# Patient Record
Sex: MALE | Race: WHITE | NOT HISPANIC OR LATINO | URBAN - METROPOLITAN AREA
[De-identification: names, ages, dates, MRNs, and addresses within clinical notes are randomized per-mention and may not be internally consistent; named-entity substitution may affect disease eponyms.]

---

## 2017-08-17 ENCOUNTER — EMERGENCY (EMERGENCY)
Facility: HOSPITAL | Age: 72
LOS: 1 days | Discharge: HOME | End: 2017-08-17
Payer: MEDICARE

## 2017-08-17 VITALS
TEMPERATURE: 98 F | HEART RATE: 77 BPM | DIASTOLIC BLOOD PRESSURE: 56 MMHG | SYSTOLIC BLOOD PRESSURE: 123 MMHG | HEIGHT: 66 IN | WEIGHT: 177.91 LBS | RESPIRATION RATE: 18 BRPM | OXYGEN SATURATION: 97 %

## 2017-08-17 PROCEDURE — 99284 EMERGENCY DEPT VISIT MOD MDM: CPT

## 2017-08-17 PROCEDURE — 99283 EMERGENCY DEPT VISIT LOW MDM: CPT | Mod: 25

## 2017-08-17 PROCEDURE — 96372 THER/PROPH/DIAG INJ SC/IM: CPT

## 2017-08-17 RX ORDER — DEXAMETHASONE 0.5 MG/5ML
10 ELIXIR ORAL ONCE
Qty: 0 | Refills: 0 | Status: DISCONTINUED | OUTPATIENT
Start: 2017-08-17 | End: 2017-08-17

## 2017-08-17 RX ORDER — DEXAMETHASONE 0.5 MG/5ML
2 ELIXIR ORAL ONCE
Qty: 0 | Refills: 0 | Status: COMPLETED | OUTPATIENT
Start: 2017-08-17 | End: 2017-08-17

## 2017-08-17 RX ADMIN — Medication 600 MILLIGRAM(S): at 06:54

## 2017-08-17 RX ADMIN — Medication 2 MILLIGRAM(S): at 06:54

## 2017-08-17 NOTE — ED PROVIDER NOTE - RIGHT EAR
benign ear exam, no external ear tenderness, no mastoid tenderness, TM clear, no erythema or bulging/TM clear

## 2017-08-17 NOTE — ED PROVIDER NOTE - ATTENDING CONTRIBUTION TO CARE
73 yo male with chronic ear pain currently being treated for an acute otitis by his PMD. I personally saw the patient with the PA, and completed the key components of the history and physical exam. I then discussed the management plan with the PA.

## 2017-08-17 NOTE — ED PROVIDER NOTE - PROGRESS NOTE DETAILS
Case d/w ED attending. Pt received Decadron IM to decrease mild throat inflammation and Mucinex po. Rx sent for Mucinex po. Pt advised to follow up with PMD within 2 days. Pt advised to return to ED w worsening symptoms, fevers/chills, dizziness, worsening ear pain or drainage. Pt advised to take prescribed meds to completion. NAD at discharge.

## 2017-08-17 NOTE — ED PROVIDER NOTE - OBJECTIVE STATEMENT
71 y/o male presents to ED w cc right ear pain. Pt reports that pain started ten days ago. Pt went to his PMD, was prescribed oral Cipro, Ofloxacin ophthalmic and Dexamethasone ophthalmic. Pt has been taking medications for 6 days. States pain has decreased from 10/10 to 4/10. Pt reports he feels nervous because he is leaving for [place] in one week and will be visiting there for three months. He came to ED due to he wants an injection that his PMD told him about. Pt admits associated symptom of mucous production and right sided throat pain. Pt denies fevers/chills, nausea/vomiting, hearing loss, ear drainage, headache, dizziness.

## 2017-08-17 NOTE — ED PROVIDER NOTE - THROAT FINDINGS
no exudate/no redness/uvula midline/no vesicles/TONSILLAR SWELLING/mild peritonsillar edema and erythema, no abscess,

## 2022-06-14 PROBLEM — E11.9 TYPE 2 DIABETES MELLITUS WITHOUT COMPLICATIONS: Chronic | Status: ACTIVE | Noted: 2017-08-17

## 2022-07-15 ENCOUNTER — APPOINTMENT (OUTPATIENT)
Dept: COLORECTAL SURGERY | Facility: CLINIC | Age: 77
End: 2022-07-15

## 2022-07-15 VITALS
HEART RATE: 81 BPM | WEIGHT: 126.25 LBS | DIASTOLIC BLOOD PRESSURE: 71 MMHG | SYSTOLIC BLOOD PRESSURE: 155 MMHG | OXYGEN SATURATION: 99 %

## 2022-07-15 DIAGNOSIS — F41.9 ANXIETY DISORDER, UNSPECIFIED: ICD-10-CM

## 2022-07-15 DIAGNOSIS — K64.5 PERIANAL VENOUS THROMBOSIS: ICD-10-CM

## 2022-07-15 DIAGNOSIS — Z78.9 OTHER SPECIFIED HEALTH STATUS: ICD-10-CM

## 2022-07-15 DIAGNOSIS — F32.A ANXIETY DISORDER, UNSPECIFIED: ICD-10-CM

## 2022-07-15 DIAGNOSIS — Z86.79 PERSONAL HISTORY OF OTHER DISEASES OF THE CIRCULATORY SYSTEM: ICD-10-CM

## 2022-07-15 DIAGNOSIS — Z86.39 PERSONAL HISTORY OF OTHER ENDOCRINE, NUTRITIONAL AND METABOLIC DISEASE: ICD-10-CM

## 2022-07-15 PROCEDURE — 99203 OFFICE O/P NEW LOW 30 MIN: CPT | Mod: 25

## 2022-07-15 PROCEDURE — 46600 DIAGNOSTIC ANOSCOPY SPX: CPT

## 2022-07-15 RX ORDER — BUSPIRONE HYDROCHLORIDE 10 MG/1
10 TABLET ORAL
Qty: 60 | Refills: 0 | Status: ACTIVE | COMMUNITY
Start: 2022-04-12

## 2022-07-15 RX ORDER — PANTOPRAZOLE 40 MG/1
40 TABLET, DELAYED RELEASE ORAL
Qty: 90 | Refills: 0 | Status: ACTIVE | COMMUNITY
Start: 2022-04-12

## 2022-07-15 RX ORDER — QUETIAPINE FUMARATE 100 MG/1
100 TABLET ORAL
Qty: 30 | Refills: 0 | Status: ACTIVE | COMMUNITY
Start: 2022-04-26

## 2022-07-15 RX ORDER — SERTRALINE HYDROCHLORIDE 100 MG/1
100 TABLET, FILM COATED ORAL
Qty: 30 | Refills: 0 | Status: ACTIVE | COMMUNITY
Start: 2022-04-12

## 2022-07-15 RX ORDER — GLIMEPIRIDE 4 MG/1
4 TABLET ORAL
Qty: 90 | Refills: 0 | Status: ACTIVE | COMMUNITY
Start: 2021-11-30

## 2022-07-15 RX ORDER — HYDROCORTISONE 10 MG/G
1 CREAM TOPICAL
Qty: 28 | Refills: 0 | Status: ACTIVE | COMMUNITY
Start: 2022-06-08

## 2022-07-15 RX ORDER — POLYETHYLENE GLYCOL-3350 AND ELECTROLYTES WITH FLAVOR PACK 240; 5.84; 2.98; 6.72; 22.72 G/278.26G; G/278.26G; G/278.26G; G/278.26G; G/278.26G
240 POWDER, FOR SOLUTION ORAL
Qty: 4000 | Refills: 0 | Status: COMPLETED | COMMUNITY
Start: 2022-04-08

## 2022-07-15 RX ORDER — AMOXICILLIN 500 MG/1
500 CAPSULE ORAL
Qty: 84 | Refills: 0 | Status: COMPLETED | COMMUNITY
Start: 2022-04-29

## 2022-07-15 RX ORDER — QUETIAPINE FUMARATE 50 MG/1
50 TABLET ORAL
Qty: 30 | Refills: 0 | Status: ACTIVE | COMMUNITY
Start: 2022-07-05

## 2022-07-15 RX ORDER — SIMVASTATIN 20 MG/1
20 TABLET, FILM COATED ORAL
Qty: 90 | Refills: 0 | Status: ACTIVE | COMMUNITY
Start: 2022-04-28

## 2022-07-15 RX ORDER — CLOBETASOL PROPIONATE 0.5 MG/G
0.05 OINTMENT TOPICAL
Qty: 120 | Refills: 0 | Status: ACTIVE | COMMUNITY
Start: 2022-03-25

## 2022-07-15 RX ORDER — FERROUS FUMARATE 324(106)MG
324 (106 FE) TABLET ORAL
Qty: 90 | Refills: 0 | Status: ACTIVE | COMMUNITY
Start: 2022-06-27

## 2022-07-15 RX ORDER — CLONAZEPAM 1 MG/1
1 TABLET ORAL
Qty: 38 | Refills: 0 | Status: ACTIVE | COMMUNITY
Start: 2022-03-29

## 2022-07-15 RX ORDER — SERTRALINE HYDROCHLORIDE 50 MG/1
50 TABLET, FILM COATED ORAL
Qty: 90 | Refills: 0 | Status: ACTIVE | COMMUNITY
Start: 2022-03-01

## 2022-07-15 RX ORDER — LISINOPRIL 40 MG/1
40 TABLET ORAL
Qty: 90 | Refills: 0 | Status: ACTIVE | COMMUNITY
Start: 2022-01-23

## 2022-07-15 RX ORDER — METFORMIN HYDROCHLORIDE 500 MG/1
500 TABLET, COATED ORAL
Qty: 180 | Refills: 0 | Status: ACTIVE | COMMUNITY
Start: 2022-01-23

## 2022-07-15 NOTE — REVIEW OF SYSTEMS
[As Noted in HPI] : as noted in HPI [Anxiety] : anxiety [Negative] : Heme/Lymph [FreeTextEntry2] : 40 pound weight loss over the past 4 years [FreeTextEntry7] : Dysphagia

## 2022-07-15 NOTE — CONSULT LETTER
[Dear  ___] : Dear  [unfilled], [Consult Letter:] : I had the pleasure of evaluating your patient, [unfilled]. [Please see my note below.] : Please see my note below. [Consult Closing:] : Thank you very much for allowing me to participate in the care of this patient.  If you have any questions, please do not hesitate to contact me. [Sincerely,] : Sincerely, [FreeTextEntry3] : Chava Chatman MD\par

## 2022-07-15 NOTE — HISTORY OF PRESENT ILLNESS
[FreeTextEntry1] : 76-year-old male who presents for consultation for perirectal lump.  He noticed it about 2 months ago and has gradually decreased in size.  He is quite nervous about the possibility of a malignancy.  He denies any pain or bleeding from the area.  He is having regular bowel movements without difficulty.  He has been using topical hydrocortisone to the area which helped.  He underwent a colonoscopy about 2 months ago which was reportedly normal.  Endoscopy at the time was also done and he was treated for his H. pylori which has since resolved.

## 2022-07-15 NOTE — PHYSICAL EXAM
[Excoriation] : no perianal excoriation [Fistula] : no fistulas [Normal] : was normal [None] : there was no rectal mass  [Gross Blood] : no gross blood [Respiratory Effort] : normal respiratory effort [Normal Rate and Rhythm] : normal rate and rhythm [Calm] : calm [de-identified] : Soft, nontender, nondistended.  No mass or hernias appreciated. [de-identified] : Enlarged prostate on NORMA [de-identified] : Grade 2 internal hemorrhoids [de-identified] : Very small resolving left external hemorrhoid [de-identified] :  in no distress [de-identified] : Normocephalic, atraumatic [de-identified] : Moves extremities without difficulty [de-identified] : Warm and dry [de-identified] : Alert and oriented x3

## 2022-09-12 ENCOUNTER — INPATIENT (INPATIENT)
Facility: HOSPITAL | Age: 77
LOS: 4 days | Discharge: HOME | DRG: 391 | End: 2022-09-17
Attending: INTERNAL MEDICINE | Admitting: STUDENT IN AN ORGANIZED HEALTH CARE EDUCATION/TRAINING PROGRAM
Payer: COMMERCIAL

## 2022-09-12 ENCOUNTER — OUTPATIENT (OUTPATIENT)
Dept: OUTPATIENT SERVICES | Facility: HOSPITAL | Age: 77
LOS: 1 days | End: 2022-09-12
Payer: COMMERCIAL

## 2022-09-12 VITALS
WEIGHT: 126.99 LBS | SYSTOLIC BLOOD PRESSURE: 119 MMHG | HEIGHT: 66 IN | RESPIRATION RATE: 18 BRPM | HEART RATE: 61 BPM | OXYGEN SATURATION: 100 % | DIASTOLIC BLOOD PRESSURE: 58 MMHG | TEMPERATURE: 98 F

## 2022-09-12 DIAGNOSIS — R13.10 DYSPHAGIA, UNSPECIFIED: ICD-10-CM

## 2022-09-12 DIAGNOSIS — R13.12 DYSPHAGIA, OROPHARYNGEAL PHASE: ICD-10-CM

## 2022-09-12 LAB
ALBUMIN SERPL ELPH-MCNC: 4.1 G/DL — SIGNIFICANT CHANGE UP (ref 3.3–5.2)
ALP SERPL-CCNC: 80 U/L — SIGNIFICANT CHANGE UP (ref 40–120)
ALT FLD-CCNC: 9 U/L — SIGNIFICANT CHANGE UP
ANION GAP SERPL CALC-SCNC: 13 MMOL/L — SIGNIFICANT CHANGE UP (ref 5–17)
AST SERPL-CCNC: 19 U/L — SIGNIFICANT CHANGE UP
BASOPHILS # BLD AUTO: 0.03 K/UL — SIGNIFICANT CHANGE UP (ref 0–0.2)
BASOPHILS NFR BLD AUTO: 0.6 % — SIGNIFICANT CHANGE UP (ref 0–2)
BILIRUB SERPL-MCNC: 0.3 MG/DL — LOW (ref 0.4–2)
BUN SERPL-MCNC: 42.4 MG/DL — HIGH (ref 8–20)
CALCIUM SERPL-MCNC: 9.4 MG/DL — SIGNIFICANT CHANGE UP (ref 8.4–10.5)
CHLORIDE SERPL-SCNC: 101 MMOL/L — SIGNIFICANT CHANGE UP (ref 98–107)
CO2 SERPL-SCNC: 20 MMOL/L — LOW (ref 22–29)
CREAT SERPL-MCNC: 1.45 MG/DL — HIGH (ref 0.5–1.3)
EGFR: 49 ML/MIN/1.73M2 — LOW
EOSINOPHIL # BLD AUTO: 0.07 K/UL — SIGNIFICANT CHANGE UP (ref 0–0.5)
EOSINOPHIL NFR BLD AUTO: 1.4 % — SIGNIFICANT CHANGE UP (ref 0–6)
GLUCOSE SERPL-MCNC: 177 MG/DL — HIGH (ref 70–99)
HCT VFR BLD CALC: 27.1 % — LOW (ref 39–50)
HGB BLD-MCNC: 8.5 G/DL — LOW (ref 13–17)
IMM GRANULOCYTES NFR BLD AUTO: 0.2 % — SIGNIFICANT CHANGE UP (ref 0–1.5)
LYMPHOCYTES # BLD AUTO: 1.47 K/UL — SIGNIFICANT CHANGE UP (ref 1–3.3)
LYMPHOCYTES # BLD AUTO: 29 % — SIGNIFICANT CHANGE UP (ref 13–44)
MAGNESIUM SERPL-MCNC: 1.7 MG/DL — SIGNIFICANT CHANGE UP (ref 1.6–2.6)
MCHC RBC-ENTMCNC: 26.2 PG — LOW (ref 27–34)
MCHC RBC-ENTMCNC: 31.4 GM/DL — LOW (ref 32–36)
MCV RBC AUTO: 83.4 FL — SIGNIFICANT CHANGE UP (ref 80–100)
MONOCYTES # BLD AUTO: 0.37 K/UL — SIGNIFICANT CHANGE UP (ref 0–0.9)
MONOCYTES NFR BLD AUTO: 7.3 % — SIGNIFICANT CHANGE UP (ref 2–14)
NEUTROPHILS # BLD AUTO: 3.12 K/UL — SIGNIFICANT CHANGE UP (ref 1.8–7.4)
NEUTROPHILS NFR BLD AUTO: 61.5 % — SIGNIFICANT CHANGE UP (ref 43–77)
PLATELET # BLD AUTO: 243 K/UL — SIGNIFICANT CHANGE UP (ref 150–400)
POTASSIUM SERPL-MCNC: 5.5 MMOL/L — HIGH (ref 3.5–5.3)
POTASSIUM SERPL-SCNC: 5.5 MMOL/L — HIGH (ref 3.5–5.3)
PROT SERPL-MCNC: 7 G/DL — SIGNIFICANT CHANGE UP (ref 6.6–8.7)
RBC # BLD: 3.25 M/UL — LOW (ref 4.2–5.8)
RBC # FLD: 15.4 % — HIGH (ref 10.3–14.5)
SODIUM SERPL-SCNC: 134 MMOL/L — LOW (ref 135–145)
WBC # BLD: 5.07 K/UL — SIGNIFICANT CHANGE UP (ref 3.8–10.5)
WBC # FLD AUTO: 5.07 K/UL — SIGNIFICANT CHANGE UP (ref 3.8–10.5)

## 2022-09-12 PROCEDURE — 70491 CT SOFT TISSUE NECK W/DYE: CPT | Mod: 26,MA

## 2022-09-12 PROCEDURE — 74230 X-RAY XM SWLNG FUNCJ C+: CPT

## 2022-09-12 PROCEDURE — 99223 1ST HOSP IP/OBS HIGH 75: CPT

## 2022-09-12 PROCEDURE — 99285 EMERGENCY DEPT VISIT HI MDM: CPT

## 2022-09-12 PROCEDURE — 74230 X-RAY XM SWLNG FUNCJ C+: CPT | Mod: 26

## 2022-09-12 RX ORDER — SODIUM BICARBONATE 1 MEQ/ML
50 SYRINGE (ML) INTRAVENOUS ONCE
Refills: 0 | Status: COMPLETED | OUTPATIENT
Start: 2022-09-12 | End: 2022-09-12

## 2022-09-12 RX ORDER — DEXTROSE 50 % IN WATER 50 %
50 SYRINGE (ML) INTRAVENOUS ONCE
Refills: 0 | Status: COMPLETED | OUTPATIENT
Start: 2022-09-12 | End: 2022-09-12

## 2022-09-12 RX ORDER — INSULIN HUMAN 100 [IU]/ML
10 INJECTION, SOLUTION SUBCUTANEOUS ONCE
Refills: 0 | Status: COMPLETED | OUTPATIENT
Start: 2022-09-12 | End: 2022-09-12

## 2022-09-12 NOTE — ED ADULT TRIAGE NOTE - CHIEF COMPLAINT QUOTE
pt c/o something stuck in his throat, unable to swallow anything, has a history of difficulty swallowing  A&Ox3, resp wnl

## 2022-09-12 NOTE — SWALLOW VFSS/MBS ASSESSMENT ADULT - SLP GENERAL OBSERVATIONS
Pt recd awake/upright in chair in radiology, A&A Ox4, reduced cognition, 0/10 pain pre/post, tolerating RA no overt distress, emotional support provided due to anxiety

## 2022-09-12 NOTE — SWALLOW VFSS/MBS ASSESSMENT ADULT - RECOMMENDED CONSISTENCY
NPO w/consideration for short-term non-oral alternative means of nutrition/hydration as per Pt/family wishes      ** Pt & wife ultimately deciding to refer to [facility] emergency room for further w/u. Pt/wife escorted to ED, report/information provided to triage

## 2022-09-12 NOTE — SWALLOW VFSS/MBS ASSESSMENT ADULT - DIAGNOSTIC IMPRESSIONS
Pt presents w/severe pharyngeal dysphagia for consistencies presented puree & thin liquids. Oral stage of swallow generally functional, intact PO acceptance w/functional bolus formation/cohesion/propulsion, no anterior loss or oral stasis appreciated.    Pharyngeal phase of swallow marked by incomplete/inconsistent epiglottic deflection, questionable reduced integrity of cricopharyngeus muscle- thus impeding UES opening, w/reduced upper/lower airway protection. Unable to ascertain potential structural defect (CP bar vs outpouching?), w/? abnormal movement/flow of PO through pharyngeal space (again difficult to ascertain structural issue vs ?visualization of air on fluoro. Almost incomplete progression of PO beyond UES, with substantial stasis remaining in pharynx despite multiple swallows, liquid wash or compensatory postures. Pt with clearance of stasis reducing to min-mod only following independent & cued expectoration/regurgitation of trials via oral cavity. Penetration contacting the vocal cords w/ultimate silent aspiration during & following the swallow on severe stasis, without clearance despite cued cough. Compensatory postures are ineffective at facilitating airway protection, continued airway entry of material visualized. Additional presence of degenerative changes ?osteophytes at level C2, C3, C4, however do not suspect to impact bolus progression as per radiologist.     Esophageal phase of swallow notable for mild retention/retrograde progression of all trials, below the UES, in upper cervical esophagus (observed w/small amount of PO that was able to pass beyond pharynx). Retrograde movement not re-occupying pharyngeal space.

## 2022-09-12 NOTE — ED PROVIDER NOTE - CONSTITUTIONAL, MLM
normal... Cachectic with facial muscle wasting, awake, alert, oriented to person, place, time/situation.

## 2022-09-12 NOTE — ED ADULT NURSE REASSESSMENT NOTE - NS ED NURSE REASSESS COMMENT FT1
Assumed care of patient at approx 19:15. Patient AxOx4. Patient pain/discomfort, denies CP/SOB. Patient on cardiac monitor, NSR noted. Patient has been updated on the plan of care. Patient offers no complaints at this time. No visible signs of acute distress noted, safety maintained.

## 2022-09-12 NOTE — ED PROVIDER NOTE - OBJECTIVE STATEMENT
78 y/o male with PMHx of DM presents with difficulty swallowing. Pt reports 1 year of progressive dysphagia. Initially with solids, progressed to liquids. Pt states he now chokes on swallowing just water. Pt had a 50lb weight loss over past year. Pt had barium swallow test by radiology and failed. No other complaints at this time.

## 2022-09-12 NOTE — SWALLOW VFSS/MBS ASSESSMENT ADULT - ADDITIONAL RECOMMENDATIONS
Rx full w/u to determine etiology of significant dysphagia   Rx ENT referral for direct visualization of upper airway   Rx GI due to retention/retrograde movement noted on fluoro  Rx neuro w/u   As per radiologist, consider CT soft tissue

## 2022-09-12 NOTE — SWALLOW VFSS/MBS ASSESSMENT ADULT - UNSUCCESSFUL STRATEGIES TRIALED DURING PROCEDURE
chin tuck/head turn to the right/head turn to the left/productive volitional cough following clinician cue chin tuck/head turn to the right/head turn to the left

## 2022-09-12 NOTE — SWALLOW VFSS/MBS ASSESSMENT ADULT - SLP PERTINENT HISTORY OF CURRENT PROBLEM
Pt is a 77 M w/reported hx of DM2 & anxiety (no further hx available in [facility] or [facility]). C/o dysphagia for ~1 year, indicating food "gets stuck" and does not pass, with ultimate regurgitation of PO throughout meals, admitting to significant weight loss in last year ~30 lbs. No neuro hx, stating has been evaluated by GI via endoscopy, indicating, "infection in stomach, received ABX". No prior dysphagia evaluation or treatment. No respiratory infection or PNA in recent.

## 2022-09-13 LAB
ANION GAP SERPL CALC-SCNC: 11 MMOL/L — SIGNIFICANT CHANGE UP (ref 5–17)
BUN SERPL-MCNC: 36.7 MG/DL — HIGH (ref 8–20)
CALCIUM SERPL-MCNC: 9.7 MG/DL — SIGNIFICANT CHANGE UP (ref 8.4–10.5)
CHLORIDE SERPL-SCNC: 104 MMOL/L — SIGNIFICANT CHANGE UP (ref 98–107)
CO2 SERPL-SCNC: 27 MMOL/L — SIGNIFICANT CHANGE UP (ref 22–29)
CREAT SERPL-MCNC: 1.32 MG/DL — HIGH (ref 0.5–1.3)
EGFR: 55 ML/MIN/1.73M2 — LOW
GLUCOSE BLDC GLUCOMTR-MCNC: 103 MG/DL — HIGH (ref 70–99)
GLUCOSE BLDC GLUCOMTR-MCNC: 113 MG/DL — HIGH (ref 70–99)
GLUCOSE BLDC GLUCOMTR-MCNC: 118 MG/DL — HIGH (ref 70–99)
GLUCOSE BLDC GLUCOMTR-MCNC: 121 MG/DL — HIGH (ref 70–99)
GLUCOSE BLDC GLUCOMTR-MCNC: 135 MG/DL — HIGH (ref 70–99)
GLUCOSE BLDC GLUCOMTR-MCNC: 136 MG/DL — HIGH (ref 70–99)
GLUCOSE BLDC GLUCOMTR-MCNC: 56 MG/DL — LOW (ref 70–99)
GLUCOSE BLDC GLUCOMTR-MCNC: 58 MG/DL — LOW (ref 70–99)
GLUCOSE BLDC GLUCOMTR-MCNC: 63 MG/DL — LOW (ref 70–99)
GLUCOSE BLDC GLUCOMTR-MCNC: 66 MG/DL — LOW (ref 70–99)
GLUCOSE BLDC GLUCOMTR-MCNC: 70 MG/DL — SIGNIFICANT CHANGE UP (ref 70–99)
GLUCOSE BLDC GLUCOMTR-MCNC: 80 MG/DL — SIGNIFICANT CHANGE UP (ref 70–99)
GLUCOSE SERPL-MCNC: 51 MG/DL — CRITICAL LOW (ref 70–99)
INR BLD: 1.12 RATIO — SIGNIFICANT CHANGE UP (ref 0.88–1.16)
MAGNESIUM SERPL-MCNC: 1.6 MG/DL — SIGNIFICANT CHANGE UP (ref 1.6–2.6)
POTASSIUM SERPL-MCNC: 4.4 MMOL/L — SIGNIFICANT CHANGE UP (ref 3.5–5.3)
POTASSIUM SERPL-SCNC: 4.4 MMOL/L — SIGNIFICANT CHANGE UP (ref 3.5–5.3)
PROTHROM AB SERPL-ACNC: 13 SEC — SIGNIFICANT CHANGE UP (ref 10.5–13.4)
SARS-COV-2 RNA SPEC QL NAA+PROBE: SIGNIFICANT CHANGE UP
SODIUM SERPL-SCNC: 141 MMOL/L — SIGNIFICANT CHANGE UP (ref 135–145)

## 2022-09-13 PROCEDURE — 99233 SBSQ HOSP IP/OBS HIGH 50: CPT

## 2022-09-13 PROCEDURE — 12345: CPT | Mod: NC

## 2022-09-13 PROCEDURE — 74176 CT ABD & PELVIS W/O CONTRAST: CPT | Mod: 26

## 2022-09-13 PROCEDURE — 71250 CT THORAX DX C-: CPT | Mod: 26

## 2022-09-13 PROCEDURE — 93010 ELECTROCARDIOGRAM REPORT: CPT

## 2022-09-13 RX ORDER — DEXTROSE 50 % IN WATER 50 %
12.5 SYRINGE (ML) INTRAVENOUS ONCE
Refills: 0 | Status: DISCONTINUED | OUTPATIENT
Start: 2022-09-13 | End: 2022-09-13

## 2022-09-13 RX ORDER — DEXTROSE 50 % IN WATER 50 %
12.5 SYRINGE (ML) INTRAVENOUS ONCE
Refills: 0 | Status: DISCONTINUED | OUTPATIENT
Start: 2022-09-13 | End: 2022-09-17

## 2022-09-13 RX ORDER — DEXTROSE 10 % IN WATER 10 %
250 INTRAVENOUS SOLUTION INTRAVENOUS
Refills: 0 | Status: COMPLETED | OUTPATIENT
Start: 2022-09-13 | End: 2022-09-13

## 2022-09-13 RX ORDER — DIAZEPAM 5 MG
2 TABLET ORAL
Refills: 0 | Status: DISCONTINUED | OUTPATIENT
Start: 2022-09-13 | End: 2022-09-16

## 2022-09-13 RX ORDER — GLUCAGON INJECTION, SOLUTION 0.5 MG/.1ML
1 INJECTION, SOLUTION SUBCUTANEOUS ONCE
Refills: 0 | Status: DISCONTINUED | OUTPATIENT
Start: 2022-09-13 | End: 2022-09-17

## 2022-09-13 RX ORDER — ONDANSETRON 8 MG/1
4 TABLET, FILM COATED ORAL EVERY 6 HOURS
Refills: 0 | Status: DISCONTINUED | OUTPATIENT
Start: 2022-09-13 | End: 2022-09-17

## 2022-09-13 RX ORDER — DEXTROSE 10 % IN WATER 10 %
125 INTRAVENOUS SOLUTION INTRAVENOUS
Refills: 0 | Status: COMPLETED | OUTPATIENT
Start: 2022-09-13 | End: 2022-09-13

## 2022-09-13 RX ORDER — SODIUM CHLORIDE 9 MG/ML
1000 INJECTION, SOLUTION INTRAVENOUS
Refills: 0 | Status: DISCONTINUED | OUTPATIENT
Start: 2022-09-13 | End: 2022-09-13

## 2022-09-13 RX ORDER — DEXTROSE 50 % IN WATER 50 %
25 SYRINGE (ML) INTRAVENOUS ONCE
Refills: 0 | Status: DISCONTINUED | OUTPATIENT
Start: 2022-09-13 | End: 2022-09-17

## 2022-09-13 RX ORDER — SODIUM CHLORIDE 9 MG/ML
1000 INJECTION INTRAMUSCULAR; INTRAVENOUS; SUBCUTANEOUS
Refills: 0 | Status: DISCONTINUED | OUTPATIENT
Start: 2022-09-13 | End: 2022-09-13

## 2022-09-13 RX ORDER — DEXTROSE 50 % IN WATER 50 %
15 SYRINGE (ML) INTRAVENOUS ONCE
Refills: 0 | Status: DISCONTINUED | OUTPATIENT
Start: 2022-09-13 | End: 2022-09-17

## 2022-09-13 RX ORDER — SODIUM CHLORIDE 9 MG/ML
1000 INJECTION, SOLUTION INTRAVENOUS
Refills: 0 | Status: DISCONTINUED | OUTPATIENT
Start: 2022-09-13 | End: 2022-09-16

## 2022-09-13 RX ADMIN — Medication 1000 MILLILITER(S): at 09:56

## 2022-09-13 RX ADMIN — SODIUM CHLORIDE 75 MILLILITER(S): 9 INJECTION INTRAMUSCULAR; INTRAVENOUS; SUBCUTANEOUS at 06:07

## 2022-09-13 RX ADMIN — SODIUM CHLORIDE 75 MILLILITER(S): 9 INJECTION, SOLUTION INTRAVENOUS at 11:07

## 2022-09-13 RX ADMIN — SODIUM CHLORIDE 75 MILLILITER(S): 9 INJECTION, SOLUTION INTRAVENOUS at 20:55

## 2022-09-13 RX ADMIN — Medication 50 MILLIEQUIVALENT(S): at 00:16

## 2022-09-13 RX ADMIN — Medication 50 MILLILITER(S): at 00:17

## 2022-09-13 RX ADMIN — Medication 2 MILLIGRAM(S): at 21:53

## 2022-09-13 RX ADMIN — INSULIN HUMAN 10 UNIT(S): 100 INJECTION, SOLUTION SUBCUTANEOUS at 00:17

## 2022-09-13 RX ADMIN — SODIUM CHLORIDE 24 MILLILITER(S): 9 INJECTION, SOLUTION INTRAVENOUS at 07:40

## 2022-09-13 RX ADMIN — Medication 8.33 MILLILITER(S): at 07:10

## 2022-09-13 NOTE — H&P ADULT - HISTORY OF PRESENT ILLNESS
77yoM hx DM, HLD, CKD presenting with several months hx of progressive dysphagia initially to solid foods, now to liquids who was advised to seek hospital admission after failed barium swallow test.  Pt reports outpatient evaluation with ?laryngoscopy and also endoscopy, with the former study reportedly being normal and the latter study showing a ?stomach infection that was treated with antibiotics, however per pt neither provided an explanation for dysphagia.  Pt attempts to use soft and liquid diet but persistently coughs afterwards.  He reports about 50lb weight loss over the past year.  Admission CT neck w/ IV contrast w/out acute findings.

## 2022-09-13 NOTE — H&P ADULT - NSHPPHYSICALEXAM_GEN_ALL_CORE
Vital Signs Last 24 Hrs  T(C): 36.7 (13 Sep 2022 00:37), Max: 36.7 (13 Sep 2022 00:37)  T(F): 98.1 (13 Sep 2022 00:37), Max: 98.1 (13 Sep 2022 00:37)  HR: 78 (13 Sep 2022 00:37) (61 - 78)  BP: 124/77 (13 Sep 2022 00:37) (119/58 - 124/77)  BP(mean): --  RR: 18 (13 Sep 2022 00:37) (18 - 18)  SpO2: 99% (13 Sep 2022 00:37) (99% - 100%)    Parameters below as of 13 Sep 2022 00:37  Patient On (Oxygen Delivery Method): room air    GENERAL:  Well-appearing elderly male, not in acute distress  EYES:  Clear conjunctiva, extraocular movement intact  ENT: Moist mucous membranes  RESP:  Non-labored breathing pattern, lungs clear to ausculation   CV: Regular rate and rhythm, no murmurs appreciated, no lower extremity edema  GI: Soft, non-tender, non-distended  NEURO: Awake, alert, conversant, upper and lower extremity strength 5/5, light touch sensation grossly intact  PSYCH: Sad affect, cooperative  SKIN: No rash or lesions, warm and dry

## 2022-09-13 NOTE — H&P ADULT - NSHPADDITIONALINFOADULT_GEN_ALL_CORE
Addendum- changed IVF from NS to D5 + NS for hypoglycemia with FS of 56. Changed pt from any bed to step down for q2hr FS until FS >100 x 2 at which point FS can be de-escalated to FS q4-6hr.  Pharmacy does not have dextrose pushes so will supplement with D10 125cc to be given over 15 minutes as per pharmacy.  Repeat FS after interventions started, most recent FS is 113.

## 2022-09-13 NOTE — H&P ADULT - VTE RISK ASSESSMENT
VTE Assessment already completed for this visit H Plasty Text: Given the location of the defect, shape of the defect and the proximity to free margins a H-plasty was deemed most appropriate for repair.  Using a sterile surgical marker, the appropriate advancement arms of the H-plasty were drawn incorporating the defect and placing the expected incisions within the relaxed skin tension lines where possible. The area thus outlined was incised deep to adipose tissue with a #15 scalpel blade. The skin margins were undermined to an appropriate distance in all directions utilizing iris scissors.  The opposing advancement arms were then advanced into place in opposite direction and anchored with interrupted buried subcutaneous sutures.

## 2022-09-13 NOTE — H&P ADULT - ASSESSMENT
77yoM hx DM, HLD, CKD presenting with several months hx of progressive dysphagia initially to solid foods, now to liquids who was advised to seek hospital admission after failed barium swallow test    Dysphagia  -CT neck w/out structural abnormality  -Pt reports previous laryngoscopy and endoscopy that did not reveal etiology  -?Due to esophageal motility disorder  -Strict NPO  -HOB elevation  -Aspiration precautions   -Maintenance NS at 75 cc/hr x 14hr  -Hypoglycemia protocol with FS q6hr  -GI consulted, suspect need for PEG given aspiration risk and to help support nutritional requirements    CKD complicated by hyperkalemia  -Cr around baseline which appears to be 1.4 to 1.7 based on outpatient records  -Received insulin/dextrose, NaHCO3 by ED  -Repeat BMP in AM  -No EKG performed in ED, ordered, please follow up  -Telemetry monitoring    Hx DM   -Holding metformin   -Hypoglycemia protocol with FS q6hr as above    Hx HLD  -Holding simvastatin 10mg daily as NPO    Anxiety disorder with insomnia  -Holding buspirone 10mg and quetiapine mg QHS given above  -On clonazepam, will supplement with IV valium 2mg QHS with holding parameters    Prophylactic measure  -No pharmacologic AC in event of GI procedure i.e. PEG Interpolation Flap Text: A decision was made to reconstruct the defect utilizing an interpolation axial flap and a staged reconstruction.  A telfa template was made of the defect.  This telfa template was then used to outline the interpolation flap.  The donor area for the pedicle flap was then injected with anesthesia.  The flap was excised through the skin and subcutaneous tissue down to the layer of the underlying musculature.  The interpolation flap was carefully excised within this deep plane to maintain its blood supply.  The edges of the donor site were undermined.   The donor site was closed in a primary fashion.  The pedicle was then rotated into position and sutured.  Once the tube was sutured into place, adequate blood supply was confirmed with blanching and refill.  The pedicle was then wrapped with xeroform gauze and dressed appropriately with a telfa and gauze bandage to ensure continued blood supply and protect the attached pedicle.

## 2022-09-13 NOTE — H&P ADULT - NSHPLABSRESULTS_GEN_ALL_CORE
09-13    141  |  104  |  36.7<H>  ----------------------------<  51<LL>  4.4   |  27.0  |  1.32<H>    Ca    9.7      13 Sep 2022 04:21  Mg     1.6     09-13    TPro  7.0  /  Alb  4.1  /  TBili  0.3<L>  /  DBili  x   /  AST  19  /  ALT  9   /  AlkPhos  80  09-12                            8.5    5.07  )-----------( 243      ( 12 Sep 2022 16:45 )             27.1

## 2022-09-13 NOTE — PROGRESS NOTE ADULT - ASSESSMENT
77yoM hx DM, HLD, CKD presenting with several months hx of progressive dysphagia initially to solid foods, now to liquids who was advised to seek hospital admission after failed barium swallow test.Pt states he is following with gi out pt,has egd/colonoscopy 3 months ago was stable.    Dysphagia  -CT neck w/out structural abnormality  -Pt reports previous laryngoscopy and endoscopy that did not reveal etiology  -?Due to esophageal motility disorder  -Strict NPO  -Aspiration precautions   -iv fluid   -Hypoglycemia protocol with FS q6hr  -GI consulted, suspect need for PEG given aspiration risk and to help support nutritional requirements    CKD complicated by hyperkalemia  -Cr around baseline which appears to be 1.4 to 1.7 based on outpatient records  -Received insulin/dextrose, NaHCO3 by ED  -k 4.4 am  -No EKG performed in ED, ordered, please follow up  -Telemetry monitoring    Hypoglycemia,hx DM   -npo   -s/p replacement per hypoglycemia protocol  -close monitor bg  -Hypoglycemia protocol     Hx HLD  -Holding simvastatin 10mg daily as NPO    Anxiety disorder with insomnia  -Holding buspirone 10mg and quetiapine mg QHS given above  -On clonazepam, will supplement with IV valium 2mg QHS with holding parameters    Prophylactic measure  -No pharmacologic AC in event of GI procedure i.e. PEG  -scd's    care of plan dw pt  paula rn

## 2022-09-14 DIAGNOSIS — R13.10 DYSPHAGIA, UNSPECIFIED: ICD-10-CM

## 2022-09-14 LAB
ALBUMIN SERPL ELPH-MCNC: 3.6 G/DL — SIGNIFICANT CHANGE UP (ref 3.3–5.2)
ALP SERPL-CCNC: 72 U/L — SIGNIFICANT CHANGE UP (ref 40–120)
ALT FLD-CCNC: 7 U/L — SIGNIFICANT CHANGE UP
ANION GAP SERPL CALC-SCNC: 11 MMOL/L — SIGNIFICANT CHANGE UP (ref 5–17)
AST SERPL-CCNC: 11 U/L — SIGNIFICANT CHANGE UP
BILIRUB SERPL-MCNC: 0.3 MG/DL — LOW (ref 0.4–2)
BUN SERPL-MCNC: 26.3 MG/DL — HIGH (ref 8–20)
CALCIUM SERPL-MCNC: 9 MG/DL — SIGNIFICANT CHANGE UP (ref 8.4–10.5)
CHLORIDE SERPL-SCNC: 107 MMOL/L — SIGNIFICANT CHANGE UP (ref 98–107)
CO2 SERPL-SCNC: 23 MMOL/L — SIGNIFICANT CHANGE UP (ref 22–29)
CREAT SERPL-MCNC: 1.17 MG/DL — SIGNIFICANT CHANGE UP (ref 0.5–1.3)
EGFR: 64 ML/MIN/1.73M2 — SIGNIFICANT CHANGE UP
GLUCOSE BLDC GLUCOMTR-MCNC: 143 MG/DL — HIGH (ref 70–99)
GLUCOSE BLDC GLUCOMTR-MCNC: 145 MG/DL — HIGH (ref 70–99)
GLUCOSE BLDC GLUCOMTR-MCNC: 146 MG/DL — HIGH (ref 70–99)
GLUCOSE BLDC GLUCOMTR-MCNC: 150 MG/DL — HIGH (ref 70–99)
GLUCOSE SERPL-MCNC: 138 MG/DL — HIGH (ref 70–99)
HCT VFR BLD CALC: 23.9 % — LOW (ref 39–50)
HCT VFR BLD CALC: 25.5 % — LOW (ref 39–50)
HGB BLD-MCNC: 7.8 G/DL — LOW (ref 13–17)
HGB BLD-MCNC: 8 G/DL — LOW (ref 13–17)
MAGNESIUM SERPL-MCNC: 1.6 MG/DL — SIGNIFICANT CHANGE UP (ref 1.6–2.6)
MCHC RBC-ENTMCNC: 26.1 PG — LOW (ref 27–34)
MCHC RBC-ENTMCNC: 26.7 PG — LOW (ref 27–34)
MCHC RBC-ENTMCNC: 31.4 GM/DL — LOW (ref 32–36)
MCHC RBC-ENTMCNC: 32.6 GM/DL — SIGNIFICANT CHANGE UP (ref 32–36)
MCV RBC AUTO: 81.8 FL — SIGNIFICANT CHANGE UP (ref 80–100)
MCV RBC AUTO: 83.3 FL — SIGNIFICANT CHANGE UP (ref 80–100)
PLATELET # BLD AUTO: 211 K/UL — SIGNIFICANT CHANGE UP (ref 150–400)
PLATELET # BLD AUTO: 216 K/UL — SIGNIFICANT CHANGE UP (ref 150–400)
POTASSIUM SERPL-MCNC: 4.4 MMOL/L — SIGNIFICANT CHANGE UP (ref 3.5–5.3)
POTASSIUM SERPL-SCNC: 4.4 MMOL/L — SIGNIFICANT CHANGE UP (ref 3.5–5.3)
PROT SERPL-MCNC: 6.2 G/DL — LOW (ref 6.6–8.7)
RBC # BLD: 2.92 M/UL — LOW (ref 4.2–5.8)
RBC # BLD: 3.06 M/UL — LOW (ref 4.2–5.8)
RBC # FLD: 15.5 % — HIGH (ref 10.3–14.5)
RBC # FLD: 15.6 % — HIGH (ref 10.3–14.5)
SODIUM SERPL-SCNC: 140 MMOL/L — SIGNIFICANT CHANGE UP (ref 135–145)
WBC # BLD: 5.25 K/UL — SIGNIFICANT CHANGE UP (ref 3.8–10.5)
WBC # BLD: 5.29 K/UL — SIGNIFICANT CHANGE UP (ref 3.8–10.5)
WBC # FLD AUTO: 5.25 K/UL — SIGNIFICANT CHANGE UP (ref 3.8–10.5)
WBC # FLD AUTO: 5.29 K/UL — SIGNIFICANT CHANGE UP (ref 3.8–10.5)

## 2022-09-14 PROCEDURE — 31505 DIAGNOSTIC LARYNGOSCOPY: CPT

## 2022-09-14 PROCEDURE — 70450 CT HEAD/BRAIN W/O DYE: CPT | Mod: 26

## 2022-09-14 PROCEDURE — 99024 POSTOP FOLLOW-UP VISIT: CPT

## 2022-09-14 PROCEDURE — 99233 SBSQ HOSP IP/OBS HIGH 50: CPT

## 2022-09-14 RX ORDER — INFLUENZA VIRUS VACCINE 15; 15; 15; 15 UG/.5ML; UG/.5ML; UG/.5ML; UG/.5ML
0.7 SUSPENSION INTRAMUSCULAR ONCE
Refills: 0 | Status: DISCONTINUED | OUTPATIENT
Start: 2022-09-14 | End: 2022-09-17

## 2022-09-14 RX ADMIN — Medication 2 MILLIGRAM(S): at 23:29

## 2022-09-14 NOTE — PATIENT PROFILE ADULT - TOBACCO USE

## 2022-09-14 NOTE — PROGRESS NOTE ADULT - ASSESSMENT
77yoM hx DM, HLD, CKD presenting with several months hx of progressive dysphagia initially to solid foods, now to liquids who was advised to seek hospital admission after failed Modified barium swallow test 09/12/22 as out pt , pt was seen by GI as out pt and had work up done was negative per pt..Pt states he is following with gi out pt,has egd /colonoscopy 3 months ago was stable.CT neck/chest/abd pelvis stable. Per GI no further work rec, rec ENT/neuro eval.    Dysphagia  -Failed out MBS,speech rec strict npo  -CT neck w/out structural abnormality  -Pt reports previous laryngoscopy and endoscopy that did not reveal etiology  -Pt states he is following with gi out pt,has egd /colonoscopy 3 months ago was stable.CT neck/chest/abd pelvis stable. Per GI no further work rec, rec ENT/neuro eval.  -?Due to esophageal motility disorder,will dw GI  -Strict NPO  -Aspiration precautions   -iv fluid   -Hypoglycemia protocol with FS q6hr  -spoke with GI  suspect need for PEG given aspiration risk and to help support nutritional requirements, as pt has progressive wt loss    CKD complicated by hyperkalemia  -Cr around baseline which appears to be 1.4 to 1.7 based on outpatient records  -Received insulin/dextrose, NaHCO3 by ED  -k 4.4 am  -No EKG performed in ED, ordered, please follow up  -Telemetry monitoring    Hypoglycemia,hx DM   -npo   -s/p replacement per hypoglycemia protocol  -close monitor bg  -Hypoglycemia protocol     Hx HLD  -Holding simvastatin 10mg daily as NPO    Anxiety disorder with insomnia  -Holding buspirone 10mg and quetiapine mg QHS given above  -On clonazepam, will supplement with IV valium 2mg QHS with holding parameters    Prophylactic measure  -No pharmacologic AC in event of GI procedure i.e. PEG  -scd's    care of plan dw pt  spoke with Speech/gi  dw rn 77yoM hx DM, HLD, CKD presenting with several months hx of progressive dysphagia initially to solid foods, now to liquids who was advised to seek hospital admission after failed Modified barium swallow test 09/12/22 as out pt , pt was seen by GI as out pt and had work up done was negative per pt..Pt states he is following with gi out pt,has egd /colonoscopy 3 months ago was stable.CT neck/chest/abd pelvis stable. Per GI no further work rec, rec ENT/neuro eval.    Dysphagia  -Failed out MBS,speech rec strict npo  -CT neck w/out structural abnormality  -Pt reports previous laryngoscopy and endoscopy that did not reveal etiology  -Pt states he is following with gi out pt,has egd /colonoscopy 3 months ago was stable.CT neck/chest/abd pelvis stable. Per GI no further work rec, rec ENT/neuro eval.  -?Due to esophageal motility disorder,will dw GI  -Strict NPO  -Aspiration precautions   -iv fluid   -Hypoglycemia protocol with FS q6hr  -spoke with GI  suspect need for PEG given aspiration risk and to help support nutritional requirements, as pt has progressive wt loss  -ct head     CKD complicated by hyperkalemia  -Cr around baseline which appears to be 1.4 to 1.7 based on outpatient records  -Received insulin/dextrose, NaHCO3 by ED  -k 4.4 am,cr 1.17  -No EKG performed in ED, ordered, please follow up  -Telemetry monitoring    Hypoglycemia,hx DM   -npo   -s/p replacement per hypoglycemia protocol  -close monitor bg  -Hypoglycemia protocol     Hx HLD  -Holding simvastatin 10mg daily as NPO    Anxiety disorder with insomnia  -Holding buspirone 10mg and quetiapine mg QHS given above  -On clonazepam, will supplement with IV valium 2mg QHS with holding parameters    Prophylactic measure  -No pharmacologic AC in event of GI procedure i.e. PEG  -scd's    care of plan dw pt  called wife --no voicemaill, unable to reach  spoke with Speech/gi  dw rn

## 2022-09-14 NOTE — CONSULT NOTE ADULT - SUBJECTIVE AND OBJECTIVE BOX
HISTORY OF PRESENT ILLNESS: 77 year old male with a history of DM (a1c 6.5), HLD and Anxiety who was admitted after a failed MBS performed for progressive dysphagia. He reports a 50lb weight loss in the last 1-2 years. He reports progressive dysphagia to solids. He states that he is able to tolerate liquids but reports coughing with a large quantity of fluids.   He denies reflux symptoms or history of food impactions. He denies reflux symptoms or abdominal pain.   According to his family at home he is able to tolerate regular food as long as he takes small bites and chews thoroughly.     His last EGD 4/2022 report was provided by the patient's family and reviewed. EGD performed by Dr. Cheikh John Paul Ba with a normal esophagus and stomach Gastric biopsies were obtained and per patient found to be positive for a bacteria (presumably h pylori) for which he was treated with antibiotics and stool testing upon completion confirmed eradications. Colonoscopy also performed at that time notable for small polyps.    ROS: A 14-point review of systems was completed and was otherwise negative save what was reported in the HPI.    PAST MEDICAL/SURGICAL HISTORY:  Diabetes  Hyperlipidemia  Anxiety with depression  Insomnia    No significant past surgical history      SOCIAL HISTORY:  - TOBACCO: Denies  - ALCOHOL: Denies  - ILLICIT DRUG USE: Denies    FAMILY HISTORY:  No known history of gastrointestinal or liver disease;  No pertinent family history in first degree relatives        HOME MEDICATIONS:  busPIRone 10 mg oral tablet: 1 tab(s) orally once a day (13 Sep 2022 07:36)  clonazePAM 0.5 mg oral tablet: 1 tab(s) orally once a day (13 Sep 2022 07:36)  metFORMIN 500 mg oral tablet: 1 tab(s) orally 2 times a day (13 Sep 2022 07:36)  QUEtiapine 50 mg oral tablet: 1 tab(s) orally once a day (at bedtime) (13 Sep 2022 07:36)  simvastatin 10 mg oral tablet: 1 tab(s) orally once a day (at bedtime) (13 Sep 2022 07:36)    INPATIENT MEDICATIONS:  MEDICATIONS  (STANDING):  dextrose 5% + sodium chloride 0.9%. 1000 milliLiter(s) (24 mL/Hr) IV Continuous <Continuous>  dextrose 50% Injectable 25 Gram(s) IV Push once  dextrose 50% Injectable 12.5 Gram(s) IV Push once  dextrose 50% Injectable 25 Gram(s) IV Push once  dextrose Oral Gel 15 Gram(s) Oral once  diazepam  Injectable 2 milliGRAM(s) IV Push <User Schedule>  glucagon  Injectable 1 milliGRAM(s) IntraMuscular once    MEDICATIONS  (PRN):  ondansetron Injectable 4 milliGRAM(s) IV Push every 6 hours PRN Nausea and/or Vomiting    ALLERGIES:  No Known Allergies    T(C): 36.6 (09-13-22 @ 07:58), Max: 36.7 (09-13-22 @ 00:37)  HR: 74 (09-13-22 @ 07:58) (61 - 78)  BP: 154/71 (09-13-22 @ 07:58) (119/58 - 154/71)  RR: 18 (09-13-22 @ 07:58) (18 - 18)  SpO2: 100% (09-13-22 @ 07:58) (99% - 100%)      PHYSICAL EXAM:  Constitutional: in no apparent distress  Eyes: Sclerae anicteric, conjunctivae normal  ENMT: Mucus membranes moist, no oropharyngeal thrush noted  Respiratory: Breathing nonlabored; clear to auscultation  Cardiovascular: Regular rate and rhythm  Gastrointestinal: Soft, nontender, nondistended, normoactive bowel sounds; no hepatosplenomegaly appreciated; no rebound tenderness or involuntary guarding  Extremities: No edema  Neurological: Alert and oriented to person, place and time;  Skin: No jaundice  Musculoskeletal: No significant peripheral atrophy  Psychiatric: Affect and mood appropriate      LABS:             8.5    5.07  )-----------( 243      ( 09-12 @ 16:45 )             27.1       PT/INR - ( 13 Sep 2022 04:21 )   PT: 13.0 sec;   INR: 1.12 ratio           09-13    141  |  104  |  36.7<H>  ----------------------------<  51<LL>  4.4   |  27.0  |  1.32<H>    Ca    9.7      13 Sep 2022 04:21  Mg     1.6     09-13    TPro  7.0  /  Alb  4.1  /  TBili  0.3<L>  /  DBili  x   /  AST  19  /  ALT  9   /  AlkPhos  80  09-12    LIVER FUNCTIONS - ( 12 Sep 2022 16:45 )  Alb: 4.1 g/dL / Pro: 7.0 g/dL / ALK PHOS: 80 U/L / ALT: 9 U/L / AST: 19 U/L /           IMAGING: I personally reviewed the XXXX, and I agree with the radiologist's interpretation as described below:   ACC: [id number] EXAM:  CT NECK SOFT TISSUE IC                          PROCEDURE DATE:  09/12/2022          INTERPRETATION:  VRAD Exam: CT Neck With Contrast  Exam date and time: 9/12/2022 7:16 PM  Age: 77 years old Clinical indication: Neck pain and other: Dysphagia    TECHNIQUE:  Imaging protocol: Computed tomography of the neck with contrast.    Contrast material: OMNI 350; Contrast volume: 84 ml; Contrast route:   INTRAVENOUS (IV);    COMPARISON: RF XR BARIUM SWALLOW WITH CINE 9/12/2022 2:11 PM    FINDINGS:  Pharynx: Unremarkable. No significant tonsillar enlargement.  Larynx: Unremarkable. Epiglottis is normal.  Prevertebral and retropharyngeal spaces: Unremarkable.  Salivary glands: Unremarkable. Glands are normal in size.  Thyroid: Unremarkable. No enlarged or calcified nodules.  Lymph nodes: Unremarkable. No lymphadenopathy.    Trachea: Visualized trachea is unremarkable.  Lungs: Unremarkable as visualized.  Bones/joints: Unremarkable. No acute fracture.  Soft tissues: Unremarkable. No significant soft tissue swelling.    IMPRESSION:  No acute findings. Preliminary report provided by [facility] PARRISH.    --- End of Report ---            ALONDRA TOURE MD; Attending Radiologist  This document has been electronically signed. Sep 13 2022  9:30AM        
CC: difficulty swallowing    HPI: 77yoM hx DM, HLD, CKD presenting with several months hx of progressive dysphagia initially to solid foods, now to liquids who was advised to seek hospital admission after failed barium swallow test.  Pt reports outpatient evaluation with ?laryngoscopy and also endoscopy, with the former study reportedly being normal and the latter study showing a ?stomach infection that was treated with antibiotics, however per pt neither provided an explanation for dysphagia. Pt attempts to use soft and liquid diet but persistently coughs afterwards.  He reports about 50lb weight loss over the past year.  Admission CT neck w/ IV contrast w/out acute findings.     Pt reports never seeing an ENT  Denies throat pain, hoarseness, smoking hx, throat tightness.   Pt reports the swallowing difficulty as "food goes down but then gets stuck"    PAST MEDICAL & SURGICAL HISTORY:  Diabetes      Hyperlipidemia      Anxiety with depression      Insomnia      No significant past surgical history        Allergies    No Known Allergies    Intolerances      MEDICATIONS  (STANDING):  dextrose 5% + sodium chloride 0.9%. 1000 milliLiter(s) (75 mL/Hr) IV Continuous <Continuous>  dextrose 50% Injectable 25 Gram(s) IV Push once  dextrose 50% Injectable 12.5 Gram(s) IV Push once  dextrose 50% Injectable 25 Gram(s) IV Push once  dextrose Oral Gel 15 Gram(s) Oral once  diazepam  Injectable 2 milliGRAM(s) IV Push <User Schedule>  glucagon  Injectable 1 milliGRAM(s) IntraMuscular once    MEDICATIONS  (PRN):  ondansetron Injectable 4 milliGRAM(s) IV Push every 6 hours PRN Nausea and/or Vomiting      Social History: Denies hx of smoking, heavy EtOH use or illicit drug use    Family history:   No pertinent family history in first degree relatives        ROS:   ENT: all negative except as noted in HPI   Skin: No itching, dryness, rash, changes to hair, or skin masses  CV: denies palpitations  Pulm: denies SOB, cough, hemoptysis  GI: denies change in appetite, indigestion, n/v  : denies pertinent urinary symptoms, urgency  Neuro: denies numbness/tingling, loss of sensation  Psych: denies anxiety  MS: denies muscle weakness, instability  Heme: denies easy bruising or bleeding  Endo: denies heat/cold intolerance, excessive sweating  Vascular: denies LE edema    Vital Signs Last 24 Hrs  T(C): 36.9 (14 Sep 2022 12:18), Max: 37.1 (14 Sep 2022 04:00)  T(F): 98.5 (14 Sep 2022 12:18), Max: 98.8 (14 Sep 2022 04:00)  HR: 65 (14 Sep 2022 08:00) (58 - 65)  BP: 111/49 (14 Sep 2022 08:00) (111/49 - 142/69)  BP(mean): 77 (14 Sep 2022 04:00) (77 - 91)  RR: 14 (14 Sep 2022 08:00) (13 - 18)  SpO2: 100% (14 Sep 2022 08:00) (97% - 100%)    Parameters below as of 14 Sep 2022 08:00  Patient On (Oxygen Delivery Method): room air                              8.0    5.25  )-----------( 211      ( 14 Sep 2022 10:46 )             25.5    09-14    140  |  107  |  26.3<H>  ----------------------------<  138<H>  4.4   |  23.0  |  1.17    Ca    9.0      14 Sep 2022 04:50  Mg     1.6     09-14    TPro  6.2<L>  /  Alb  3.6  /  TBili  0.3<L>  /  DBili  x   /  AST  11  /  ALT  7   /  AlkPhos  72  09-14   PT/INR - ( 13 Sep 2022 04:21 )   PT: 13.0 sec;   INR: 1.12 ratio             PHYSICAL EXAM:  Gen: NAD, strong voice  Skin: No rashes, bruises, or lesions  Head: Normocephalic, Atraumatic  Face: no edema, erythema, or fluctuance. Parotid glands soft without mass  Eyes: no scleral injection  Nose: Nares bilaterally patent, no discharge  Mouth: No Stridor / Drooling / Trismus.  Mucosa moist, tongue/uvula midline, poor dentition  Neck: Flat, supple, no lymphadenopathy, trachea midline, no masses  Lymphatic: No lymphadenopathy  Resp: breathing easily, no stridor, on room air  CV: no peripheral edema/cyanosis  GI: nondistended   Peripheral vascular: no JVD or edema  Neuro: facial nerve intact, no facial droop      Fiberoptic Indirect laryngoscopy:    Reason for Laryngoscopy: Dysphagia    Patient was unable to cooperate with mirror.  +Copious thick secretions. Nasopharynx, oropharynx, and hypopharynx clear, no bleeding. Tongue base, posterior pharyngeal wall, vallecula, epiglottis, and subglottis appear normal. No erythema, edema, masses or lesions. Airway patent, no foreign body visualized. No glottic/supraglottic edema. True vocal cords, arytenoids, vestibular folds, ventricles, pyriform sinuses, and aryepiglottic folds appear normal bilaterally. Vocal cords mobile with good contact b/l.    IMAGING/ADDITIONAL STUDIES:     ACC: [id number] EXAM:  CT NECK SOFT TISSUE IC                          PROCEDURE DATE:  09/12/2022          INTERPRETATION:  VRAD Exam: CT Neck With Contrast  Exam date and time: 9/12/2022 7:16 PM  Age: 77 years old Clinical indication: Neck pain andother: Dysphagia    TECHNIQUE:  Imaging protocol: Computed tomography of the neck with contrast.    Contrast material: OMNI 350; Contrast volume: 84 ml; Contrast route:   INTRAVENOUS (IV);    COMPARISON: RF XR BARIUM SWALLOW WITH CINE 9/12/2022 2:11 PM    FINDINGS:  Pharynx: Unremarkable. No significant tonsillar enlargement.  Larynx: Unremarkable. Epiglottis is normal.  Prevertebral and retropharyngeal spaces: Unremarkable.  Salivary glands: Unremarkable. Glands are normal in size.  Thyroid: Unremarkable. No enlarged or calcified nodules.  Lymph nodes: Unremarkable. No lymphadenopathy.    Trachea: Visualized trachea is unremarkable.  Lungs: Unremarkable as visualized.  Bones/joints: Unremarkable. No acute fracture.  Soft tissues: Unremarkable. No significant soft tissue swelling.    IMPRESSION:  No acute findings. Preliminary report provided by [facility] PARRISH.    --- End of Report ---      ALONDRA TOURE MD; Attending Radiologist  This document has been electronically signed. Sep 13 2022  9:30AM

## 2022-09-14 NOTE — PATIENT PROFILE ADULT - FALL HARM RISK - RISK INTERVENTIONS

## 2022-09-14 NOTE — CONSULT NOTE ADULT - ASSESSMENT
77 year old male with DM, HLD and Anxiety who presents with progressive dysphagia.    Dysphagia: given recent negative egd in April low suspicion for malignancy   Recommend continued work up for dysphagia and weight loss to rule out occult malignancy paraneoplastic syndrome or neurologic disorders  Consider barium esophagram if no other etiology determined. No plans for endoscopy at this time, please re-engage GI if clinical status changes  Maintain aspiration precautions and head of bed elevation    Recommendations discussed with wife and son at bedside.
78 yo male w progresive dysphagia now to both solids and liquids. No change in voice. CT neck neg. Laryngoscopy WNL, no obstructing anatomy, VCs mobile b/l

## 2022-09-15 LAB
ANION GAP SERPL CALC-SCNC: 10 MMOL/L — SIGNIFICANT CHANGE UP (ref 5–17)
BUN SERPL-MCNC: 20.6 MG/DL — HIGH (ref 8–20)
CALCIUM SERPL-MCNC: 9.2 MG/DL — SIGNIFICANT CHANGE UP (ref 8.4–10.5)
CHLORIDE SERPL-SCNC: 108 MMOL/L — HIGH (ref 98–107)
CO2 SERPL-SCNC: 25 MMOL/L — SIGNIFICANT CHANGE UP (ref 22–29)
CREAT SERPL-MCNC: 1.03 MG/DL — SIGNIFICANT CHANGE UP (ref 0.5–1.3)
EGFR: 74 ML/MIN/1.73M2 — SIGNIFICANT CHANGE UP
GLUCOSE BLDC GLUCOMTR-MCNC: 138 MG/DL — HIGH (ref 70–99)
GLUCOSE BLDC GLUCOMTR-MCNC: 141 MG/DL — HIGH (ref 70–99)
GLUCOSE BLDC GLUCOMTR-MCNC: 146 MG/DL — HIGH (ref 70–99)
GLUCOSE BLDC GLUCOMTR-MCNC: 160 MG/DL — HIGH (ref 70–99)
GLUCOSE SERPL-MCNC: 131 MG/DL — HIGH (ref 70–99)
HCT VFR BLD CALC: 26.4 % — LOW (ref 39–50)
HGB BLD-MCNC: 8.5 G/DL — LOW (ref 13–17)
MCHC RBC-ENTMCNC: 26.7 PG — LOW (ref 27–34)
MCHC RBC-ENTMCNC: 32.2 GM/DL — SIGNIFICANT CHANGE UP (ref 32–36)
MCV RBC AUTO: 83 FL — SIGNIFICANT CHANGE UP (ref 80–100)
PLATELET # BLD AUTO: 240 K/UL — SIGNIFICANT CHANGE UP (ref 150–400)
POTASSIUM SERPL-MCNC: 4.2 MMOL/L — SIGNIFICANT CHANGE UP (ref 3.5–5.3)
POTASSIUM SERPL-SCNC: 4.2 MMOL/L — SIGNIFICANT CHANGE UP (ref 3.5–5.3)
RBC # BLD: 3.18 M/UL — LOW (ref 4.2–5.8)
RBC # FLD: 15.4 % — HIGH (ref 10.3–14.5)
SODIUM SERPL-SCNC: 143 MMOL/L — SIGNIFICANT CHANGE UP (ref 135–145)
WBC # BLD: 5.76 K/UL — SIGNIFICANT CHANGE UP (ref 3.8–10.5)
WBC # FLD AUTO: 5.76 K/UL — SIGNIFICANT CHANGE UP (ref 3.8–10.5)

## 2022-09-15 PROCEDURE — 99233 SBSQ HOSP IP/OBS HIGH 50: CPT

## 2022-09-15 RX ORDER — DIAZEPAM 5 MG
2 TABLET ORAL ONCE
Refills: 0 | Status: DISCONTINUED | OUTPATIENT
Start: 2022-09-15 | End: 2022-09-15

## 2022-09-15 RX ADMIN — SODIUM CHLORIDE 75 MILLILITER(S): 9 INJECTION, SOLUTION INTRAVENOUS at 02:18

## 2022-09-15 RX ADMIN — SODIUM CHLORIDE 75 MILLILITER(S): 9 INJECTION, SOLUTION INTRAVENOUS at 12:32

## 2022-09-15 RX ADMIN — Medication 2 MILLIGRAM(S): at 21:35

## 2022-09-15 NOTE — PROGRESS NOTE ADULT - ASSESSMENT
77yoM hx DM, HLD, CKD presenting with several months hx of progressive dysphagia initially to solid foods, now to liquids who was advised to seek hospital admission after failed Modified barium swallow test 09/12/22 as out pt , pt was seen by GI as out pt and had work up done was negative per pt..Pt states he is following with gi out pt,has egd /colonoscopy 3 months ago was stable.CT neck/chest/abd pelvis stable. Per GI no further work rec, rec ENT/neuro eval.    Oropharyngeal Dysphagia unclear etiology at this time  -Failed out MBS on presentation  - extensive work up,  including CT neck, head, chest//abd/pel, GI and ENT eval, is unrevealing so far  - suppect psychological nature of dysphagia; " it always happens to me when I am udner stress"  - c/w NPO and IVF  - ordered repeat MBS and SLP eval again  - may involve psych team if failing again       CKD complicated by hyperkalemia  -Cr around baseline which appears to be 1.4 to 1.7 based on outpatient records  -stable  - bmp daily     Hypoglycemia,hx DM   - FS achs  - IVF as above    Anemia most likely CORNELIUS  - lamentary in nature  - will check iron panel, may start iv iron    Hx HLD  -Holding simvastatin 10mg daily as NPO    Anxiety disorder with insomnia  -Holding buspirone 10mg and quetiapine mg QHS given above  -On clonazepam, will supplement with IV valium 2mg QHS with holding parameters      DVT ppx - scd for now  code/social - full code, family updated at bedside  Dispo - pending above work up

## 2022-09-16 ENCOUNTER — TRANSCRIPTION ENCOUNTER (OUTPATIENT)
Age: 77
End: 2022-09-16

## 2022-09-16 LAB
ANION GAP SERPL CALC-SCNC: 10 MMOL/L — SIGNIFICANT CHANGE UP (ref 5–17)
APTT BLD: 28.6 SEC — SIGNIFICANT CHANGE UP (ref 27.5–35.5)
BUN SERPL-MCNC: 17.7 MG/DL — SIGNIFICANT CHANGE UP (ref 8–20)
CALCIUM SERPL-MCNC: 9 MG/DL — SIGNIFICANT CHANGE UP (ref 8.4–10.5)
CHLORIDE SERPL-SCNC: 113 MMOL/L — HIGH (ref 98–107)
CO2 SERPL-SCNC: 23 MMOL/L — SIGNIFICANT CHANGE UP (ref 22–29)
CREAT SERPL-MCNC: 1.03 MG/DL — SIGNIFICANT CHANGE UP (ref 0.5–1.3)
EGFR: 74 ML/MIN/1.73M2 — SIGNIFICANT CHANGE UP
FERRITIN SERPL-MCNC: 380 NG/ML — SIGNIFICANT CHANGE UP (ref 30–400)
FOLATE SERPL-MCNC: 8.3 NG/ML — SIGNIFICANT CHANGE UP
GLUCOSE BLDC GLUCOMTR-MCNC: 122 MG/DL — HIGH (ref 70–99)
GLUCOSE BLDC GLUCOMTR-MCNC: 126 MG/DL — HIGH (ref 70–99)
GLUCOSE BLDC GLUCOMTR-MCNC: 142 MG/DL — HIGH (ref 70–99)
GLUCOSE BLDC GLUCOMTR-MCNC: 194 MG/DL — HIGH (ref 70–99)
GLUCOSE SERPL-MCNC: 132 MG/DL — HIGH (ref 70–99)
HCT VFR BLD CALC: 25.5 % — LOW (ref 39–50)
HGB BLD-MCNC: 8.1 G/DL — LOW (ref 13–17)
INR BLD: 1.28 RATIO — HIGH (ref 0.88–1.16)
IRON SATN MFR SERPL: 34 % — SIGNIFICANT CHANGE UP (ref 16–55)
IRON SATN MFR SERPL: 70 UG/DL — SIGNIFICANT CHANGE UP (ref 59–158)
POTASSIUM SERPL-MCNC: 4 MMOL/L — SIGNIFICANT CHANGE UP (ref 3.5–5.3)
POTASSIUM SERPL-SCNC: 4 MMOL/L — SIGNIFICANT CHANGE UP (ref 3.5–5.3)
PROTHROM AB SERPL-ACNC: 14.9 SEC — HIGH (ref 10.5–13.4)
SODIUM SERPL-SCNC: 146 MMOL/L — HIGH (ref 135–145)
TIBC SERPL-MCNC: 209 UG/DL — LOW (ref 220–430)
TRANSFERRIN SERPL-MCNC: 146 MG/DL — LOW (ref 180–329)
VIT B12 SERPL-MCNC: 629 PG/ML — SIGNIFICANT CHANGE UP (ref 232–1245)

## 2022-09-16 PROCEDURE — 99232 SBSQ HOSP IP/OBS MODERATE 35: CPT

## 2022-09-16 PROCEDURE — 74230 X-RAY XM SWLNG FUNCJ C+: CPT | Mod: 26

## 2022-09-16 PROCEDURE — 99233 SBSQ HOSP IP/OBS HIGH 50: CPT

## 2022-09-16 RX ORDER — QUETIAPINE FUMARATE 200 MG/1
50 TABLET, FILM COATED ORAL AT BEDTIME
Refills: 0 | Status: DISCONTINUED | OUTPATIENT
Start: 2022-09-16 | End: 2022-09-17

## 2022-09-16 RX ORDER — SIMVASTATIN 20 MG/1
10 TABLET, FILM COATED ORAL AT BEDTIME
Refills: 0 | Status: DISCONTINUED | OUTPATIENT
Start: 2022-09-16 | End: 2022-09-17

## 2022-09-16 RX ORDER — CLONAZEPAM 1 MG
0.5 TABLET ORAL AT BEDTIME
Refills: 0 | Status: DISCONTINUED | OUTPATIENT
Start: 2022-09-16 | End: 2022-09-17

## 2022-09-16 RX ADMIN — SODIUM CHLORIDE 75 MILLILITER(S): 9 INJECTION, SOLUTION INTRAVENOUS at 06:17

## 2022-09-16 RX ADMIN — QUETIAPINE FUMARATE 50 MILLIGRAM(S): 200 TABLET, FILM COATED ORAL at 22:06

## 2022-09-16 RX ADMIN — Medication 0.5 MILLIGRAM(S): at 22:06

## 2022-09-16 RX ADMIN — SIMVASTATIN 10 MILLIGRAM(S): 20 TABLET, FILM COATED ORAL at 22:06

## 2022-09-16 NOTE — SWALLOW VFSS/MBS ASSESSMENT ADULT - ADDITIONAL RECOMMENDATIONS
Follow-up with team with regards to etiology of dysphagia (CT soft tissue completed & unremarkable)  Further neuro work-up (Head CT completed) Follow-up with team with regards to etiology of dysphagia (CT soft tissue completed & unremarkable)  Consider further neuro work-up (Head CT completed)

## 2022-09-16 NOTE — DIETITIAN INITIAL EVALUATION ADULT - ADD RECOMMEND
RECOMMENDATIONS:  1) Monitor tolerance to diet consistency; adjust as medically feasible  2) RX: MVI to maintain nutrition status  3) monitor nutrition related labs; monitor wts; monitor PO intake

## 2022-09-16 NOTE — DISCHARGE NOTE PROVIDER - DETAILS OF MALNUTRITION DIAGNOSIS/DIAGNOSES
This patient has been assessed with a concern for Malnutrition and was treated during this hospitalization for the following Nutrition diagnosis/diagnoses:     -  09/16/2022: Severe protein-calorie malnutrition   -  09/16/2022: Underweight (BMI < 19)

## 2022-09-16 NOTE — PROGRESS NOTE ADULT - ASSESSMENT
77yoM hx DM, HLD, CKD presenting with several months hx of progressive dysphagia initially to solid foods, now to liquids who was advised to seek hospital admission after failed Modified barium swallow test 09/12/22 as out pt. Pt underwent MBS on presentation noted to have poor progression of bolus feed beyong pharyng. Pt underwent extensive work up while in house including GI evealuation, ENT assessment, CT head neg for signs of prior stroke, no evidence of any masses on CT neck, chest, abd/pelvis sugestive paraneoplastic syndrom. Pt appears very depressed and anxious and given that pt reports  "it always happens to me when I am udner stress", suspect psychological component in his current condition. Notably despite pt with weightl loss, there is no signs of aspiration PNA or vitamin deficiencies or iron. On 09/16 pt underwent another MBS with again poor progression of contrast bolus below T1 level, therefore GOC with pt was held on 09/16 and we went over above finding and discussed indications for PEG tube in his case for nutritional support, pt declined PEG tube and express wish to be resumed on diet, pt and wife verbalized understanding of risk for aspiration, but would like to resume diet. Pt was resumed of dysphagia diet and restarted on his home medication.       Oropharyngeal Dysphagia unclear etiology at this time  -Failed out MBS today >>  therefore GOC with pt was held on 09/16 and we went over above finding and discussed indications for PEG tube in his case for nutritional support, pt declined PEG tube and express wish to be resumed on diet, pt and wife verbalized understanding of risk for aspiration, but would like to resume diet.  - extensive work up,  including CT neck, head, chest//abd/pel, GI and ENT eval, is unrevealing so far  - suspect psychological nature of dysphagia; " it always happens to me when I am udner stress"  - d/c IVF  - pt primary OP psychiatrist ( over phone) and wife were present during conversation for GOC      CKD complicated by hyperkalemia  -Cr around baseline which appears to be 1.4 to 1.7 based on outpatient records  -stable  - bmp in am     Hypoglycemia,hx DM   - FS achs  - resumed diet     Anemia most likely CORNELIUS  - lamentary in nature  -  iron panel, vit b12, folated - no role in supplementing above    Hx HLD  -resumed simvastatin 10mg daily as NPO    Anxiety disorder with insomnia  - resumed buspirone 10mg, quetiapine, ativan    DVT ppx - ambulation every shift   code/social - full code, family updated at bedside, GOC as above spent 30 at bedside  Dispo - if bmp is stable in am, may be d/c home

## 2022-09-16 NOTE — DIETITIAN INITIAL EVALUATION ADULT - PERTINENT MEDS FT
MEDICATIONS  (STANDING):  busPIRone 10 milliGRAM(s) Oral <User Schedule>  clonazePAM  Tablet 0.5 milliGRAM(s) Oral at bedtime  dextrose 50% Injectable 25 Gram(s) IV Push once  dextrose 50% Injectable 12.5 Gram(s) IV Push once  dextrose 50% Injectable 25 Gram(s) IV Push once  dextrose Oral Gel 15 Gram(s) Oral once  glucagon  Injectable 1 milliGRAM(s) IntraMuscular once  influenza  Vaccine (HIGH DOSE) 0.7 milliLiter(s) IntraMuscular once  QUEtiapine 50 milliGRAM(s) Oral at bedtime  simvastatin 10 milliGRAM(s) Oral at bedtime    MEDICATIONS  (PRN):  ondansetron Injectable 4 milliGRAM(s) IV Push every 6 hours PRN Nausea and/or Vomiting

## 2022-09-16 NOTE — SWALLOW VFSS/MBS ASSESSMENT ADULT - DIAGNOSTIC IMPRESSIONS
Oral stage of swallow grossly WFL for assessed PO trials of thin fluids & puree   Severe pharyngeal dysphagia for presented trials with absent epiglottic deflection, ? integrity of cricopharyngeus muscle, resulting in reduced UES opening. Pharyngeal stasis noted as outlined above, with resultant silent penetration to level of vocal folds during & after the swallow. Incorporation of trialed postures did not improve swallow profile, however stasis did reduce in valleculae with successive swallows. Minimal progression of material was observed beyond UES.   Esophageal phase noted for ongoing retention/retrograde progression of presented trials to level of pyriform sinus & below level of UES.   A ? cricopharyngeal bar/fingerlike projection was visualized below level of pyriform sinus. Oral stage of swallow grossly WFL for assessed PO trials of thin fluids & puree   Severe pharyngeal dysphagia for presented trials with absent epiglottic deflection, ? integrity of cricopharyngeus muscle, resulting in reduced UES opening. Pharyngeal stasis noted as outlined above, with resultant silent penetration to level of vocal folds during & after the swallow. Incorporation of trialed postures did not improve swallow profile, however stasis did reduce with successive swallows. Minimal progression of material was observed beyond UES.   Esophageal phase noted for ongoing retention/retrograde progression of presented trials to level of pyriform sinus & below level of UES.   A ? cricopharyngeal bar/fingerlike projection was visualized below level of pyriform sinus.

## 2022-09-16 NOTE — DISCHARGE NOTE PROVIDER - NSDCMRMEDTOKEN_GEN_ALL_CORE_FT
busPIRone 10 mg oral tablet: 1 tab(s) orally once a day  clonazePAM 0.5 mg oral tablet: 1 tab(s) orally once a day  metFORMIN 500 mg oral tablet: 1 tab(s) orally 2 times a day  QUEtiapine 50 mg oral tablet: 1 tab(s) orally once a day (at bedtime)  simvastatin 10 mg oral tablet: 1 tab(s) orally once a day (at bedtime)   busPIRone 10 mg oral tablet: 1 tab(s) orally once a day  clonazePAM 0.5 mg oral tablet: 0.5 tab(s) orally 3 times a day, As Needed MDD:1.5 tabs  metFORMIN 500 mg oral tablet: 1 tab(s) orally 2 times a day  QUEtiapine 50 mg oral tablet: 1 tab(s) orally once a day (at bedtime)  simvastatin 10 mg oral tablet: 1 tab(s) orally once a day (at bedtime)

## 2022-09-16 NOTE — DISCHARGE NOTE PROVIDER - PROVIDER TOKENS
FREE:[LAST:[Primary medical doctor],PHONE:[(   )    -],FAX:[(   )    -]],FREE:[LAST:[Primary psychiatrist],PHONE:[(   )    -],FAX:[(   )    -]]

## 2022-09-16 NOTE — DIETITIAN INITIAL EVALUATION ADULT - NS FNS CHANGE IN WEIGHT
Jhonny Ortiz is a 44 year old male who calls with diarrhea.    NURSING ASSESSMENT:  Description:  Ongoing diarrhea x 7 days  Onset/duration:  Last Friday  Precip. factors:  Started with diarrhea last Friday, patient reports greater than 3-4 diarrhea stools per day. No has watery stools. Drinking lots of water.   Patient was not around anyone else that had diarrhea or anyone that was ill, has not been out of the country.  Last night had some cramping.   Associated symptoms:  Has had abdominal cramping  Improves/worsens symptoms:  Soda crackers and soup helped.     Last exam/Treatment:  12/4/18  Allergies: No Known Allergies      RECOMMENDED DISPOSITION:  See in 24 hours - scheduled today at  2 pm with Dr. Prescott.   Will comply with recommendation: Yes  If further questions/concerns or if symptoms do not improve, worsen or new symptoms develop, call your PCP or [facility] as soon as possible.      Guideline used:  Telephone Triage Protocols for Nurses, Fifth Edition, Chandrika Christina RN      
Reason for Call:  Karan    Detailed comments: pt is call and has for about 1 week having GI issues, diarrhea mostly.  Wondering his next step as he states this is not usual.    Please advise    Phone Number Patient can be reached at: Cell number on file:    Telephone Information:   Mobile [phone]       Best Time: any    Can we leave a detailed message on this number? YES    Call taken on 4/12/2019 at 8:00 AM by Kavita Philip      
Loss

## 2022-09-16 NOTE — DISCHARGE NOTE PROVIDER - HOSPITAL COURSE
77yoM hx DM, HLD, CKD presenting with several months hx of progressive dysphagia initially to solid foods, now to liquids who was advised to seek hospital admission after failed Modified barium swallow test 09/12/22 as out pt. Pt underwent MBS on presentation noted to have poor progression of bolus feed beyong pharyng. Pt underwent extensive work up while in house including GI evealuation, ENT assessment, CT head neg for signs of prior stroke, no evidence of any masses on CT neck, chest, abd/pelvis sugestive paraneoplastic syndrom. Pt appears very depressed and anxious and given that pt reports  "it always happens to me when I am udner stress", suspect psychological component in his current condition. Notably despite pt with weightl loss, there is no signs of aspiration PNA or vitamin deficiencies or iron. On 09/16 pt underwent another MBS with again poor progression of contrast bolus below T1 level, therefore GOC with pt was held on 09/16 and we went over above finding and discussed indications for PEG tube in his case for nutritional support, pt declined PEG tube and express wish to be resumed on diet, pt and wife verbalized understanding of risk for aspiration, but would like to resume diet. Pt was resumed of dysphagia diet and restarted on his home medication.

## 2022-09-16 NOTE — DIETITIAN INITIAL EVALUATION ADULT - PERTINENT LABORATORY DATA
09-16    146<H>  |  113<H>  |  17.7  ----------------------------<  132<H>  4.0   |  23.0  |  1.03    Ca    9.0      16 Sep 2022 05:56    POCT Blood Glucose.: 122 mg/dL (09-16-22 @ 12:26)    09-16 Na146 mmol/L<H> Glu 132 mg/dL<H> K+ 4.0 mmol/L Cr  1.03 mg/dL BUN 17.7 mg/dL Phos n/a   Alb n/a   PAB n/a

## 2022-09-16 NOTE — DISCHARGE NOTE PROVIDER - NSDCCPCAREPLAN_GEN_ALL_CORE_FT
PRINCIPAL DISCHARGE DIAGNOSIS  Diagnosis: Dysphagia  Assessment and Plan of Treatment: - we ruled out any organic couses for dysphagia   - negative ENT adn GI evaluation  - ruled out prior CVA, no signs of malignancy on CT neck/chest/abd/pelvis  - it appears related to sever depression/anxiety  - pt was offered PEG tube which he declined at this time  - please follow up with your PMD and pscychiatrist      SECONDARY DISCHARGE DIAGNOSES  Diagnosis: Depression, major  Assessment and Plan of Treatment: - resume  your medication as directed    Diagnosis: HLD (hyperlipidemia)  Assessment and Plan of Treatment: - resume  your medication as directed    Diagnosis: DM (diabetes mellitus), type 2  Assessment and Plan of Treatment: - resume  your medication as directed

## 2022-09-16 NOTE — SWALLOW VFSS/MBS ASSESSMENT ADULT - ESOPHAGEAL STAGE
Retrograde movement of PO to pyriform sinus & below level of UES noted    Minimal progression of contrast beyond ~T-1 Retrograde movement of PO to pyriform sinus & below level of UES noted    Minimal progression of contrast beyond ~T-1, as per radiologist Fingerlike projection/? criscopharyngeal bar visualized below pyriform sinus  etrograde movement of PO to pyriform sinus & below level of UES noted    Minimal progression of contrast beyond ~T-1

## 2022-09-16 NOTE — DIETITIAN INITIAL EVALUATION ADULT - ORAL INTAKE PTA/DIET HISTORY
Nutrition assessment completed at bedside. Wife present for interview. Patient s/p MBS; failed 2/2 oropharyngeal dysphagia. Now tolerating pureed consistency with mildly thick liquids. High aspiration risk. Gurgling observed. Patient and wife confirmed UBW ~170 lbs x 2 years ago, and gradual weight loss noted. However, they feel like he has been gaining weight again recently. Patient appears underweight. BMI 17.5. IBW 136lbs. Bedscale weight 116lbs. Some gain noted since admission, weight maintenance desired. Provided nutrition recommendations for mildly thick liquids/pureed diet care at home. Emphasized safety precautions during meals. Patient and wife in good understanding. At this time, PEG / enteral nutrition not option to meet nutrition needs per family wishes. RD to remain available.

## 2022-09-16 NOTE — DIETITIAN NUTRITION RISK NOTIFICATION - TREATMENT: THE FOLLOWING DIET HAS BEEN RECOMMENDED
Diet, Pureed:   Mildly Thick Liquids (MILDTHICKLIQS)  Supplement Feeding Modality:  Oral  Ensure Clear Cans or Servings Per Day:  1       Frequency:  Three Times a day (09-16-22 @ 13:12) [Active]

## 2022-09-16 NOTE — PROGRESS NOTE ADULT - SUBJECTIVE AND OBJECTIVE BOX
[facility] Division of Hospital Medicine    Chief Complaint:  dysphagia    SUBJECTIVE: pt appears stressed and depressed; speaks and swollos fine when not paying attention to it, denied odynophagia, also is very reserved about NGT or PEG tube     OVERNIGHT EVENTS: none reported     Patient denies chest pain, SOB, abd pain, N/V, fever, chills, dysuria or any other complaints. All remainder ROS negative.     MEDICATIONS  (STANDING):  dextrose 5% + sodium chloride 0.9%. 1000 milliLiter(s) (75 mL/Hr) IV Continuous <Continuous>  dextrose 50% Injectable 25 Gram(s) IV Push once  dextrose 50% Injectable 12.5 Gram(s) IV Push once  dextrose 50% Injectable 25 Gram(s) IV Push once  dextrose Oral Gel 15 Gram(s) Oral once  diazepam  Injectable 2 milliGRAM(s) IV Push <User Schedule>  glucagon  Injectable 1 milliGRAM(s) IntraMuscular once  influenza  Vaccine (HIGH DOSE) 0.7 milliLiter(s) IntraMuscular once    MEDICATIONS  (PRN):  ondansetron Injectable 4 milliGRAM(s) IV Push every 6 hours PRN Nausea and/or Vomiting        I&O's Summary    14 Sep 2022 07:01  -  15 Sep 2022 07:00  --------------------------------------------------------  IN: 0 mL / OUT: 150 mL / NET: -150 mL        PHYSICAL EXAM:  Vital Signs Last 24 Hrs  T(C): 37.1 (15 Sep 2022 02:00), Max: 37.1 (15 Sep 2022 02:00)  T(F): 98.8 (15 Sep 2022 02:00), Max: 98.8 (15 Sep 2022 02:00)  HR: 61 (15 Sep 2022 05:36) (59 - 69)  BP: 161/56 (15 Sep 2022 05:36) (131/69 - 161/56)  BP(mean): 91 (14 Sep 2022 19:56) (91 - 91)  RR: 17 (15 Sep 2022 05:36) (16 - 20)  SpO2: 100% (15 Sep 2022 05:36) (100% - 100%)    Parameters below as of 15 Sep 2022 05:36  Patient On (Oxygen Delivery Method): room air            CONSTITUTIONAL: NAD. pail, malnurished  ENMT: Moist oral mucosa, no pharyngeal injection or exudates; normal dentition; No JVD  RESPIRATORY: Normal respiratory effort; lungs are clear to auscultation bilaterally  CARDIOVASCULAR: Regular rate and rhythm, normal S1 and S2, no murmur/rub/gallop; No lower extremity edema; Peripheral pulses are 2+ bilaterally  ABDOMEN: Nontender to palpation, normoactive bowel sounds, no rebound/guarding; No hepatosplenomegaly  MUSCLOSKELETAL:  no clubbing or cyanosis of digits; no joint swelling or tenderness to palpation  PSYCH: A+O to person, place, and time; appears stressed and anxious  NEUROLOGY: CN 2-12 are intact and symmetric; no gross sensory deficits; was observed moving all 4 ext against gravity cooperating with exam.   SKIN: No rashes; no palpable lesions      LABS:                        8.5    5.76  )-----------( 240      ( 15 Sep 2022 07:35 )             26.4     09-15    143  |  108<H>  |  20.6<H>  ----------------------------<  131<H>  4.2   |  25.0  |  1.03    Ca    9.2      15 Sep 2022 07:35  Mg     1.6     09-14    TPro  6.2<L>  /  Alb  3.6  /  TBili  0.3<L>  /  DBili  x   /  AST  11  /  ALT  7   /  AlkPhos  72  09-14              CAPILLARY BLOOD GLUCOSE      POCT Blood Glucose.: 138 mg/dL (15 Sep 2022 12:06)  POCT Blood Glucose.: 141 mg/dL (15 Sep 2022 05:25)  POCT Blood Glucose.: 146 mg/dL (14 Sep 2022 23:25)  POCT Blood Glucose.: 143 mg/dL (14 Sep 2022 17:53)        RADIOLOGY & ADDITIONAL TESTS:  Results Reviewed:   Imaging Personally Reviewed:  Electrocardiogram Personally Reviewed:
Chief Complaint:  Patient is a 77y old  Male who presents with a chief complaint of Dysphagia (15 Sep 2022 13:10)      Interval Events / Subjective: Patient seen and examined at bedside with wife. GI re-engaged after patient failed a repeat MBS due to oropharyngeal dysphagia. S&S recommended endoscopic evaluation. Malignancy work up is negative. Patient was also evaluated by ENT.     He reports feeling very stressed that he is still in the hospital. He is relieved that there was no cancer found. He would like to go home. He does not want a PEG tube or NTG. He feels that staying in the hospital is making his anxiety and swallowing worse.   He denies chest pain, cough, nausea, vomiting or abdominal pain.    EGD performed by Dr. Cheikh John Paul Ba April 2022 negative     REVIEW OF SYSTEMS:   General: anxious   HEENT: Negative  CV: Negative  Respiratory: Negative  GI: See HPI  : Negative  MSK: Negative  Hematologic: Negative  Skin: Negative    MEDICATIONS:   MEDICATIONS  (STANDING):  dextrose 5% + sodium chloride 0.9%. 1000 milliLiter(s) (75 mL/Hr) IV Continuous <Continuous>  dextrose 50% Injectable 25 Gram(s) IV Push once  dextrose 50% Injectable 12.5 Gram(s) IV Push once  dextrose 50% Injectable 25 Gram(s) IV Push once  dextrose Oral Gel 15 Gram(s) Oral once  diazepam  Injectable 2 milliGRAM(s) IV Push <User Schedule>  glucagon  Injectable 1 milliGRAM(s) IntraMuscular once  influenza  Vaccine (HIGH DOSE) 0.7 milliLiter(s) IntraMuscular once    MEDICATIONS  (PRN):  ondansetron Injectable 4 milliGRAM(s) IV Push every 6 hours PRN Nausea and/or Vomiting      ALLERGIES:   Allergies    No Known Allergies      VITAL SIGNS:   Vital Signs Last 24 Hrs  T(C): 36.9 (16 Sep 2022 12:05), Max: 37 (16 Sep 2022 04:05)  T(F): 98.4 (16 Sep 2022 12:05), Max: 98.6 (16 Sep 2022 04:05)  HR: 75 (16 Sep 2022 12:05) (60 - 78)  BP: 145/73 (16 Sep 2022 12:05) (145/73 - 165/62)  BP(mean): --  RR: 19 (16 Sep 2022 12:05) (18 - 19)  SpO2: 95% (16 Sep 2022 10:45) (95% - 99%)    Parameters below as of 16 Sep 2022 10:45  Patient On (Oxygen Delivery Method): room air      I&O's Summary    15 Sep 2022 07:01  -  16 Sep 2022 07:00  --------------------------------------------------------  IN: 525 mL / OUT: 0 mL / NET: 525 mL        PHYSICAL EXAM:   GENERAL:  No acute distress  HEENT:  NC/AT,  conjunctivae clear, sclera -anicteric  CHEST:  Full & symmetric excursion, no increased effort, breath sounds clear  HEART:  rate regular Regular rhythm,  ABDOMEN:  Soft, non-tender, non-distended, normoactive bowel sounds,  no rebound or guarding  EXTREMITIES: No cyanosis, clubbing or edema  SKIN: warm/dry  NEURO:  calm, cooperative    LABS:  CBC Full  -  ( 16 Sep 2022 05:56 )    Hemoglobin : 8.1 g/dL  Hematocrit : 25.5 %    09-16    146<H>  |  113<H>  |  17.7  ----------------------------<  132<H>  4.0   |  23.0  |  1.03    Ca    9.0      16 Sep 2022 05:56        PT/INR - ( 16 Sep 2022 05:56 )   PT: 14.9 sec;   INR: 1.28 ratio         PTT - ( 16 Sep 2022 05:56 )  PTT:28.6 sec      
[facility] Division of Hospital Medicine    Chief Complaint:  dysphagia    SUBJECTIVE: reports feeling ok    OVERNIGHT EVENTS: none reported     Patient denies chest pain, SOB, abd pain, N/V, fever, chills, dysuria or any other complaints. All remainder ROS negative.     MEDICATIONS  (STANDING):  busPIRone 10 milliGRAM(s) Oral <User Schedule>  clonazePAM  Tablet 0.5 milliGRAM(s) Oral at bedtime  dextrose 50% Injectable 25 Gram(s) IV Push once  dextrose 50% Injectable 12.5 Gram(s) IV Push once  dextrose 50% Injectable 25 Gram(s) IV Push once  dextrose Oral Gel 15 Gram(s) Oral once  glucagon  Injectable 1 milliGRAM(s) IntraMuscular once  influenza  Vaccine (HIGH DOSE) 0.7 milliLiter(s) IntraMuscular once  QUEtiapine 50 milliGRAM(s) Oral at bedtime  simvastatin 10 milliGRAM(s) Oral at bedtime    MEDICATIONS  (PRN):  ondansetron Injectable 4 milliGRAM(s) IV Push every 6 hours PRN Nausea and/or Vomiting        I&O's Summary    15 Sep 2022 07:01  -  16 Sep 2022 07:00  --------------------------------------------------------  IN: 525 mL / OUT: 0 mL / NET: 525 mL        PHYSICAL EXAM:  Vital Signs Last 24 Hrs  T(C): 36.9 (16 Sep 2022 12:05), Max: 37 (16 Sep 2022 04:05)  T(F): 98.4 (16 Sep 2022 12:05), Max: 98.6 (16 Sep 2022 04:05)  HR: 75 (16 Sep 2022 12:05) (60 - 78)  BP: 145/73 (16 Sep 2022 12:05) (145/73 - 165/62)  BP(mean): --  RR: 19 (16 Sep 2022 12:05) (18 - 19)  SpO2: 95% (16 Sep 2022 10:45) (95% - 99%)    Parameters below as of 16 Sep 2022 10:45  Patient On (Oxygen Delivery Method): room air      CONSTITUTIONAL: NAD. pail, malnurished  ENMT: Moist oral mucosa, no pharyngeal injection or exudates; normal dentition; No JVD  RESPIRATORY: Normal respiratory effort; lungs are clear to auscultation bilaterally  CARDIOVASCULAR: Regular rate and rhythm, normal S1 and S2, no murmur/rub/gallop; No lower extremity edema; Peripheral pulses are 2+ bilaterally  ABDOMEN: Nontender to palpation, normoactive bowel sounds, no rebound/guarding; No hepatosplenomegaly  MUSCLOSKELETAL:  no clubbing or cyanosis of digits; no joint swelling or tenderness to palpation  PSYCH: A+O to person, place, and time; appears stressed and anxious  NEUROLOGY: CN 2-12 are intact and symmetric; no gross sensory deficits; was observed moving all 4 ext against gravity cooperating with exam.   SKIN: No rashes; no palpable lesions      LABS:                        8.1    x     )-----------( x        ( 16 Sep 2022 05:56 )             25.5     09-16    146<H>  |  113<H>  |  17.7  ----------------------------<  132<H>  4.0   |  23.0  |  1.03    Ca    9.0      16 Sep 2022 05:56      PT/INR - ( 16 Sep 2022 05:56 )   PT: 14.9 sec;   INR: 1.28 ratio         PTT - ( 16 Sep 2022 05:56 )  PTT:28.6 sec          CAPILLARY BLOOD GLUCOSE      POCT Blood Glucose.: 122 mg/dL (16 Sep 2022 12:26)  POCT Blood Glucose.: 142 mg/dL (16 Sep 2022 06:06)  POCT Blood Glucose.: 160 mg/dL (15 Sep 2022 23:49)  POCT Blood Glucose.: 146 mg/dL (15 Sep 2022 17:16)        RADIOLOGY & ADDITIONAL TESTS:  Results Reviewed:   Imaging Personally Reviewed:  Electrocardiogram Personally Reviewed:
Patient is a 77y old  Male who presents with a chief complaint of Dysphagia (13 Sep 2022 01:20)    c/o dysphagia,food is not going down,+vomiting. nausea/vomiting not present now, denoes cp,sob,fever,chill,diarrhea,dizziness,headache  bg was low, now 70  REVIEW OF SYSTEMS: All systems are reviewed and found to be negative except above    MEDICATIONS  (STANDING):  dextrose 5% + sodium chloride 0.9%. 1000 milliLiter(s) (24 mL/Hr) IV Continuous <Continuous>  dextrose 50% Injectable 25 Gram(s) IV Push once  dextrose 50% Injectable 12.5 Gram(s) IV Push once  dextrose 50% Injectable 25 Gram(s) IV Push once  dextrose Oral Gel 15 Gram(s) Oral once  diazepam  Injectable 2 milliGRAM(s) IV Push <User Schedule>  glucagon  Injectable 1 milliGRAM(s) IntraMuscular once    MEDICATIONS  (PRN):  ondansetron Injectable 4 milliGRAM(s) IV Push every 6 hours PRN Nausea and/or Vomiting      CAPILLARY BLOOD GLUCOSE      POCT Blood Glucose.: 70 mg/dL (13 Sep 2022 10:22)  POCT Blood Glucose.: 66 mg/dL (13 Sep 2022 09:21)  POCT Blood Glucose.: 63 mg/dL (13 Sep 2022 09:19)  POCT Blood Glucose.: 113 mg/dL (13 Sep 2022 06:53)  POCT Blood Glucose.: 56 mg/dL (13 Sep 2022 06:14)  POCT Blood Glucose.: 58 mg/dL (13 Sep 2022 06:12)    I&O's Summary      PHYSICAL EXAM:  Vital Signs Last 24 Hrs  T(C): 36.6 (13 Sep 2022 07:58), Max: 36.7 (13 Sep 2022 00:37)  T(F): 97.9 (13 Sep 2022 07:58), Max: 98.1 (13 Sep 2022 00:37)  HR: 74 (13 Sep 2022 07:58) (61 - 78)  BP: 154/71 (13 Sep 2022 07:58) (119/58 - 154/71)  BP(mean): --  RR: 18 (13 Sep 2022 07:58) (18 - 18)  SpO2: 100% (13 Sep 2022 07:58) (99% - 100%)    Parameters below as of 13 Sep 2022 07:58  Patient On (Oxygen Delivery Method): room air        CONSTITUTIONAL: NAD,  EYES: PERRLA; conjunctiva and sclera clear  ENMT: Moist oral mucosa,   RESPIRATORY: Normal respiratory effort; lungs are clear to auscultation bilaterally  CARDIOVASCULAR: Regular rate and rhythm, normal S1 and S2, no murmur   EXTS: No lower extremity edema; Peripheral pulses are 2+ bilaterally  ABDOMEN: Nontender to palpation, normoactive bowel sounds, no rebound/guarding;   MUSCLOSKELETAL:   no cyanosis of digits; no joint swelling or tenderness to palpation  PSYCH: affect appropriate  NEUROLOGY: A+O to person, place, and time; CN 2-12 are intact and symmetric; no gross sensory/motor deficits;       LABS:                        8.5    5.07  )-----------( 243      ( 12 Sep 2022 16:45 )             27.1     09-13    141  |  104  |  36.7<H>  ----------------------------<  51<LL>  4.4   |  27.0  |  1.32<H>    Ca    9.7      13 Sep 2022 04:21  Mg     1.6     09-13    TPro  7.0  /  Alb  4.1  /  TBili  0.3<L>  /  DBili  x   /  AST  19  /  ALT  9   /  AlkPhos  80  09-12    PT/INR - ( 13 Sep 2022 04:21 )   PT: 13.0 sec;   INR: 1.12 ratio                     RADIOLOGY & ADDITIONAL TESTS:  Results Reviewed:     
Patient is a 77y old  Male who presents with a chief complaint of Dysphagia (14 Sep 2022 07:34)    pt denies any chest pain,sob,sore throat,heart burn,weakness,numbness,headache  REVIEW OF SYSTEMS: All systems are reviewed and found to be negative except above    MEDICATIONS  (STANDING):  dextrose 5% + sodium chloride 0.9%. 1000 milliLiter(s) (75 mL/Hr) IV Continuous <Continuous>  dextrose 50% Injectable 25 Gram(s) IV Push once  dextrose 50% Injectable 12.5 Gram(s) IV Push once  dextrose 50% Injectable 25 Gram(s) IV Push once  dextrose Oral Gel 15 Gram(s) Oral once  diazepam  Injectable 2 milliGRAM(s) IV Push <User Schedule>  glucagon  Injectable 1 milliGRAM(s) IntraMuscular once    MEDICATIONS  (PRN):  ondansetron Injectable 4 milliGRAM(s) IV Push every 6 hours PRN Nausea and/or Vomiting      CAPILLARY BLOOD GLUCOSE      POCT Blood Glucose.: 145 mg/dL (14 Sep 2022 06:05)  POCT Blood Glucose.: 150 mg/dL (14 Sep 2022 01:54)  POCT Blood Glucose.: 136 mg/dL (13 Sep 2022 23:27)  POCT Blood Glucose.: 135 mg/dL (13 Sep 2022 20:54)  POCT Blood Glucose.: 121 mg/dL (13 Sep 2022 18:28)  POCT Blood Glucose.: 118 mg/dL (13 Sep 2022 16:59)  POCT Blood Glucose.: 103 mg/dL (13 Sep 2022 14:41)  POCT Blood Glucose.: 80 mg/dL (13 Sep 2022 12:20)    I&O's Summary    13 Sep 2022 07:01  -  14 Sep 2022 07:00  --------------------------------------------------------  IN: 1500 mL / OUT: 850 mL / NET: 650 mL    14 Sep 2022 07:01  -  14 Sep 2022 11:28  --------------------------------------------------------  IN: 0 mL / OUT: 150 mL / NET: -150 mL        PHYSICAL EXAM:  Vital Signs Last 24 Hrs  T(C): 36.9 (14 Sep 2022 07:21), Max: 37.1 (13 Sep 2022 12:43)  T(F): 98.4 (14 Sep 2022 07:21), Max: 98.8 (14 Sep 2022 04:00)  HR: 65 (14 Sep 2022 08:00) (58 - 68)  BP: 111/49 (14 Sep 2022 08:00) (111/49 - 142/69)  BP(mean): 77 (14 Sep 2022 04:00) (76 - 91)  RR: 14 (14 Sep 2022 08:00) (13 - 18)  SpO2: 100% (14 Sep 2022 08:00) (96% - 100%)    Parameters below as of 14 Sep 2022 08:00  Patient On (Oxygen Delivery Method): room air        CONSTITUTIONAL: NAD,  EYES: PERRLA; conjunctiva and sclera clear  ENMT: Moist oral mucosa,   RESPIRATORY: Normal respiratory effort; lungs are clear to auscultation bilaterally  CARDIOVASCULAR: Regular rate and rhythm, normal S1 and S2, no murmur   EXTS: No lower extremity edema; Peripheral pulses are 2+ bilaterally  ABDOMEN: Nontender to palpation, normoactive bowel sounds, no rebound/guarding;   MUSCLOSKELETAL:   no cyanosis of digits; no joint swelling or tenderness to palpation  PSYCH: affect appropriate  NEUROLOGY: A+O to person, place, and time; CN 2-12 are intact and symmetric; no gross sensory/motor deficits;       LABS:                        8.0    5.25  )-----------( 211      ( 14 Sep 2022 10:46 )             25.5     09-14    140  |  107  |  26.3<H>  ----------------------------<  138<H>  4.4   |  23.0  |  1.17    Ca    9.0      14 Sep 2022 04:50  Mg     1.6     09-14    TPro  6.2<L>  /  Alb  3.6  /  TBili  0.3<L>  /  DBili  x   /  AST  11  /  ALT  7   /  AlkPhos  72  09-14    PT/INR - ( 13 Sep 2022 04:21 )   PT: 13.0 sec;   INR: 1.12 ratio                     RADIOLOGY & ADDITIONAL TESTS:  Results Reviewed:

## 2022-09-16 NOTE — DIETITIAN NUTRITION RISK NOTIFICATION - ADDITIONAL COMMENTS/DIETITIAN RECOMMENDATIONS
1) Monitor tolerance to diet consistency; adjust as medically feasible  2) RX: MVI to maintain nutrition status  3) monitor nutrition related labs; monitor wts; monitor PO intake

## 2022-09-16 NOTE — SWALLOW VFSS/MBS ASSESSMENT ADULT - COMMENTS
As per MD note: 77yoM hx DM, HLD, CKD presenting with several months hx of progressive dysphagia initially to solid foods, now to liquids who was advised to seek hospital admission after failed Modified barium swallow test 09/12/22 as out pt , pt was seen by GI as out pt and had work up done was negative per pt..Pt states he is following with gi out pt,has egd /colonoscopy 3 months ago was stable.CT neck/chest/abd pelvis stable. Per GI no further work rec, rec ENT eval, which was unremarkable" .

## 2022-09-16 NOTE — DIETITIAN INITIAL EVALUATION ADULT - ETIOLOGY
related to not meeting protein energy requirements in the setting of dx dysphagia, NPO x 3 days now pureed with mildly thick liquids

## 2022-09-16 NOTE — SWALLOW VFSS/MBS ASSESSMENT ADULT - RESIDUE IN VALLECULAE
Moderate/Severe reduced with attempted successive swallows/Moderate/Severe reduced with successive swallows/Moderate/Severe

## 2022-09-16 NOTE — SWALLOW VFSS/MBS ASSESSMENT ADULT - SLP GENERAL OBSERVATIONS
Pt received & seen seated upright via stretcher in radiology, awake/alert, anxious: support provided as appropriate, 0/10 pain

## 2022-09-16 NOTE — PROGRESS NOTE ADULT - ASSESSMENT
77 year old male with DM, HLD and Anxiety who presents with progressive dysphagia.    Oropharyngeal Dysphagia   Recent EGD without evidence of structural lesion, neuro evaluation unrevealing, malignancy work up negative  The patient and wife do not want PEG tube placement. They would like to go home today.   Patient wishes to maintain po intake   Management of anxiety per primary team   No further inpatient evaluation by GI needed

## 2022-09-16 NOTE — DIETITIAN INITIAL EVALUATION ADULT - NS FNS DIET ORDER
Diet, Pureed:   Mildly Thick Liquids (MILDTHICKLIQS)  Supplement Feeding Modality:  Oral  Ensure Clear Cans or Servings Per Day:  1       Frequency:  Three Times a day (09-16-22 @ 13:12)

## 2022-09-16 NOTE — SWALLOW VFSS/MBS ASSESSMENT ADULT - SLP PERTINENT HISTORY OF CURRENT PROBLEM
Pt s/p MBS 9/12 as an outpatient at this facilkity: see reportt for details. NPO status was RX: pt since admitted to this facility with work-up thus far for dysphagia etiology unremarkable. Repeat MBS warranted by team to re-assess PO candidacy Pt s/p MBS 9/12 as an outpatient at this facility: see report for details. NPO status was RX: pt since admitted to this facility with work-up thus far for dysphagia etiology unremarkable (seen by ENT/GI, CT soft tissue & head CT completed). Repeat MBS warranted by team to re-assess PO candidacy

## 2022-09-17 ENCOUNTER — TRANSCRIPTION ENCOUNTER (OUTPATIENT)
Age: 77
End: 2022-09-17

## 2022-09-17 VITALS
OXYGEN SATURATION: 99 % | DIASTOLIC BLOOD PRESSURE: 78 MMHG | TEMPERATURE: 98 F | RESPIRATION RATE: 18 BRPM | SYSTOLIC BLOOD PRESSURE: 165 MMHG | HEART RATE: 72 BPM

## 2022-09-17 LAB
ANION GAP SERPL CALC-SCNC: 10 MMOL/L — SIGNIFICANT CHANGE UP (ref 5–17)
BUN SERPL-MCNC: 16.5 MG/DL — SIGNIFICANT CHANGE UP (ref 8–20)
CALCIUM SERPL-MCNC: 8.7 MG/DL — SIGNIFICANT CHANGE UP (ref 8.4–10.5)
CHLORIDE SERPL-SCNC: 106 MMOL/L — SIGNIFICANT CHANGE UP (ref 98–107)
CO2 SERPL-SCNC: 23 MMOL/L — SIGNIFICANT CHANGE UP (ref 22–29)
CREAT SERPL-MCNC: 0.99 MG/DL — SIGNIFICANT CHANGE UP (ref 0.5–1.3)
EGFR: 78 ML/MIN/1.73M2 — SIGNIFICANT CHANGE UP
GLUCOSE BLDC GLUCOMTR-MCNC: 108 MG/DL — HIGH (ref 70–99)
GLUCOSE BLDC GLUCOMTR-MCNC: 82 MG/DL — SIGNIFICANT CHANGE UP (ref 70–99)
GLUCOSE SERPL-MCNC: 82 MG/DL — SIGNIFICANT CHANGE UP (ref 70–99)
POTASSIUM SERPL-MCNC: 3.6 MMOL/L — SIGNIFICANT CHANGE UP (ref 3.5–5.3)
POTASSIUM SERPL-SCNC: 3.6 MMOL/L — SIGNIFICANT CHANGE UP (ref 3.5–5.3)
SODIUM SERPL-SCNC: 139 MMOL/L — SIGNIFICANT CHANGE UP (ref 135–145)

## 2022-09-17 PROCEDURE — 74176 CT ABD & PELVIS W/O CONTRAST: CPT

## 2022-09-17 PROCEDURE — 74230 X-RAY XM SWLNG FUNCJ C+: CPT

## 2022-09-17 PROCEDURE — 99239 HOSP IP/OBS DSCHRG MGMT >30: CPT

## 2022-09-17 PROCEDURE — 84466 ASSAY OF TRANSFERRIN: CPT

## 2022-09-17 PROCEDURE — 83735 ASSAY OF MAGNESIUM: CPT

## 2022-09-17 PROCEDURE — 85025 COMPLETE CBC W/AUTO DIFF WBC: CPT

## 2022-09-17 PROCEDURE — 82746 ASSAY OF FOLIC ACID SERUM: CPT

## 2022-09-17 PROCEDURE — 99285 EMERGENCY DEPT VISIT HI MDM: CPT

## 2022-09-17 PROCEDURE — 82607 VITAMIN B-12: CPT

## 2022-09-17 PROCEDURE — 71250 CT THORAX DX C-: CPT

## 2022-09-17 PROCEDURE — 82962 GLUCOSE BLOOD TEST: CPT

## 2022-09-17 PROCEDURE — 83550 IRON BINDING TEST: CPT

## 2022-09-17 PROCEDURE — 80053 COMPREHEN METABOLIC PANEL: CPT

## 2022-09-17 PROCEDURE — U0003: CPT

## 2022-09-17 PROCEDURE — 80048 BASIC METABOLIC PNL TOTAL CA: CPT

## 2022-09-17 PROCEDURE — 36415 COLL VENOUS BLD VENIPUNCTURE: CPT

## 2022-09-17 PROCEDURE — 85018 HEMOGLOBIN: CPT

## 2022-09-17 PROCEDURE — 70491 CT SOFT TISSUE NECK W/DYE: CPT | Mod: MA

## 2022-09-17 PROCEDURE — 85027 COMPLETE CBC AUTOMATED: CPT

## 2022-09-17 PROCEDURE — 85730 THROMBOPLASTIN TIME PARTIAL: CPT

## 2022-09-17 PROCEDURE — 93005 ELECTROCARDIOGRAM TRACING: CPT

## 2022-09-17 PROCEDURE — 92611 MOTION FLUOROSCOPY/SWALLOW: CPT

## 2022-09-17 PROCEDURE — U0005: CPT

## 2022-09-17 PROCEDURE — 85610 PROTHROMBIN TIME: CPT

## 2022-09-17 PROCEDURE — 82728 ASSAY OF FERRITIN: CPT

## 2022-09-17 PROCEDURE — 85014 HEMATOCRIT: CPT

## 2022-09-17 PROCEDURE — 70450 CT HEAD/BRAIN W/O DYE: CPT

## 2022-09-17 PROCEDURE — 83540 ASSAY OF IRON: CPT

## 2022-09-17 RX ORDER — CLONAZEPAM 1 MG
0.5 TABLET ORAL
Qty: 11 | Refills: 0
Start: 2022-09-17 | End: 2022-09-23

## 2022-09-17 RX ORDER — CLONAZEPAM 1 MG
1 TABLET ORAL
Qty: 0 | Refills: 0 | DISCHARGE

## 2022-09-17 RX ADMIN — Medication 10 MILLIGRAM(S): at 08:03

## 2022-09-17 NOTE — DISCHARGE NOTE NURSING/CASE MANAGEMENT/SOCIAL WORK - NSDCPEFALRISK_GEN_ALL_CORE
For information on Fall & Injury Prevention, visit: [email or web address] OR  [email or web address] OR  https://www.cdc.gov/steadi/patient.html

## 2022-09-17 NOTE — DISCHARGE NOTE NURSING/CASE MANAGEMENT/SOCIAL WORK - PATIENT PORTAL LINK FT
You can access the FollowMyHealth Patient Portal offered by [facility] by registering at the following website: [email or web address]. By joining [employer]’s FollowMyHealth portal, you will also be able to view your health information using other applications (apps) compatible with our system.

## 2024-02-12 ENCOUNTER — INPATIENT (INPATIENT)
Facility: HOSPITAL | Age: 79
LOS: 22 days | Discharge: HOSPICE | DRG: 280 | End: 2024-03-06
Attending: STUDENT IN AN ORGANIZED HEALTH CARE EDUCATION/TRAINING PROGRAM | Admitting: HOSPITALIST
Payer: COMMERCIAL

## 2024-02-12 ENCOUNTER — RESULT REVIEW (OUTPATIENT)
Age: 79
End: 2024-02-12

## 2024-02-12 VITALS
HEIGHT: 65 IN | OXYGEN SATURATION: 96 % | SYSTOLIC BLOOD PRESSURE: 108 MMHG | TEMPERATURE: 98 F | DIASTOLIC BLOOD PRESSURE: 65 MMHG | WEIGHT: 134.92 LBS | RESPIRATION RATE: 20 BRPM | HEART RATE: 83 BPM

## 2024-02-12 DIAGNOSIS — N18.9 CHRONIC KIDNEY DISEASE, UNSPECIFIED: ICD-10-CM

## 2024-02-12 DIAGNOSIS — I50.9 HEART FAILURE, UNSPECIFIED: ICD-10-CM

## 2024-02-12 DIAGNOSIS — I21.4 NON-ST ELEVATION (NSTEMI) MYOCARDIAL INFARCTION: ICD-10-CM

## 2024-02-12 PROBLEM — E78.5 HYPERLIPIDEMIA, UNSPECIFIED: Chronic | Status: ACTIVE | Noted: 2022-09-13

## 2024-02-12 PROBLEM — F41.8 OTHER SPECIFIED ANXIETY DISORDERS: Chronic | Status: ACTIVE | Noted: 2022-09-13

## 2024-02-12 PROBLEM — G47.00 INSOMNIA, UNSPECIFIED: Chronic | Status: ACTIVE | Noted: 2022-09-13

## 2024-02-12 LAB
ALBUMIN SERPL ELPH-MCNC: 3.6 G/DL — SIGNIFICANT CHANGE UP (ref 3.3–5.2)
ALBUMIN SERPL ELPH-MCNC: 4 G/DL — SIGNIFICANT CHANGE UP (ref 3.3–5.2)
ALP SERPL-CCNC: 110 U/L — SIGNIFICANT CHANGE UP (ref 40–120)
ALP SERPL-CCNC: 119 U/L — SIGNIFICANT CHANGE UP (ref 40–120)
ALT FLD-CCNC: 12 U/L — SIGNIFICANT CHANGE UP
ALT FLD-CCNC: 33 U/L — SIGNIFICANT CHANGE UP
ANION GAP SERPL CALC-SCNC: 16 MMOL/L — SIGNIFICANT CHANGE UP (ref 5–17)
ANION GAP SERPL CALC-SCNC: 21 MMOL/L — HIGH (ref 5–17)
APPEARANCE UR: ABNORMAL
APTT BLD: 27.2 SEC — SIGNIFICANT CHANGE UP (ref 24.5–35.6)
AST SERPL-CCNC: 44 U/L — HIGH
AST SERPL-CCNC: 82 U/L — HIGH
BACTERIA # UR AUTO: ABNORMAL /HPF
BASOPHILS # BLD AUTO: 0.02 K/UL — SIGNIFICANT CHANGE UP (ref 0–0.2)
BASOPHILS NFR BLD AUTO: 0.1 % — SIGNIFICANT CHANGE UP (ref 0–2)
BILIRUB SERPL-MCNC: 0.4 MG/DL — SIGNIFICANT CHANGE UP (ref 0.4–2)
BILIRUB SERPL-MCNC: 0.5 MG/DL — SIGNIFICANT CHANGE UP (ref 0.4–2)
BILIRUB UR-MCNC: ABNORMAL
BUN SERPL-MCNC: 43.5 MG/DL — HIGH (ref 8–20)
BUN SERPL-MCNC: 47.7 MG/DL — HIGH (ref 8–20)
CALCIUM SERPL-MCNC: 8.6 MG/DL — SIGNIFICANT CHANGE UP (ref 8.4–10.5)
CALCIUM SERPL-MCNC: 9.2 MG/DL — SIGNIFICANT CHANGE UP (ref 8.4–10.5)
CAST: 2 /LPF — SIGNIFICANT CHANGE UP (ref 0–4)
CHLORIDE SERPL-SCNC: 96 MMOL/L — SIGNIFICANT CHANGE UP (ref 96–108)
CHLORIDE SERPL-SCNC: 99 MMOL/L — SIGNIFICANT CHANGE UP (ref 96–108)
CO2 SERPL-SCNC: 20 MMOL/L — LOW (ref 22–29)
CO2 SERPL-SCNC: 20 MMOL/L — LOW (ref 22–29)
COLOR SPEC: SIGNIFICANT CHANGE UP
CREAT SERPL-MCNC: 2.31 MG/DL — HIGH (ref 0.5–1.3)
CREAT SERPL-MCNC: 2.39 MG/DL — HIGH (ref 0.5–1.3)
DIFF PNL FLD: ABNORMAL
EGFR: 27 ML/MIN/1.73M2 — LOW
EGFR: 28 ML/MIN/1.73M2 — LOW
EOSINOPHIL # BLD AUTO: 0 K/UL — SIGNIFICANT CHANGE UP (ref 0–0.5)
EOSINOPHIL NFR BLD AUTO: 0 % — SIGNIFICANT CHANGE UP (ref 0–6)
GLUCOSE BLDC GLUCOMTR-MCNC: 261 MG/DL — HIGH (ref 70–99)
GLUCOSE SERPL-MCNC: 270 MG/DL — HIGH (ref 70–99)
GLUCOSE SERPL-MCNC: 274 MG/DL — HIGH (ref 70–99)
GLUCOSE UR QL: NEGATIVE MG/DL — SIGNIFICANT CHANGE UP
HCT VFR BLD CALC: 32.1 % — LOW (ref 39–50)
HGB BLD-MCNC: 10.3 G/DL — LOW (ref 13–17)
IMM GRANULOCYTES NFR BLD AUTO: 0.4 % — SIGNIFICANT CHANGE UP (ref 0–0.9)
INR BLD: 1.26 RATIO — HIGH (ref 0.85–1.18)
KETONES UR-MCNC: ABNORMAL MG/DL
LACTATE BLDV-MCNC: 3.5 MMOL/L — HIGH (ref 0.5–2)
LACTATE BLDV-MCNC: 6.2 MMOL/L — CRITICAL HIGH (ref 0.5–2)
LEUKOCYTE ESTERASE UR-ACNC: ABNORMAL
LIDOCAIN IGE QN: 8 U/L — LOW (ref 22–51)
LYMPHOCYTES # BLD AUTO: 0.61 K/UL — LOW (ref 1–3.3)
LYMPHOCYTES # BLD AUTO: 4.6 % — LOW (ref 13–44)
MAGNESIUM SERPL-MCNC: 1.7 MG/DL — LOW (ref 1.8–2.6)
MAGNESIUM SERPL-MCNC: 1.7 MG/DL — SIGNIFICANT CHANGE UP (ref 1.6–2.6)
MCHC RBC-ENTMCNC: 26.8 PG — LOW (ref 27–34)
MCHC RBC-ENTMCNC: 32.1 GM/DL — SIGNIFICANT CHANGE UP (ref 32–36)
MCV RBC AUTO: 83.4 FL — SIGNIFICANT CHANGE UP (ref 80–100)
MONOCYTES # BLD AUTO: 0.69 K/UL — SIGNIFICANT CHANGE UP (ref 0–0.9)
MONOCYTES NFR BLD AUTO: 5.1 % — SIGNIFICANT CHANGE UP (ref 2–14)
NEUTROPHILS # BLD AUTO: 12.02 K/UL — HIGH (ref 1.8–7.4)
NEUTROPHILS NFR BLD AUTO: 89.8 % — HIGH (ref 43–77)
NITRITE UR-MCNC: NEGATIVE — SIGNIFICANT CHANGE UP
NT-PROBNP SERPL-SCNC: HIGH PG/ML (ref 0–300)
PH UR: 5 — SIGNIFICANT CHANGE UP (ref 5–8)
PHOSPHATE SERPL-MCNC: 5.2 MG/DL — HIGH (ref 2.4–4.7)
PLATELET # BLD AUTO: 352 K/UL — SIGNIFICANT CHANGE UP (ref 150–400)
POTASSIUM SERPL-MCNC: 5.6 MMOL/L — HIGH (ref 3.5–5.3)
POTASSIUM SERPL-MCNC: 5.7 MMOL/L — HIGH (ref 3.5–5.3)
POTASSIUM SERPL-SCNC: 5.6 MMOL/L — HIGH (ref 3.5–5.3)
POTASSIUM SERPL-SCNC: 5.7 MMOL/L — HIGH (ref 3.5–5.3)
PROT SERPL-MCNC: 6.7 G/DL — SIGNIFICANT CHANGE UP (ref 6.6–8.7)
PROT SERPL-MCNC: 7.5 G/DL — SIGNIFICANT CHANGE UP (ref 6.6–8.7)
PROT UR-MCNC: 100 MG/DL
PROTHROM AB SERPL-ACNC: 13.9 SEC — HIGH (ref 9.5–13)
RAPID RVP RESULT: SIGNIFICANT CHANGE UP
RBC # BLD: 3.85 M/UL — LOW (ref 4.2–5.8)
RBC # FLD: 15 % — HIGH (ref 10.3–14.5)
RBC CASTS # UR COMP ASSIST: 60 /HPF — HIGH (ref 0–4)
SARS-COV-2 RNA SPEC QL NAA+PROBE: SIGNIFICANT CHANGE UP
SODIUM SERPL-SCNC: 135 MMOL/L — SIGNIFICANT CHANGE UP (ref 135–145)
SODIUM SERPL-SCNC: 137 MMOL/L — SIGNIFICANT CHANGE UP (ref 135–145)
SP GR SPEC: 1.02 — SIGNIFICANT CHANGE UP (ref 1–1.03)
TROPONIN T, HIGH SENSITIVITY RESULT: 2344 NG/L — HIGH (ref 0–51)
TROPONIN T, HIGH SENSITIVITY RESULT: 2477 NG/L — HIGH (ref 0–51)
TROPONIN T, HIGH SENSITIVITY RESULT: 3279 NG/L — HIGH (ref 0–51)
UROBILINOGEN FLD QL: 1 MG/DL — SIGNIFICANT CHANGE UP (ref 0.2–1)
WBC # BLD: 13.4 K/UL — HIGH (ref 3.8–10.5)
WBC # FLD AUTO: 13.4 K/UL — HIGH (ref 3.8–10.5)
WBC UR QL: 10 /HPF — HIGH (ref 0–5)

## 2024-02-12 PROCEDURE — 99285 EMERGENCY DEPT VISIT HI MDM: CPT

## 2024-02-12 PROCEDURE — 71045 X-RAY EXAM CHEST 1 VIEW: CPT | Mod: 26

## 2024-02-12 PROCEDURE — 93306 TTE W/DOPPLER COMPLETE: CPT | Mod: 26

## 2024-02-12 RX ORDER — DEXTROSE 50 % IN WATER 50 %
25 SYRINGE (ML) INTRAVENOUS ONCE
Refills: 0 | Status: DISCONTINUED | OUTPATIENT
Start: 2024-02-12 | End: 2024-03-06

## 2024-02-12 RX ORDER — HEPARIN SODIUM 5000 [USP'U]/ML
3800 INJECTION INTRAVENOUS; SUBCUTANEOUS ONCE
Refills: 0 | Status: COMPLETED | OUTPATIENT
Start: 2024-02-12 | End: 2024-02-12

## 2024-02-12 RX ORDER — PHYTONADIONE (VIT K1) 5 MG
5 TABLET ORAL ONCE
Refills: 0 | Status: DISCONTINUED | OUTPATIENT
Start: 2024-02-12 | End: 2024-02-12

## 2024-02-12 RX ORDER — CHLORHEXIDINE GLUCONATE 213 G/1000ML
1 SOLUTION TOPICAL
Refills: 0 | Status: DISCONTINUED | OUTPATIENT
Start: 2024-02-12 | End: 2024-03-06

## 2024-02-12 RX ORDER — SODIUM CHLORIDE 9 MG/ML
1000 INJECTION INTRAMUSCULAR; INTRAVENOUS; SUBCUTANEOUS ONCE
Refills: 0 | Status: COMPLETED | OUTPATIENT
Start: 2024-02-12 | End: 2024-02-12

## 2024-02-12 RX ORDER — NOREPINEPHRINE BITARTRATE/D5W 8 MG/250ML
0.05 PLASTIC BAG, INJECTION (ML) INTRAVENOUS
Qty: 8 | Refills: 0 | Status: DISCONTINUED | OUTPATIENT
Start: 2024-02-12 | End: 2024-02-15

## 2024-02-12 RX ORDER — MAGNESIUM SULFATE 500 MG/ML
2 VIAL (ML) INJECTION ONCE
Refills: 0 | Status: COMPLETED | OUTPATIENT
Start: 2024-02-12 | End: 2024-02-12

## 2024-02-12 RX ORDER — INSULIN HUMAN 100 [IU]/ML
10 INJECTION, SOLUTION SUBCUTANEOUS ONCE
Refills: 0 | Status: COMPLETED | OUTPATIENT
Start: 2024-02-12 | End: 2024-02-12

## 2024-02-12 RX ORDER — INFLUENZA VIRUS VACCINE 15; 15; 15; 15 UG/.5ML; UG/.5ML; UG/.5ML; UG/.5ML
0.7 SUSPENSION INTRAMUSCULAR ONCE
Refills: 0 | Status: DISCONTINUED | OUTPATIENT
Start: 2024-02-12 | End: 2024-03-06

## 2024-02-12 RX ORDER — CALCIUM GLUCONATE 100 MG/ML
2 VIAL (ML) INTRAVENOUS ONCE
Refills: 0 | Status: COMPLETED | OUTPATIENT
Start: 2024-02-12 | End: 2024-02-12

## 2024-02-12 RX ORDER — PIPERACILLIN AND TAZOBACTAM 4; .5 G/20ML; G/20ML
3.38 INJECTION, POWDER, LYOPHILIZED, FOR SOLUTION INTRAVENOUS ONCE
Refills: 0 | Status: COMPLETED | OUTPATIENT
Start: 2024-02-12 | End: 2024-02-12

## 2024-02-12 RX ORDER — HEPARIN SODIUM 5000 [USP'U]/ML
INJECTION INTRAVENOUS; SUBCUTANEOUS
Qty: 25000 | Refills: 0 | Status: DISCONTINUED | OUTPATIENT
Start: 2024-02-12 | End: 2024-02-15

## 2024-02-12 RX ORDER — HEPARIN SODIUM 5000 [USP'U]/ML
3800 INJECTION INTRAVENOUS; SUBCUTANEOUS EVERY 6 HOURS
Refills: 0 | Status: DISCONTINUED | OUTPATIENT
Start: 2024-02-12 | End: 2024-02-15

## 2024-02-12 RX ORDER — SIMVASTATIN 20 MG/1
1 TABLET, FILM COATED ORAL
Qty: 0 | Refills: 0 | DISCHARGE

## 2024-02-12 RX ORDER — INSULIN LISPRO 100/ML
VIAL (ML) SUBCUTANEOUS EVERY 6 HOURS
Refills: 0 | Status: DISCONTINUED | OUTPATIENT
Start: 2024-02-12 | End: 2024-02-14

## 2024-02-12 RX ORDER — DEXTROSE 50 % IN WATER 50 %
50 SYRINGE (ML) INTRAVENOUS ONCE
Refills: 0 | Status: COMPLETED | OUTPATIENT
Start: 2024-02-12 | End: 2024-02-12

## 2024-02-12 RX ORDER — ACETAMINOPHEN 500 MG
1000 TABLET ORAL ONCE
Refills: 0 | Status: COMPLETED | OUTPATIENT
Start: 2024-02-12 | End: 2024-02-12

## 2024-02-12 RX ORDER — FUROSEMIDE 40 MG
40 TABLET ORAL ONCE
Refills: 0 | Status: COMPLETED | OUTPATIENT
Start: 2024-02-12 | End: 2024-02-12

## 2024-02-12 RX ORDER — BUMETANIDE 0.25 MG/ML
2 INJECTION INTRAMUSCULAR; INTRAVENOUS ONCE
Refills: 0 | Status: COMPLETED | OUTPATIENT
Start: 2024-02-12 | End: 2024-02-12

## 2024-02-12 RX ORDER — DEXTROSE 50 % IN WATER 50 %
12.5 SYRINGE (ML) INTRAVENOUS ONCE
Refills: 0 | Status: DISCONTINUED | OUTPATIENT
Start: 2024-02-12 | End: 2024-03-06

## 2024-02-12 RX ORDER — ONDANSETRON 8 MG/1
4 TABLET, FILM COATED ORAL ONCE
Refills: 0 | Status: COMPLETED | OUTPATIENT
Start: 2024-02-12 | End: 2024-02-12

## 2024-02-12 RX ADMIN — Medication 40 MILLIGRAM(S): at 18:52

## 2024-02-12 RX ADMIN — Medication 400 MILLIGRAM(S): at 17:12

## 2024-02-12 RX ADMIN — Medication 5.74 MICROGRAM(S)/KG/MIN: at 18:36

## 2024-02-12 RX ADMIN — CHLORHEXIDINE GLUCONATE 1 APPLICATION(S): 213 SOLUTION TOPICAL at 23:30

## 2024-02-12 RX ADMIN — HEPARIN SODIUM 750 UNIT(S)/HR: 5000 INJECTION INTRAVENOUS; SUBCUTANEOUS at 21:42

## 2024-02-12 RX ADMIN — INSULIN HUMAN 10 UNIT(S): 100 INJECTION, SOLUTION SUBCUTANEOUS at 23:58

## 2024-02-12 RX ADMIN — Medication 50 MILLILITER(S): at 23:58

## 2024-02-12 RX ADMIN — Medication 3: at 23:34

## 2024-02-12 RX ADMIN — ONDANSETRON 4 MILLIGRAM(S): 8 TABLET, FILM COATED ORAL at 17:12

## 2024-02-12 RX ADMIN — Medication 200 GRAM(S): at 23:58

## 2024-02-12 RX ADMIN — BUMETANIDE 2 MILLIGRAM(S): 0.25 INJECTION INTRAMUSCULAR; INTRAVENOUS at 23:29

## 2024-02-12 RX ADMIN — PIPERACILLIN AND TAZOBACTAM 200 GRAM(S): 4; .5 INJECTION, POWDER, LYOPHILIZED, FOR SOLUTION INTRAVENOUS at 18:01

## 2024-02-12 RX ADMIN — SODIUM CHLORIDE 1000 MILLILITER(S): 9 INJECTION INTRAMUSCULAR; INTRAVENOUS; SUBCUTANEOUS at 17:12

## 2024-02-12 RX ADMIN — Medication 25 GRAM(S): at 23:58

## 2024-02-12 RX ADMIN — HEPARIN SODIUM 3800 UNIT(S): 5000 INJECTION INTRAVENOUS; SUBCUTANEOUS at 21:42

## 2024-02-12 NOTE — PHARMACOTHERAPY INTERVENTION NOTE - COMMENTS
Hypotension
Spoke to family to obtain medication list. Last filled clonazepam 1.5 mG once daily as needed #45 on 7/2023. As per daughter still on it for anxiety. Outpatient Medication Review updated.    HOME MEDICATIONS:  ARIPiprazole 5 mg oral tablet: 1 tab(s) orally once a day (12 Feb 2024 19:32)  busPIRone 10 mg oral tablet: 1 tab(s) orally once a day (12 Feb 2024 19:32)  KlonoPIN 1 mg oral tablet: 1 tab(s) orally once a day as needed for  anxiety (12 Feb 2024 19:32)  lisinopril 40 mg oral tablet: 1 tab(s) orally once a day (12 Feb 2024 19:32)  metFORMIN 500 mg oral tablet: 1 tab(s) orally 2 times a day (12 Feb 2024 19:32)  QUEtiapine 50 mg oral tablet: 1 tab(s) orally once a day (at bedtime) (12 Feb 2024 19:32)  sertraline 100 mg oral tablet: 1 tab(s) orally once a day (12 Feb 2024 19:32)  simvastatin 20 mg oral tablet: 1 tab(s) orally once a day (12 Feb 2024 19:32)  
Impaired gait/Weakness

## 2024-02-12 NOTE — ED PROVIDER NOTE - OBJECTIVE STATEMENT
78-year-old male past medical history of depression ex-smoker comes to the ED with chest chest pain x 1 day.  As per family patient has never seen a cardiologist before but has a primary care doctor.  Patient noted having diarrhea x 6 and vomiting x 1 today.  Patient in the in the ED noted to have a low O2 sat in the 90%

## 2024-02-12 NOTE — ED ADULT NURSE REASSESSMENT NOTE - NS ED NURSE REASSESS COMMENT FT1
PT with increasing hypoxia on 3L NC. Reports SOB. Spo2 @85%. Dr. Christianson called to bedside. NC increased to 6L.

## 2024-02-12 NOTE — PATIENT PROFILE ADULT - FALL HARM RISK - UNIVERSAL INTERVENTIONS
Bed in lowest position, wheels locked, appropriate side rails in place/Call bell, personal items and telephone in reach/Instruct patient to call for assistance before getting out of bed or chair/Non-slip footwear when patient is out of bed/[place] to call system/Physically safe environment - no spills, clutter or unnecessary equipment/Purposeful Proactive Rounding/Room/bathroom lighting operational, light cord in reach

## 2024-02-12 NOTE — CONSULT NOTE ADULT - ASSESSMENT
79 y/o M with PMHx anxiety, HTN, HLD, CKD, plaque psoriasis, DM2 who presents to [facility] ED with 1 weeks of chest pain at rest, that woke him out of sleep today. Patient states he had been having chest pain but was mild and tolerable. Today the pain became sharp and he felt it midsternal with radiation to left side and he also had 6 diarrhea bowel movements. Hypotensive on presentation, given IV fluid bolus but had respiratory distress, placed on bipap and levophed initiated to maintain BP. He endorses having chest pain and diarrhea today and denies back pain, headache, dizziness, diaphoresis, syncope.  Plan for ICU evaluation, STAT TTE, and start heparin GTT.    hsTnT 2344->2477, lactate 6.2, BNP 17912

## 2024-02-12 NOTE — ED ADULT NURSE NOTE - NSFALLUNIVINTERV_ED_ALL_ED
Bed/Stretcher in lowest position, wheels locked, appropriate side rails in place/Call bell, personal items and telephone in reach/Instruct patient to call for assistance before getting out of bed/chair/stretcher/Non-slip footwear applied when patient is off stretcher/[place] to call system/Physically safe environment - no spills, clutter or unnecessary equipment/Purposeful proactive rounding/Room/bathroom lighting operational, light cord in reach

## 2024-02-12 NOTE — ED ADULT NURSE NOTE - OBJECTIVE STATEMENT
Pt. states he was having crushing chest pain, beginning last night. Pt rates pain 6/10. Pt poor historian. CiF0puni equal respirations. Pt. on 3L nasal cannula with O2 saturation of 94%. Pt witnessed coughing but denies any contact with respiratory viruses. Pt states he vomited once this morning and had diarrhea x6 this morning.

## 2024-02-12 NOTE — CONSULT NOTE ADULT - SUBJECTIVE AND OBJECTIVE BOX
[facility]                                              CARDIOLOGY AT [address], [place] [address]                                             Telephone: [phone]. Fax:[phone]                                                       CARDIOLOGY CONSULTATION NOTE                                                                                             History obtained by: Patient and medical record  Community Cardiologist: none follows with Dr. Sissy Daniels in [place]   obtained: Yes [  ] No [ x ]  Reason for Consultation: Chest Pain   Available out pt records reviewed: Yes [  ] No [  ]    Chief complaint:    Patient is a 78y old  Male who presents with a chief complaint of     HPI:  77 y/o M with PMHx anxiety, HTN, HLD, CKD, plaque psoriasis, DM2 who presents to [facility] ED with 1 weeks of chest pain at rest, that woke him out of sleep today. Patient states he had been having chest pain but was mild and tolerable. Today the pain became sharp and he felt it midsternal with radiation to left side and he also had 6 diarrhea bowel movements. Per daughter (RN in ED), patient very anxious about health and doctors and most of his care is managed by his PCP. He has never seen a cardiologist, rheumatologist or nephrologist. In ED EKG with nonspecific ST changes. hsTnT 2344->2477. Hypotensive on presentation, given IV fluid bolus but had respiratory distress, placed on bipap and levophed initiated to maintain BP. He endorses having chest pain and diarrhea today and denies back pain, headache, dizziness, diaphoresis, syncope.    CARDIAC TESTING   ECHO: PENDING    STRESS:    CATH:     ELECTROPHYSIOLOGY:     PAST MEDICAL HISTORY  Diabetes  Hyperlipidemia  Anxiety with depression  Insomnia        PAST SURGICAL HISTORY  No significant past surgical history        SOCIAL HISTORY:  Denies smoking/alcohol/drugs  CIGARETTES:     ALCOHOL:  DRUGS:    FAMILY HISTORY:  No pertinent family history in first degree relatives      Family History of Cardiovascular Disease:  Yes [  ] No [  ]  Coronary Artery Disease in first degree relative: Yes [  ] No [  ]  Sudden Cardiac Death in First degree relative: Yes [  ] No [  ]    HOME MEDICATIONS:  ARIPiprazole 5 mg oral tablet: 1 tab(s) orally once a day (12 Feb 2024 19:32)  busPIRone 10 mg oral tablet: 1 tab(s) orally once a day (12 Feb 2024 19:32)  KlonoPIN 1 mg oral tablet: 1 tab(s) orally once a day as needed for  anxiety (12 Feb 2024 19:32)  lisinopril 40 mg oral tablet: 1 tab(s) orally once a day (12 Feb 2024 19:32)  metFORMIN 500 mg oral tablet: 1 tab(s) orally 2 times a day (12 Feb 2024 19:32)  QUEtiapine 50 mg oral tablet: 1 tab(s) orally once a day (at bedtime) (12 Feb 2024 19:32)  sertraline 100 mg oral tablet: 1 tab(s) orally once a day (12 Feb 2024 19:32)  simvastatin 20 mg oral tablet: 1 tab(s) orally once a day (12 Feb 2024 19:32)      CURRENT CARDIAC MEDICATIONS:  norepinephrine Infusion 0.05 MICROgram(s)/kG/Min IV Continuous <Continuous>      CURRENT OTHER MEDICATIONS:      ALLERGIES:   No Known Allergies      REVIEW OF SYMPTOMS:   CONSTITUTIONAL: No fever, no chills, no weight loss, no weight gain, no fatigue   ENMT:  No vertigo; No sinus or throat pain  NECK: No pain or stiffness  CARDIOVASCULAR: + chest pain,+dyspnea, no syncope/presyncope, no palpitations, no dizziness, no Orthopnea, no Paroxsymal nocturnal dyspnea  RESPIRATORY: + Shortness of breath, no cough, no wheezing  : No dysuria, no hematuria   GI: No dark color stool, no nausea, no diarrhea, no constipation, no abdominal pain   NEURO: No headache, no slurred speech   MUSCULOSKELETAL: No joint pain or swelling; No muscle, back, or extremity pain  PSYCH: No agitation, no anxiety.    ALL OTHER REVIEW OF SYSTEMS ARE NEGATIVE.    VITAL SIGNS:  T(C): 36.7 (02-12-24 @ 16:00), Max: 36.7 (02-12-24 @ 14:06)  T(F): 98 (02-12-24 @ 16:00), Max: 98 (02-12-24 @ 14:06)  HR: 97 (02-12-24 @ 20:42) (83 - 106)  BP: 106/72 (02-12-24 @ 20:40) (87/58 - 108/65)  RR: 18 (02-12-24 @ 20:40) (14 - 24)  SpO2: 99% (02-12-24 @ 20:42) (85% - 100%)    INTAKE AND OUTPUT:       PHYSICAL EXAM:  Constitutional: Comfortable . No acute distress.   HEENT: Atraumatic and normocephalic , neck is supple . no JVD. No carotid bruit.  CNS: A&Ox3. No focal deficits.   Respiratory: CTAB, unlabored currently on bipap  Cardiovascular: RRR normal s1 s2. No murmur. No rubs or gallop.  Gastrointestinal: Soft, non-tender. +Bowel sounds.   Extremities: 2+ Peripheral Pulses, No clubbing, cyanosis, or edema  Psychiatric: Calm . no agitation.   Skin: Warm and dry, plaques on LE consistent with psoriasis  LABS:                            10.3   13.40 )-----------( 352      ( 12 Feb 2024 16:50 )             32.1     02-12    137  |  96  |  43.5<H>  ----------------------------<  274<H>  5.6<H>   |  20.0<L>  |  2.31<H>    Ca    9.2      12 Feb 2024 16:50  Mg     1.7     02-12    TPro  7.5  /  Alb  4.0  /  TBili  0.4  /  DBili  x   /  AST  44<H>  /  ALT  12  /  AlkPhos  119  02-12    PT/INR - ( 12 Feb 2024 18:35 )   PT: 13.9 sec;   INR: 1.26 ratio         PTT - ( 12 Feb 2024 18:35 )  PTT:27.2 sec  Urinalysis Basic - ( 12 Feb 2024 16:50 )    Color: x / Appearance: x / SG: x / pH: x  Gluc: 274 mg/dL / Ketone: x  / Bili: x / Urobili: x   Blood: x / Protein: x / Nitrite: x   Leuk Esterase: x / RBC: x / WBC x   Sq Epi: x / Non Sq Epi: x / Bacteria: x              INTERPRETATION OF TELEMETRY:     ECG: ST nonspecific EKG changes  Prior ECG: Yes [  ] No [  ]    RADIOLOGY & ADDITIONAL STUDIES:    X-ray:    CT scan:   MRI:   US:

## 2024-02-13 ENCOUNTER — RESULT REVIEW (OUTPATIENT)
Age: 79
End: 2024-02-13

## 2024-02-13 DIAGNOSIS — I50.41 ACUTE COMBINED SYSTOLIC (CONGESTIVE) AND DIASTOLIC (CONGESTIVE) HEART FAILURE: ICD-10-CM

## 2024-02-13 LAB
A1C WITH ESTIMATED AVERAGE GLUCOSE RESULT: 7 % — HIGH (ref 4–5.6)
A1C WITH ESTIMATED AVERAGE GLUCOSE RESULT: 7 % — HIGH (ref 4–5.6)
ALBUMIN SERPL ELPH-MCNC: 3.7 G/DL — SIGNIFICANT CHANGE UP (ref 3.3–5.2)
ALBUMIN SERPL ELPH-MCNC: 3.7 G/DL — SIGNIFICANT CHANGE UP (ref 3.3–5.2)
ALP SERPL-CCNC: 101 U/L — SIGNIFICANT CHANGE UP (ref 40–120)
ALP SERPL-CCNC: 105 U/L — SIGNIFICANT CHANGE UP (ref 40–120)
ALT FLD-CCNC: 43 U/L — HIGH
ALT FLD-CCNC: 44 U/L — HIGH
ANION GAP SERPL CALC-SCNC: 19 MMOL/L — HIGH (ref 5–17)
ANION GAP SERPL CALC-SCNC: 21 MMOL/L — HIGH (ref 5–17)
APTT BLD: 33.2 SEC — SIGNIFICANT CHANGE UP (ref 24.5–35.6)
APTT BLD: 33.8 SEC — SIGNIFICANT CHANGE UP (ref 24.5–35.6)
APTT BLD: 34.4 SEC — SIGNIFICANT CHANGE UP (ref 24.5–35.6)
AST SERPL-CCNC: 113 U/L — HIGH
AST SERPL-CCNC: 83 U/L — HIGH
BASOPHILS # BLD AUTO: 0.02 K/UL — SIGNIFICANT CHANGE UP (ref 0–0.2)
BASOPHILS NFR BLD AUTO: 0.2 % — SIGNIFICANT CHANGE UP (ref 0–2)
BILIRUB SERPL-MCNC: 0.3 MG/DL — LOW (ref 0.4–2)
BILIRUB SERPL-MCNC: 0.5 MG/DL — SIGNIFICANT CHANGE UP (ref 0.4–2)
BUN SERPL-MCNC: 48.6 MG/DL — HIGH (ref 8–20)
BUN SERPL-MCNC: 60.7 MG/DL — HIGH (ref 8–20)
CALCIUM SERPL-MCNC: 9.1 MG/DL — SIGNIFICANT CHANGE UP (ref 8.4–10.5)
CALCIUM SERPL-MCNC: 9.1 MG/DL — SIGNIFICANT CHANGE UP (ref 8.4–10.5)
CHLORIDE SERPL-SCNC: 101 MMOL/L — SIGNIFICANT CHANGE UP (ref 96–108)
CHLORIDE SERPL-SCNC: 98 MMOL/L — SIGNIFICANT CHANGE UP (ref 96–108)
CHOLEST SERPL-MCNC: 179 MG/DL — SIGNIFICANT CHANGE UP
CHOLEST SERPL-MCNC: 183 MG/DL — SIGNIFICANT CHANGE UP
CO2 SERPL-SCNC: 18 MMOL/L — LOW (ref 22–29)
CO2 SERPL-SCNC: 21 MMOL/L — LOW (ref 22–29)
CREAT SERPL-MCNC: 2.51 MG/DL — HIGH (ref 0.5–1.3)
CREAT SERPL-MCNC: 2.57 MG/DL — HIGH (ref 0.5–1.3)
CULTURE RESULTS: NO GROWTH — SIGNIFICANT CHANGE UP
EGFR: 25 ML/MIN/1.73M2 — LOW
EGFR: 25 ML/MIN/1.73M2 — LOW
EOSINOPHIL # BLD AUTO: 0 K/UL — SIGNIFICANT CHANGE UP (ref 0–0.5)
EOSINOPHIL NFR BLD AUTO: 0 % — SIGNIFICANT CHANGE UP (ref 0–6)
ESTIMATED AVERAGE GLUCOSE: 154 MG/DL — HIGH (ref 68–114)
ESTIMATED AVERAGE GLUCOSE: 154 MG/DL — HIGH (ref 68–114)
GAS PNL BLDA: SIGNIFICANT CHANGE UP
GAS PNL BLDV: SIGNIFICANT CHANGE UP
GLUCOSE BLDC GLUCOMTR-MCNC: 155 MG/DL — HIGH (ref 70–99)
GLUCOSE BLDC GLUCOMTR-MCNC: 197 MG/DL — HIGH (ref 70–99)
GLUCOSE SERPL-MCNC: 163 MG/DL — HIGH (ref 70–99)
GLUCOSE SERPL-MCNC: 166 MG/DL — HIGH (ref 70–99)
HCT VFR BLD CALC: 28.3 % — LOW (ref 39–50)
HCT VFR BLD CALC: 31.3 % — LOW (ref 39–50)
HDLC SERPL-MCNC: 48 MG/DL — SIGNIFICANT CHANGE UP
HDLC SERPL-MCNC: 49 MG/DL — SIGNIFICANT CHANGE UP
HGB BLD-MCNC: 9.2 G/DL — LOW (ref 13–17)
HGB BLD-MCNC: 9.7 G/DL — LOW (ref 13–17)
IMM GRANULOCYTES NFR BLD AUTO: 0.4 % — SIGNIFICANT CHANGE UP (ref 0–0.9)
INR BLD: 1.23 RATIO — HIGH (ref 0.85–1.18)
INR BLD: 1.26 RATIO — HIGH (ref 0.85–1.18)
LACTATE SERPL-SCNC: 2.5 MMOL/L — HIGH (ref 0.5–2)
LACTATE SERPL-SCNC: 3.8 MMOL/L — HIGH (ref 0.5–2)
LIPID PNL WITH DIRECT LDL SERPL: 111 MG/DL — HIGH
LIPID PNL WITH DIRECT LDL SERPL: 120 MG/DL — HIGH
LYMPHOCYTES # BLD AUTO: 1.88 K/UL — SIGNIFICANT CHANGE UP (ref 1–3.3)
LYMPHOCYTES # BLD AUTO: 14.2 % — SIGNIFICANT CHANGE UP (ref 13–44)
MAGNESIUM SERPL-MCNC: 2.2 MG/DL — SIGNIFICANT CHANGE UP (ref 1.6–2.6)
MAGNESIUM SERPL-MCNC: 2.2 MG/DL — SIGNIFICANT CHANGE UP (ref 1.8–2.6)
MCHC RBC-ENTMCNC: 26.3 PG — LOW (ref 27–34)
MCHC RBC-ENTMCNC: 27.3 PG — SIGNIFICANT CHANGE UP (ref 27–34)
MCHC RBC-ENTMCNC: 31 GM/DL — LOW (ref 32–36)
MCHC RBC-ENTMCNC: 32.5 GM/DL — SIGNIFICANT CHANGE UP (ref 32–36)
MCV RBC AUTO: 84 FL — SIGNIFICANT CHANGE UP (ref 80–100)
MCV RBC AUTO: 84.8 FL — SIGNIFICANT CHANGE UP (ref 80–100)
MONOCYTES # BLD AUTO: 1.25 K/UL — HIGH (ref 0–0.9)
MONOCYTES NFR BLD AUTO: 9.4 % — SIGNIFICANT CHANGE UP (ref 2–14)
MRSA PCR RESULT.: SIGNIFICANT CHANGE UP
NEUTROPHILS # BLD AUTO: 10.03 K/UL — HIGH (ref 1.8–7.4)
NEUTROPHILS NFR BLD AUTO: 75.8 % — SIGNIFICANT CHANGE UP (ref 43–77)
NON HDL CHOLESTEROL: 130 MG/DL — HIGH
NON HDL CHOLESTEROL: 135 MG/DL — HIGH
NT-PROBNP SERPL-SCNC: HIGH PG/ML (ref 0–300)
PHOSPHATE SERPL-MCNC: 4.8 MG/DL — HIGH (ref 2.4–4.7)
PHOSPHATE SERPL-MCNC: 5.1 MG/DL — HIGH (ref 2.4–4.7)
PLATELET # BLD AUTO: 242 K/UL — SIGNIFICANT CHANGE UP (ref 150–400)
PLATELET # BLD AUTO: 306 K/UL — SIGNIFICANT CHANGE UP (ref 150–400)
POTASSIUM SERPL-MCNC: 5 MMOL/L — SIGNIFICANT CHANGE UP (ref 3.5–5.3)
POTASSIUM SERPL-MCNC: 5.1 MMOL/L — SIGNIFICANT CHANGE UP (ref 3.5–5.3)
POTASSIUM SERPL-SCNC: 5 MMOL/L — SIGNIFICANT CHANGE UP (ref 3.5–5.3)
POTASSIUM SERPL-SCNC: 5.1 MMOL/L — SIGNIFICANT CHANGE UP (ref 3.5–5.3)
PROCALCITONIN SERPL-MCNC: 1.43 NG/ML — HIGH (ref 0.02–0.1)
PROT SERPL-MCNC: 7.2 G/DL — SIGNIFICANT CHANGE UP (ref 6.6–8.7)
PROT SERPL-MCNC: 7.2 G/DL — SIGNIFICANT CHANGE UP (ref 6.6–8.7)
PROTHROM AB SERPL-ACNC: 13.6 SEC — HIGH (ref 9.5–13)
PROTHROM AB SERPL-ACNC: 13.9 SEC — HIGH (ref 9.5–13)
RBC # BLD: 3.37 M/UL — LOW (ref 4.2–5.8)
RBC # BLD: 3.69 M/UL — LOW (ref 4.2–5.8)
RBC # FLD: 14.9 % — HIGH (ref 10.3–14.5)
RBC # FLD: 15 % — HIGH (ref 10.3–14.5)
S AUREUS DNA NOSE QL NAA+PROBE: DETECTED
SODIUM SERPL-SCNC: 138 MMOL/L — SIGNIFICANT CHANGE UP (ref 135–145)
SODIUM SERPL-SCNC: 139 MMOL/L — SIGNIFICANT CHANGE UP (ref 135–145)
SPECIMEN SOURCE: SIGNIFICANT CHANGE UP
TRIGL SERPL-MCNC: 76 MG/DL — SIGNIFICANT CHANGE UP
TRIGL SERPL-MCNC: 94 MG/DL — SIGNIFICANT CHANGE UP
TROPONIN T, HIGH SENSITIVITY RESULT: 4448 NG/L — HIGH (ref 0–51)
TROPONIN T, HIGH SENSITIVITY RESULT: 5209 NG/L — HIGH (ref 0–51)
TSH SERPL-MCNC: 2.49 UIU/ML — SIGNIFICANT CHANGE UP (ref 0.27–4.2)
WBC # BLD: 13.23 K/UL — HIGH (ref 3.8–10.5)
WBC # BLD: 15.96 K/UL — HIGH (ref 3.8–10.5)
WBC # FLD AUTO: 13.23 K/UL — HIGH (ref 3.8–10.5)
WBC # FLD AUTO: 15.96 K/UL — HIGH (ref 3.8–10.5)

## 2024-02-13 PROCEDURE — 99233 SBSQ HOSP IP/OBS HIGH 50: CPT

## 2024-02-13 PROCEDURE — 93010 ELECTROCARDIOGRAM REPORT: CPT | Mod: 76

## 2024-02-13 PROCEDURE — 93308 TTE F-UP OR LMTD: CPT | Mod: 26

## 2024-02-13 PROCEDURE — 71045 X-RAY EXAM CHEST 1 VIEW: CPT | Mod: 26

## 2024-02-13 PROCEDURE — 99291 CRITICAL CARE FIRST HOUR: CPT

## 2024-02-13 PROCEDURE — 93321 DOPPLER ECHO F-UP/LMTD STD: CPT | Mod: 26

## 2024-02-13 RX ORDER — BUMETANIDE 0.25 MG/ML
2 INJECTION INTRAMUSCULAR; INTRAVENOUS ONCE
Refills: 0 | Status: COMPLETED | OUTPATIENT
Start: 2024-02-13 | End: 2024-02-13

## 2024-02-13 RX ORDER — ARIPIPRAZOLE 15 MG/1
5 TABLET ORAL DAILY
Refills: 0 | Status: DISCONTINUED | OUTPATIENT
Start: 2024-02-13 | End: 2024-02-13

## 2024-02-13 RX ORDER — CEFTRIAXONE 500 MG/1
1000 INJECTION, POWDER, FOR SOLUTION INTRAMUSCULAR; INTRAVENOUS ONCE
Refills: 0 | Status: COMPLETED | OUTPATIENT
Start: 2024-02-13 | End: 2024-02-13

## 2024-02-13 RX ORDER — ATORVASTATIN CALCIUM 80 MG/1
40 TABLET, FILM COATED ORAL AT BEDTIME
Refills: 0 | Status: DISCONTINUED | OUTPATIENT
Start: 2024-02-13 | End: 2024-02-13

## 2024-02-13 RX ORDER — DEXMEDETOMIDINE HYDROCHLORIDE IN 0.9% SODIUM CHLORIDE 4 UG/ML
0.3 INJECTION INTRAVENOUS
Qty: 400 | Refills: 0 | Status: DISCONTINUED | OUTPATIENT
Start: 2024-02-13 | End: 2024-02-13

## 2024-02-13 RX ORDER — DOBUTAMINE HCL 250MG/20ML
2.5 VIAL (ML) INTRAVENOUS
Qty: 500 | Refills: 0 | Status: DISCONTINUED | OUTPATIENT
Start: 2024-02-13 | End: 2024-02-15

## 2024-02-13 RX ORDER — MUPIROCIN 20 MG/G
1 OINTMENT TOPICAL ONCE
Refills: 0 | Status: DISCONTINUED | OUTPATIENT
Start: 2024-02-13 | End: 2024-02-13

## 2024-02-13 RX ORDER — CLONAZEPAM 1 MG
1 TABLET ORAL DAILY
Refills: 0 | Status: DISCONTINUED | OUTPATIENT
Start: 2024-02-13 | End: 2024-02-13

## 2024-02-13 RX ORDER — CEFTRIAXONE 500 MG/1
INJECTION, POWDER, FOR SOLUTION INTRAMUSCULAR; INTRAVENOUS
Refills: 0 | Status: COMPLETED | OUTPATIENT
Start: 2024-02-13 | End: 2024-02-21

## 2024-02-13 RX ORDER — ASPIRIN/CALCIUM CARB/MAGNESIUM 324 MG
81 TABLET ORAL DAILY
Refills: 0 | Status: DISCONTINUED | OUTPATIENT
Start: 2024-02-13 | End: 2024-02-13

## 2024-02-13 RX ORDER — FUROSEMIDE 40 MG
15 TABLET ORAL
Qty: 500 | Refills: 0 | Status: DISCONTINUED | OUTPATIENT
Start: 2024-02-13 | End: 2024-02-13

## 2024-02-13 RX ORDER — FUROSEMIDE 40 MG
10 TABLET ORAL
Qty: 500 | Refills: 0 | Status: DISCONTINUED | OUTPATIENT
Start: 2024-02-13 | End: 2024-02-13

## 2024-02-13 RX ORDER — BUMETANIDE 0.25 MG/ML
2 INJECTION INTRAMUSCULAR; INTRAVENOUS
Qty: 20 | Refills: 0 | Status: DISCONTINUED | OUTPATIENT
Start: 2024-02-13 | End: 2024-02-14

## 2024-02-13 RX ORDER — OLANZAPINE 15 MG/1
10 TABLET, FILM COATED ORAL DAILY
Refills: 0 | Status: DISCONTINUED | OUTPATIENT
Start: 2024-02-13 | End: 2024-02-13

## 2024-02-13 RX ORDER — ACETAMINOPHEN 500 MG
1000 TABLET ORAL ONCE
Refills: 0 | Status: COMPLETED | OUTPATIENT
Start: 2024-02-13 | End: 2024-02-13

## 2024-02-13 RX ORDER — DEXMEDETOMIDINE HYDROCHLORIDE IN 0.9% SODIUM CHLORIDE 4 UG/ML
0.3 INJECTION INTRAVENOUS
Qty: 200 | Refills: 0 | Status: DISCONTINUED | OUTPATIENT
Start: 2024-02-13 | End: 2024-02-17

## 2024-02-13 RX ORDER — SERTRALINE 25 MG/1
100 TABLET, FILM COATED ORAL DAILY
Refills: 0 | Status: DISCONTINUED | OUTPATIENT
Start: 2024-02-13 | End: 2024-02-13

## 2024-02-13 RX ORDER — QUETIAPINE FUMARATE 200 MG/1
50 TABLET, FILM COATED ORAL AT BEDTIME
Refills: 0 | Status: DISCONTINUED | OUTPATIENT
Start: 2024-02-13 | End: 2024-02-13

## 2024-02-13 RX ORDER — CHLOROTHIAZIDE 500 MG
250 TABLET ORAL ONCE
Refills: 0 | Status: COMPLETED | OUTPATIENT
Start: 2024-02-13 | End: 2024-02-13

## 2024-02-13 RX ORDER — CEFTRIAXONE 500 MG/1
1000 INJECTION, POWDER, FOR SOLUTION INTRAMUSCULAR; INTRAVENOUS EVERY 24 HOURS
Refills: 0 | Status: COMPLETED | OUTPATIENT
Start: 2024-02-14 | End: 2024-02-20

## 2024-02-13 RX ORDER — ASPIRIN/CALCIUM CARB/MAGNESIUM 324 MG
300 TABLET ORAL DAILY
Refills: 0 | Status: DISCONTINUED | OUTPATIENT
Start: 2024-02-14 | End: 2024-02-14

## 2024-02-13 RX ADMIN — HEPARIN SODIUM 3800 UNIT(S): 5000 INJECTION INTRAVENOUS; SUBCUTANEOUS at 03:35

## 2024-02-13 RX ADMIN — CEFTRIAXONE 1000 MILLIGRAM(S): 500 INJECTION, POWDER, FOR SOLUTION INTRAMUSCULAR; INTRAVENOUS at 05:02

## 2024-02-13 RX ADMIN — CHLORHEXIDINE GLUCONATE 1 APPLICATION(S): 213 SOLUTION TOPICAL at 05:06

## 2024-02-13 RX ADMIN — DEXMEDETOMIDINE HYDROCHLORIDE IN 0.9% SODIUM CHLORIDE 4.59 MICROGRAM(S)/KG/HR: 4 INJECTION INTRAVENOUS at 21:21

## 2024-02-13 RX ADMIN — Medication 400 MILLIGRAM(S): at 22:17

## 2024-02-13 RX ADMIN — Medication 1: at 05:05

## 2024-02-13 RX ADMIN — HEPARIN SODIUM 3800 UNIT(S): 5000 INJECTION INTRAVENOUS; SUBCUTANEOUS at 20:22

## 2024-02-13 RX ADMIN — Medication 0.5 MILLIGRAM(S): at 03:22

## 2024-02-13 RX ADMIN — Medication 1 MILLIGRAM(S): at 10:51

## 2024-02-13 RX ADMIN — BUMETANIDE 10 MG/HR: 0.25 INJECTION INTRAMUSCULAR; INTRAVENOUS at 21:50

## 2024-02-13 RX ADMIN — HEPARIN SODIUM 1150 UNIT(S)/HR: 5000 INJECTION INTRAVENOUS; SUBCUTANEOUS at 20:20

## 2024-02-13 RX ADMIN — Medication 200 MILLIGRAM(S): at 23:09

## 2024-02-13 RX ADMIN — Medication 0.5 MILLIGRAM(S): at 14:38

## 2024-02-13 RX ADMIN — HEPARIN SODIUM 950 UNIT(S)/HR: 5000 INJECTION INTRAVENOUS; SUBCUTANEOUS at 03:33

## 2024-02-13 RX ADMIN — BUMETANIDE 2 MILLIGRAM(S): 0.25 INJECTION INTRAMUSCULAR; INTRAVENOUS at 21:21

## 2024-02-13 RX ADMIN — Medication 100 MILLIGRAM(S): at 19:07

## 2024-02-13 RX ADMIN — Medication 100 MILLIGRAM(S): at 05:02

## 2024-02-13 RX ADMIN — Medication 1: at 23:08

## 2024-02-13 RX ADMIN — Medication 4.59 MICROGRAM(S)/KG/MIN: at 21:24

## 2024-02-13 RX ADMIN — Medication 5 MG/HR: at 01:32

## 2024-02-13 RX ADMIN — Medication 0.5 MILLIGRAM(S): at 10:53

## 2024-02-13 RX ADMIN — Medication 7.5 MG/HR: at 13:16

## 2024-02-13 RX ADMIN — Medication 1000 MILLIGRAM(S): at 23:16

## 2024-02-13 NOTE — PROGRESS NOTE ADULT - PROBLEM SELECTOR PLAN 1
.  - EKG ST nonspecific ST changes  - hsTnT 2344->2477->3279->4448  - started on levophed for BP support, maintain MAP >65, now weaned off   - continue heparin Gtt ACS nomogram  - currently on NIPPV weaning as tolerated  - TTE with EF <20%, moderate pericardial effusion adjacent to anterior RV, multiple segmental abnormalities  - check daily EKG   - eventual ischemic evaluation when more optimized. Case discussed with Dr. Edwards, plan for LHC tomorrow for further evaluation   - if patient develops worsening chest pain, will consider expediting LHC

## 2024-02-13 NOTE — CHART NOTE - NSCHARTNOTEFT_GEN_A_CORE
Pt failed trial without BiPAP. Pt desaturating with increased WOB. Will continue with BiPAP. Pt unable to tolerate PO meds, will hold home medications. Plan to start IV ativan, IM zyprexa and IV thiazide

## 2024-02-13 NOTE — H&P ADULT - NSHPPHYSICALEXAM_GEN_ALL_CORE
PHYSICAL EXAM:    GENERAL: NAD, elderly man  NERVOUS SYSTEM:  A&O X3, interactive, non-focal, Motor Strength 5/5 B/L upper and lower extremities;   HEAD:  Atraumatic, Normocephalic  EYES: EOMI, PERRLA, conjunctiva and sclera clear  NECK: Supple, No JVD  CHEST/LUNG: Bilateral rales throughout. No rhonchi, wheezing, or rubs  HEART: RRR, normal S1/S2; No murmurs, rubs, or gallops  ABDOMEN: Soft, +BS, Nontender, Nondistended  EXTREMITIES:  2+ Peripheral Pulses, No clubbing, cyanosis, or edema  SKIN: Warm. Scattered psoriasis plaques over entirety of body

## 2024-02-13 NOTE — PROGRESS NOTE ADULT - SUBJECTIVE AND OBJECTIVE BOX
[facility]                                                         CARDIOLOGY AT [place] [address]                                                         Telephone: [phone]. Fax:[phone]                                                                             PROGRESS NOTE    Reason for follow up: Chest Pain   Update: currently in ICU remains on BIPAP, planning to wean as tolerated. vasopressor support weaned around 4am. New HFrEF      Review of symptoms:   Cardiac:  No chest pain. No dyspnea. No palpitations.  Respiratory: no cough. No dyspnea  Gastrointestinal: No diarrhea. No abdominal pain. No bleeding.   Neuro: No focal neuro complaints.    Vitals:  T(C): 36.5 (02-13-24 @ 05:00), Max: 36.8 (02-13-24 @ 04:29)  HR: 79 (02-13-24 @ 11:15) (76 - 106)  BP: 101/61 (02-13-24 @ 11:10) (87/58 - 120/71)  RR: 20 (02-13-24 @ 11:15) (14 - 35)  SpO2: 99% (02-13-24 @ 11:15) (80% - 100%)  Wt(kg): --  I&O's Summary    12 Feb 2024 07:01  -  13 Feb 2024 07:00  --------------------------------------------------------  IN: 369.6 mL / OUT: 560 mL / NET: -190.4 mL    13 Feb 2024 07:01  -  13 Feb 2024 11:40  --------------------------------------------------------  IN: 0 mL / OUT: 170 mL / NET: -170 mL      Weight (kg): 61.2 (02-12 @ 14:06)    PHYSICAL EXAM:  Appearance: Comfortable. No acute distress  HEENT:  Atraumatic. Normocephalic.  Normal oral mucosa  Neurologic: A & O x 3, no gross focal deficits.  Cardiovascular: RRR S1 S2, No murmur, no rubs/gallops. No JVD  Respiratory: Lungs clear to auscultation, unlabored   Gastrointestinal:  Soft, Non-tender, + BS  Lower Extremities: 2+ Peripheral Pulses, No clubbing, cyanosis, or edema  Psychiatry: Patient is calm. No agitation.   Skin: warm and dry.    CURRENT CARDIAC MEDICATIONS:  furosemide Infusion 10 mG/Hr IV Continuous <Continuous>  norepinephrine Infusion 0.05 MICROgram(s)/kG/Min IV Continuous <Continuous>      CURRENT OTHER MEDICATIONS:  cefTRIAXone Injectable.      doxycycline IVPB      doxycycline IVPB 100 milliGRAM(s) IV Intermittent every 12 hours  ARIPiprazole 5 milliGRAM(s) Oral daily  busPIRone 10 milliGRAM(s) Oral <User Schedule>  clonazePAM  Tablet 1 milliGRAM(s) Oral daily PRN for anxiety  QUEtiapine 50 milliGRAM(s) Oral at bedtime  sertraline 100 milliGRAM(s) Oral daily  atorvastatin 40 milliGRAM(s) Oral at bedtime  dextrose 50% Injectable 12.5 Gram(s) IV Push once, Stop order after: 1 Doses  dextrose 50% Injectable 25 Gram(s) IV Push once, Stop order after: 1 Doses  dextrose 50% Injectable 25 Gram(s) IV Push once, Stop order after: 1 Doses  insulin lispro (ADMELOG) corrective regimen sliding scale   SubCutaneous every 6 hours  aspirin  chewable 81 milliGRAM(s) Oral daily  chlorhexidine 2% Cloths 1 Application(s) Topical <User Schedule>  heparin   Injectable 3800 Unit(s) IV Push every 6 hours PRN For aPTT less than 40  heparin  Infusion.  Unit(s)/Hr (7.5 mL/Hr) IV Continuous <Continuous>  influenza  Vaccine (HIGH DOSE) 0.7 milliLiter(s) IntraMuscular once      LABS:	 	                            9.7    13.23 )-----------( 306      ( 13 Feb 2024 03:08 )             31.3     02-13    139  |  101  |  48.6<H>  ----------------------------<  163<H>  5.1   |  18.0<L>  |  2.51<H>    Ca    9.1      13 Feb 2024 03:08  Phos  5.1     02-13  Mg     2.2     02-13    TPro  7.2  /  Alb  3.7  /  TBili  0.5  /  DBili  x   /  AST  113<H>  /  ALT  44<H>  /  AlkPhos  105  02-13    PT/INR/PTT ( 13 Feb 2024 03:08 )                       :                       :      13.6         :       34.4                  .        .                   .              .           .       1.23        .                                       Lipid Profile: Date: 02-13 @ 03:08  Total cholesterol 183; Direct LDL: --; HDL: 48; Triglycerides:76    HgA1c:   TSH: Thyroid Stimulating Hormone, Serum: 2.49 uIU/mL      TELEMETRY: NSR  ECG:    DIAGNOSTIC TESTING:  [ ] Echocardiogram: < from: TTE W or WO Ultrasound Enhancing Agent (02.12.24 @ 20:54) >  TRANSTHORACIC ECHOCARDIOGRAM REPORT  ________________________________________________________________________________                                      _______       Pt. Name:       CABRERA DENSON Study Date:    2/12/2024  MRN:            [id number]         YOB: 1945  Accession #:    [id number]        Age:           78 years  Account#:       [id number]       Gender:        M  Heart Rate:     98 bpm           Height:        65.00 in (165.10 cm)  Rhythm:         sinus rhythm     Weight:        134.64 lb (61.07 kg)  Blood Pressure: 105/70 mmHg      BSA/BMI:       1.67 m² / 22.41 kg/m²  ________________________________________________________________________________________  Referring Physician:    Mai Holley  Interpreting Physician: Aman Monte  Primary Sonographer:    Randy Loving    CPT:                ECHO TTE WITH CON COMP W DOPP - .m;DEFINITY ECHO                      CONTRAST PER ML - .m  Indication(s):      Dyspnea, unspecified - R06.00  Procedure:        Transthoracic echocardiogram with 2-D, M-mode and complete                      spectral and color flow Doppler.  Ordering Location:  [facility]  Admission Status:   Inpatient  Contrast Injection: Verbal consent was obtained for injection of Ultrasonic                      Enhancing Agent following a discussion of risks and                      benefits.                      Endocardial visualization enhanced with 2 ml of Definity                      Ultrasound enhancing agent (Lot#:[id number] Exp.Date:01/2025).  UEA Reaction:       Patient had no adverse reaction after injection of                      Ultrasound Enhancing Agent.  Study Information:  Image quality for this study is technically difficult.                      Technically difficult study due to poor echo windows.    _______________________________________________________________________________________     CONCLUSIONS:      1. Technically difficult image quality. Patient on BIPAP for the study.   2. Left ventricular cavity is normal. Left ventricular systolic function is severely decreased with an ejection fraction of 20 % by Magana's method of disks with an ejection fraction visually estimated at <20 %.   3. There is no evidence of a left ventricular thrombus.   4. Multiple segmental abnormalities exist. See findings.   5. Normal right ventricular cavity size and mildly reduced systolic function.   6. Normal left and right atrial size.   7. Moderate mitral regurgitation.   8. Moderate tricuspid regurgitation.   9. Mild pulmonic regurgitation.  10. Estimated pulmonary artery systolic pressure is 43 mmHg, consistent with mild pulmonary hypertension.  11. Moderate pericardial effusion noted adjacent to the anterior right ventricle with no evidence of hemodynamic compromise (or echocardiographic evidence of cardiac tamponade).  12. Findings were discussed with patient, daughter, ER physician on 2/12/2024.    < end of copied text >    [ ]  Catheterization:   [ ] Stress Test:    OTHER:

## 2024-02-13 NOTE — PROGRESS NOTE ADULT - PROBLEM SELECTOR PLAN 2
.  - per daughter has chronic kidney disease, unclear baseline   - has never seen nephrologist  - will need optimization of renal function  - would obtain nephrology consult

## 2024-02-13 NOTE — PROGRESS NOTE ADULT - CRITICAL CARE ATTENDING COMMENT
Critical care time spent titrating IV diuretic infusion, vasoactive medications, adjusting noninvasive ventilator settings, interpreting blood gases and chest imaging.

## 2024-02-13 NOTE — PROGRESS NOTE ADULT - NS ATTEND AMEND GEN_ALL_CORE FT
79 y/o M with PMHx anxiety, HTN, HLD, CKD, plaque psoriasis, DM2 who presents to [facility] ED with 1 weeks of chest pain at rest, that woke him out of sleep today. Patient states he had been having chest pain but was mild and tolerable. Today the pain became sharp and he felt it midsternal with radiation to left side and he also had 6 diarrhea bowel movements. Hypotensive on presentation, given IV fluid bolus but had respiratory distress, placed on bipap and levophed initiated to maintain BP. He endorses having chest pain and diarrhea today and denies back pain, headache, dizziness, diaphoresis, syncope.  Plan for ICU evaluation, STAT TTE, and start heparin GTT.    hsTnT 2344->2477, lactate 6.2, BNP 86454      NSTEMI    reviewed echo with interventional cardiology along with cardiac plan currently with Dr. Edwards. reviewed case.    Patient not candidate for interventional cath today, unless clinical status worsens. He is improving with lasix infusion but still remains on bipap. the patient is almost able to lay flat.    He is without cardiac chest pain. He does not complain of anything and actually wants to leave despite his clinical status.    He will need an ischemic evaluation, we will plan for tomorrow.    A repeat limited 2D TTE is recommended today for further evlauation of MR  no vasopressors at present  heparin infusion is on board.    hold GDMT at present given low normal BP's and recent acute successful wean of vasopressors.    We will follow.

## 2024-02-13 NOTE — PROGRESS NOTE ADULT - SUBJECTIVE AND OBJECTIVE BOX
Interval HPI:  wants to come off bipap  saturating well on IPAP 10, EPAP 5, fio2 40%    Exam:  awake and alert, slightly restless, nad  bilat crackles  reg rhythm  abd soft  bilat edema    Radiology: cxr - pulmonary vascular congestion, perihilar opacities, pulmonary edema    On Admission  02-12-24 (1d)  HPI:  78M with anxiety, HTN, HLD, CKD, plaque psoriasis, DM2 who presents with burning substernal chest pain at rest that started 1wk ago but acutely progressed this morning to sharp pain with radiation to L arm with associated multiple bouts of diarrhea. On ED arrival, afebrile, hypotensive to SBP 80-90s despite 1L IVF requiring levophed. EKG with nonspecific ST changes, troponin 2344->2477. Labs otherwise notable for K 5.6, BUN/Cr 43.5/2.31 (baseline Cr ~1 in 2022), BNP 56996, lactate 6.2. CXR with pulmonary edema. Placed on BiPAP for SOB and hypoxia. STAT TTE with EF<20%, multiple segmental abnormalities, mod MR, mod TR, mod pericardial effusion without tamponade. Received total lasix 40mg IV in ED. ICU consulted for further management of cardiogenic shock.      (13 Feb 2024 01:56)    PAST MEDICAL & SURGICAL HISTORY:  Diabetes      Hyperlipidemia      Anxiety with depression      Insomnia      No significant past surgical history          Antimicrobial:  cefTRIAXone Injectable.      doxycycline IVPB      doxycycline IVPB 100 milliGRAM(s) IV Intermittent every 12 hours    Cardiovascular:  furosemide Infusion 10 mG/Hr IV Continuous <Continuous>  metolazone 5 milliGRAM(s) Oral daily  norepinephrine Infusion 0.05 MICROgram(s)/kG/Min IV Continuous <Continuous>    Pulmonary:    Hematalogic:  aspirin  chewable 81 milliGRAM(s) Oral daily  heparin   Injectable 3800 Unit(s) IV Push every 6 hours PRN  heparin  Infusion.  Unit(s)/Hr IV Continuous <Continuous>    Other:  ARIPiprazole 5 milliGRAM(s) Oral daily  atorvastatin 40 milliGRAM(s) Oral at bedtime  busPIRone 10 milliGRAM(s) Oral <User Schedule>  chlorhexidine 2% Cloths 1 Application(s) Topical <User Schedule>  clonazePAM  Tablet 1 milliGRAM(s) Oral daily PRN  dextrose 50% Injectable 25 Gram(s) IV Push once  dextrose 50% Injectable 25 Gram(s) IV Push once  dextrose 50% Injectable 12.5 Gram(s) IV Push once  influenza  Vaccine (HIGH DOSE) 0.7 milliLiter(s) IntraMuscular once  insulin lispro (ADMELOG) corrective regimen sliding scale   SubCutaneous every 6 hours  QUEtiapine 50 milliGRAM(s) Oral at bedtime  sertraline 100 milliGRAM(s) Oral daily      Drug Dosing Weight  Height (cm): 167.6 (13 Feb 2024 00:17)  Weight (kg): 61.2 (12 Feb 2024 14:06)  BMI (kg/m2): 21.8 (13 Feb 2024 00:17)  BSA (m2): 1.69 (13 Feb 2024 00:17)    T(C): 36.5 (02-13-24 @ 05:00), Max: 36.8 (02-13-24 @ 04:29)  HR: 78 (02-13-24 @ 12:04)  BP: 101/61 (02-13-24 @ 11:10)  BP(mean): 74 (02-13-24 @ 11:10)  ABP: --  ABP(mean): --  RR: 20 (02-13-24 @ 11:15)  SpO2: 99% (02-13-24 @ 12:04)          02-12 @ 07:01  -  02-13 @ 07:00  --------------------------------------------------------  IN: 369.6 mL / OUT: 560 mL / NET: -190.4 mL              LABS:  CBC Full  -  ( 13 Feb 2024 03:08 )  WBC Count : 13.23 K/uL  RBC Count : 3.69 M/uL  Hemoglobin : 9.7 g/dL  Hematocrit : 31.3 %  Platelet Count - Automated : 306 K/uL  Mean Cell Volume : 84.8 fl  Mean Cell Hemoglobin : 26.3 pg  Mean Cell Hemoglobin Concentration : 31.0 gm/dL  Auto Neutrophil # : 10.03 K/uL  Auto Lymphocyte # : 1.88 K/uL  Auto Monocyte # : 1.25 K/uL  Auto Eosinophil # : 0.00 K/uL  Auto Basophil # : 0.02 K/uL  Auto Neutrophil % : 75.8 %  Auto Lymphocyte % : 14.2 %  Auto Monocyte % : 9.4 %  Auto Eosinophil % : 0.0 %  Auto Basophil % : 0.2 %    02-13    139  |  101  |  48.6<H>  ----------------------------<  163<H>  5.1   |  18.0<L>  |  2.51<H>    Ca    9.1      13 Feb 2024 03:08  Phos  5.1     02-13  Mg     2.2     02-13    TPro  7.2  /  Alb  3.7  /  TBili  0.5  /  DBili  x   /  AST  113<H>  /  ALT  44<H>  /  AlkPhos  105  02-13    PT/INR - ( 13 Feb 2024 03:08 )   PT: 13.6 sec;   INR: 1.23 ratio         PTT - ( 13 Feb 2024 11:29 )  PTT:33.2 sec  Urinalysis Basic - ( 13 Feb 2024 03:08 )    Color: x / Appearance: x / SG: x / pH: x  Gluc: 163 mg/dL / Ketone: x  / Bili: x / Urobili: x   Blood: x / Protein: x / Nitrite: x   Leuk Esterase: x / RBC: x / WBC x   Sq Epi: x / Non Sq Epi: x / Bacteria: x        ____________________________________________________________________________________________________

## 2024-02-13 NOTE — H&P ADULT - HISTORY OF PRESENT ILLNESS
78M with anxiety, HTN, HLD, CKD, plaque psoriasis, DM2 who presents with burning substernal chest pain at rest that started 1wk ago but acutely progressed this morning to sharp pain with radiation to L arm with associated multiple bouts of diarrhea. On ED arrival, afebrile, hypotensive to SBP 80-90s despite 1L IVF requiring levophed. EKG with nonspecific ST changes, troponin 2344->2477. Labs otherwise notable for K 5.6, BUN/Cr 43.5/2.31, BNP 57014, lactate 6.2. CXR with pulmonary edema. Placed on BiPAP for SOB and hypoxia. STAT TTE with EF<20%, multiple segmental abnormalities, mod MR, mod TR, mod pericardial effusion without tamponade. Received total lasix 40mg IV in ED. ICU consulted for further management of cardiogenic shock.      78M with anxiety, HTN, HLD, CKD, plaque psoriasis, DM2 who presents with burning substernal chest pain at rest that started 1wk ago but acutely progressed this morning to sharp pain with radiation to L arm with associated multiple bouts of diarrhea. On ED arrival, afebrile, hypotensive to SBP 80-90s despite 1L IVF requiring levophed. EKG with nonspecific ST changes, troponin 2344->2477. Labs otherwise notable for K 5.6, BUN/Cr 43.5/2.31 (baseline Cr ~1 in 2022), BNP 65018, lactate 6.2. CXR with pulmonary edema. Placed on BiPAP for SOB and hypoxia. STAT TTE with EF<20%, multiple segmental abnormalities, mod MR, mod TR, mod pericardial effusion without tamponade. Received total lasix 40mg IV in ED. ICU consulted for further management of cardiogenic shock.

## 2024-02-13 NOTE — PROGRESS NOTE ADULT - ASSESSMENT
79 yo male with CKD, plaque psoriasis, DM, HTN, HLD, presented with acute chest pain, elevated troponin/NSTEMI, acute systolic CHF (EF 20%), acute respiratory failure requiring NIV/ pulmonary edema, WES on CKD, hyperkalemia.     Plan  Acute respiratory failure  - will try to wean off bipap after diuresis  - on lasix drip, adequate urine output but not robust, added metolazone   - on empiric CTX/doxy for pneumonia coverage, can likely d/c if all cultures negative     Acute systolic CHF/ NSTEMI  - ASA, heparin drip, statin  - BB when off pressor  - GDMT when able to tolerate  - ideally he should undergo LHC, however his WES and current respiratory status make that difficult    Diarrhea   - GI PCR panel  - on empiric abx    GI/DVT proph  Start diet when off bipap  Prognosis guarded

## 2024-02-13 NOTE — PROGRESS NOTE ADULT - ASSESSMENT
77 y/o M with PMHx anxiety, HTN, HLD, CKD, plaque psoriasis, DM2 who presents to [facility] ED with 1 weeks of chest pain at rest, that woke him out of sleep today. Patient states he had been having chest pain but was mild and tolerable. Today the pain became sharp and he felt it midsternal with radiation to left side and he also had 6 diarrhea bowel movements. Hypotensive on presentation, given IV fluid bolus but had respiratory distress, placed on bipap and levophed initiated to maintain BP. He endorses having chest pain and diarrhea today and denies back pain, headache, dizziness, diaphoresis, syncope.  Plan for ICU evaluation, STAT TTE, and start heparin GTT.    hsTnT 2344->2477, lactate 6.2, BNP 60605

## 2024-02-13 NOTE — H&P ADULT - ASSESSMENT
78M with anxiety, HTN, HLD, CKD, plaque psoriasis, DM2 who presents with burning substernal chest pain at rest that started 1wk ago but acutely progressed this morning to sharp pain with radiation to L arm with associated multiple bouts of diarrhea. On ED arrival, afebrile, hypotensive to SBP 80-90s despite 1L IVF requiring levophed. EKG with nonspecific ST changes, troponin 2344->2477. Labs otherwise notable for K 5.6, BUN/Cr 43.5/2.31, BNP 01371, lactate 6.2. CXR with pulmonary edema. Placed on BiPAP for SOB and hypoxia. STAT TTE with EF<20%, multiple segmental abnormalities, mod MR, mod TR, mod pericardial effusion without tamponade. Received total lasix 40mg IV in ED. ICU consulted for further management of cardiogenic shock.     Cardiogenic Shock  Acute Systolic Heart Failure  NSTEMI  WES on CKD  Hyperkalemia 78M with anxiety, HTN, HLD, CKD, plaque psoriasis, DM2 who presents with burning substernal chest pain at rest that started 1wk ago but acutely progressed this morning to sharp pain with radiation to L arm with associated multiple bouts of diarrhea. On ED arrival, afebrile, hypotensive to SBP 80-90s despite 1L IVF requiring levophed. EKG with nonspecific ST changes, troponin 2344->2477. Labs otherwise notable for K 5.6, BUN/Cr 43.5/2.31 (baseline Cr ~1 in 2022), BNP 30520, lactate 6.2. CXR with pulmonary edema. Placed on BiPAP for SOB and hypoxia. STAT TTE with EF<20%, multiple segmental abnormalities, mod MR, mod TR, mod pericardial effusion without tamponade. Received total lasix 40mg IV in ED. ICU consulted for further management of cardiogenic shock.     Cardiogenic Shock, may be component of distributive iso weak UTI  Acute Systolic Heart Failure  NSTEMI  Acute Hypoxic Respiratory Failure  Acute Pulmonary Edema  Type 1 Lactic Acidosis  WES on CKD likely s/t ATN  Hyperkalemia    Plan:  NEURO: Mentation intact. Resume home anti-depressants.  CARDIAC: Actively titrating levophed to maintain goal MAP>65, closely monitoring end points of perfusion. STAT TTE with EF<20%, multiple segmental abnormalities, mod MR, mod TR, mod pericardial effusion without tamponade. S/p Lasix 40mg IV in ED, dose bumex 2mg IV now. Start lasix gtt @10mg/hr. Trend troponin to peak, will need LHC when medically optimized. Cardiology following.  RESPIRATORY: Continue BiPAP overnight. Goal SpO2>92% and or pH>7.25. CXR with pulmonary edema, diuresis as above.   GI: NPO on BiPAP.  : q12h BMPs while on lasix infusion. Temporized K with Ca, Insulin, D50, Bumex/Lasix. Goal K~4, Mg~2, P~3 for optimal arrhythmia suppresion. Strict I&Os via hawkins.   ENDO: q6h ISS. Goal -180. F/U AM TSH and A1c.   ID: Afebrile, WBC 13k. Start empiric CTX for weak UTI. F/U UCx, BCx, procalcitonin, and MRSA PCR.  HEME: Heparin gtt for ACS protocol. SCDs.    Dispo: Admit to ICU for further management of cardiogenic shock.    Case discussed with ICU Attending Dr Darnell.      Critical Care time: 90 mins assessing presenting problems of acute illness that poses high probability of life threatening deterioration or end organ damage/dysfunction.  Medical decision making including Initiating plan of care, reviewing data, reviewing radiology, direct patient bedside evaluation and interpretation of vital signs, any necessary ventilator management , discussion with multidisciplinary team, discussing goals of care with patient/family, all non inclusive of procedures. Date of entry of this note is equal to the date of services rendered.

## 2024-02-13 NOTE — H&P ADULT - NS PANP COMMENT GEN_ALL_CORE FT
78-year-old male with history of  anxiety hypertension dyslipidemia CKD plaque psoriasis type 2 diabetes mellitus severe depression presented with substernal chest pain that started last night progressively worsened earlier today with couple of episodes of diarrhea as well as 1 episode of nausea vomiting without fever chills dysuria frequency flank pain abdominal pain recent URI loss of consciousness dizziness palpitation  without ST elevation on EKG with elevated troponin, ER course complicated by hypotension for which she received 1 L IV fluid bolus subsequently significant hypoxia and respiratory distress requiring bilevel placement and placement on norepinephrine infusion following which medical ICU consulted being admitted to medical ICU for    Cardiogenic shock  Non-ST elevation MI  Acute/acute on chronic biventricular systolic heart failure  Pericardial effusion  Acute pulmonary edema  Acute hypoxic respiratory failure with respiratory distress  Hyperkalemia/hypomagnesemia/hypophosphatemia/metabolic acidosis–lactic acidosis  CKD   hyperglycemia with underlying diabetes mellitus       patient seen and examined  CODE STATUS:Full code  Plan of care discussed with: wife, daughter at bedside and son on the phone and all questions answered    Neurology: every 4 neurochecks, resume home medications of Abilify BuSpar Klonopin Seroquel sertraline, fall aspiration precaution    Cardiovascular: trend troponin, trend EKG, TTE performed with biventricular failure with LVEF less than 20% with moderate pericardial effusion, cardiology following along, started on heparin infusion for ACS protocol, holding rest of the GDMT, left heart cath timing  per cardiology discretion, as 40 mg of IV Lasix given in ER and will give 2 mg Bumex push in ICU followed by Bumex infusion, trend lactic acid and LFTs and urine output when mental status, norepinephrine infusion for MAP goal more than 65    Respiratory: Improved respiratory distress on bilevel as well as diuresis, will continue with BiPAP 10/5 at 60% through the night to keep saturation goal more than 92% and then transition to nasal cannula as tolerated, if persistent respiratory distress and metabolic acidosis in the morning persisted, couple of amps of sodium bicarb    Gastrointestinal: n.p.o. while on bilevel    Renal: avoid Nephrotoxic agents, Adjust medications for renal  function, Close urinary output monitoring, UA, Renal imaging if worsening renal function, Close Monitoring of electrolytes as well as acid-base status    Endocrinology: insulin sliding scale with every 6 fingerstick    Hematology: heparin infusion as above    Infectious disease:2 sets of blood culture and urine culture sent in the ER, MRSA screening; Will place the patient on empiric Rocephin and doxycycline with low threshold to discontinue antibiotics if initial cultures are negative in next 24 to 48 hours

## 2024-02-14 DIAGNOSIS — I50.21 ACUTE SYSTOLIC (CONGESTIVE) HEART FAILURE: ICD-10-CM

## 2024-02-14 LAB
ALBUMIN SERPL ELPH-MCNC: 3.4 G/DL — SIGNIFICANT CHANGE UP (ref 3.3–5.2)
ALBUMIN SERPL ELPH-MCNC: 3.4 G/DL — SIGNIFICANT CHANGE UP (ref 3.3–5.2)
ALBUMIN SERPL ELPH-MCNC: 3.5 G/DL — SIGNIFICANT CHANGE UP (ref 3.3–5.2)
ALP SERPL-CCNC: 92 U/L — SIGNIFICANT CHANGE UP (ref 40–120)
ALP SERPL-CCNC: 93 U/L — SIGNIFICANT CHANGE UP (ref 40–120)
ALP SERPL-CCNC: 94 U/L — SIGNIFICANT CHANGE UP (ref 40–120)
ALT FLD-CCNC: 35 U/L — SIGNIFICANT CHANGE UP
ALT FLD-CCNC: 38 U/L — SIGNIFICANT CHANGE UP
ALT FLD-CCNC: 39 U/L — SIGNIFICANT CHANGE UP
ANION GAP SERPL CALC-SCNC: 16 MMOL/L — SIGNIFICANT CHANGE UP (ref 5–17)
ANION GAP SERPL CALC-SCNC: 18 MMOL/L — HIGH (ref 5–17)
ANION GAP SERPL CALC-SCNC: 18 MMOL/L — HIGH (ref 5–17)
ANION GAP SERPL CALC-SCNC: 19 MMOL/L — HIGH (ref 5–17)
APTT BLD: 32.8 SEC — SIGNIFICANT CHANGE UP (ref 24.5–35.6)
APTT BLD: 40.2 SEC — HIGH (ref 24.5–35.6)
APTT BLD: 45.7 SEC — HIGH (ref 24.5–35.6)
APTT BLD: 63.8 SEC — HIGH (ref 24.5–35.6)
AST SERPL-CCNC: 38 U/L — SIGNIFICANT CHANGE UP
AST SERPL-CCNC: 55 U/L — HIGH
AST SERPL-CCNC: 59 U/L — HIGH
BASOPHILS # BLD AUTO: 0.01 K/UL — SIGNIFICANT CHANGE UP (ref 0–0.2)
BASOPHILS # BLD AUTO: 0.02 K/UL — SIGNIFICANT CHANGE UP (ref 0–0.2)
BASOPHILS NFR BLD AUTO: 0.1 % — SIGNIFICANT CHANGE UP (ref 0–2)
BASOPHILS NFR BLD AUTO: 0.1 % — SIGNIFICANT CHANGE UP (ref 0–2)
BILIRUB SERPL-MCNC: 0.3 MG/DL — LOW (ref 0.4–2)
BILIRUB SERPL-MCNC: 0.3 MG/DL — LOW (ref 0.4–2)
BILIRUB SERPL-MCNC: 0.4 MG/DL — SIGNIFICANT CHANGE UP (ref 0.4–2)
BUN SERPL-MCNC: 64.3 MG/DL — HIGH (ref 8–20)
BUN SERPL-MCNC: 67.3 MG/DL — HIGH (ref 8–20)
BUN SERPL-MCNC: 69.5 MG/DL — HIGH (ref 8–20)
BUN SERPL-MCNC: 70.9 MG/DL — HIGH (ref 8–20)
CALCIUM SERPL-MCNC: 8.4 MG/DL — SIGNIFICANT CHANGE UP (ref 8.4–10.5)
CALCIUM SERPL-MCNC: 8.5 MG/DL — SIGNIFICANT CHANGE UP (ref 8.4–10.5)
CALCIUM SERPL-MCNC: 8.7 MG/DL — SIGNIFICANT CHANGE UP (ref 8.4–10.5)
CALCIUM SERPL-MCNC: 8.7 MG/DL — SIGNIFICANT CHANGE UP (ref 8.4–10.5)
CHLORIDE SERPL-SCNC: 100 MMOL/L — SIGNIFICANT CHANGE UP (ref 96–108)
CHLORIDE SERPL-SCNC: 95 MMOL/L — LOW (ref 96–108)
CHLORIDE SERPL-SCNC: 96 MMOL/L — SIGNIFICANT CHANGE UP (ref 96–108)
CHLORIDE SERPL-SCNC: 96 MMOL/L — SIGNIFICANT CHANGE UP (ref 96–108)
CO2 SERPL-SCNC: 21 MMOL/L — LOW (ref 22–29)
CO2 SERPL-SCNC: 22 MMOL/L — SIGNIFICANT CHANGE UP (ref 22–29)
CO2 SERPL-SCNC: 24 MMOL/L — SIGNIFICANT CHANGE UP (ref 22–29)
CO2 SERPL-SCNC: 25 MMOL/L — SIGNIFICANT CHANGE UP (ref 22–29)
CREAT SERPL-MCNC: 2.58 MG/DL — HIGH (ref 0.5–1.3)
CREAT SERPL-MCNC: 2.64 MG/DL — HIGH (ref 0.5–1.3)
CREAT SERPL-MCNC: 2.71 MG/DL — HIGH (ref 0.5–1.3)
CREAT SERPL-MCNC: 2.76 MG/DL — HIGH (ref 0.5–1.3)
EGFR: 23 ML/MIN/1.73M2 — LOW
EGFR: 23 ML/MIN/1.73M2 — LOW
EGFR: 24 ML/MIN/1.73M2 — LOW
EGFR: 25 ML/MIN/1.73M2 — LOW
EOSINOPHIL # BLD AUTO: 0 K/UL — SIGNIFICANT CHANGE UP (ref 0–0.5)
EOSINOPHIL # BLD AUTO: 0.02 K/UL — SIGNIFICANT CHANGE UP (ref 0–0.5)
EOSINOPHIL NFR BLD AUTO: 0 % — SIGNIFICANT CHANGE UP (ref 0–6)
EOSINOPHIL NFR BLD AUTO: 0.2 % — SIGNIFICANT CHANGE UP (ref 0–6)
GAS PNL BLDA: SIGNIFICANT CHANGE UP
GAS PNL BLDV: SIGNIFICANT CHANGE UP
GLUCOSE BLDC GLUCOMTR-MCNC: 166 MG/DL — HIGH (ref 70–99)
GLUCOSE BLDC GLUCOMTR-MCNC: 241 MG/DL — HIGH (ref 70–99)
GLUCOSE BLDC GLUCOMTR-MCNC: 251 MG/DL — HIGH (ref 70–99)
GLUCOSE SERPL-MCNC: 151 MG/DL — HIGH (ref 70–99)
GLUCOSE SERPL-MCNC: 187 MG/DL — HIGH (ref 70–99)
GLUCOSE SERPL-MCNC: 201 MG/DL — HIGH (ref 70–99)
GLUCOSE SERPL-MCNC: 224 MG/DL — HIGH (ref 70–99)
GRAM STN FLD: SIGNIFICANT CHANGE UP
HCT VFR BLD CALC: 26.5 % — LOW (ref 39–50)
HCT VFR BLD CALC: 27 % — LOW (ref 39–50)
HCT VFR BLD CALC: 27.1 % — LOW (ref 39–50)
HGB BLD-MCNC: 8.8 G/DL — LOW (ref 13–17)
HGB BLD-MCNC: 8.9 G/DL — LOW (ref 13–17)
HGB BLD-MCNC: 9 G/DL — LOW (ref 13–17)
IMM GRANULOCYTES NFR BLD AUTO: 0.4 % — SIGNIFICANT CHANGE UP (ref 0–0.9)
IMM GRANULOCYTES NFR BLD AUTO: 0.5 % — SIGNIFICANT CHANGE UP (ref 0–0.9)
LACTATE BLDV-MCNC: 1.4 MMOL/L — SIGNIFICANT CHANGE UP (ref 0.5–2)
LACTATE BLDV-MCNC: 1.5 MMOL/L — SIGNIFICANT CHANGE UP (ref 0.5–2)
LEGIONELLA AG UR QL: NEGATIVE — SIGNIFICANT CHANGE UP
LYMPHOCYTES # BLD AUTO: 1.05 K/UL — SIGNIFICANT CHANGE UP (ref 1–3.3)
LYMPHOCYTES # BLD AUTO: 1.42 K/UL — SIGNIFICANT CHANGE UP (ref 1–3.3)
LYMPHOCYTES # BLD AUTO: 12.5 % — LOW (ref 13–44)
LYMPHOCYTES # BLD AUTO: 5.9 % — LOW (ref 13–44)
MAGNESIUM SERPL-MCNC: 2 MG/DL — SIGNIFICANT CHANGE UP (ref 1.6–2.6)
MAGNESIUM SERPL-MCNC: 2 MG/DL — SIGNIFICANT CHANGE UP (ref 1.6–2.6)
MAGNESIUM SERPL-MCNC: 2.1 MG/DL — SIGNIFICANT CHANGE UP (ref 1.6–2.6)
MAGNESIUM SERPL-MCNC: 2.1 MG/DL — SIGNIFICANT CHANGE UP (ref 1.6–2.6)
MCHC RBC-ENTMCNC: 27.1 PG — SIGNIFICANT CHANGE UP (ref 27–34)
MCHC RBC-ENTMCNC: 27.2 PG — SIGNIFICANT CHANGE UP (ref 27–34)
MCHC RBC-ENTMCNC: 27.8 PG — SIGNIFICANT CHANGE UP (ref 27–34)
MCHC RBC-ENTMCNC: 32.5 GM/DL — SIGNIFICANT CHANGE UP (ref 32–36)
MCHC RBC-ENTMCNC: 33.3 GM/DL — SIGNIFICANT CHANGE UP (ref 32–36)
MCHC RBC-ENTMCNC: 33.6 GM/DL — SIGNIFICANT CHANGE UP (ref 32–36)
MCV RBC AUTO: 80.8 FL — SIGNIFICANT CHANGE UP (ref 80–100)
MCV RBC AUTO: 83.3 FL — SIGNIFICANT CHANGE UP (ref 80–100)
MCV RBC AUTO: 83.6 FL — SIGNIFICANT CHANGE UP (ref 80–100)
MONOCYTES # BLD AUTO: 0.61 K/UL — SIGNIFICANT CHANGE UP (ref 0–0.9)
MONOCYTES # BLD AUTO: 1.04 K/UL — HIGH (ref 0–0.9)
MONOCYTES NFR BLD AUTO: 5.4 % — SIGNIFICANT CHANGE UP (ref 2–14)
MONOCYTES NFR BLD AUTO: 5.9 % — SIGNIFICANT CHANGE UP (ref 2–14)
NEUTROPHILS # BLD AUTO: 15.48 K/UL — HIGH (ref 1.8–7.4)
NEUTROPHILS # BLD AUTO: 9.26 K/UL — HIGH (ref 1.8–7.4)
NEUTROPHILS NFR BLD AUTO: 81.4 % — HIGH (ref 43–77)
NEUTROPHILS NFR BLD AUTO: 87.6 % — HIGH (ref 43–77)
NT-PROBNP SERPL-SCNC: HIGH PG/ML (ref 0–300)
PHOSPHATE SERPL-MCNC: 4 MG/DL — SIGNIFICANT CHANGE UP (ref 2.4–4.7)
PHOSPHATE SERPL-MCNC: 4.1 MG/DL — SIGNIFICANT CHANGE UP (ref 2.4–4.7)
PHOSPHATE SERPL-MCNC: 5.1 MG/DL — HIGH (ref 2.4–4.7)
PHOSPHATE SERPL-MCNC: 5.3 MG/DL — HIGH (ref 2.4–4.7)
PLATELET # BLD AUTO: 260 K/UL — SIGNIFICANT CHANGE UP (ref 150–400)
PLATELET # BLD AUTO: 264 K/UL — SIGNIFICANT CHANGE UP (ref 150–400)
PLATELET # BLD AUTO: 267 K/UL — SIGNIFICANT CHANGE UP (ref 150–400)
POTASSIUM SERPL-MCNC: 3.9 MMOL/L — SIGNIFICANT CHANGE UP (ref 3.5–5.3)
POTASSIUM SERPL-MCNC: 4 MMOL/L — SIGNIFICANT CHANGE UP (ref 3.5–5.3)
POTASSIUM SERPL-MCNC: 4.4 MMOL/L — SIGNIFICANT CHANGE UP (ref 3.5–5.3)
POTASSIUM SERPL-MCNC: 4.5 MMOL/L — SIGNIFICANT CHANGE UP (ref 3.5–5.3)
POTASSIUM SERPL-SCNC: 3.9 MMOL/L — SIGNIFICANT CHANGE UP (ref 3.5–5.3)
POTASSIUM SERPL-SCNC: 4 MMOL/L — SIGNIFICANT CHANGE UP (ref 3.5–5.3)
POTASSIUM SERPL-SCNC: 4.4 MMOL/L — SIGNIFICANT CHANGE UP (ref 3.5–5.3)
POTASSIUM SERPL-SCNC: 4.5 MMOL/L — SIGNIFICANT CHANGE UP (ref 3.5–5.3)
PROT SERPL-MCNC: 6.5 G/DL — LOW (ref 6.6–8.7)
PROT SERPL-MCNC: 6.8 G/DL — SIGNIFICANT CHANGE UP (ref 6.6–8.7)
PROT SERPL-MCNC: 6.8 G/DL — SIGNIFICANT CHANGE UP (ref 6.6–8.7)
RAPID RVP RESULT: SIGNIFICANT CHANGE UP
RBC # BLD: 3.24 M/UL — LOW (ref 4.2–5.8)
RBC # BLD: 3.24 M/UL — LOW (ref 4.2–5.8)
RBC # BLD: 3.28 M/UL — LOW (ref 4.2–5.8)
RBC # FLD: 14.8 % — HIGH (ref 10.3–14.5)
RBC # FLD: 14.8 % — HIGH (ref 10.3–14.5)
RBC # FLD: 15 % — HIGH (ref 10.3–14.5)
SARS-COV-2 RNA SPEC QL NAA+PROBE: SIGNIFICANT CHANGE UP
SODIUM SERPL-SCNC: 135 MMOL/L — SIGNIFICANT CHANGE UP (ref 135–145)
SODIUM SERPL-SCNC: 137 MMOL/L — SIGNIFICANT CHANGE UP (ref 135–145)
SODIUM SERPL-SCNC: 137 MMOL/L — SIGNIFICANT CHANGE UP (ref 135–145)
SODIUM SERPL-SCNC: 138 MMOL/L — SIGNIFICANT CHANGE UP (ref 135–145)
SPECIMEN SOURCE: SIGNIFICANT CHANGE UP
TROPONIN T, HIGH SENSITIVITY RESULT: 4832 NG/L — HIGH (ref 0–51)
TROPONIN T, HIGH SENSITIVITY RESULT: 4934 NG/L — HIGH (ref 0–51)
WBC # BLD: 11.37 K/UL — HIGH (ref 3.8–10.5)
WBC # BLD: 17 K/UL — HIGH (ref 3.8–10.5)
WBC # BLD: 17.68 K/UL — HIGH (ref 3.8–10.5)
WBC # FLD AUTO: 11.37 K/UL — HIGH (ref 3.8–10.5)
WBC # FLD AUTO: 17 K/UL — HIGH (ref 3.8–10.5)
WBC # FLD AUTO: 17.68 K/UL — HIGH (ref 3.8–10.5)

## 2024-02-14 PROCEDURE — 99223 1ST HOSP IP/OBS HIGH 75: CPT | Mod: 25

## 2024-02-14 PROCEDURE — 71045 X-RAY EXAM CHEST 1 VIEW: CPT | Mod: 26

## 2024-02-14 PROCEDURE — 99291 CRITICAL CARE FIRST HOUR: CPT

## 2024-02-14 PROCEDURE — 93456 R HRT CORONARY ARTERY ANGIO: CPT | Mod: 26

## 2024-02-14 PROCEDURE — 99222 1ST HOSP IP/OBS MODERATE 55: CPT

## 2024-02-14 PROCEDURE — 99223 1ST HOSP IP/OBS HIGH 75: CPT

## 2024-02-14 PROCEDURE — 93880 EXTRACRANIAL BILAT STUDY: CPT | Mod: 26

## 2024-02-14 RX ORDER — QUETIAPINE FUMARATE 200 MG/1
50 TABLET, FILM COATED ORAL AT BEDTIME
Refills: 0 | Status: DISCONTINUED | OUTPATIENT
Start: 2024-02-14 | End: 2024-03-06

## 2024-02-14 RX ORDER — PROPOFOL 10 MG/ML
20 INJECTION, EMULSION INTRAVENOUS
Qty: 1000 | Refills: 0 | Status: DISCONTINUED | OUTPATIENT
Start: 2024-02-14 | End: 2024-02-14

## 2024-02-14 RX ORDER — PROPOFOL 10 MG/ML
20 INJECTION, EMULSION INTRAVENOUS
Qty: 1000 | Refills: 0 | Status: DISCONTINUED | OUTPATIENT
Start: 2024-02-14 | End: 2024-02-15

## 2024-02-14 RX ORDER — ATORVASTATIN CALCIUM 80 MG/1
80 TABLET, FILM COATED ORAL AT BEDTIME
Refills: 0 | Status: DISCONTINUED | OUTPATIENT
Start: 2024-02-14 | End: 2024-03-06

## 2024-02-14 RX ORDER — FENTANYL CITRATE 50 UG/ML
50 INJECTION INTRAVENOUS ONCE
Refills: 0 | Status: DISCONTINUED | OUTPATIENT
Start: 2024-02-14 | End: 2024-02-14

## 2024-02-14 RX ORDER — SODIUM CHLORIDE 9 MG/ML
10 INJECTION INTRAMUSCULAR; INTRAVENOUS; SUBCUTANEOUS
Refills: 0 | Status: DISCONTINUED | OUTPATIENT
Start: 2024-02-14 | End: 2024-03-06

## 2024-02-14 RX ORDER — ACETAMINOPHEN 500 MG
650 TABLET ORAL EVERY 6 HOURS
Refills: 0 | Status: DISCONTINUED | OUTPATIENT
Start: 2024-02-14 | End: 2024-02-26

## 2024-02-14 RX ORDER — ASPIRIN/CALCIUM CARB/MAGNESIUM 324 MG
81 TABLET ORAL DAILY
Refills: 0 | Status: DISCONTINUED | OUTPATIENT
Start: 2024-02-15 | End: 2024-03-06

## 2024-02-14 RX ORDER — CHLORHEXIDINE GLUCONATE 213 G/1000ML
1 SOLUTION TOPICAL
Refills: 0 | Status: DISCONTINUED | OUTPATIENT
Start: 2024-02-14 | End: 2024-02-18

## 2024-02-14 RX ORDER — FENTANYL CITRATE 50 UG/ML
50 INJECTION INTRAVENOUS
Refills: 0 | Status: DISCONTINUED | OUTPATIENT
Start: 2024-02-14 | End: 2024-02-18

## 2024-02-14 RX ORDER — INSULIN LISPRO 100/ML
VIAL (ML) SUBCUTANEOUS EVERY 6 HOURS
Refills: 0 | Status: DISCONTINUED | OUTPATIENT
Start: 2024-02-14 | End: 2024-03-06

## 2024-02-14 RX ORDER — ARIPIPRAZOLE 15 MG/1
5 TABLET ORAL DAILY
Refills: 0 | Status: DISCONTINUED | OUTPATIENT
Start: 2024-02-14 | End: 2024-03-06

## 2024-02-14 RX ORDER — SERTRALINE 25 MG/1
100 TABLET, FILM COATED ORAL DAILY
Refills: 0 | Status: DISCONTINUED | OUTPATIENT
Start: 2024-02-14 | End: 2024-03-06

## 2024-02-14 RX ORDER — CHLORHEXIDINE GLUCONATE 213 G/1000ML
15 SOLUTION TOPICAL EVERY 12 HOURS
Refills: 0 | Status: DISCONTINUED | OUTPATIENT
Start: 2024-02-14 | End: 2024-02-18

## 2024-02-14 RX ORDER — CLONAZEPAM 1 MG
1 TABLET ORAL DAILY
Refills: 0 | Status: DISCONTINUED | OUTPATIENT
Start: 2024-02-14 | End: 2024-02-19

## 2024-02-14 RX ORDER — MIDAZOLAM HYDROCHLORIDE 1 MG/ML
2 INJECTION, SOLUTION INTRAMUSCULAR; INTRAVENOUS ONCE
Refills: 0 | Status: DISCONTINUED | OUTPATIENT
Start: 2024-02-14 | End: 2024-02-14

## 2024-02-14 RX ORDER — PANTOPRAZOLE SODIUM 20 MG/1
40 TABLET, DELAYED RELEASE ORAL DAILY
Refills: 0 | Status: DISCONTINUED | OUTPATIENT
Start: 2024-02-14 | End: 2024-03-06

## 2024-02-14 RX ORDER — MIDAZOLAM HYDROCHLORIDE 1 MG/ML
2 INJECTION, SOLUTION INTRAMUSCULAR; INTRAVENOUS
Refills: 0 | Status: DISCONTINUED | OUTPATIENT
Start: 2024-02-14 | End: 2024-02-18

## 2024-02-14 RX ADMIN — Medication 6: at 11:12

## 2024-02-14 RX ADMIN — MIDAZOLAM HYDROCHLORIDE 2 MILLIGRAM(S): 1 INJECTION, SOLUTION INTRAMUSCULAR; INTRAVENOUS at 01:24

## 2024-02-14 RX ADMIN — DEXMEDETOMIDINE HYDROCHLORIDE IN 0.9% SODIUM CHLORIDE 4.59 MICROGRAM(S)/KG/HR: 4 INJECTION INTRAVENOUS at 04:12

## 2024-02-14 RX ADMIN — CHLORHEXIDINE GLUCONATE 1 APPLICATION(S): 213 SOLUTION TOPICAL at 05:10

## 2024-02-14 RX ADMIN — Medication 2: at 05:11

## 2024-02-14 RX ADMIN — FENTANYL CITRATE 50 MICROGRAM(S): 50 INJECTION INTRAVENOUS at 02:51

## 2024-02-14 RX ADMIN — DEXMEDETOMIDINE HYDROCHLORIDE IN 0.9% SODIUM CHLORIDE 4.59 MICROGRAM(S)/KG/HR: 4 INJECTION INTRAVENOUS at 22:46

## 2024-02-14 RX ADMIN — CHLORHEXIDINE GLUCONATE 15 MILLILITER(S): 213 SOLUTION TOPICAL at 05:10

## 2024-02-14 RX ADMIN — ATORVASTATIN CALCIUM 80 MILLIGRAM(S): 80 TABLET, FILM COATED ORAL at 22:46

## 2024-02-14 RX ADMIN — PANTOPRAZOLE SODIUM 40 MILLIGRAM(S): 20 TABLET, DELAYED RELEASE ORAL at 11:12

## 2024-02-14 RX ADMIN — CEFTRIAXONE 1000 MILLIGRAM(S): 500 INJECTION, POWDER, FOR SOLUTION INTRAMUSCULAR; INTRAVENOUS at 04:37

## 2024-02-14 RX ADMIN — FENTANYL CITRATE 50 MICROGRAM(S): 50 INJECTION INTRAVENOUS at 01:54

## 2024-02-14 RX ADMIN — MIDAZOLAM HYDROCHLORIDE 2 MILLIGRAM(S): 1 INJECTION, SOLUTION INTRAMUSCULAR; INTRAVENOUS at 21:32

## 2024-02-14 RX ADMIN — MIDAZOLAM HYDROCHLORIDE 2 MILLIGRAM(S): 1 INJECTION, SOLUTION INTRAMUSCULAR; INTRAVENOUS at 02:32

## 2024-02-14 RX ADMIN — Medication 4.59 MICROGRAM(S)/KG/MIN: at 04:11

## 2024-02-14 RX ADMIN — HEPARIN SODIUM 1550 UNIT(S)/HR: 5000 INJECTION INTRAVENOUS; SUBCUTANEOUS at 23:02

## 2024-02-14 RX ADMIN — HEPARIN SODIUM 1550 UNIT(S)/HR: 5000 INJECTION INTRAVENOUS; SUBCUTANEOUS at 16:47

## 2024-02-14 RX ADMIN — HEPARIN SODIUM 1350 UNIT(S)/HR: 5000 INJECTION INTRAVENOUS; SUBCUTANEOUS at 04:11

## 2024-02-14 RX ADMIN — Medication 5.74 MICROGRAM(S)/KG/MIN: at 04:11

## 2024-02-14 RX ADMIN — CHLORHEXIDINE GLUCONATE 1 APPLICATION(S): 213 SOLUTION TOPICAL at 05:16

## 2024-02-14 RX ADMIN — HEPARIN SODIUM 1450 UNIT(S)/HR: 5000 INJECTION INTRAVENOUS; SUBCUTANEOUS at 09:16

## 2024-02-14 RX ADMIN — Medication 100 MILLIGRAM(S): at 05:11

## 2024-02-14 RX ADMIN — Medication 9.18 MICROGRAM(S)/KG/MIN: at 05:10

## 2024-02-14 RX ADMIN — FENTANYL CITRATE 50 MICROGRAM(S): 50 INJECTION INTRAVENOUS at 01:24

## 2024-02-14 RX ADMIN — DEXMEDETOMIDINE HYDROCHLORIDE IN 0.9% SODIUM CHLORIDE 4.59 MICROGRAM(S)/KG/HR: 4 INJECTION INTRAVENOUS at 05:09

## 2024-02-14 RX ADMIN — CHLORHEXIDINE GLUCONATE 15 MILLILITER(S): 213 SOLUTION TOPICAL at 17:13

## 2024-02-14 RX ADMIN — MIDAZOLAM HYDROCHLORIDE 2 MILLIGRAM(S): 1 INJECTION, SOLUTION INTRAMUSCULAR; INTRAVENOUS at 19:00

## 2024-02-14 RX ADMIN — Medication 300 MILLIGRAM(S): at 11:12

## 2024-02-14 RX ADMIN — HEPARIN SODIUM 1350 UNIT(S)/HR: 5000 INJECTION INTRAVENOUS; SUBCUTANEOUS at 02:49

## 2024-02-14 RX ADMIN — PROPOFOL 7.34 MICROGRAM(S)/KG/MIN: 10 INJECTION, EMULSION INTRAVENOUS at 05:10

## 2024-02-14 RX ADMIN — FENTANYL CITRATE 50 MICROGRAM(S): 50 INJECTION INTRAVENOUS at 02:21

## 2024-02-14 RX ADMIN — Medication 2: at 17:13

## 2024-02-14 RX ADMIN — MIDAZOLAM HYDROCHLORIDE 2 MILLIGRAM(S): 1 INJECTION, SOLUTION INTRAMUSCULAR; INTRAVENOUS at 07:10

## 2024-02-14 RX ADMIN — DEXMEDETOMIDINE HYDROCHLORIDE IN 0.9% SODIUM CHLORIDE 4.59 MICROGRAM(S)/KG/HR: 4 INJECTION INTRAVENOUS at 18:07

## 2024-02-14 NOTE — CONSULT NOTE ADULT - ASSESSMENT
79 y/o M with PMHx anxiety, HTN, HLD, CKD, plaque psoriasis, DM2 who presents to [facility] ED with 1 weeks of chest pain at rest, that woke him out of sleep today. Patient states he had been having chest pain but was mild and tolerable. Today the pain became sharp and he felt it midsternal with radiation to left side and he also had 6 diarrhea bowel movements. Hypotensive on presentation, given IV fluid bolus but had respiratory distress, placed on bipap and levophed initiated to maintain BP. He endorses having chest pain and diarrhea today and denies back pain, headache, dizziness, diaphoresis, syncope.  on 2/14 patient was complaining of chest pain again with uptrending troponins, unchanged EKG. Patient was still in respiratory distress despite BiPAP and CPAP with fever of 101.1 and cold extremities. Patient was started on levophed and dobutamine and intubated. Patient was weaned off levophed overnight, still on dobutamine at 5mcg/kg/min.         LHC which showed prox left main 60% stenosis, pros LAD with 70% stenosis, mid LAD with 80% stenosis, first diagonal 100% stenosis, prox circ 100% stenosis, distal RCA with 90% stenosis, ramus with 90% stenosis  RHC: RA 6, PA 22/10/15, PCWP 8, CO/CI 5.5/3.1

## 2024-02-14 NOTE — DIETITIAN INITIAL EVALUATION ADULT - ORAL INTAKE PTA/DIET HISTORY
MST consult flagged for unsure weight loss. Pt intubated, sedated and on pressors in ICU, NPO at this time, per notes plan for cath lab today, palliative care following pt to remain full code at this time. Propofol ordered for 7.34 ml/hr @ 24 hours providing ~194 kcals. Pt seen by [facility] RD 9/12/22, 109 lbs at that time, UBW 2 years prior 170 lbs. This admission weight 135 lbs. Pt has h/o dysphagia, declined PEG in the past, suspected aspiration pna this admission. At risk for malnutrition, RD to follow up as feasible, remains available.

## 2024-02-14 NOTE — CONSULT NOTE ADULT - ASSESSMENT
78M former smoker with anxiety, HTN, HLD, CKD, plaque psoriasis, DM2 who presents with burning substernal chest pain at rest that started 1wk ago but acutely progressed this morning to sharp pain with radiation to L arm with associated multiple bouts of diarrhea. On ED arrival, afebrile, hypotensive to SBP 80-90s despite 1L IVF requiring levophed. EKG with nonspecific ST changes, troponin 2344->2477. Labs otherwise notable for K 5.6, BUN/Cr 43.5/2.31 (baseline Cr ~1 in 2022), BNP 79610, lactate 6.2. CXR with pulmonary edema. Placed on BiPAP for SOB and hypoxia. STAT TTE with EF<20%, multiple segmental abnormalities, mod MR, mod TR, mod pericardial effusion without tamponade. Received total lasix 40mg IV in ED. The patient continued to have progressively worsening SOB, and was intubated overnight.

## 2024-02-14 NOTE — PROGRESS NOTE ADULT - ASSESSMENT
77 yo male, PMHx HTN, HLD, T2DM, CKD, plaque psoriasis, anxiety, depression, history of dysphagia who declined PEG placement, who initially presented with progressively worsening substernal chest pain, admitted to MICU:    # Cardiogenic shock  # NSTEMI  # Acute biventricular systolic heart failure  # Pericardial effusion  # Acute pulmonary edema  # Acute hypoxic respiratory failure  # Hyperkalemia/hypomagnesemia/hypophosphatemia/metabolic acidosis–lactic acidosis  # WES on CKD  # Hyperglycemia with underlying type II diabetes mellitus     77 yo male, PMHx HTN, HLD, T2DM, CKD, plaque psoriasis, anxiety, depression, history of dysphagia who declined PEG placement, who initially presented with progressively worsening substernal chest pain, admitted to MICU:    # Cardiogenic shock  # NSTEMI  # Acute biventricular systolic heart failure  # Pericardial effusion  # Acute hypoxic respiratory failure  # Acute pulmonary edema  # Aspiration pneumonitis/pneumonia  # Hyperkalemia/hypomagnesemia/hypophosphatemia/metabolic acidosis–lactic acidosis  # WES on CKD  # Hyperglycemia with underlying type II diabetes mellitus    Given history of dysphagia, now febrile most likely actively aspirating in conjunction with worsening encephalopathy and shock, decision made to proceed with intubation to protect airway from worsening aspiration and offload myocardial oxygen demands.  In light of his overall poor prognosis and known wishes to defer PEG placement as recommended for dysphagia diagnosed 8 years prior, goals of care were discussed extensively with family prior to proceeding with intubation, at which time they agreed upon a trial of aggressive critical care management.  He was in clinical shock with persistently elevated lactate, cool distal extremities, poor urine output despite Lasix infusion, worsening blood pressure and mentation. Dobutamine was added, with significant improvement in vasopressor requirement from Norepinephrine 0.1 --> 0.05 mcg/kg/min.  Lasix infusion was changed to Bumex infusion and in conjunction with inotropic support demonstrated improvement in urine output, though remains significantly volume overloaded with persistent pulmonary edema on CXR and POCUS.   If he does not demonstrate significant improvement   He is now febrile, source suspected to be aspiration pneumonia. Cultures sent and empiric antibiotics with Zosyn were initiated.       79 yo male, PMHx HTN, HLD, T2DM, CKD, plaque psoriasis, anxiety, depression, history of dysphagia who declined PEG placement, who initially presented with progressively worsening substernal chest pain, admitted to MICU:    # Cardiogenic shock  # NSTEMI  # Acute biventricular systolic heart failure  # Pericardial effusion  # Acute hypoxic respiratory failure  # Acute pulmonary edema  # Aspiration pneumonitis/pneumonia  # Hyperkalemia/hypomagnesemia/hypophosphatemia/metabolic acidosis–lactic acidosis  # WES on CKD  # Hyperglycemia with underlying type II diabetes mellitus    Given history of dysphagia, now febrile most likely actively aspirating in conjunction with worsening encephalopathy and shock, decision made to proceed with intubation to protect airway from worsening aspiration and offload myocardial oxygen demands.  In light of his overall poor prognosis and known wishes to defer PEG placement as recommended for dysphagia diagnosed 8 years prior, goals of care were discussed extensively with family prior to proceeding with intubation, at which time they agreed upon a trial of aggressive critical care management.  Sedated on Propofol and Dexmedetomidine with Fentanyl and Versed PRNs  Full vent support, weaning down FiO2 as able  He was in clinical shock with persistently elevated lactate, cool distal extremities, poor urine output despite Lasix infusion, worsening blood pressure and mentation. Dobutamine was added, with significant improvement in vasopressor requirement from Norepinephrine 0.1 --> 0.05 mcg/kg/min. He tolerated dobutamine without significant side effects, will increase to 5. Continuing to titrate norepinephrine as needed to keep targeted MAP goal >65. Monitoring dynamic end-points of perfusion.  Lasix infusion was changed to Bumex infusion and in conjunction with inotropic support demonstrated improvement in urine output, though remains significantly volume overloaded with persistent pulmonary edema on CXR and POCUS.   If he does not demonstrate significant improvement with inotropic-assisted diuresis, will require CRRT for UF or aquaphoresis.  Troponin continues to rise. Repeat EKG without acute ischemic changes. Plan for cardiac catheterization once stabilized.  He is now febrile, source suspected to be aspiration pneumonia. Cultures sent and he is on empiric antibiotics with Ceftriaxone and Doxycycline.

## 2024-02-14 NOTE — CONSULT NOTE ADULT - SUBJECTIVE AND OBJECTIVE BOX
HPI:  HPI from chart -  Unable   to obtain secondary to poor mentation   78M with anxiety, HTN, HLD, CKD, plaque psoriasis, DM2 who presents with burning substernal chest pain at rest that started 1wk ago but acutely progressed this morning to sharp pain with radiation to L arm with associated multiple bouts of diarrhea. On ED arrival, afebrile, hypotensive to SBP 80-90s despite 1L IVF requiring levophed. EKG with nonspecific ST changes, troponin 2344->2477. Labs otherwise notable for K 5.6, BUN/Cr 43.5/2.31 (baseline Cr ~1 in 2022), BNP 79929, lactate 6.2. CXR with pulmonary edema. Placed on BiPAP for SOB and hypoxia. STAT TTE with EF<20%, multiple segmental abnormalities, mod MR, mod TR, mod pericardial effusion without tamponade. Received total lasix 40mg IV in ED. ICU consulted for further management of cardiogenic shock.      (13 Feb 2024 01:56)      PERTINENT PMH REVIEWED: Yes    PAST MEDICAL & SURGICAL HISTORY:  Diabetes      Hyperlipidemia      Anxiety with depression      Insomnia      No significant past surgical history      SOCIAL HISTORY:                      Substance history:                    Admitted from:  home                      Church/spirituality: Orthodox                    Cultural concerns:    Baseline ADLs (prior to admission):  Independent/ Dependent                        Surrogate/HCP/Guardian:  Rosa Elena Elizabeth    FAMILY HISTORY:   Unable   to obtain secondary to poor mentation       Allergies    No Known Allergies    Intolerances        http://npcrc.org/files/news/palliative_performance_scale_[email or web address].pdf    Present Symptoms:   Dyspnea:  No Mild Moderate Severe  Nausea/Vomiting:  No  Yes  Anxiety:   No  Yes  Depression: No Yes Unable  Fatigue: Yes No Unable  Loss of appetite: Yes No  N/A  NPO  Constipation:  Not reported   Yes    Pain:  No  No signs            Location            Character            Duration            Factors            Severity            Effect    Pain AD Score:  [email or web address]/[email or web address].pdf (press ctrl + left click to view)    Review of Systems: Reviewed    All other ROS negative  Unable  Limited  to obtain due to poor mentation historian      MEDICATIONS  (STANDING):  aspirin Suppository 300 milliGRAM(s) Rectal daily  buMETAnide Infusion 2 mG/Hr (10 mL/Hr) IV Continuous <Continuous>  cefTRIAXone Injectable. 1000 milliGRAM(s) IV Push every 24 hours  cefTRIAXone Injectable.      chlorhexidine 0.12% Liquid 15 milliLiter(s) Oral Mucosa every 12 hours  chlorhexidine 2% Cloths 1 Application(s) Topical <User Schedule>  chlorhexidine 4% Liquid 1 Application(s) Topical <User Schedule>  dexMEDEtomidine Infusion 0.3 MICROgram(s)/kG/Hr (4.59 mL/Hr) IV Continuous <Continuous>  dextrose 50% Injectable 25 Gram(s) IV Push once  dextrose 50% Injectable 12.5 Gram(s) IV Push once  dextrose 50% Injectable 25 Gram(s) IV Push once  DOBUTamine Infusion 5 MICROgram(s)/kG/Min (9.18 mL/Hr) IV Continuous <Continuous>  doxycycline IVPB      doxycycline IVPB 100 milliGRAM(s) IV Intermittent every 12 hours  heparin  Infusion.  Unit(s)/Hr (7.5 mL/Hr) IV Continuous <Continuous>  influenza  Vaccine (HIGH DOSE) 0.7 milliLiter(s) IntraMuscular once  insulin lispro (ADMELOG) corrective regimen sliding scale   SubCutaneous every 6 hours  norepinephrine Infusion 0.05 MICROgram(s)/kG/Min (5.74 mL/Hr) IV Continuous <Continuous>  pantoprazole  Injectable 40 milliGRAM(s) IV Push daily  propofol Infusion 20 MICROgram(s)/kG/Min (7.34 mL/Hr) IV Continuous <Continuous>    MEDICATIONS  (PRN):  fentaNYL    Injectable 50 MICROGram(s) IV Push every 2 hours PRN vent synchrony or pain  heparin   Injectable 3800 Unit(s) IV Push every 6 hours PRN For aPTT less than 40  midazolam Injectable 2 milliGRAM(s) IV Push every 2 hours PRN Vent synchrony or anxiety  sodium chloride 0.9% lock flush 10 milliLiter(s) IV Push every 1 hour PRN Pre/post blood products, medications, blood draw, and to maintain line patency      PHYSICAL EXAM:    Vital Signs Last 24 Hrs  T(C): 36.7 (14 Feb 2024 04:00), Max: 38.4 (13 Feb 2024 22:00)  T(F): 98.1 (14 Feb 2024 04:00), Max: 101.1 (13 Feb 2024 22:00)  HR: 89 (14 Feb 2024 09:00) (75 - 123)  BP: 114/73 (14 Feb 2024 03:30) (78/53 - 136/74)  BP(mean): 84 (14 Feb 2024 03:30) (62 - 92)  RR: 20 (14 Feb 2024 09:00) (16 - 35)  SpO2: 100% (14 Feb 2024 09:00) (92% - 100%)    Parameters below as of 14 Feb 2024 08:00  Patient On (Oxygen Delivery Method): ventilator        Karnofsky     %  General:    HEENT: NCAT      (  )  ET tube   (    ) NGT  Lungs: comfortable  CV:   GI:           (  )  PEG  MSK: normal   weak  chair/bedbound  Neuro:   Skin: warm dry  Psych:    LABS:                        9.0    17.00 )-----------( 264      ( 14 Feb 2024 05:12 )             27.0     02-14    135  |  96  |  67.3<H>  ----------------------------<  224<H>  4.4   |  22.0  |  2.71<H>    Ca    8.5      14 Feb 2024 05:12  Phos  5.3     02-14  Mg     2.1     02-14    TPro  6.8  /  Alb  3.4  /  TBili  0.3<L>  /  DBili  x   /  AST  55<H>  /  ALT  39  /  AlkPhos  94  02-14    PT/INR - ( 13 Feb 2024 19:42 )   PT: 13.9 sec;   INR: 1.26 ratio         PTT - ( 14 Feb 2024 08:30 )  PTT:45.7 sec  Urinalysis Basic - ( 14 Feb 2024 05:12 )    Color: x / Appearance: x / SG: x / pH: x  Gluc: 224 mg/dL / Ketone: x  / Bili: x / Urobili: x   Blood: x / Protein: x / Nitrite: x   Leuk Esterase: x / RBC: x / WBC x   Sq Epi: x / Non Sq Epi: x / Bacteria: x      I&O's Summary    13 Feb 2024 07:01  -  14 Feb 2024 07:00  --------------------------------------------------------  IN: 1905.2 mL / OUT: 2465 mL / NET: -559.8 mL    14 Feb 2024 07:01  -  14 Feb 2024 09:22  --------------------------------------------------------  IN: 97 mL / OUT: 500 mL / NET: -403 mL        RADIOLOGY & ADDITIONAL STUDIES:  Imaging reviewed   < from: Xray Chest 1 View- PORTABLE-Urgent (02.12.24 @ 17:40) >    ACC: [id number] EXAM:  XR CHEST PORTABLE URGENT 1V   ORDERED BY: EMMA TANG     PROCEDURE DATE:  02/12/2024          INTERPRETATION:  Portable AP chest radiographs    COMPARISON: Chest CT 9/13/2022.    CLINICAL INFORMATION: Chest Pain.    FINDINGS:  CATHETERS AND TUBES: None    PULMONARY: Bilateral perihilar diffuse airspace disease concerning for   pulmonary edema of cardiac or noncardiac origin versus infectious process.  No pleural effusion or pneumothorax.    HEART/VASCULAR: The heartsize and mediastinum configuration are within   the limits of normal.    BONES: The visualized osseous thorax is intact.    IMPRESSION:    Bilateral perihilar diffuse airspace disease concerning for pulmonary   edema of cardiac or noncardiac origin versus infectious process.    --- End of Report ---    < end of copied text >      < from: TTE Limited W or WO Ultrasound Enhancing Agent (02.13.24 @ 21:29) >    TRANSTHORACIC ECHOCARDIOGRAM REPORT  ________________________________________________________________________________                                      _______       Pt. Name:       CABRERA DENSON Study Date:    2/13/2024  MRN:            [id number]         YOB: 1945  Accession #:    [id number]        Age:           78 years  Account#:       [id number]       Gender:        M  Heart Rate:                      Height:        65.00 in (165.10 cm)  Rhythm:                          Weight:        134.64 lb (61.07 kg)  Blood Pressure: 78/53 mmHg       BSA/BMI:       1.67 m² / 22.41 kg/m²  ________________________________________________________________________________________  Referring Physician:    [id number] Kin Darnell  Interpreting Physician: Melissa Pablo MD  Primary Sonographer:    Prudence Hamilton    CPT:               ECHO TTE W/O CON F/U LTD - 98618.m;LIMITED SPECTRAL - 69881.m  Indication(s):     Cardiogenic shock - R57.0  Procedure:         Limited transthoracic echocardiogram.  Ordering Location: [facility]  Admission Status:  Inpatient    _______________________________________________________________________________________     CONCLUSIONS:      1. Left ventricular systolic function is severely decreased with an ejection fraction visually estimated at 25 to 30 %.   2. Normal right ventricular cavity size and normal systolic function.   3. Estimated pulmonary artery systolic pressure is 44 mmHg.   4. Trace pericardial effusion noted adjacent to the right ventricle.    ________________________________________________________________________________________    < end of copied text >        ADVANCE DIRECTIVES/TREATMENT PREFERENCES:  Currently Full code, all aggressive measures desired          HPI:  HPI from chart -  Unable   to obtain secondary to poor mentation   78M with anxiety, HTN, HLD, CKD, plaque psoriasis, DM2 who presents with burning substernal chest pain at rest that started 1wk ago but acutely progressed this morning to sharp pain with radiation to L arm with associated multiple bouts of diarrhea. On ED arrival, afebrile, hypotensive to SBP 80-90s despite 1L IVF requiring levophed. EKG with nonspecific ST changes, troponin 2344->2477. Labs otherwise notable for K 5.6, BUN/Cr 43.5/2.31 (baseline Cr ~1 in 2022), BNP 87219, lactate 6.2. CXR with pulmonary edema. Placed on BiPAP for SOB and hypoxia. STAT TTE with EF<20%, multiple segmental abnormalities, mod MR, mod TR, mod pericardial effusion without tamponade. Received total lasix 40mg IV in ED. ICU consulted for further management of cardiogenic shock.      (13 Feb 2024 01:56)      PERTINENT PMH REVIEWED: Yes    PAST MEDICAL & SURGICAL HISTORY:  Diabetes      Hyperlipidemia      Anxiety with depression      Insomnia      No significant past surgical history      SOCIAL HISTORY:   Unable  Limited  to obtain secondary to poor mentation                     Substance history:                    Admitted from:  home                      Church/spirituality: Protestant                    Cultural concerns:    Baseline ADLs (prior to admission):  Unable  Limited  to obtain secondary to poor mentation    Independent/ Dependent                        Surrogate/HCP/Guardian:  Rosa Elena Cankellen    FAMILY HISTORY:   Unable   to obtain secondary to poor mentation       Allergies    No Known Allergies    Intolerances        http://npcrc.org/files/news/palliative_performance_scale_[email or web address].pdf    Present Symptoms:   Dyspnea:  No on vent  Nausea/Vomiting:  No    Anxiety:   No    Depression Unable  Fatigue:Unable  Loss of appetite:   N/A  NPO  Constipation:  Not reported      Pain:  No signs            Location            Character            Duration            Factors            Severity            Effect    Pain AD Score:  [email or web address]/[email or web address] (press ctrl + left click to view)    Review of Systems: Reviewed    Unable  Limited  to obtain due to poor mentation       MEDICATIONS  (STANDING):  aspirin Suppository 300 milliGRAM(s) Rectal daily  buMETAnide Infusion 2 mG/Hr (10 mL/Hr) IV Continuous <Continuous>  cefTRIAXone Injectable. 1000 milliGRAM(s) IV Push every 24 hours  cefTRIAXone Injectable.      chlorhexidine 0.12% Liquid 15 milliLiter(s) Oral Mucosa every 12 hours  chlorhexidine 2% Cloths 1 Application(s) Topical <User Schedule>  chlorhexidine 4% Liquid 1 Application(s) Topical <User Schedule>  dexMEDEtomidine Infusion 0.3 MICROgram(s)/kG/Hr (4.59 mL/Hr) IV Continuous <Continuous>  dextrose 50% Injectable 25 Gram(s) IV Push once  dextrose 50% Injectable 12.5 Gram(s) IV Push once  dextrose 50% Injectable 25 Gram(s) IV Push once  DOBUTamine Infusion 5 MICROgram(s)/kG/Min (9.18 mL/Hr) IV Continuous <Continuous>  doxycycline IVPB      doxycycline IVPB 100 milliGRAM(s) IV Intermittent every 12 hours  heparin  Infusion.  Unit(s)/Hr (7.5 mL/Hr) IV Continuous <Continuous>  influenza  Vaccine (HIGH DOSE) 0.7 milliLiter(s) IntraMuscular once  insulin lispro (ADMELOG) corrective regimen sliding scale   SubCutaneous every 6 hours  norepinephrine Infusion 0.05 MICROgram(s)/kG/Min (5.74 mL/Hr) IV Continuous <Continuous>  pantoprazole  Injectable 40 milliGRAM(s) IV Push daily  propofol Infusion 20 MICROgram(s)/kG/Min (7.34 mL/Hr) IV Continuous <Continuous>    MEDICATIONS  (PRN):  fentaNYL    Injectable 50 MICROGram(s) IV Push every 2 hours PRN vent synchrony or pain  heparin   Injectable 3800 Unit(s) IV Push every 6 hours PRN For aPTT less than 40  midazolam Injectable 2 milliGRAM(s) IV Push every 2 hours PRN Vent synchrony or anxiety  sodium chloride 0.9% lock flush 10 milliLiter(s) IV Push every 1 hour PRN Pre/post blood products, medications, blood draw, and to maintain line patency      PHYSICAL EXAM:    Vital Signs Last 24 Hrs  T(C): 36.7 (14 Feb 2024 04:00), Max: 38.4 (13 Feb 2024 22:00)  T(F): 98.1 (14 Feb 2024 04:00), Max: 101.1 (13 Feb 2024 22:00)  HR: 89 (14 Feb 2024 09:00) (75 - 123)  BP: 114/73 (14 Feb 2024 03:30) (78/53 - 136/74)  BP(mean): 84 (14 Feb 2024 03:30) (62 - 92)  RR: 20 (14 Feb 2024 09:00) (16 - 35)  SpO2: 100% (14 Feb 2024 09:00) (92% - 100%)    Parameters below as of 14 Feb 2024 08:00  Patient On (Oxygen Delivery Method): ventilator    Karnofsky  10 %  General:  Elder male NAD intubated  HEENT: NCAT    ( x )  ET tube     Lungs: comfortable  CV: RR  GI:   soft NTND  MSK:   weak  chair/bedbound  Neuro: sedated  Skin: warm dry    LABS:                        9.0    17.00 )-----------( 264      ( 14 Feb 2024 05:12 )             27.0     02-14    135  |  96  |  67.3<H>  ----------------------------<  224<H>  4.4   |  22.0  |  2.71<H>    Ca    8.5      14 Feb 2024 05:12  Phos  5.3     02-14  Mg     2.1     02-14    TPro  6.8  /  Alb  3.4  /  TBili  0.3<L>  /  DBili  x   /  AST  55<H>  /  ALT  39  /  AlkPhos  94  02-14    PT/INR - ( 13 Feb 2024 19:42 )   PT: 13.9 sec;   INR: 1.26 ratio         PTT - ( 14 Feb 2024 08:30 )  PTT:45.7 sec  Urinalysis Basic - ( 14 Feb 2024 05:12 )    Color: x / Appearance: x / SG: x / pH: x  Gluc: 224 mg/dL / Ketone: x  / Bili: x / Urobili: x   Blood: x / Protein: x / Nitrite: x   Leuk Esterase: x / RBC: x / WBC x   Sq Epi: x / Non Sq Epi: x / Bacteria: x      I&O's Summary    13 Feb 2024 07:01  -  14 Feb 2024 07:00  --------------------------------------------------------  IN: 1905.2 mL / OUT: 2465 mL / NET: -559.8 mL    14 Feb 2024 07:01  -  14 Feb 2024 09:22  --------------------------------------------------------  IN: 97 mL / OUT: 500 mL / NET: -403 mL        RADIOLOGY & ADDITIONAL STUDIES:  Imaging reviewed   < from: Xray Chest 1 View- PORTABLE-Urgent (02.12.24 @ 17:40) >    ACC: [id number] EXAM:  XR CHEST PORTABLE URGENT 1V   ORDERED BY: EMMA TANG     PROCEDURE DATE:  02/12/2024          INTERPRETATION:  Portable AP chest radiographs    COMPARISON: Chest CT 9/13/2022.    CLINICAL INFORMATION: Chest Pain.    FINDINGS:  CATHETERS AND TUBES: None    PULMONARY: Bilateral perihilar diffuse airspace disease concerning for   pulmonary edema of cardiac or noncardiac origin versus infectious process.  No pleural effusion or pneumothorax.    HEART/VASCULAR: The heartsize and mediastinum configuration are within   the limits of normal.    BONES: The visualized osseous thorax is intact.    IMPRESSION:    Bilateral perihilar diffuse airspace disease concerning for pulmonary   edema of cardiac or noncardiac origin versus infectious process.    --- End of Report ---    < end of copied text >      < from: TTE Limited W or WO Ultrasound Enhancing Agent (02.13.24 @ 21:29) >    TRANSTHORACIC ECHOCARDIOGRAM REPORT  ________________________________________________________________________________                                      _______       Pt. Name:       CABRERA DENSON Study Date:    2/13/2024  MRN:            [id number]         YOB: 1945  Accession #:    [id number]        Age:           78 years  Account#:       [id number]       Gender:        M  Heart Rate:                      Height:        65.00 in (165.10 cm)  Rhythm:                          Weight:        134.64 lb (61.07 kg)  Blood Pressure: 78/53 mmHg       BSA/BMI:       1.67 m² / 22.41 kg/m²  ________________________________________________________________________________________  Referring Physician:    [id number] Kin Darnell  Interpreting Physician: Melissa Pablo MD  Primary Sonographer:    Prudence Hamilton    CPT:               ECHO TTE W/O CON F/U LTD - 79427.m;LIMITED SPECTRAL - 30225.m  Indication(s):     Cardiogenic shock - R57.0  Procedure:         Limited transthoracic echocardiogram.  Ordering Location: [facility]  Admission Status:  Inpatient    _______________________________________________________________________________________     CONCLUSIONS:      1. Left ventricular systolic function is severely decreased with an ejection fraction visually estimated at 25 to 30 %.   2. Normal right ventricular cavity size and normal systolic function.   3. Estimated pulmonary artery systolic pressure is 44 mmHg.   4. Trace pericardial effusion noted adjacent to the right ventricle.    ________________________________________________________________________________________    < end of copied text >        ADVANCE DIRECTIVES/TREATMENT PREFERENCES:  Currently Full code, all aggressive measures desired

## 2024-02-14 NOTE — CONSULT NOTE ADULT - SUBJECTIVE AND OBJECTIVE BOX
Patient is a 78y old  Male who presents with a chief complaint of Cardiogenic Shock (14 Feb 2024 16:48)       HPI:  78M with anxiety, HTN, HLD, CKD, plaque psoriasis, DM2 who presents with burning substernal chest pain at rest that started 1wk ago but acutely progressed this morning to sharp pain with radiation to L arm with associated multiple bouts of diarrhea. On ED arrival, afebrile, hypotensive to SBP 80-90s despite 1L IVF requiring levophed. EKG with nonspecific ST changes, troponin 2344->2477. Labs otherwise notable for K 5.6, BUN/Cr 43.5/2.31 (baseline Cr ~1 in 2022), BNP 33305, lactate 6.2. CXR with pulmonary edema. Placed on BiPAP for SOB and hypoxia. STAT TTE with EF<20%, multiple segmental abnormalities, mod MR, mod TR, mod pericardial effusion without tamponade. Received total lasix 40mg IV in ED. ICU consulted for further management of cardiogenic shock.   Intubated. History obtained from daughter and wife. Has not seen nephrologist in the past.        PAST MEDICAL & SURGICAL HISTORY:  Diabetes      Hyperlipidemia      Anxiety with depression      Insomnia      No significant past surgical history           FAMILY HISTORY:  No pertinent family history in first degree relatives    [place]    Social History:Non smoker    MEDICATIONS  (STANDING):  atorvastatin 80 milliGRAM(s) Oral at bedtime  cefTRIAXone Injectable. 1000 milliGRAM(s) IV Push every 24 hours  cefTRIAXone Injectable.      chlorhexidine 0.12% Liquid 15 milliLiter(s) Oral Mucosa every 12 hours  chlorhexidine 2% Cloths 1 Application(s) Topical <User Schedule>  chlorhexidine 4% Liquid 1 Application(s) Topical <User Schedule>  dexMEDEtomidine Infusion 0.3 MICROgram(s)/kG/Hr (4.59 mL/Hr) IV Continuous <Continuous>  dextrose 50% Injectable 25 Gram(s) IV Push once  dextrose 50% Injectable 12.5 Gram(s) IV Push once  dextrose 50% Injectable 25 Gram(s) IV Push once  DOBUTamine Infusion 5 MICROgram(s)/kG/Min (9.18 mL/Hr) IV Continuous <Continuous>  heparin  Infusion.  Unit(s)/Hr (7.5 mL/Hr) IV Continuous <Continuous>  influenza  Vaccine (HIGH DOSE) 0.7 milliLiter(s) IntraMuscular once  insulin lispro (ADMELOG) corrective regimen sliding scale   SubCutaneous every 6 hours  norepinephrine Infusion 0.05 MICROgram(s)/kG/Min (5.74 mL/Hr) IV Continuous <Continuous>  pantoprazole  Injectable 40 milliGRAM(s) IV Push daily  propofol Infusion 20 MICROgram(s)/kG/Min (7.34 mL/Hr) IV Continuous <Continuous>    MEDICATIONS  (PRN):  acetaminophen     Tablet .. 650 milliGRAM(s) Oral every 6 hours PRN Temp greater or equal to 38C (100.4F)  fentaNYL    Injectable 50 MICROGram(s) IV Push every 2 hours PRN vent synchrony or pain  heparin   Injectable 3800 Unit(s) IV Push every 6 hours PRN For aPTT less than 40  midazolam Injectable 2 milliGRAM(s) IV Push every 2 hours PRN Vent synchrony or anxiety  sodium chloride 0.9% lock flush 10 milliLiter(s) IV Push every 1 hour PRN Pre/post blood products, medications, blood draw, and to maintain line patency   Meds reviewed    Allergies    No Known Allergies    Intolerances         REVIEW OF SYSTEMS:    Review of Systems:   UTO 2/2 intubation      Vital Signs Last 24 Hrs  T(C): 35.9 (14 Feb 2024 16:00), Max: 38.4 (13 Feb 2024 22:00)  T(F): 96.6 (14 Feb 2024 16:00), Max: 101.1 (13 Feb 2024 22:00)  HR: 92 (14 Feb 2024 20:26) (78 - 123)  BP: 114/73 (14 Feb 2024 03:30) (78/53 - 136/74)  BP(mean): 84 (14 Feb 2024 03:30) (62 - 92)  RR: 20 (14 Feb 2024 20:15) (15 - 30)  SpO2: 100% (14 Feb 2024 20:26) (93% - 100%)    Parameters below as of 14 Feb 2024 20:00  Patient On (Oxygen Delivery Method): ventilator      Daily     Daily     PHYSICAL EXAM:    GENERAL: ill appearing  HEAD:  Atraumatic, Normocephalic  EYES: EOMI, conjunctiva and sclera clear  ENMT: No Drainage from nares, No drainage from ears  NERVOUS SYSTEM:  intubated  CHEST/LUNG: decrased  EXTREMITIES:  No Edema  SKIN: No rashes No obvious ecchymosis      LABS:                        8.9    11.37 )-----------( 260      ( 14 Feb 2024 16:32 )             26.5     02-14    137  |  96  |  69.5<H>  ----------------------------<  151<H>  3.9   |  25.0  |  2.58<H>    Ca    8.7      14 Feb 2024 16:32  Phos  4.1     02-14  Mg     2.1     02-14    TPro  6.8  /  Alb  3.5  /  TBili  0.4  /  DBili  x   /  AST  38  /  ALT  35  /  AlkPhos  93  02-14    PT/INR - ( 13 Feb 2024 19:42 )   PT: 13.9 sec;   INR: 1.26 ratio         PTT - ( 14 Feb 2024 15:48 )  PTT:40.2 sec  Urinalysis Basic - ( 14 Feb 2024 16:32 )    Color: x / Appearance: x / SG: x / pH: x  Gluc: 151 mg/dL / Ketone: x  / Bili: x / Urobili: x   Blood: x / Protein: x / Nitrite: x   Leuk Esterase: x / RBC: x / WBC x   Sq Epi: x / Non Sq Epi: x / Bacteria: x      Magnesium: 2.1 mg/dL (02-14 @ 16:32)  Phosphorus: 4.1 mg/dL (02-14 @ 16:32)  Magnesium: 2.1 mg/dL (02-14 @ 05:12)  Phosphorus: 5.3 mg/dL (02-14 @ 05:12)  Magnesium: 2.0 mg/dL (02-14 @ 01:50)  Phosphorus: 5.1 mg/dL (02-14 @ 01:50)    ABG - ( 14 Feb 2024 03:52 )  pH, Arterial: 7.380 pH, Blood: x     /  pCO2: 39    /  pO2: 136   / HCO3: 23    / Base Excess: -2.0  /  SaO2: 100.0                 RADIOLOGY & ADDITIONAL TESTS:

## 2024-02-14 NOTE — PROGRESS NOTE ADULT - ASSESSMENT
A/P: 79 y/o M with PMHx anxiety, HTN, HLD, CKD, plaque psoriasis, DM2 who presents to [facility] ED with 1 weeks of chest pain at rest, that woke him out of sleep today. Patient states he had been having chest pain but was mild and tolerable. Today the pain became sharp and he felt it midsternal with radiation to left side and he also had 6 diarrhea bowel movements. Hypotensive on presentation, given IV fluid bolus but had respiratory distress, placed on bipap and levophed initiated to maintain BP. He endorses having chest pain and diarrhea today and denies back pain, headache, dizziness, diaphoresis, syncope.  Plan for ICU evaluation, STAT TTE, and start heparin GTT.    hsTnT 2344->2477, lactate 6.2, BNP 15062    02/14: Patient overnight was complaining of chest pain again with uptrending troponins, unchanged EKG. Patient was still in respiratory distress despite BiPAP and CPAP with fever of 101.1 and cold extremities. Patient was started on levophed and dobutamine and intubated. Patient was weaned off levophed overnight, still on dobutamine at 5mcg/kg/min. Advanced Heart Failure consulted. Plan for diagnostic LHC today with RHC and swan-juvenal placement.

## 2024-02-14 NOTE — PROGRESS NOTE ADULT - ASSESSMENT
Procedure: Left heart catheterization    1. S/P LHC: Severe multi-vessel CAD including LM  Bedrest  CT Surgery evaluation for CABG.  If refused for CABG, will need high risk PCI

## 2024-02-14 NOTE — DIETITIAN INITIAL EVALUATION ADULT - OTHER INFO
79 yo male, PMHx HTN, HLD, T2DM, CKD, plaque psoriasis, anxiety, depression, history of dysphagia who declined PEG placement, initially presented with progressively worsening substernal chest pain that onset the night prior with multiple episodes of diarrhea as well as 1 episode of nausea and vomiting. Initial EKG on arrival without ST elevation though inferolateral Q waves. Labs remarkable for elevated troponin, WES, anion gap metabolic lactic acidosis. CXR with pulmonary edema and possible underlying pneumonia. ER course complicated by hypotension for which he received 1L IV fluid bolus subsequently developed significant hypoxia and respiratory distress requiring bilevel placement and placement on norepinephrine infusion following which medical ICU consulted. Pt was placed on bipap, followed by intubation. Cardiogenic shock noted, on pressors. Lasix gtt switched to bumex.

## 2024-02-14 NOTE — PROGRESS NOTE ADULT - SUBJECTIVE AND OBJECTIVE BOX
[facility]                                                         CARDIOLOGY AT [place] [address]                                                         Telephone: [phone]. Fax:[phone]                                                                             PROGRESS NOTE    Reason for follow up: HFrEF, NSTEMI  Update: Patient overnight was complaining of chest pain again with uptrending troponins, unchanged EKG. Patient was still in respiratory distress despite BiPAP and CPAP with fever of 101.1 and cold extremities. Patient was started on levophed and dobutamine and intubated. Patient was weaned off levophed overnight, still on dobutamine at 5mcg/kg/min. Advanced Heart Failure consulted. Plan for diagnostic LHC today with RHC and swan-juvenal placement.      Review of symptoms: Unable to obtain      Vitals:  T(C): 36.7 (02-14-24 @ 04:00), Max: 38.4 (02-13-24 @ 22:00)  HR: 89 (02-14-24 @ 09:45) (75 - 123)  BP: 114/73 (02-14-24 @ 03:30) (78/53 - 136/74)  RR: 20 (02-14-24 @ 09:45) (16 - 35)  SpO2: 100% (02-14-24 @ 09:45) (92% - 100%)  Wt(kg): --  I&O's Summary    13 Feb 2024 07:01  -  14 Feb 2024 07:00  --------------------------------------------------------  IN: 1905.2 mL / OUT: 2465 mL / NET: -559.8 mL    14 Feb 2024 07:01  -  14 Feb 2024 09:48  --------------------------------------------------------  IN: 97 mL / OUT: 500 mL / NET: -403 mL      Weight (kg): 61.2 (02-12 @ 14:06)    PHYSICAL EXAM:  Appearance: Sedated  HEENT:  Atraumatic. Normocephalic.  Normal oral mucosa  Neurologic: Intubated, sedated  Cardiovascular: RRR S1 S2,  no rubs/gallops. No JVD, II/VI systolic murmur lsb  Respiratory: Coarse breath sounds bilaterally, vented  Gastrointestinal:  Soft, Non-tender, + BS  Lower Extremities: 2+ Peripheral Pulses, No clubbing, cyanosis, or edema, warm  Psychiatry: Patient is calm. No agitation.   Skin: warm and dry.    CURRENT CARDIAC MEDICATIONS:  buMETAnide Infusion 2 mG/Hr IV Continuous <Continuous>  DOBUTamine Infusion 5 MICROgram(s)/kG/Min IV Continuous <Continuous>  norepinephrine Infusion 0.05 MICROgram(s)/kG/Min IV Continuous <Continuous>      CURRENT OTHER MEDICATIONS:  cefTRIAXone Injectable. 1000 milliGRAM(s) IV Push every 24 hours  cefTRIAXone Injectable.      doxycycline IVPB      doxycycline IVPB 100 milliGRAM(s) IV Intermittent every 12 hours  aspirin Suppository 300 milliGRAM(s) Rectal daily  dexMEDEtomidine Infusion 0.3 MICROgram(s)/kG/Hr (4.59 mL/Hr) IV Continuous <Continuous>  fentaNYL    Injectable 50 MICROGram(s) IV Push every 2 hours PRN vent synchrony or pain  midazolam Injectable 2 milliGRAM(s) IV Push every 2 hours PRN Vent synchrony or anxiety  propofol Infusion 20 MICROgram(s)/kG/Min (7.34 mL/Hr) IV Continuous <Continuous>  pantoprazole  Injectable 40 milliGRAM(s) IV Push daily  dextrose 50% Injectable 25 Gram(s) IV Push once, Stop order after: 1 Doses  dextrose 50% Injectable 12.5 Gram(s) IV Push once, Stop order after: 1 Doses  dextrose 50% Injectable 25 Gram(s) IV Push once, Stop order after: 1 Doses  insulin lispro (ADMELOG) corrective regimen sliding scale   SubCutaneous every 6 hours  chlorhexidine 0.12% Liquid 15 milliLiter(s) Oral Mucosa every 12 hours  chlorhexidine 2% Cloths 1 Application(s) Topical <User Schedule>  chlorhexidine 4% Liquid 1 Application(s) Topical <User Schedule>  heparin   Injectable 3800 Unit(s) IV Push every 6 hours PRN For aPTT less than 40  heparin  Infusion.  Unit(s)/Hr (7.5 mL/Hr) IV Continuous <Continuous>  influenza  Vaccine (HIGH DOSE) 0.7 milliLiter(s) IntraMuscular once  sodium chloride 0.9% lock flush 10 milliLiter(s) IV Push every 1 hour PRN Pre/post blood products, medications, blood draw, and to maintain line patency      LABS:	 	                            9.0    17.00 )-----------( 264      ( 14 Feb 2024 05:12 )             27.0     02-14    135  |  96  |  67.3<H>  ----------------------------<  224<H>  4.4   |  22.0  |  2.71<H>    Ca    8.5      14 Feb 2024 05:12  Phos  5.3     02-14  Mg     2.1     02-14    TPro  6.8  /  Alb  3.4  /  TBili  0.3<L>  /  DBili  x   /  AST  55<H>  /  ALT  39  /  AlkPhos  94  02-14    PT/INR/PTT ( 14 Feb 2024 08:30 )                       :                       :      X            :       45.7                  .        .                   .              .           .       X           .                                       Lipid Profile: Date: 02-13 @ 11:15  Total cholesterol 179; Direct LDL: --; HDL: 49; Triglycerides:94  Date: 02-13 @ 03:08  Total cholesterol 183; Direct LDL: --; HDL: 48; Triglycerides:76    HgA1c:   TSH: Thyroid Stimulating Hormone, Serum: 2.49 uIU/mL      TELEMETRY: SR, Afib, NSVT  ECG:    DIAGNOSTIC TESTING:  [ ] Echocardiogram:   < from: TTE Limited W or WO Ultrasound Enhancing Agent (02.13.24 @ 21:29) >  CONCLUSIONS:      1. Left ventricular systolic function is severely decreased with an ejection fraction visually estimated at 25 to 30 %.   2. Normal right ventricular cavity size and normal systolic function.   3. Estimated pulmonary artery systolic pressure is 44 mmHg.   4. Trace pericardial effusion noted adjacent to the right ventricle.      < end of copied text >    [ ]  Catheterization:    [ ] Stress Test:    OTHER:

## 2024-02-14 NOTE — PROGRESS NOTE ADULT - SUBJECTIVE AND OBJECTIVE BOX
Department of Cardiology                                                                  [facility]/[place] [address]                                                            Telephone: [phone]. Fax:[phone]                                                     INTERVENTIONAL CARDIOLOGY CATHETERIZATION NOTE       Subjective:  78y  Male who had a left heart catheterization which showed:  Coronary Angiography   ·	The coronary circulation is right dominant. Cardiac catheterization was performed electively.  LM   ·	Left main artery: The segment is normal sized and severely calcified.  ·	Proximal left main: There is a 60 % stenosis.  LAD   ·	Left anterior descending artery: The segment is normal sized and moderately calcified.   ·	Proximal left anterior descending: There is a 70 % stenosis.   ·	Mid left anterior descending: There is an 80 % stenosis.   ·	First diagonal: There is a 100 % stenosis.  CX   ·	Circumflex: The segment is normal sized.   ·	Proximal circumflex: There is a 100 % stenosis.  RCA   ·	Right coronary artery: The segment is large and severely calcified.  ·	Distal right coronary artery: There is a 90 % stenosis.   ·	Right Posterolateral Segment: There is a 90 % stenosis.   ·	Right posterior descending artery: There is a 90 % stenosis.  Ramus   ·	Ramus intermedius: both upper and lower poles. There is a 90 % stenosis.  Right Heart Pressures:       RA: 6       RV: 28/6       PA: 22/10/15       PCWP: 8       CO: 5.59 LPM       CI: 3.31 LPM/m2       SVR: 11.82 Wood Uniits       PVR: 1.25 Wood Units         Access/Hemostasis: Right femoral artery (Angio-Seal), Right femoral vein (pulled in CCL)       Total Contrast: 45 mL Omnipaque       Total Heparin: N/A       Antiplatelet Given: N/A    PAST MEDICAL & SURGICAL HISTORY:  Diabetes  Hyperlipidemia  Anxiety with depression  Insomnia  No significant past surgical history    FAMILY HISTORY:  No pertinent family history in first degree relatives    Home Medications:  ARIPiprazole 5 mg oral tablet: 1 tab(s) orally once a day (12 Feb 2024 21:31)  busPIRone 10 mg oral tablet: 1 tab(s) orally once a day (12 Feb 2024 19:32)  KlonoPIN 1 mg oral tablet: 1 tab(s) orally once a day as needed for  anxiety (12 Feb 2024 21:31)  lisinopril 40 mg oral tablet: 1 tab(s) orally once a day (12 Feb 2024 21:31)  metFORMIN 500 mg oral tablet: 1 tab(s) orally 2 times a day (12 Feb 2024 19:32)  QUEtiapine 50 mg oral tablet: 1 tab(s) orally once a day (at bedtime) (12 Feb 2024 19:32)  sertraline 100 mg oral tablet: 1 tab(s) orally once a day (12 Feb 2024 21:31)  simvastatin 20 mg oral tablet: 1 tab(s) orally once a day (12 Feb 2024 21:31)    Patient is a 78y old  Male who presents with a chief complaint of Cardiogenic Shock (14 Feb 2024 11:06)    HEALTH ISSUES - PROBLEM Dx:  NSTEMI (non-ST elevation myocardial infarction)  Chronic kidney disease (CKD)  Acute combined systolic and diastolic congestive heart failure  Acute HFrEF (heart failure with reduced ejection fraction)    HPI: 78M former smoker with anxiety, HTN, HLD, CKD, plaque psoriasis, DM2 who presents with burning substernal chest pain at rest that started 1wk ago but acutely progressed this morning to sharp pain with radiation to L arm with associated multiple bouts of diarrhea. On ED arrival, afebrile, hypotensive to SBP 80-90s despite 1L IVF requiring levophed. EKG with nonspecific ST changes, troponin 2344->2477. Labs otherwise notable for K 5.6, BUN/Cr 43.5/2.31 (baseline Cr ~1 in 2022), BNP 63324, lactate 6.2. CXR with pulmonary edema. Placed on BiPAP for SOB and hypoxia. STAT TTE with EF<20%, multiple segmental abnormalities, mod MR, mod TR, mod pericardial effusion without tamponade. Received total lasix 40mg IV in ED. The patient continued to have progressively worsening SOB, and was intubated overnight.    REVIEW OF SYMPTOMS: Patient intubated    Objective:  Vital Signs Last 24 Hrs  T(C): 36.4 (14 Feb 2024 11:08), Max: 38.4 (13 Feb 2024 22:00)  T(F): 97.5 (14 Feb 2024 11:08), Max: 101.1 (13 Feb 2024 22:00)  HR: 97 (14 Feb 2024 12:45) (75 - 123)  BP: 114/73 (14 Feb 2024 03:30) (78/53 - 136/74)  BP(mean): 84 (14 Feb 2024 03:30) (62 - 92)  RR: 20 (14 Feb 2024 12:45) (18 - 32)  SpO2: 100% (14 Feb 2024 12:45) (93% - 100%)    Parameters below as of 14 Feb 2024 08:00  Patient On (Oxygen Delivery Method): ventilator    Constitutional: Intubated   HEENT: Atraumatic and normocephalic , neck is supple . no JVD. No carotid bruit.  Neck: Left IJ TLC, Right IJ HD catheter, sites soft, no bleeding, no hematoma  CNS: A&Ox3. No focal deficits.   Respiratory: CTAB, unlabored   Cardiovascular: RRR normal s1 s2. + grade 2/6 systolic murmur. No rubs or gallop.  Gastrointestinal: Soft, non-tender. +Bowel sounds.   Extremities: Left brachial arterial line  Psychiatric: Calm   Skin: Warm and dry, no ulcers on extremities                             9.0    17.00 )-----------( 264      ( 14 Feb 2024 05:12 )             27.0     02-14    135  |  96  |  67.3<H>  ----------------------------<  224<H>  4.4   |  22.0  |  2.71<H>    Ca    8.5      14 Feb 2024 05:12  Phos  5.3     02-14  Mg     2.1     02-14    TPro  6.8  /  Alb  3.4  /  TBili  0.3<L>  /  DBili  x   /  AST  55<H>  /  ALT  39  /  AlkPhos  94  02-14    PT/INR - ( 13 Feb 2024 19:42 )   PT: 13.9 sec;   INR: 1.26 ratio         PTT - ( 14 Feb 2024 08:30 )  PTT:45.7 sec

## 2024-02-14 NOTE — PROCEDURE NOTE - ADDITIONAL PROCEDURE DETAILS
indications: acute hypoxic respiratory failure, aspiration pneumonitis, cardiogenic shock, NSTEMI, acute systolic heart failure, acute kidney injury

## 2024-02-14 NOTE — CONSULT NOTE ADULT - PROBLEM SELECTOR PROBLEM 2
Chronic kidney disease (CKD)
Chronic kidney disease (CKD)
NSTEMI (non-ST elevation myocardial infarction)
Acute HFrEF (heart failure with reduced ejection fraction)

## 2024-02-14 NOTE — PROCEDURE NOTE - NSPERFORMEDBY_GEN_A_CORE
Wade Macedo, PA trainee/Other
Wade Macedo, PA trainee/Myself/Other
Wade Macedo, PA trainee/Other
Wade Macedo, PA trainee/Other

## 2024-02-14 NOTE — DIETITIAN INITIAL EVALUATION ADULT - PERTINENT MEDS FT
MEDICATIONS  (STANDING):  buMETAnide Infusion 2 mG/Hr (10 mL/Hr) IV Continuous <Continuous>  cefTRIAXone Injectable. 1000 milliGRAM(s) IV Push every 24 hours  dexMEDEtomidine Infusion 0.3 MICROgram(s)/kG/Hr (4.59 mL/Hr) IV Continuous <Continuous>  dextrose 50% Injectable 25 Gram(s) IV Push once  DOBUTamine Infusion 5 MICROgram(s)/kG/Min (9.18 mL/Hr) IV Continuous <Continuous>  doxycycline IVPB 100 milliGRAM(s) IV Intermittent every 12 hours  insulin lispro (ADMELOG) corrective regimen sliding scale   SubCutaneous every 6 hours  norepinephrine Infusion 0.05 MICROgram(s)/kG/Min (5.74 mL/Hr) IV Continuous <Continuous>  pantoprazole  Injectable 40 milliGRAM(s) IV Push daily  propofol Infusion 20 MICROgram(s)/kG/Min (7.34 mL/Hr) IV Continuous <Continuous>

## 2024-02-14 NOTE — CONSULT NOTE ADULT - ASSESSMENT
WES on CKD  2  Anion Gap  Lactic Acidosis  Metabolic Acidosis  Cardiogenic Shock    -Baseline Creatinine 1 WES on CKD  2  Anion Gap  Lactic Acidosis  Metabolic Acidosis  Cardiogenic Shock    -Baseline Creatinine 1  -WES likely 2/2 hypoperfusion from cardiogenic shock  -Check Urine lytes  -Check UA  -Was on bumex gtt and had adequate urine output  -Inotrope per heart failure team  -May need bicarbonate but not urgently and will avoid sodium load at this time  -Trend Lactic acid, improving    D/w ICU  Critical Care time 50minutes   2/14/24-d/w daughter and wife

## 2024-02-14 NOTE — CONSULT NOTE ADULT - SUBJECTIVE AND OBJECTIVE BOX
Reason for consult/History of Present Illness: Multi vessel CAD    REVIEW OF SYSTEMS: Unable to obtain as patient is sedated and vented        HPI:  78M with anxiety, HTN, HLD, CKD, plaque psoriasis, dysphagia, should be on thick liquid but does not comply as per daughter,DM2 who presents with burning substernal chest pain at rest that started 1wk ago but acutely progressed this morning to sharp pain with radiation to Left arm with associated multiple bouts of diarrhea. On ED arrival, afebrile, hypotensive to SBP 80-90s despite 1L IVF requiring levophed. EKG with nonspecific ST changes, troponin 2344->2477. Labs otherwise notable for K 5.6, BUN/Cr 43.5/2.31 (baseline Cr ~1 in 2022), BNP 25426, lactate 6.2. CXR with pulmonary edema. Placed on BiPAP for SOB and hypoxia. STAT TTE with EF<20%, multiple segmental abnormalities, mod MR, mod TR, mod pericardial effusion without tamponade. Received total lasix 40mg IV in ED. ICU consulted for further management of cardiogenic shock. Underwent cardiac cath 2/14 and found to have multivessel coronary disease.      (13 Feb 2024 01:56)      Past Medical History  Diabetes    Hyperlipidemia    Anxiety with depression    Insomnia    Dysphagia    Chronic kidney disease      Past Surgical History  No significant past surgical history        SOCIAL HISTORY: as per daughter   Smoker: limited 40 years ago never consistent   ETOH use: denies  Ilicit Drug use:  denies  Occupation: retired  Lives with: wife and daughter   Assist device use: none, has steps into the house and inside.     Relevant Family History  FAMILY HISTORY:  No pertinent family history in first degree relatives        LABS:                        9.0    17.00 )-----------( 264      ( 14 Feb 2024 05:12 )             27.0     02-14    135  |  96  |  67.3<H>  ----------------------------<  224<H>  4.4   |  22.0  |  2.71<H>    Ca    8.5      14 Feb 2024 05:12  Phos  5.3     02-14  Mg     2.1     02-14    TPro  6.8  /  Alb  3.4  /  TBili  0.3<L>  /  DBili  x   /  AST  55<H>  /  ALT  39  /  AlkPhos  94  02-14    PT/INR - ( 13 Feb 2024 19:42 )   PT: 13.9 sec;   INR: 1.26 ratio         PTT - ( 14 Feb 2024 15:48 )  PTT:40.2 sec  Urinalysis Basic - ( 14 Feb 2024 05:12 )    Color: x / Appearance: x / SG: x / pH: x  Gluc: 224 mg/dL / Ketone: x  / Bili: x / Urobili: x   Blood: x / Protein: x / Nitrite: x   Leuk Esterase: x / RBC: x / WBC x   Sq Epi: x / Non Sq Epi: x / Bacteria: x              Cardiac Cath:  < from: Cardiac Catheterization (02.14.24 @ 13:28) >  Diagnostic Findings:     Coronary Angiography   The coronary circulation is right dominant. Cardiac catheterization  was performed electively.    LM   Left main artery: The segment is normal sized and severely calcified.  Proximal left main: There is a 60 % stenosis.    LAD   Left anterior descending artery: The segment is normal sized and  moderately calcified. Proximal left anterior descending: There  is a 70 % stenosis. Mid left anterior descending: There is an 80 %  stenosis. First diagonal: There is a 100 % stenosis.    CX   Circumflex: The segment is normal sized. Proximal circumflex: There is  a 100 % stenosis.    RCA   Right coronary artery: The segment is large and severely calcified.  Distal right coronary artery: There is a 90 % stenosis. Right  Posterolateral Segment: There is a 90 % stenosis. Right posterior  descending artery: There is a 90 % stenosis.    Ramus   Ramus intermedius: both upper and lower poles. There is a 90 %  stenosis.    < end of copied text >      TTE / VINOD:  < from: TTE Limited W or WO Ultrasound Enhancing Agent (02.13.24 @ 21:29) >  CONCLUSIONS:      1. Left ventricular systolic function is severely decreased with an ejection fraction visually estimated at 25 to 30 %.   2. Normal right ventricular cavity size and normal systolic function.   3. Estimated pulmonary artery systolic pressure is 44 mmHg.   4. Trace pericardial effusion noted adjacent to the right ventricle.    < end of copied text >        MEDICATIONS  (STANDING):  atorvastatin 80 milliGRAM(s) Oral at bedtime  cefTRIAXone Injectable. 1000 milliGRAM(s) IV Push every 24 hours  cefTRIAXone Injectable.      chlorhexidine 0.12% Liquid 15 milliLiter(s) Oral Mucosa every 12 hours  chlorhexidine 2% Cloths 1 Application(s) Topical <User Schedule>  chlorhexidine 4% Liquid 1 Application(s) Topical <User Schedule>  dexMEDEtomidine Infusion 0.3 MICROgram(s)/kG/Hr (4.59 mL/Hr) IV Continuous <Continuous>  dextrose 50% Injectable 25 Gram(s) IV Push once  dextrose 50% Injectable 12.5 Gram(s) IV Push once  dextrose 50% Injectable 25 Gram(s) IV Push once  DOBUTamine Infusion 5 MICROgram(s)/kG/Min (9.18 mL/Hr) IV Continuous <Continuous>  heparin  Infusion.  Unit(s)/Hr (7.5 mL/Hr) IV Continuous <Continuous>  influenza  Vaccine (HIGH DOSE) 0.7 milliLiter(s) IntraMuscular once  insulin lispro (ADMELOG) corrective regimen sliding scale   SubCutaneous every 6 hours  norepinephrine Infusion 0.05 MICROgram(s)/kG/Min (5.74 mL/Hr) IV Continuous <Continuous>  pantoprazole  Injectable 40 milliGRAM(s) IV Push daily  propofol Infusion 20 MICROgram(s)/kG/Min (7.34 mL/Hr) IV Continuous <Continuous>    MEDICATIONS  (PRN):  acetaminophen     Tablet .. 650 milliGRAM(s) Oral every 6 hours PRN Temp greater or equal to 38C (100.4F)  fentaNYL    Injectable 50 MICROGram(s) IV Push every 2 hours PRN vent synchrony or pain  heparin   Injectable 3800 Unit(s) IV Push every 6 hours PRN For aPTT less than 40  midazolam Injectable 2 milliGRAM(s) IV Push every 2 hours PRN Vent synchrony or anxiety  sodium chloride 0.9% lock flush 10 milliLiter(s) IV Push every 1 hour PRN Pre/post blood products, medications, blood draw, and to maintain line patency                  Allergies: No Known Allergies        Vital Signs Last 24 Hrs  T(C): 35.9 (14 Feb 2024 16:00), Max: 38.4 (13 Feb 2024 22:00)  T(F): 96.6 (14 Feb 2024 16:00), Max: 101.1 (13 Feb 2024 22:00)  HR: 91 (14 Feb 2024 16:15) (75 - 123)  BP: 114/73 (14 Feb 2024 03:30) (78/53 - 136/74)  BP(mean): 84 (14 Feb 2024 03:30) (62 - 92)  RR: 20 (14 Feb 2024 16:15) (18 - 32)  SpO2: 100% (14 Feb 2024 16:15) (93% - 100%)    Parameters below as of 14 Feb 2024 16:15  Patient On (Oxygen Delivery Method): ventilator        Physical Exam:   Constitutional: NAD,Intubated sedated, opens eyes follows commands weakly   HEENT: PERRLA, EOMI, no drainage or redness  Neck: No JVD  Respiratory:ourse breath sounds B/L   Cardiovascular: Regular rate, regular rhythm,  Gastrointestinal: Soft, non-tender, non distended, no hepatosplenomegaly, normal bowel sounds  Extremities: LITTLE x 4, no peripheral edema, no cyanosis, no clubbing   Vascular: Equal and normal pulses: 2+ peripheral pulses throughout  Neurological: Intubated sedated, opens eyes follows commands weakly moves all 4 limbs  Musculoskeletal: No joint swelling or deformity;   Skin: warm, dry, well perfused

## 2024-02-14 NOTE — PROCEDURE NOTE - NSINDICATIONS_GEN_A_CORE
airway protection/mental status change/respiratory failure
dialysis/CRRT
arterial puncture to obtain ABG's/critical patient/monitoring purposes
critical illness/emergency venous access/hypertonic/irritant infusion

## 2024-02-14 NOTE — PROGRESS NOTE ADULT - PROBLEM SELECTOR PLAN 1
- Patient with crushing chest pain overnight with troponins peaked at 5209.   - Patient with worsening WES as well (baseline Cr 1 in 2022).   - Patient intubated overnight and started on levophed and dobutamine.   - Levophed subsequently weaned off.   - Continue dobutamine at 5 mcg/kg/min.   - Plan for diagnostic LHC today.   - Continue heparin gtt and rectal aspirin.   - Can transition to aspirin 81mg via NGT.

## 2024-02-14 NOTE — AIRWAY PLACEMENT NOTE ADULT - POST AIRWAY PLACEMENT ASSESSMENT:
Please see wound care instructions which have been provided separately.   
breath sounds bilateral/breath sounds equal/positive end tidal CO2 noted/CXR pending

## 2024-02-14 NOTE — PROGRESS NOTE ADULT - NS ATTEND AMEND GEN_ALL_CORE FT
79 y/o M admitted with NSTEMI, s/p LHC with multivessel disease (LM 60%, pLAD 70%, mLAD 80%, D1 100%, pLCx 100%, dRCA 90%, RI 90%). TTE with LVEF <20%, moderate MR/TR, moderate pericardial effusion. In shock state; s/p RHC with RA 6, PA 22/10/15, PCWP 8, CO/CI 5.5/3.1. On dobutamine and bumex gtt. Would d/c bumex as PCWP is 8 and patient is not significantly fluid overloaded on exam. Continue dobutamine, wean as tolerated. Will add GDMT as pressors are weaned. CTS following for CABG eval; if patient is not a candidate for surgery, will plan for high risk PCI with interventional cardiology. Advanced HF following.

## 2024-02-14 NOTE — CONSULT NOTE ADULT - SUBJECTIVE AND OBJECTIVE BOX
[facility]                                              INTERVENTIONAL CARDIOLOGY AT [address], [place] [address]                                             Telephone: [phone]. Fax:[phone]                                                       INTERVENTIONAL CARDIOLOGY CONSULTATION NOTE                                                                                           ******INCOMPLETE NOTE*******  History obtained by: Patient and medical record  Community Cardiologist: None  Reason for Consultation: Evaluation for cardiac catheterization  Available pt records reviewed: Yes [ x ] No [  ]    Chief complaint:    Patient is a 78y old  Male who presents with a chief complaint of Cardiogenic Shock (14 Feb 2024 09:47)    HPI: 78M never smoker with anxiety, HTN, HLD, CKD, plaque psoriasis, DM2 who presents with burning substernal chest pain at rest that started 1wk ago but acutely progressed this morning to sharp pain with radiation to L arm with associated multiple bouts of diarrhea. On ED arrival, afebrile, hypotensive to SBP 80-90s despite 1L IVF requiring levophed. EKG with nonspecific ST changes, troponin 2344->2477. Labs otherwise notable for K 5.6, BUN/Cr 43.5/2.31 (baseline Cr ~1 in 2022), BNP 21490, lactate 6.2. CXR with pulmonary edema. Placed on BiPAP for SOB and hypoxia. STAT TTE with EF<20%, multiple segmental abnormalities, mod MR, mod TR, mod pericardial effusion without tamponade. Received total lasix 40mg IV in ED. The patient continued to have progressively worsening SOB, and was intubated overnight.    Anginal Class:        Angina (Class): 4       Ischemic Symptoms: SOB (CCS class 4 anginal equivalent)    Heart Failure: Acute ACC/AHA stage D, NYHA functional class 4, HFrEF    PAST MEDICAL & SURGICAL HISTORY:  Diabetes  Hyperlipidemia  Anxiety with depression  Insomnia  No significant past surgical history    FAMILY HISTORY:  No pertinent family history in first degree relatives    Family History of Premature Cardiovascular Disease:  Yes [  ] No [X]    Associated Risk Factors:        Frailty Assessment: (none/mild/mod/severe):       Cerebrovascular Disease: N/A       Chronic Lung Disease: N/A       Peripheral Arterial Disease: N/A       Chronic Kidney Disease (if yes, what is GFR): N/A       Uncontrolled Diabetes (if yes, what is HgbA1C or FBS): N/A       Poorly Controlled Hypertension (if yes, what is SBP): N/A       Morbid Obesity (if yes, what is BMI): N/A       History of Recent Ventricular Arrhythmia: N/A       Inability to Ambulate Safely: N/A       Need for Therapeutic Anticoagulation: N/A       Antiplatelet or Contrast Allergy: N/A    SOCIAL HISTORY:         Marital:        Smoking: Never smoker       ETOH: Unknown       Drugs: Unknown       Caffeine: Unknown    Home Medications:  ARIPiprazole 5 mg oral tablet: 1 tab(s) orally once a day (12 Feb 2024 21:31)  busPIRone 10 mg oral tablet: 1 tab(s) orally once a day (12 Feb 2024 19:32)  KlonoPIN 1 mg oral tablet: 1 tab(s) orally once a day as needed for  anxiety (12 Feb 2024 21:31)  lisinopril 40 mg oral tablet: 1 tab(s) orally once a day (12 Feb 2024 21:31)  metFORMIN 500 mg oral tablet: 1 tab(s) orally 2 times a day (12 Feb 2024 19:32)  QUEtiapine 50 mg oral tablet: 1 tab(s) orally once a day (at bedtime) (12 Feb 2024 19:32)  sertraline 100 mg oral tablet: 1 tab(s) orally once a day (12 Feb 2024 21:31)  simvastatin 20 mg oral tablet: 1 tab(s) orally once a day (12 Feb 2024 21:31)    MEDICATIONS  (STANDING):  aspirin Suppository 300 milliGRAM(s) Rectal daily  buMETAnide Infusion 2 mG/Hr (10 mL/Hr) IV Continuous <Continuous>  cefTRIAXone Injectable. 1000 milliGRAM(s) IV Push every 24 hours  cefTRIAXone Injectable.      chlorhexidine 0.12% Liquid 15 milliLiter(s) Oral Mucosa every 12 hours  chlorhexidine 2% Cloths 1 Application(s) Topical <User Schedule>  chlorhexidine 4% Liquid 1 Application(s) Topical <User Schedule>  dexMEDEtomidine Infusion 0.3 MICROgram(s)/kG/Hr (4.59 mL/Hr) IV Continuous <Continuous>  dextrose 50% Injectable 25 Gram(s) IV Push once  dextrose 50% Injectable 25 Gram(s) IV Push once  dextrose 50% Injectable 12.5 Gram(s) IV Push once  DOBUTamine Infusion 5 MICROgram(s)/kG/Min (9.18 mL/Hr) IV Continuous <Continuous>  doxycycline IVPB      doxycycline IVPB 100 milliGRAM(s) IV Intermittent every 12 hours  heparin  Infusion.  Unit(s)/Hr (7.5 mL/Hr) IV Continuous <Continuous>  influenza  Vaccine (HIGH DOSE) 0.7 milliLiter(s) IntraMuscular once  insulin lispro (ADMELOG) corrective regimen sliding scale   SubCutaneous every 6 hours  norepinephrine Infusion 0.05 MICROgram(s)/kG/Min (5.74 mL/Hr) IV Continuous <Continuous>  pantoprazole  Injectable 40 milliGRAM(s) IV Push daily  propofol Infusion 20 MICROgram(s)/kG/Min (7.34 mL/Hr) IV Continuous <Continuous>    MEDICATIONS  (PRN):  acetaminophen     Tablet .. 650 milliGRAM(s) Oral every 6 hours PRN Temp greater or equal to 38C (100.4F)  fentaNYL    Injectable 50 MICROGram(s) IV Push every 2 hours PRN vent synchrony or pain  heparin   Injectable 3800 Unit(s) IV Push every 6 hours PRN For aPTT less than 40  midazolam Injectable 2 milliGRAM(s) IV Push every 2 hours PRN Vent synchrony or anxiety  sodium chloride 0.9% lock flush 10 milliLiter(s) IV Push every 1 hour PRN Pre/post blood products, medications, blood draw, and to maintain line patency    Antianginal Therapies:        Beta Blockers: N/A       Calcium Channel Blockers: N/A       Long Acting Nitrates: N/A       Ranexa: N/A    ALLERGIES: No Known Allergies    REVIEW OF SYMPTOMS: Patient intubated    VITAL SIGNS:  T(C): 36.7 (02-14-24 @ 04:00), Max: 38.4 (02-13-24 @ 22:00)  T(F): 98.1 (02-14-24 @ 04:00), Max: 101.1 (02-13-24 @ 22:00)  HR: 91 (02-14-24 @ 11:00) (75 - 123)  BP: 114/73 (02-14-24 @ 03:30) (78/53 - 136/74)  RR: 20 (02-14-24 @ 11:00) (18 - 32)  SpO2: 100% (02-14-24 @ 11:00) (92% - 100%)    INTAKE AND OUTPUT:   02-13 @ 07:01  -  02-14 @ 07:00  --------------------------------------------------------  IN: 1905.2 mL / OUT: 2465 mL / NET: -559.8 mL    02-14 @ 07:01  -  02-14 @ 11:07  --------------------------------------------------------  IN: 197 mL / OUT: 800 mL / NET: -603 mL    PHYSICAL EXAM:  Constitutional: Intubated   HEENT: Atraumatic and normocephalic , neck is supple . no JVD. No carotid bruit.  Neck: Left IJ TLC, Right IJ HD catheter, sites soft, no bleeding, no hematoma  CNS: A&Ox3. No focal deficits.   Respiratory: CTAB, unlabored   Cardiovascular: RRR normal s1 s2. No murmur. No rubs or gallop.  Gastrointestinal: Soft, non-tender. +Bowel sounds.   Extremities: Left brachial arterial line  Psychiatric: Calm . no agitation.   Skin: Warm and dry, no ulcers on extremities     LABS:                            9.0    17.00 )-----------( 264      ( 14 Feb 2024 05:12 )             27.0     02-14    135  |  96    |  67.3  ----------------------------<  224  4.4   |  22.0  |  2.71    Ca    8.5      14 Feb 2024 05:12  Phos  5.3     02-14  Mg     2.1     02-14    hsTrop: 2344 ---> 2477 ---> 3279 ---> 4448 ---> 5209 ---> 4934 ---> 4832    TPro  6.8  /  Alb  3.4  /  TBili  0.3  /  DBili  x   /  AST  55<H>  /  ALT  39  /  AlkPhos  94  02-14    PT/INR - ( 13 Feb 2024 19:42 )   PT: 13.9 sec;   INR: 1.26 ratio    PTT - ( 14 Feb 2024 08:30 )  PTT:45.7 sec    Lipids       Total Cholesterol: 179       Triglycerides: 94       HDL: 49       Non-HDL: 130       LDL: 111    HgbA1C: 7.0%    ECG:   Prior ECG: Yes [  ] No [  ]    CARDIAC TESTING   ECHO: 2/13/2024  Left Ventricle: Left ventricular systolic function is severely decreased with an ejection fraction visually estimated at 25 to 30%.  Right Ventricle: The right ventricular cavity is normal in size and normal systolic function.  Mitral Valve: There is mitral valve thickening of the anterior and posterior leaflets. There is mild mitral regurgitation.  Tricuspid Valve: Structurally normal tricuspid valve with normal leaflet excursion. Estimated pulmonary artery systolic pressure is 44 mmHg.  Pericardium: There is a trace pericardial effusion noted adjacent to the right ventricle.  Systemic Veins: The inferior vena cava is dilated (dilated >2.1cm) with abnormal inspiratory collapse (abnormal <50%) consistent with elevated right atrial pressure (~15, range 10-20mmHg).    STRESS: N/A    Cardiac Interventions: N/A    CATH: N/A    ELECTROPHYSIOLOGY: N/A    Cardiac Cath Risk Assessments:  ASA: 4  Mallampati: Intubated  Bleeding Risk:   Creatinine: 2.71  GFR: 23                                                [facility]                                              INTERVENTIONAL CARDIOLOGY AT [place] [address]                                             Telephone: [phone]. Fax:[phone]                                                       INTERVENTIONAL CARDIOLOGY CONSULTATION NOTE                                                                                           ******INCOMPLETE NOTE*******  History obtained by: Patient and medical record  Community Cardiologist: None  Reason for Consultation: Evaluation for cardiac catheterization  Available pt records reviewed: Yes [ x ] No [  ]    Chief complaint:    Patient is a 78y old  Male who presents with a chief complaint of Cardiogenic Shock (14 Feb 2024 09:47)    HPI: 78M former smoker with anxiety, HTN, HLD, CKD, plaque psoriasis, DM2 who presents with burning substernal chest pain at rest that started 1wk ago but acutely progressed this morning to sharp pain with radiation to L arm with associated multiple bouts of diarrhea. On ED arrival, afebrile, hypotensive to SBP 80-90s despite 1L IVF requiring levophed. EKG with nonspecific ST changes, troponin 2344->2477. Labs otherwise notable for K 5.6, BUN/Cr 43.5/2.31 (baseline Cr ~1 in 2022), BNP 73208, lactate 6.2. CXR with pulmonary edema. Placed on BiPAP for SOB and hypoxia. STAT TTE with EF<20%, multiple segmental abnormalities, mod MR, mod TR, mod pericardial effusion without tamponade. Received total lasix 40mg IV in ED. The patient continued to have progressively worsening SOB, and was intubated overnight.    Anginal Class:        Angina (Class): 4       Ischemic Symptoms: SOB (CCS class 4 anginal equivalent)    Heart Failure: Acute ACC/AHA stage D, NYHA functional class 4, HFrEF    PAST MEDICAL & SURGICAL HISTORY:  Diabetes  Hyperlipidemia  Anxiety with depression  Insomnia  No significant past surgical history    FAMILY HISTORY:  No pertinent family history in first degree relatives    Family History of Premature Cardiovascular Disease:  Yes [  ] No [X]    Associated Risk Factors:        Frailty Assessment: (none/mild/mod/severe):       Cerebrovascular Disease: N/A       Chronic Lung Disease: N/A       Peripheral Arterial Disease: N/A       Chronic Kidney Disease (if yes, what is GFR): N/A       Uncontrolled Diabetes (if yes, what is HgbA1C or FBS): N/A       Poorly Controlled Hypertension (if yes, what is SBP): N/A       Morbid Obesity (if yes, what is BMI): N/A       History of Recent Ventricular Arrhythmia: N/A       Inability to Ambulate Safely: N/A       Need for Therapeutic Anticoagulation: N/A       Antiplatelet or Contrast Allergy: N/A    SOCIAL HISTORY:         Marital:        Smoking: Never smoker       ETOH: Unknown       Drugs: Unknown       Caffeine: Unknown    Home Medications:  ARIPiprazole 5 mg oral tablet: 1 tab(s) orally once a day (12 Feb 2024 21:31)  busPIRone 10 mg oral tablet: 1 tab(s) orally once a day (12 Feb 2024 19:32)  KlonoPIN 1 mg oral tablet: 1 tab(s) orally once a day as needed for  anxiety (12 Feb 2024 21:31)  lisinopril 40 mg oral tablet: 1 tab(s) orally once a day (12 Feb 2024 21:31)  metFORMIN 500 mg oral tablet: 1 tab(s) orally 2 times a day (12 Feb 2024 19:32)  QUEtiapine 50 mg oral tablet: 1 tab(s) orally once a day (at bedtime) (12 Feb 2024 19:32)  sertraline 100 mg oral tablet: 1 tab(s) orally once a day (12 Feb 2024 21:31)  simvastatin 20 mg oral tablet: 1 tab(s) orally once a day (12 Feb 2024 21:31)    MEDICATIONS  (STANDING):  aspirin Suppository 300 milliGRAM(s) Rectal daily  buMETAnide Infusion 2 mG/Hr (10 mL/Hr) IV Continuous <Continuous>  cefTRIAXone Injectable. 1000 milliGRAM(s) IV Push every 24 hours  cefTRIAXone Injectable.      chlorhexidine 0.12% Liquid 15 milliLiter(s) Oral Mucosa every 12 hours  chlorhexidine 2% Cloths 1 Application(s) Topical <User Schedule>  chlorhexidine 4% Liquid 1 Application(s) Topical <User Schedule>  dexMEDEtomidine Infusion 0.3 MICROgram(s)/kG/Hr (4.59 mL/Hr) IV Continuous <Continuous>  dextrose 50% Injectable 25 Gram(s) IV Push once  dextrose 50% Injectable 25 Gram(s) IV Push once  dextrose 50% Injectable 12.5 Gram(s) IV Push once  DOBUTamine Infusion 5 MICROgram(s)/kG/Min (9.18 mL/Hr) IV Continuous <Continuous>  doxycycline IVPB      doxycycline IVPB 100 milliGRAM(s) IV Intermittent every 12 hours  heparin  Infusion.  Unit(s)/Hr (7.5 mL/Hr) IV Continuous <Continuous>  influenza  Vaccine (HIGH DOSE) 0.7 milliLiter(s) IntraMuscular once  insulin lispro (ADMELOG) corrective regimen sliding scale   SubCutaneous every 6 hours  norepinephrine Infusion 0.05 MICROgram(s)/kG/Min (5.74 mL/Hr) IV Continuous <Continuous>  pantoprazole  Injectable 40 milliGRAM(s) IV Push daily  propofol Infusion 20 MICROgram(s)/kG/Min (7.34 mL/Hr) IV Continuous <Continuous>    MEDICATIONS  (PRN):  acetaminophen     Tablet .. 650 milliGRAM(s) Oral every 6 hours PRN Temp greater or equal to 38C (100.4F)  fentaNYL    Injectable 50 MICROGram(s) IV Push every 2 hours PRN vent synchrony or pain  heparin   Injectable 3800 Unit(s) IV Push every 6 hours PRN For aPTT less than 40  midazolam Injectable 2 milliGRAM(s) IV Push every 2 hours PRN Vent synchrony or anxiety  sodium chloride 0.9% lock flush 10 milliLiter(s) IV Push every 1 hour PRN Pre/post blood products, medications, blood draw, and to maintain line patency    IV Drips:   ·	Heparin at 1450 units/hr  ·	Dobutamine at 5 mcg/kg/min  ·	Bumex at 2 mg/hr  ·	Precedex at 1.1 mcg/kg/hr    Mode: AC/ CMV (Assist Control/ Continuous Mandatory Ventilation)  RR (machine): 20  TV (machine): 450  FiO2: 40  PEEP: 6  ITime: 1  MAP: 10  PIP: 21    Antianginal Therapies:        Beta Blockers: N/A       Calcium Channel Blockers: N/A       Long Acting Nitrates: N/A       Ranexa: N/A    ALLERGIES: No Known Allergies    REVIEW OF SYMPTOMS: Patient intubated    VITAL SIGNS:  T(C): 36.7 (02-14-24 @ 04:00), Max: 38.4 (02-13-24 @ 22:00)  T(F): 98.1 (02-14-24 @ 04:00), Max: 101.1 (02-13-24 @ 22:00)  HR: 91 (02-14-24 @ 11:00) (75 - 123)  BP: 114/73 (02-14-24 @ 03:30) (78/53 - 136/74)  RR: 20 (02-14-24 @ 11:00) (18 - 32)  SpO2: 100% (02-14-24 @ 11:00) (92% - 100%)    INTAKE AND OUTPUT:   02-13 @ 07:01  -  02-14 @ 07:00  --------------------------------------------------------  IN: 1905.2 mL / OUT: 2465 mL / NET: -559.8 mL    02-14 @ 07:01  -  02-14 @ 11:07  --------------------------------------------------------  IN: 197 mL / OUT: 800 mL / NET: -603 mL    PHYSICAL EXAM:  Constitutional: Intubated   HEENT: Atraumatic and normocephalic , neck is supple . no JVD. No carotid bruit.  Neck: Left IJ TLC, Right IJ HD catheter, sites soft, no bleeding, no hematoma  CNS: A&Ox3. No focal deficits.   Respiratory: CTAB, unlabored   Cardiovascular: RRR normal s1 s2. No murmur. No rubs or gallop.  Gastrointestinal: Soft, non-tender. +Bowel sounds.   Extremities: Left brachial arterial line  Psychiatric: Calm   Skin: Warm and dry, no ulcers on extremities     LABS:                            9.0    17.00 )-----------( 264      ( 14 Feb 2024 05:12 )             27.0     02-14    135  |  96    |  67.3  ----------------------------<  224  4.4   |  22.0  |  2.71    Ca    8.5      14 Feb 2024 05:12  Phos  5.3     02-14  Mg     2.1     02-14    hsTrop: 2344 ---> 7357 ---> 4829 ---> 4448 ---> 5209 ---> 4934 ---> 4832    TPro  6.8  /  Alb  3.4  /  TBili  0.3  /  DBili  x   /  AST  55<H>  /  ALT  39  /  AlkPhos  94  02-14    PT/INR - ( 13 Feb 2024 19:42 )   PT: 13.9 sec;   INR: 1.26 ratio    PTT - ( 14 Feb 2024 08:30 )  PTT:45.7 sec    Lipids       Total Cholesterol: 179       Triglycerides: 94       HDL: 49       Non-HDL: 130       LDL: 111    HgbA1C: 7.0%    ECG:   Prior ECG: Yes [  ] No [  ]    CARDIAC TESTING   ECHO: 2/13/2024  Left Ventricle: Left ventricular systolic function is severely decreased with an ejection fraction visually estimated at 25 to 30%.  Right Ventricle: The right ventricular cavity is normal in size and normal systolic function.  Mitral Valve: There is mitral valve thickening of the anterior and posterior leaflets. There is mild mitral regurgitation.  Tricuspid Valve: Structurally normal tricuspid valve with normal leaflet excursion. Estimated pulmonary artery systolic pressure is 44 mmHg.  Pericardium: There is a trace pericardial effusion noted adjacent to the right ventricle.  Systemic Veins: The inferior vena cava is dilated (dilated >2.1cm) with abnormal inspiratory collapse (abnormal <50%) consistent with elevated right atrial pressure (~15, range 10-20mmHg).    STRESS: N/A    Cardiac Interventions: N/A    CATH: N/A    ELECTROPHYSIOLOGY: N/A    Cardiac Cath Risk Assessments:  ASA: 4  Mallampati: Intubated  Bleeding Risk: 43.6%  Creatinine: 2.71  GFR: 23                                                [facility]                                              INTERVENTIONAL CARDIOLOGY AT [place] [address]                                             Telephone: [phone]. Fax:[phone]                                                       INTERVENTIONAL CARDIOLOGY CONSULTATION NOTE                                                                                          History obtained by: Patient and medical record  Community Cardiologist: None  Reason for Consultation: Evaluation for cardiac catheterization  Available pt records reviewed: Yes [ x ] No [  ]    Chief complaint:    Patient is a 78y old  Male who presents with a chief complaint of Cardiogenic Shock (14 Feb 2024 09:47)    HPI: 78M former smoker with anxiety, HTN, HLD, CKD, plaque psoriasis, DM2 who presents with burning substernal chest pain at rest that started 1wk ago but acutely progressed this morning to sharp pain with radiation to L arm with associated multiple bouts of diarrhea. On ED arrival, afebrile, hypotensive to SBP 80-90s despite 1L IVF requiring levophed. EKG with nonspecific ST changes, troponin 2344->2477. Labs otherwise notable for K 5.6, BUN/Cr 43.5/2.31 (baseline Cr ~1 in 2022), BNP 47636, lactate 6.2. CXR with pulmonary edema. Placed on BiPAP for SOB and hypoxia. STAT TTE with EF<20%, multiple segmental abnormalities, mod MR, mod TR, mod pericardial effusion without tamponade. Received total lasix 40mg IV in ED. The patient continued to have progressively worsening SOB, and was intubated overnight.    Anginal Class:        Angina (Class): 4       Ischemic Symptoms: SOB (CCS class 4 anginal equivalent)    Heart Failure: Acute ACC/AHA stage D, NYHA functional class 4, HFrEF    PAST MEDICAL & SURGICAL HISTORY:  Diabetes  Hyperlipidemia  Anxiety with depression  Insomnia  No significant past surgical history    FAMILY HISTORY:  No pertinent family history in first degree relatives    Family History of Premature Cardiovascular Disease:  Yes [  ] No [X]    Associated Risk Factors:        Frailty Assessment: (none/mild/mod/severe):       Cerebrovascular Disease: N/A       Chronic Lung Disease: N/A       Peripheral Arterial Disease: N/A       Chronic Kidney Disease (if yes, what is GFR): N/A       Uncontrolled Diabetes (if yes, what is HgbA1C or FBS): N/A       Poorly Controlled Hypertension (if yes, what is SBP): N/A       Morbid Obesity (if yes, what is BMI): N/A       History of Recent Ventricular Arrhythmia: N/A       Inability to Ambulate Safely: N/A       Need for Therapeutic Anticoagulation: N/A       Antiplatelet or Contrast Allergy: N/A    SOCIAL HISTORY:         Marital:        Smoking: Never smoker       ETOH: Unknown       Drugs: Unknown       Caffeine: Unknown    Home Medications:  ARIPiprazole 5 mg oral tablet: 1 tab(s) orally once a day (12 Feb 2024 21:31)  busPIRone 10 mg oral tablet: 1 tab(s) orally once a day (12 Feb 2024 19:32)  KlonoPIN 1 mg oral tablet: 1 tab(s) orally once a day as needed for  anxiety (12 Feb 2024 21:31)  lisinopril 40 mg oral tablet: 1 tab(s) orally once a day (12 Feb 2024 21:31)  metFORMIN 500 mg oral tablet: 1 tab(s) orally 2 times a day (12 Feb 2024 19:32)  QUEtiapine 50 mg oral tablet: 1 tab(s) orally once a day (at bedtime) (12 Feb 2024 19:32)  sertraline 100 mg oral tablet: 1 tab(s) orally once a day (12 Feb 2024 21:31)  simvastatin 20 mg oral tablet: 1 tab(s) orally once a day (12 Feb 2024 21:31)    MEDICATIONS  (STANDING):  aspirin Suppository 300 milliGRAM(s) Rectal daily  buMETAnide Infusion 2 mG/Hr (10 mL/Hr) IV Continuous <Continuous>  cefTRIAXone Injectable. 1000 milliGRAM(s) IV Push every 24 hours  cefTRIAXone Injectable.      chlorhexidine 0.12% Liquid 15 milliLiter(s) Oral Mucosa every 12 hours  chlorhexidine 2% Cloths 1 Application(s) Topical <User Schedule>  chlorhexidine 4% Liquid 1 Application(s) Topical <User Schedule>  dexMEDEtomidine Infusion 0.3 MICROgram(s)/kG/Hr (4.59 mL/Hr) IV Continuous <Continuous>  dextrose 50% Injectable 25 Gram(s) IV Push once  dextrose 50% Injectable 25 Gram(s) IV Push once  dextrose 50% Injectable 12.5 Gram(s) IV Push once  DOBUTamine Infusion 5 MICROgram(s)/kG/Min (9.18 mL/Hr) IV Continuous <Continuous>  doxycycline IVPB      doxycycline IVPB 100 milliGRAM(s) IV Intermittent every 12 hours  heparin  Infusion.  Unit(s)/Hr (7.5 mL/Hr) IV Continuous <Continuous>  influenza  Vaccine (HIGH DOSE) 0.7 milliLiter(s) IntraMuscular once  insulin lispro (ADMELOG) corrective regimen sliding scale   SubCutaneous every 6 hours  norepinephrine Infusion 0.05 MICROgram(s)/kG/Min (5.74 mL/Hr) IV Continuous <Continuous>  pantoprazole  Injectable 40 milliGRAM(s) IV Push daily  propofol Infusion 20 MICROgram(s)/kG/Min (7.34 mL/Hr) IV Continuous <Continuous>    MEDICATIONS  (PRN):  acetaminophen     Tablet .. 650 milliGRAM(s) Oral every 6 hours PRN Temp greater or equal to 38C (100.4F)  fentaNYL    Injectable 50 MICROGram(s) IV Push every 2 hours PRN vent synchrony or pain  heparin   Injectable 3800 Unit(s) IV Push every 6 hours PRN For aPTT less than 40  midazolam Injectable 2 milliGRAM(s) IV Push every 2 hours PRN Vent synchrony or anxiety  sodium chloride 0.9% lock flush 10 milliLiter(s) IV Push every 1 hour PRN Pre/post blood products, medications, blood draw, and to maintain line patency    IV Drips:   ·	Heparin at 1450 units/hr  ·	Dobutamine at 5 mcg/kg/min  ·	Bumex at 2 mg/hr  ·	Precedex at 1.1 mcg/kg/hr    Mode: AC/ CMV (Assist Control/ Continuous Mandatory Ventilation)  RR (machine): 20  TV (machine): 450  FiO2: 40  PEEP: 6  ITime: 1  MAP: 10  PIP: 21    Antianginal Therapies:        Beta Blockers: N/A       Calcium Channel Blockers: N/A       Long Acting Nitrates: N/A       Ranexa: N/A    ALLERGIES: No Known Allergies    REVIEW OF SYMPTOMS: Patient intubated    VITAL SIGNS:  T(C): 36.7 (02-14-24 @ 04:00), Max: 38.4 (02-13-24 @ 22:00)  T(F): 98.1 (02-14-24 @ 04:00), Max: 101.1 (02-13-24 @ 22:00)  HR: 91 (02-14-24 @ 11:00) (75 - 123)  BP: 114/73 (02-14-24 @ 03:30) (78/53 - 136/74)  RR: 20 (02-14-24 @ 11:00) (18 - 32)  SpO2: 100% (02-14-24 @ 11:00) (92% - 100%)    INTAKE AND OUTPUT:   02-13 @ 07:01  -  02-14 @ 07:00  --------------------------------------------------------  IN: 1905.2 mL / OUT: 2465 mL / NET: -559.8 mL    02-14 @ 07:01  -  02-14 @ 11:07  --------------------------------------------------------  IN: 197 mL / OUT: 800 mL / NET: -603 mL    PHYSICAL EXAM:  Constitutional: Intubated   HEENT: Atraumatic and normocephalic , neck is supple . no JVD. No carotid bruit.  Neck: Left IJ TLC, Right IJ HD catheter, sites soft, no bleeding, no hematoma  CNS: A&Ox3. No focal deficits.   Respiratory: CTAB, unlabored   Cardiovascular: RRR normal s1 s2. + grade 2/6 systolic murmur. No rubs or gallop.  Gastrointestinal: Soft, non-tender. +Bowel sounds.   Extremities: Left brachial arterial line  Psychiatric: Calm   Skin: Warm and dry, no ulcers on extremities     LABS:                            9.0    17.00 )-----------( 264      ( 14 Feb 2024 05:12 )             27.0     02-14    135  |  96    |  67.3  ----------------------------<  224  4.4   |  22.0  |  2.71    Ca    8.5      14 Feb 2024 05:12  Phos  5.3     02-14  Mg     2.1     02-14    hsTrop: 2344 ---> 2477 ---> 3279 ---> 4448 ---> 5209 ---> 4934 ---> 4832    TPro  6.8  /  Alb  3.4  /  TBili  0.3  /  DBili  x   /  AST  55<H>  /  ALT  39  /  AlkPhos  94  02-14    PT/INR - ( 13 Feb 2024 19:42 )   PT: 13.9 sec;   INR: 1.26 ratio    PTT - ( 14 Feb 2024 08:30 )  PTT:45.7 sec    Lipids       Total Cholesterol: 179       Triglycerides: 94       HDL: 49       Non-HDL: 130       LDL: 111    HgbA1C: 7.0%    ECG:   Prior ECG: Yes [  ] No [  ]    CARDIAC TESTING   ECHO: 2/13/2024  Left Ventricle: Left ventricular systolic function is severely decreased with an ejection fraction visually estimated at 25 to 30%.  Right Ventricle: The right ventricular cavity is normal in size and normal systolic function.  Mitral Valve: There is mitral valve thickening of the anterior and posterior leaflets. There is mild mitral regurgitation.  Tricuspid Valve: Structurally normal tricuspid valve with normal leaflet excursion. Estimated pulmonary artery systolic pressure is 44 mmHg.  Pericardium: There is a trace pericardial effusion noted adjacent to the right ventricle.  Systemic Veins: The inferior vena cava is dilated (dilated >2.1cm) with abnormal inspiratory collapse (abnormal <50%) consistent with elevated right atrial pressure (~15, range 10-20mmHg).    STRESS: N/A    Cardiac Interventions: N/A    CATH: N/A    ELECTROPHYSIOLOGY: N/A    Cardiac Cath Risk Assessments:  ASA: 4  Mallampati: Intubated  Bleeding Risk: 43.6%  Creatinine: 2.71  GFR: 23

## 2024-02-14 NOTE — CONSULT NOTE ADULT - PROBLEM SELECTOR RECOMMENDATION 9
.  - EKG ST nonspecific ST changes  - hsTnT 2344->2477  - started on levophed for BP support, maintain MAP >65  - start heparin Gtt ACS nomogram  - resuscitated with 1 liter fluid, developed pulmonary edema  - BNP 08070, currently on NIPPV due to pulmonary edema, lasix as needed for diuresis  - prelim bedside POCUS with reduced EF  - pending official TTE for evaluation of cardiac function and any valvular abnormalities  - ICU consult pending   - Admit and monitor on Tele for acute arrhythmia monitoring  - Check labs including CBC, BMP, trend CE x3, ECG and CXR,  - Check TSH, FLP and HgA1C in AM  - trend markers of perfusion q12  - eventual ischemic evaluation when more optimized
Patient presented with chest pain and was found to have an NSTEMI and low EF. Cath revealed multivessel disease. He was empirically started on dobutamine yesterday for hypotension and respiratory distress  RHC this morning shows low filling pressures with normal cardiac output on  5  Would stop bumex drip  Will wean the dobutamine drip over the next couple of days  A swan was not left in so will monitor other signs of end organ perfusion such as lactate, UOP, LFTs and creatinine
C and possible intervention  Procedure explained and questions answered. Informed consent obtained.  GDMT:        APT: Asprin 300 mg PA daily, will start DAPT if PCI performed.       Statin: Will start statin       Beta Blocker: N/A       Other Antianginals: N/A       Aggressive cardiovascular risk reduction and lifestyle modification.  Cardiac rehab info provided/referral and communication to cardiac rehab completed
Cardiac cath results found above  WIll need work up for possible CABG  However patient seems to be a poor candidate for open heart surgery at this time  Will need medical optimization  Trial of extubation when deemed appropriate by primary team  Will order carotid duplex, Labs will need PFT once extubated  WIll also need dyspaghia work up once extubated    Plan to be discussed with DR Camacho

## 2024-02-14 NOTE — CONSULT NOTE ADULT - PROBLEM SELECTOR RECOMMENDATION 2
Nephrology on board
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
.  - per daughter has chronic kidney disease  - has never seen nephrologist  - will need optimization of renal function  - would obtain nephrology consult        plan discussed with Dr. Monte
RHC and [place] Sonya insertion  Continue Bumex drip at 2 mg/hr  Continue dobutamine at 5 mcg/kg/min

## 2024-02-14 NOTE — CONSULT NOTE ADULT - ASSESSMENT
78yr man  PMHx HTN, HLD, T2DM, CKD, plaque psoriasis, anxiety, depression, history of dysphagia admitted with cardiogenic shock 2/2 NSTEMI with Respiratory failure.    IN PROGRESS 78yr man  PMHx HTN, HLD, T2DM, CKD, plaque psoriasis, anxiety, depression, history of dysphagia admitted with cardiogenic shock 2/2 NSTEMI with Respiratory failure.    Cardiogenic Shock/NSTEMI  plan for cath lab today    Respiratory Failure  on vent - wean per MICU    WES/CKD  avoid nephrotoxic agents    Functional Quadriplegia  Assist with care  Turn reposition    Encounter for Palliative Care  Palliative Care consulted to assist with goals of care  Informed by RN per MICU discussions with the son who is in agreement with full  interventions.   Plan for cath today  For now, will follow hospital course and support family.

## 2024-02-14 NOTE — DIETITIAN INITIAL EVALUATION ADULT - PERTINENT LABORATORY DATA
02-14    135  |  96  |  67.3<H>  ----------------------------<  224<H>  4.4   |  22.0  |  2.71<H>    Ca    8.5      14 Feb 2024 05:12  Phos  5.3     02-14  Mg     2.1     02-14    TPro  6.8  /  Alb  3.4  /  TBili  0.3<L>  /  DBili  x   /  AST  55<H>  /  ALT  39  /  AlkPhos  94  02-14  POCT Blood Glucose.: 251 mg/dL (02-14-24 @ 11:10)  A1C with Estimated Average Glucose Result: 7.0 % (02-13-24 @ 11:15)

## 2024-02-14 NOTE — PROGRESS NOTE ADULT - SUBJECTIVE AND OBJECTIVE BOX
Patient is a 78y old  Male who presents with a chief complaint of Cardiogenic Shock (13 Feb 2024 12:14)      BRIEF HOSPITAL COURSE: 77 yo male, PMHx HTN, HLD, T2DM, CKD, plaque psoriasis, anxiety, depression, who initially presented with progressively worsening substernal chest pain that started last night progressively worsened earlier today with couple of episodes of diarrhea as well as 1 episode of nausea vomiting without fever chills dysuria frequency flank pain abdominal pain recent URI loss of consciousness dizziness palpitation  without ST elevation on EKG with elevated troponin, ER course complicated by hypotension for which she received 1 L IV fluid bolus subsequently significant hypoxia and respiratory distress requiring bilevel placement and placement on norepinephrine infusion following which medical ICU consulted being admitted to medical ICU for    Cardiogenic shock  Non-ST elevation MI  Acute/acute on chronic biventricular systolic heart failure  Pericardial effusion  Acute pulmonary edema  Acute hypoxic respiratory failure with respiratory distress  Hyperkalemia/hypomagnesemia/hypophosphatemia/metabolic acidosis–lactic acidosis  CKD   hyperglycemia with underlying diabetes mellitus      Events last 24 hours: ***      PAST MEDICAL & SURGICAL HISTORY:  Diabetes  Hyperlipidemia  Anxiety with depression  Insomnia  No significant past surgical history          Medications:  cefTRIAXone Injectable. 1000 milliGRAM(s) IV Push every 24 hours  cefTRIAXone Injectable.      doxycycline IVPB      doxycycline IVPB 100 milliGRAM(s) IV Intermittent every 12 hours    buMETAnide Infusion 2 mG/Hr IV Continuous <Continuous>  DOBUTamine Infusion 2.5 MICROgram(s)/kG/Min IV Continuous <Continuous>  norepinephrine Infusion 0.05 MICROgram(s)/kG/Min IV Continuous <Continuous>      aspirin Suppository 300 milliGRAM(s) Rectal daily  dexMEDEtomidine Infusion 0.3 MICROgram(s)/kG/Hr IV Continuous <Continuous>  fentaNYL    Injectable 50 MICROGram(s) IV Push every 2 hours PRN  midazolam Injectable 2 milliGRAM(s) IV Push every 2 hours PRN      heparin   Injectable 3800 Unit(s) IV Push every 6 hours PRN  heparin  Infusion.  Unit(s)/Hr IV Continuous <Continuous>    pantoprazole  Injectable 40 milliGRAM(s) IV Push daily      dextrose 50% Injectable 25 Gram(s) IV Push once  dextrose 50% Injectable 25 Gram(s) IV Push once  dextrose 50% Injectable 12.5 Gram(s) IV Push once  insulin lispro (ADMELOG) corrective regimen sliding scale   SubCutaneous every 6 hours      influenza  Vaccine (HIGH DOSE) 0.7 milliLiter(s) IntraMuscular once    chlorhexidine 0.12% Liquid 15 milliLiter(s) Oral Mucosa every 12 hours  chlorhexidine 2% Cloths 1 Application(s) Topical <User Schedule>        Mode: AC/ CMV (Assist Control/ Continuous Mandatory Ventilation)  RR (machine): 20  TV (machine): 450  FiO2: 60  PEEP: 6  ITime: 1  MAP: 11  PIP: 25      ICU Vital Signs Last 24 Hrs  T(C): 37.5 (14 Feb 2024 00:00), Max: 38.4 (13 Feb 2024 22:00)  T(F): 99.5 (14 Feb 2024 00:00), Max: 101.1 (13 Feb 2024 22:00)  HR: 95 (14 Feb 2024 03:00) (75 - 123)  BP: 132/71 (14 Feb 2024 03:00) (78/53 - 136/74)  BP(mean): 88 (14 Feb 2024 03:00) (62 - 92)  ABP: --  ABP(mean): --  RR: 20 (14 Feb 2024 03:00) (16 - 35)  SpO2: 100% (14 Feb 2024 03:00) (80% - 100%)    O2 Parameters below as of 14 Feb 2024 00:00  Patient On (Oxygen Delivery Method): BiPAP/CPAP    O2 Concentration (%): 30        ABG - ( 13 Feb 2024 21:18 )  pH, Arterial: 7.400 pH, Blood: x     /  pCO2: 36    /  pO2: 81    / HCO3: 22    / Base Excess: -2.5  /  SaO2: 98.8                I&O's Detail    12 Feb 2024 07:01  -  13 Feb 2024 07:00  --------------------------------------------------------  IN:    Furosemide: 30 mL    Heparin Infusion: 68 mL    IV PiggyBack: 50 mL    IV PiggyBack: 100 mL    IV PiggyBack: 100 mL    Norepinephrine: 21.6 mL  Total IN: 369.6 mL    OUT:    Indwelling Catheter - Urethral (mL): 500 mL    Voided (mL): 60 mL  Total OUT: 560 mL    Total NET: -190.4 mL      13 Feb 2024 07:01  -  14 Feb 2024 03:15  --------------------------------------------------------  IN:    Bumetanide: 50 mL    Dexmedetomidine: 7.7 mL    DOBUTamine: 30 mL    Furosemide: 67.5 mL    Heparin Infusion: 1076.5 mL    IV PiggyBack: 100 mL    IV PiggyBack: 100 mL    IV PiggyBack: 100 mL    Norepinephrine: 34 mL  Total IN: 1565.7 mL    OUT:    Indwelling Catheter - Urethral (mL): 1060 mL    Propofol: 0 mL    Voided (mL): 405 mL  Total OUT: 1465 mL    Total NET: 100.7 mL            LABS:                        8.8    17.68 )-----------( 267      ( 14 Feb 2024 01:50 )             27.1     02-14    138  |  100  |  64.3<H>  ----------------------------<  201<H>  4.5   |  21.0<L>  |  2.76<H>    Ca    8.4      14 Feb 2024 01:50  Phos  5.1     02-14  Mg     2.0     02-14    TPro  6.5<L>  /  Alb  3.4  /  TBili  0.3<L>  /  DBili  x   /  AST  59<H>  /  ALT  38  /  AlkPhos  92  02-14          CAPILLARY BLOOD GLUCOSE      POCT Blood Glucose.: 197 mg/dL (13 Feb 2024 23:04)    PT/INR - ( 13 Feb 2024 19:42 )   PT: 13.9 sec;   INR: 1.26 ratio         PTT - ( 14 Feb 2024 01:50 )  PTT:32.8 sec  Urinalysis Basic - ( 14 Feb 2024 01:50 )    Color: x / Appearance: x / SG: x / pH: x  Gluc: 201 mg/dL / Ketone: x  / Bili: x / Urobili: x   Blood: x / Protein: x / Nitrite: x   Leuk Esterase: x / RBC: x / WBC x   Sq Epi: x / Non Sq Epi: x / Bacteria: x      CULTURES:  Rapid RVP Result: NotDetec (02-13 @ 22:06)  Culture Results:   No growth (02-12 @ 22:40)  Rapid RVP Result: NotDetec (02-12 @ 17:58)  Culture Results:   No growth at 24 hours (02-12 @ 17:56)  Culture Results:   No growth at 24 hours (02-12 @ 17:46)            Physical Examination:    General: Frail appearing, in respiratory distress    PULM: Bilateral rales    CVS: +JVD. Regular rate and rhythm, no murmurs, rubs, or gallops    ABD: Soft, nondistended, nontender, normoactive bowel sounds    EXT: Trace pedal edema    SKIN: Distal extremities cool to touch. Plaque psoriasis    NEURO: RASS -2 on Dexmedetomidine        RADIOLOGY: < from: Xray Chest 1 View- PORTABLE-Urgent (02.12.24 @ 17:40) >  IMPRESSION:    Bilateral perihilar diffuse airspace disease concerning for pulmonary   edema of cardiac or noncardiac origin versus infectious process.    --- End of Report ---        MARTHA FELICIANO MD; Attending Radiologist  This document has been electronically signed. Feb 12 2024  7:36PM          INVASIVE LINES: R IJ HD cath, L IJ CVC, L Ax A line (placed 2/14)  INDWELLING HOOD: Y   VTE PROPHYLAXIS: Heparin gtt  CAM ICU: -  CODE STATUS: FULL        CRITICAL CARE TIME SPENT: 148 minutes spent performing frequent bedside reassessments and augmenting plan of care to address problems of acute critical illness that pose high probability of life threatening deterioration and/or end organ damage/dysfunction and discussing goals of care, non-inclusive of time spent on procedures performed. Patient is a 78y old  Male who presents with a chief complaint of Cardiogenic Shock (13 Feb 2024 12:14)      BRIEF HOSPITAL COURSE: 77 yo male, PMHx HTN, HLD, T2DM, CKD, plaque psoriasis, anxiety, depression, history of dysphagia who declined PEG placement, who initially presented with progressively worsening substernal chest pain that onset the night prior with multiple episodes of diarrhea as well as 1 episode of nausea and vomiting. No fever, chills, dysuria, frequency, flank pain, abdominal pain, recent URI, loss of consciousness, dizziness, palpitations. Initial EKG on arrival without ST elevation though inferolateral Q waves. Labs remarkable for elevated troponin, WES, anion gap metabolic lactic acidosis. CXR with pulmonary edema and possible underlying pneumonia. ER course complicated by hypotension for which he received 1L IV fluid bolus subsequently developed significant hypoxia and respiratory distress requiring bilevel placement and placement on norepinephrine infusion following which medical ICU consulted.      Events last 24 hours: At shift change patient complained to RN of crushing chest pain, EKG obtained which was unchanged and troponin continues to uptrend. Chest pain subsequently resolved. On evaluation, patient was on BiPAP 10/5/.30 in respiratory distress, tachypneic with accessory muscle use. Changed to CPAP 10 with significant improvement in work of breathing and RR to ~25. He was deeply sedated and minimally responsive on Dexmedetomidine infusion, which was titrated off. He also developed fever 101.1'F. He had cold distal extremities, elevated lactate, and minimal urine output. He developed recurrent shock requiring vasopressors. Dobutamine was added with improvement in significant Norepinephrine requirements. POCUS with diffuse B lines and stable pericardial effusion size without cardiac tamponade. Goals of care were discussed extensively with patient's family including his wife and daughter at bedside and son via telephone and then at bedside as well. Ultimately decision made for full code with trial of aggressive critical care.         PAST MEDICAL & SURGICAL HISTORY:  Diabetes  Hyperlipidemia  Anxiety with depression  Insomnia  No significant past surgical history        Medications:  cefTRIAXone Injectable. 1000 milliGRAM(s) IV Push every 24 hours  cefTRIAXone Injectable.      doxycycline IVPB      doxycycline IVPB 100 milliGRAM(s) IV Intermittent every 12 hours  buMETAnide Infusion 2 mG/Hr IV Continuous <Continuous>  DOBUTamine Infusion 2.5 MICROgram(s)/kG/Min IV Continuous <Continuous>  norepinephrine Infusion 0.05 MICROgram(s)/kG/Min IV Continuous <Continuous>  aspirin Suppository 300 milliGRAM(s) Rectal daily  dexMEDEtomidine Infusion 0.3 MICROgram(s)/kG/Hr IV Continuous <Continuous>  fentaNYL    Injectable 50 MICROGram(s) IV Push every 2 hours PRN  midazolam Injectable 2 milliGRAM(s) IV Push every 2 hours PRN      heparin   Injectable 3800 Unit(s) IV Push every 6 hours PRN  heparin  Infusion.  Unit(s)/Hr IV Continuous <Continuous>    pantoprazole  Injectable 40 milliGRAM(s) IV Push daily      dextrose 50% Injectable 25 Gram(s) IV Push once  dextrose 50% Injectable 25 Gram(s) IV Push once  dextrose 50% Injectable 12.5 Gram(s) IV Push once  insulin lispro (ADMELOG) corrective regimen sliding scale   SubCutaneous every 6 hours      influenza  Vaccine (HIGH DOSE) 0.7 milliLiter(s) IntraMuscular once    chlorhexidine 0.12% Liquid 15 milliLiter(s) Oral Mucosa every 12 hours  chlorhexidine 2% Cloths 1 Application(s) Topical <User Schedule>        Mode: AC/ CMV (Assist Control/ Continuous Mandatory Ventilation)  RR (machine): 20  TV (machine): 450  FiO2: 60  PEEP: 6  ITime: 1  MAP: 11  PIP: 25      ICU Vital Signs Last 24 Hrs  T(C): 37.5 (14 Feb 2024 00:00), Max: 38.4 (13 Feb 2024 22:00)  T(F): 99.5 (14 Feb 2024 00:00), Max: 101.1 (13 Feb 2024 22:00)  HR: 95 (14 Feb 2024 03:00) (75 - 123)  BP: 132/71 (14 Feb 2024 03:00) (78/53 - 136/74)  BP(mean): 88 (14 Feb 2024 03:00) (62 - 92)  ABP: --  ABP(mean): --  RR: 20 (14 Feb 2024 03:00) (16 - 35)  SpO2: 100% (14 Feb 2024 03:00) (80% - 100%)    O2 Parameters below as of 14 Feb 2024 00:00  Patient On (Oxygen Delivery Method): BiPAP/CPAP    O2 Concentration (%): 30        ABG - ( 13 Feb 2024 21:18 )  pH, Arterial: 7.400 pH, Blood: x     /  pCO2: 36    /  pO2: 81    / HCO3: 22    / Base Excess: -2.5  /  SaO2: 98.8                I&O's Detail    12 Feb 2024 07:01  -  13 Feb 2024 07:00  --------------------------------------------------------  IN:    Furosemide: 30 mL    Heparin Infusion: 68 mL    IV PiggyBack: 50 mL    IV PiggyBack: 100 mL    IV PiggyBack: 100 mL    Norepinephrine: 21.6 mL  Total IN: 369.6 mL    OUT:    Indwelling Catheter - Urethral (mL): 500 mL    Voided (mL): 60 mL  Total OUT: 560 mL    Total NET: -190.4 mL      13 Feb 2024 07:01  -  14 Feb 2024 03:15  --------------------------------------------------------  IN:    Bumetanide: 50 mL    Dexmedetomidine: 7.7 mL    DOBUTamine: 30 mL    Furosemide: 67.5 mL    Heparin Infusion: 1076.5 mL    IV PiggyBack: 100 mL    IV PiggyBack: 100 mL    IV PiggyBack: 100 mL    Norepinephrine: 34 mL  Total IN: 1565.7 mL    OUT:    Indwelling Catheter - Urethral (mL): 1060 mL    Propofol: 0 mL    Voided (mL): 405 mL  Total OUT: 1465 mL    Total NET: 100.7 mL            LABS:                        8.8    17.68 )-----------( 267      ( 14 Feb 2024 01:50 )             27.1     02-14    138  |  100  |  64.3<H>  ----------------------------<  201<H>  4.5   |  21.0<L>  |  2.76<H>    Ca    8.4      14 Feb 2024 01:50  Phos  5.1     02-14  Mg     2.0     02-14    TPro  6.5<L>  /  Alb  3.4  /  TBili  0.3<L>  /  DBili  x   /  AST  59<H>  /  ALT  38  /  AlkPhos  92  02-14          CAPILLARY BLOOD GLUCOSE      POCT Blood Glucose.: 197 mg/dL (13 Feb 2024 23:04)    PT/INR - ( 13 Feb 2024 19:42 )   PT: 13.9 sec;   INR: 1.26 ratio         PTT - ( 14 Feb 2024 01:50 )  PTT:32.8 sec  Urinalysis Basic - ( 14 Feb 2024 01:50 )    Color: x / Appearance: x / SG: x / pH: x  Gluc: 201 mg/dL / Ketone: x  / Bili: x / Urobili: x   Blood: x / Protein: x / Nitrite: x   Leuk Esterase: x / RBC: x / WBC x   Sq Epi: x / Non Sq Epi: x / Bacteria: x      CULTURES:  Rapid RVP Result: NotDetec (02-13 @ 22:06)  Culture Results:   No growth (02-12 @ 22:40)  Rapid RVP Result: NotDetec (02-12 @ 17:58)  Culture Results:   No growth at 24 hours (02-12 @ 17:56)  Culture Results:   No growth at 24 hours (02-12 @ 17:46)            Physical Examination:    General: Frail appearing, in respiratory distress    PULM: Bilateral rales    CVS: +JVD. Regular rate and rhythm, no murmurs, rubs, or gallops    ABD: Soft, nondistended, nontender, normoactive bowel sounds    EXT: Trace pedal edema    SKIN: Distal extremities cool to touch. Plaque psoriasis    NEURO: RASS -2 on Dexmedetomidine        RADIOLOGY: < from: Xray Chest 1 View- PORTABLE-Urgent (02.12.24 @ 17:40) >  IMPRESSION:    Bilateral perihilar diffuse airspace disease concerning for pulmonary   edema of cardiac or noncardiac origin versus infectious process.    --- End of Report ---        MARTHA FELICIANO MD; Attending Radiologist  This document has been electronically signed. Feb 12 2024  7:36PM          INVASIVE LINES: R IJ HD cath, L IJ CVC, L Ax A line (placed 2/14)  INDWELLING HOOD: Y   VTE PROPHYLAXIS: Heparin gtt  CAM ICU: -  CODE STATUS: FULL        CRITICAL CARE TIME SPENT: 148 minutes spent performing frequent bedside reassessments and augmenting plan of care to address problems of acute critical illness that pose high probability of life threatening deterioration and/or end organ damage/dysfunction and discussing goals of care, non-inclusive of time spent on procedures performed. Patient is a 78y old  Male who presents with a chief complaint of Cardiogenic Shock (13 Feb 2024 12:14)      BRIEF HOSPITAL COURSE: 77 yo male, PMHx HTN, HLD, T2DM, CKD, plaque psoriasis, anxiety, depression, history of dysphagia who declined PEG placement, who initially presented with progressively worsening substernal chest pain that onset the night prior with multiple episodes of diarrhea as well as 1 episode of nausea and vomiting. No fever, chills, dysuria, frequency, flank pain, abdominal pain, recent URI, loss of consciousness, dizziness, palpitations. Initial EKG on arrival without ST elevation though inferolateral Q waves. Labs remarkable for elevated troponin, WES, anion gap metabolic lactic acidosis. CXR with pulmonary edema and possible underlying pneumonia. ER course complicated by hypotension for which he received 1L IV fluid bolus subsequently developed significant hypoxia and respiratory distress requiring bilevel placement and placement on norepinephrine infusion following which medical ICU consulted.      Events last 24 hours: At shift change patient complained to RN of crushing chest pain, EKG obtained which was unchanged and troponin continues to uptrend. Chest pain subsequently resolved. On evaluation, patient was on BiPAP 10/5/.30 in respiratory distress, tachypneic with accessory muscle use. Changed to CPAP 10 with significant improvement in work of breathing and RR to ~25. He was deeply sedated and minimally responsive on Dexmedetomidine infusion, which was titrated off. He also developed fever 101.1'F. He had cold distal extremities, elevated lactate, and minimal urine output. He developed recurrent shock requiring vasopressors. Dobutamine was added with improvement in significant Norepinephrine requirements. POCUS with diffuse B lines, IVC 2.5 cm, and stable pericardial effusion size without cardiac tamponade. Goals of care were discussed extensively with patient's family including his wife and daughter at bedside and son via telephone and then at bedside as well. Ultimately decision made for full code with trial of aggressive critical care. Patient developed worsening encephalopathy, obtunded off sedation, and was intubated.        PAST MEDICAL & SURGICAL HISTORY:  Diabetes  Hyperlipidemia  Anxiety with depression  Insomnia  No significant past surgical history        Medications:  cefTRIAXone Injectable. 1000 milliGRAM(s) IV Push every 24 hours  cefTRIAXone Injectable.      doxycycline IVPB      doxycycline IVPB 100 milliGRAM(s) IV Intermittent every 12 hours  buMETAnide Infusion 2 mG/Hr IV Continuous <Continuous>  DOBUTamine Infusion 2.5 MICROgram(s)/kG/Min IV Continuous <Continuous>  norepinephrine Infusion 0.05 MICROgram(s)/kG/Min IV Continuous <Continuous>  aspirin Suppository 300 milliGRAM(s) Rectal daily  dexMEDEtomidine Infusion 0.3 MICROgram(s)/kG/Hr IV Continuous <Continuous>  fentaNYL    Injectable 50 MICROGram(s) IV Push every 2 hours PRN  midazolam Injectable 2 milliGRAM(s) IV Push every 2 hours PRN      heparin   Injectable 3800 Unit(s) IV Push every 6 hours PRN  heparin  Infusion.  Unit(s)/Hr IV Continuous <Continuous>    pantoprazole  Injectable 40 milliGRAM(s) IV Push daily      dextrose 50% Injectable 25 Gram(s) IV Push once  dextrose 50% Injectable 25 Gram(s) IV Push once  dextrose 50% Injectable 12.5 Gram(s) IV Push once  insulin lispro (ADMELOG) corrective regimen sliding scale   SubCutaneous every 6 hours      influenza  Vaccine (HIGH DOSE) 0.7 milliLiter(s) IntraMuscular once    chlorhexidine 0.12% Liquid 15 milliLiter(s) Oral Mucosa every 12 hours  chlorhexidine 2% Cloths 1 Application(s) Topical <User Schedule>        Mode: AC/ CMV (Assist Control/ Continuous Mandatory Ventilation)  RR (machine): 20  TV (machine): 450  FiO2: 60  PEEP: 6  ITime: 1  MAP: 11  PIP: 25      ICU Vital Signs Last 24 Hrs  T(C): 37.5 (14 Feb 2024 00:00), Max: 38.4 (13 Feb 2024 22:00)  T(F): 99.5 (14 Feb 2024 00:00), Max: 101.1 (13 Feb 2024 22:00)  HR: 95 (14 Feb 2024 03:00) (75 - 123)  BP: 132/71 (14 Feb 2024 03:00) (78/53 - 136/74)  BP(mean): 88 (14 Feb 2024 03:00) (62 - 92)  ABP: --  ABP(mean): --  RR: 20 (14 Feb 2024 03:00) (16 - 35)  SpO2: 100% (14 Feb 2024 03:00) (80% - 100%)    O2 Parameters below as of 14 Feb 2024 00:00  Patient On (Oxygen Delivery Method): BiPAP/CPAP    O2 Concentration (%): 30        ABG - ( 13 Feb 2024 21:18 )  pH, Arterial: 7.400 pH, Blood: x     /  pCO2: 36    /  pO2: 81    / HCO3: 22    / Base Excess: -2.5  /  SaO2: 98.8                I&O's Detail    12 Feb 2024 07:01  -  13 Feb 2024 07:00  --------------------------------------------------------  IN:    Furosemide: 30 mL    Heparin Infusion: 68 mL    IV PiggyBack: 50 mL    IV PiggyBack: 100 mL    IV PiggyBack: 100 mL    Norepinephrine: 21.6 mL  Total IN: 369.6 mL    OUT:    Indwelling Catheter - Urethral (mL): 500 mL    Voided (mL): 60 mL  Total OUT: 560 mL    Total NET: -190.4 mL      13 Feb 2024 07:01  -  14 Feb 2024 03:15  --------------------------------------------------------  IN:    Bumetanide: 50 mL    Dexmedetomidine: 7.7 mL    DOBUTamine: 30 mL    Furosemide: 67.5 mL    Heparin Infusion: 1076.5 mL    IV PiggyBack: 100 mL    IV PiggyBack: 100 mL    IV PiggyBack: 100 mL    Norepinephrine: 34 mL  Total IN: 1565.7 mL    OUT:    Indwelling Catheter - Urethral (mL): 1060 mL    Propofol: 0 mL    Voided (mL): 405 mL  Total OUT: 1465 mL    Total NET: 100.7 mL            LABS:                        8.8    17.68 )-----------( 267      ( 14 Feb 2024 01:50 )             27.1     02-14    138  |  100  |  64.3<H>  ----------------------------<  201<H>  4.5   |  21.0<L>  |  2.76<H>    Ca    8.4      14 Feb 2024 01:50  Phos  5.1     02-14  Mg     2.0     02-14    TPro  6.5<L>  /  Alb  3.4  /  TBili  0.3<L>  /  DBili  x   /  AST  59<H>  /  ALT  38  /  AlkPhos  92  02-14          CAPILLARY BLOOD GLUCOSE      POCT Blood Glucose.: 197 mg/dL (13 Feb 2024 23:04)    PT/INR - ( 13 Feb 2024 19:42 )   PT: 13.9 sec;   INR: 1.26 ratio         PTT - ( 14 Feb 2024 01:50 )  PTT:32.8 sec  Urinalysis Basic - ( 14 Feb 2024 01:50 )    Color: x / Appearance: x / SG: x / pH: x  Gluc: 201 mg/dL / Ketone: x  / Bili: x / Urobili: x   Blood: x / Protein: x / Nitrite: x   Leuk Esterase: x / RBC: x / WBC x   Sq Epi: x / Non Sq Epi: x / Bacteria: x      CULTURES:  Rapid RVP Result: NotDetec (02-13 @ 22:06)  Culture Results:   No growth (02-12 @ 22:40)  Rapid RVP Result: NotDetec (02-12 @ 17:58)  Culture Results:   No growth at 24 hours (02-12 @ 17:56)  Culture Results:   No growth at 24 hours (02-12 @ 17:46)            Physical Examination:    General: Frail appearing, in respiratory distress    PULM: Bilateral rales    CVS: +JVD. Regular rate and rhythm, no murmurs, rubs, or gallops    ABD: Soft, nondistended, nontender, normoactive bowel sounds    EXT: Trace pedal edema    SKIN: Distal extremities cool to touch. Plaque psoriasis    NEURO: RASS -2 on Dexmedetomidine        RADIOLOGY: < from: Xray Chest 1 View- PORTABLE-Urgent (02.12.24 @ 17:40) >  IMPRESSION:    Bilateral perihilar diffuse airspace disease concerning for pulmonary   edema of cardiac or noncardiac origin versus infectious process.    --- End of Report ---        MARTHA FELICIANO MD; Attending Radiologist  This document has been electronically signed. Feb 12 2024  7:36PM          INVASIVE LINES: R IJ HD cath, L IJ CVC, L Ax A line (placed 2/14)  INDWELLING HOOD: Y   VTE PROPHYLAXIS: Heparin gtt  CAM ICU: -  CODE STATUS: FULL        CRITICAL CARE TIME SPENT: 148 minutes spent performing frequent bedside reassessments and augmenting plan of care to address problems of acute critical illness that pose high probability of life threatening deterioration and/or end organ damage/dysfunction and discussing goals of care, non-inclusive of time spent on procedures performed.

## 2024-02-14 NOTE — CONSULT NOTE ADULT - TIME BILLING
evaluation for CABG
- Generation of cardiovascular treatment plan.  - Coordination of care with primary team.

## 2024-02-14 NOTE — CONSULT NOTE ADULT - PROBLEM SELECTOR PROBLEM 1
NSTEMI (non-ST elevation myocardial infarction)
NSTEMI (non-ST elevation myocardial infarction)
Acute combined systolic and diastolic congestive heart failure
NSTEMI (non-ST elevation myocardial infarction)

## 2024-02-14 NOTE — CONSULT NOTE ADULT - SUBJECTIVE AND OBJECTIVE BOX
Patient is a 78y old  Male who presents with a chief complaint of Cardiogenic Shock (14 Feb 2024 16:06)      HPI: History obtained from chart as he is intubated and sedated  79 y/o M with PMHx anxiety, HTN, HLD, CKD, plaque psoriasis, DM2 who presents to [facility] ED with 1 weeks of chest pain at rest, that woke him out of sleep today. Patient states he had been having chest pain but was mild and tolerable. Today the pain became sharp and he felt it midsternal with radiation to left side and he also had 6 diarrhea bowel movements. Hypotensive on presentation, given IV fluid bolus but had respiratory distress, placed on bipap and levophed initiated to maintain BP. He endorses having chest pain and diarrhea today and denies back pain, headache, dizziness, diaphoresis, syncope.  on 2/14 patient was complaining of chest pain again with uptrending troponins, nchanged EKG. Patient was still in respiratory distress despite BiPAP and CPAP with fever of 101.1 and cold extremities. Patient was started on levophed and dobutamine and intubated. Patient was weaned off levophed overnight, still on dobutamine at 5mcg/kg/min.     He underwent a LHC which showed prox left main 60% stenosis, pros LAD with 70% stenosis, mid LAD with 80% stenosis, first diagonal 100% stenosis, prox circ 100% stenosis, distal RCA with 90% stenosis, ramus with 90% stenosis  RHC: RA 6, PA 22/10/15, PCWP 8, CO/CI 5.5/3.1    PAST MEDICAL & SURGICAL HISTORY:  Diabetes      Hyperlipidemia      Anxiety with depression      Insomnia      No significant past surgical history          FAMILY HISTORY:  No pertinent family history in first degree relatives        Home Medications:  ARIPiprazole 5 mg oral tablet: 1 tab(s) orally once a day (12 Feb 2024 21:31)  busPIRone 10 mg oral tablet: 1 tab(s) orally once a day (12 Feb 2024 19:32)  KlonoPIN 1 mg oral tablet: 1 tab(s) orally once a day as needed for  anxiety (12 Feb 2024 21:31)  lisinopril 40 mg oral tablet: 1 tab(s) orally once a day (12 Feb 2024 21:31)  metFORMIN 500 mg oral tablet: 1 tab(s) orally 2 times a day (12 Feb 2024 19:32)  QUEtiapine 50 mg oral tablet: 1 tab(s) orally once a day (at bedtime) (12 Feb 2024 19:32)  sertraline 100 mg oral tablet: 1 tab(s) orally once a day (12 Feb 2024 21:31)  simvastatin 20 mg oral tablet: 1 tab(s) orally once a day (12 Feb 2024 21:31)      Medications:  acetaminophen     Tablet .. 650 milliGRAM(s) Oral every 6 hours PRN  atorvastatin 80 milliGRAM(s) Oral at bedtime  cefTRIAXone Injectable. 1000 milliGRAM(s) IV Push every 24 hours  cefTRIAXone Injectable.      chlorhexidine 0.12% Liquid 15 milliLiter(s) Oral Mucosa every 12 hours  chlorhexidine 2% Cloths 1 Application(s) Topical <User Schedule>  chlorhexidine 4% Liquid 1 Application(s) Topical <User Schedule>  dexMEDEtomidine Infusion 0.3 MICROgram(s)/kG/Hr IV Continuous <Continuous>  dextrose 50% Injectable 25 Gram(s) IV Push once  dextrose 50% Injectable 25 Gram(s) IV Push once  dextrose 50% Injectable 12.5 Gram(s) IV Push once  DOBUTamine Infusion 5 MICROgram(s)/kG/Min IV Continuous <Continuous>  fentaNYL    Injectable 50 MICROGram(s) IV Push every 2 hours PRN  heparin   Injectable 3800 Unit(s) IV Push every 6 hours PRN  heparin  Infusion.  Unit(s)/Hr IV Continuous <Continuous>  influenza  Vaccine (HIGH DOSE) 0.7 milliLiter(s) IntraMuscular once  insulin lispro (ADMELOG) corrective regimen sliding scale   SubCutaneous every 6 hours  midazolam Injectable 2 milliGRAM(s) IV Push every 2 hours PRN  norepinephrine Infusion 0.05 MICROgram(s)/kG/Min IV Continuous <Continuous>  pantoprazole  Injectable 40 milliGRAM(s) IV Push daily  propofol Infusion 20 MICROgram(s)/kG/Min IV Continuous <Continuous>  sodium chloride 0.9% lock flush 10 milliLiter(s) IV Push every 1 hour PRN      Review of systems:  10 point review of systems completed and are negative unless noted in HPI    Vitals:  T(C): 35.9 (02-14-24 @ 16:00), Max: 38.4 (02-13-24 @ 22:00)  HR: 91 (02-14-24 @ 16:15) (75 - 123)  BP: 114/73 (02-14-24 @ 03:30) (78/53 - 136/74)  BP(mean): 84 (02-14-24 @ 03:30) (62 - 92)  RR: 20 (02-14-24 @ 16:15) (18 - 32)  SpO2: 100% (02-14-24 @ 16:15) (93% - 100%)    Daily     Daily         I&O's Summary    13 Feb 2024 07:01  -  14 Feb 2024 07:00  --------------------------------------------------------  IN: 1905.2 mL / OUT: 2465 mL / NET: -559.8 mL    14 Feb 2024 07:01  -  14 Feb 2024 16:48  --------------------------------------------------------  IN: 444.8 mL / OUT: 1615 mL / NET: -1170.2 mL        Physical Exam:  Appearance: Intubated and sedated  HEENT: PERRL  Neck:   Cardiovascular: Normal S1 S2, No murmurs/rubs/gallops  Respiratory: Clear to auscultation bilaterally  Gastrointestinal: Soft, Non-tender	  Skin: No cyanosis	  Neurologic: Non-focal  Extremities: No LE edema      Labs:                        9.0    17.00 )-----------( 264      ( 14 Feb 2024 05:12 )             27.0     02-14    135  |  96  |  67.3<H>  ----------------------------<  224<H>  4.4   |  22.0  |  2.71<H>    Ca    8.5      14 Feb 2024 05:12  Phos  5.3     02-14  Mg     2.1     02-14    TPro  6.8  /  Alb  3.4  /  TBili  0.3<L>  /  DBili  x   /  AST  55<H>  /  ALT  39  /  AlkPhos  94  02-14    PT/INR - ( 13 Feb 2024 19:42 )   PT: 13.9 sec;   INR: 1.26 ratio         PTT - ( 14 Feb 2024 15:48 )  PTT:40.2 sec          TELEMETRY:    Echocardiogram:    EKG:

## 2024-02-14 NOTE — CONSULT NOTE ADULT - ASSESSMENT
78M with anxiety, HTN, HLD, CKD, plaque psoriasis, dysphagia, should be on thick liquid but does not comply as per daughter,DM2 who presents with burning substernal chest pain at rest that started 1wk ago but acutely progressed this morning to sharp pain with radiation to Left arm with associated multiple bouts of diarrhea. On ED arrival, afebrile, hypotensive to SBP 80-90s despite 1L IVF requiring levophed. EKG with nonspecific ST changes, troponin 2344->2477. Labs otherwise notable for K 5.6, BUN/Cr 43.5/2.31 (baseline Cr ~1 in 2022), BNP 28589, lactate 6.2. CXR with pulmonary edema. Placed on BiPAP for SOB and hypoxia. STAT TTE with EF<20%, multiple segmental abnormalities, mod MR, mod TR, mod pericardial effusion without tamponade. Received total lasix 40mg IV in ED. ICU consulted for further management of cardiogenic shock. Underwent cardiac cath 2/14 and found to have multivessel coronary disease. CT surgery called to evaluate.

## 2024-02-14 NOTE — PROGRESS NOTE ADULT - PROBLEM SELECTOR PLAN 2
- Patient initially with echocardiogram showing EF <20%, multiple RWMA, mod MR/TR, mild pulmonary HTN, and moderate pericardial effusion.   - Repeat echocardiogram last night with EF 25-30% on dobutamine with trace pericardial effusion.   - Advanced heart failure consult placed.   - Continue dobutamine at 5mcg/kg/min at this time.   - RHC with swan placement today.   - Continue bumex gtt.   - Hold ARNi/ACEi/ARB, SGLT2i, and MRA at this time.   - Will evaluate for increased LV support with RHC.

## 2024-02-14 NOTE — PROCEDURE NOTE - NSPROCDETAILS_GEN_ALL_CORE
guidewire recovered/lumen(s) aspirated and flushed/sterile dressing applied/sterile technique, catheter placed/ultrasound guidance with use of sterile gel and probe cove
guidewire recovered/lumen(s) aspirated and flushed/sterile dressing applied/sterile technique, catheter placed/ultrasound guidance with use of sterile gel and probe cove
patient pre-oxygenated, tube inserted, placement confirmed
location identified, draped/prepped, sterile technique used, needle inserted/introduced/positive blood return obtained via catheter/connected to a pressurized flush line/sutured in place/hemostasis with direct pressure, dressing applied/Seldinger technique/all materials/supplies accounted for at end of procedure

## 2024-02-15 LAB
ALBUMIN SERPL ELPH-MCNC: 3.2 G/DL — LOW (ref 3.3–5.2)
ALBUMIN SERPL ELPH-MCNC: 3.3 G/DL — SIGNIFICANT CHANGE UP (ref 3.3–5.2)
ALP SERPL-CCNC: 91 U/L — SIGNIFICANT CHANGE UP (ref 40–120)
ALP SERPL-CCNC: 94 U/L — SIGNIFICANT CHANGE UP (ref 40–120)
ALT FLD-CCNC: 35 U/L — SIGNIFICANT CHANGE UP
ALT FLD-CCNC: 36 U/L — SIGNIFICANT CHANGE UP
ANION GAP SERPL CALC-SCNC: 18 MMOL/L — HIGH (ref 5–17)
ANION GAP SERPL CALC-SCNC: 19 MMOL/L — HIGH (ref 5–17)
APTT BLD: 72.1 SEC — HIGH (ref 24.5–35.6)
APTT BLD: 81.4 SEC — HIGH (ref 24.5–35.6)
AST SERPL-CCNC: 29 U/L — SIGNIFICANT CHANGE UP
AST SERPL-CCNC: 29 U/L — SIGNIFICANT CHANGE UP
BASE EXCESS BLDV CALC-SCNC: 5.7 MMOL/L — HIGH (ref -2–3)
BASOPHILS # BLD AUTO: 0.01 K/UL — SIGNIFICANT CHANGE UP (ref 0–0.2)
BASOPHILS # BLD AUTO: 0.03 K/UL — SIGNIFICANT CHANGE UP (ref 0–0.2)
BASOPHILS NFR BLD AUTO: 0.1 % — SIGNIFICANT CHANGE UP (ref 0–2)
BASOPHILS NFR BLD AUTO: 0.3 % — SIGNIFICANT CHANGE UP (ref 0–2)
BILIRUB SERPL-MCNC: 0.3 MG/DL — LOW (ref 0.4–2)
BILIRUB SERPL-MCNC: 0.3 MG/DL — LOW (ref 0.4–2)
BUN SERPL-MCNC: 75.5 MG/DL — HIGH (ref 8–20)
BUN SERPL-MCNC: 77.1 MG/DL — HIGH (ref 8–20)
BUN SERPL-MCNC: 77.6 MG/DL — HIGH (ref 8–20)
BUN SERPL-MCNC: 78.5 MG/DL — HIGH (ref 8–20)
CA-I SERPL-SCNC: 1.17 MMOL/L — SIGNIFICANT CHANGE UP (ref 1.15–1.33)
CALCIUM SERPL-MCNC: 8.8 MG/DL — SIGNIFICANT CHANGE UP (ref 8.4–10.5)
CALCIUM SERPL-MCNC: 8.8 MG/DL — SIGNIFICANT CHANGE UP (ref 8.4–10.5)
CALCIUM SERPL-MCNC: 9 MG/DL — SIGNIFICANT CHANGE UP (ref 8.4–10.5)
CALCIUM SERPL-MCNC: 9.1 MG/DL — SIGNIFICANT CHANGE UP (ref 8.4–10.5)
CHLORIDE BLDV-SCNC: 101 MMOL/L — SIGNIFICANT CHANGE UP (ref 96–108)
CHLORIDE SERPL-SCNC: 94 MMOL/L — LOW (ref 96–108)
CHLORIDE SERPL-SCNC: 95 MMOL/L — LOW (ref 96–108)
CHLORIDE SERPL-SCNC: 95 MMOL/L — LOW (ref 96–108)
CHLORIDE SERPL-SCNC: 96 MMOL/L — SIGNIFICANT CHANGE UP (ref 96–108)
CO2 SERPL-SCNC: 23 MMOL/L — SIGNIFICANT CHANGE UP (ref 22–29)
CO2 SERPL-SCNC: 23 MMOL/L — SIGNIFICANT CHANGE UP (ref 22–29)
CO2 SERPL-SCNC: 24 MMOL/L — SIGNIFICANT CHANGE UP (ref 22–29)
CO2 SERPL-SCNC: 24 MMOL/L — SIGNIFICANT CHANGE UP (ref 22–29)
CREAT SERPL-MCNC: 2.56 MG/DL — HIGH (ref 0.5–1.3)
CREAT SERPL-MCNC: 2.59 MG/DL — HIGH (ref 0.5–1.3)
CREAT SERPL-MCNC: 2.74 MG/DL — HIGH (ref 0.5–1.3)
CREAT SERPL-MCNC: 2.74 MG/DL — HIGH (ref 0.5–1.3)
CULTURE RESULTS: SIGNIFICANT CHANGE UP
EGFR: 23 ML/MIN/1.73M2 — LOW
EGFR: 23 ML/MIN/1.73M2 — LOW
EGFR: 24 ML/MIN/1.73M2 — LOW
EGFR: 25 ML/MIN/1.73M2 — LOW
EOSINOPHIL # BLD AUTO: 0.02 K/UL — SIGNIFICANT CHANGE UP (ref 0–0.5)
EOSINOPHIL # BLD AUTO: 0.07 K/UL — SIGNIFICANT CHANGE UP (ref 0–0.5)
EOSINOPHIL NFR BLD AUTO: 0.2 % — SIGNIFICANT CHANGE UP (ref 0–6)
EOSINOPHIL NFR BLD AUTO: 0.7 % — SIGNIFICANT CHANGE UP (ref 0–6)
GAS PNL BLDA: SIGNIFICANT CHANGE UP
GAS PNL BLDA: SIGNIFICANT CHANGE UP
GAS PNL BLDV: 135 MMOL/L — LOW (ref 136–145)
GAS PNL BLDV: SIGNIFICANT CHANGE UP
GLUCOSE BLDC GLUCOMTR-MCNC: 141 MG/DL — HIGH (ref 70–99)
GLUCOSE BLDC GLUCOMTR-MCNC: 154 MG/DL — HIGH (ref 70–99)
GLUCOSE BLDC GLUCOMTR-MCNC: 158 MG/DL — HIGH (ref 70–99)
GLUCOSE BLDC GLUCOMTR-MCNC: 188 MG/DL — HIGH (ref 70–99)
GLUCOSE BLDC GLUCOMTR-MCNC: 216 MG/DL — HIGH (ref 70–99)
GLUCOSE BLDV-MCNC: 141 MG/DL — HIGH (ref 70–99)
GLUCOSE SERPL-MCNC: 136 MG/DL — HIGH (ref 70–99)
GLUCOSE SERPL-MCNC: 147 MG/DL — HIGH (ref 70–99)
GLUCOSE SERPL-MCNC: 151 MG/DL — HIGH (ref 70–99)
GLUCOSE SERPL-MCNC: 175 MG/DL — HIGH (ref 70–99)
HCO3 BLDV-SCNC: 30 MMOL/L — HIGH (ref 22–29)
HCT VFR BLD CALC: 26.7 % — LOW (ref 39–50)
HCT VFR BLD CALC: 27.5 % — LOW (ref 39–50)
HCT VFR BLDA CALC: 27 % — SIGNIFICANT CHANGE UP
HGB BLD CALC-MCNC: 8.9 G/DL — LOW (ref 12.6–17.4)
HGB BLD-MCNC: 8.8 G/DL — LOW (ref 13–17)
HGB BLD-MCNC: 8.8 G/DL — LOW (ref 13–17)
IMM GRANULOCYTES NFR BLD AUTO: 0.3 % — SIGNIFICANT CHANGE UP (ref 0–0.9)
IMM GRANULOCYTES NFR BLD AUTO: 0.3 % — SIGNIFICANT CHANGE UP (ref 0–0.9)
INR BLD: 1.14 RATIO — SIGNIFICANT CHANGE UP (ref 0.85–1.18)
LACTATE BLDV-MCNC: 1.2 MMOL/L — SIGNIFICANT CHANGE UP (ref 0.5–2)
LYMPHOCYTES # BLD AUTO: 1.43 K/UL — SIGNIFICANT CHANGE UP (ref 1–3.3)
LYMPHOCYTES # BLD AUTO: 14.7 % — SIGNIFICANT CHANGE UP (ref 13–44)
LYMPHOCYTES # BLD AUTO: 2.16 K/UL — SIGNIFICANT CHANGE UP (ref 1–3.3)
LYMPHOCYTES # BLD AUTO: 23.1 % — SIGNIFICANT CHANGE UP (ref 13–44)
MAGNESIUM SERPL-MCNC: 1.9 MG/DL — SIGNIFICANT CHANGE UP (ref 1.8–2.6)
MAGNESIUM SERPL-MCNC: 2 MG/DL — SIGNIFICANT CHANGE UP (ref 1.6–2.6)
MCHC RBC-ENTMCNC: 26.1 PG — LOW (ref 27–34)
MCHC RBC-ENTMCNC: 26.5 PG — LOW (ref 27–34)
MCHC RBC-ENTMCNC: 32 GM/DL — SIGNIFICANT CHANGE UP (ref 32–36)
MCHC RBC-ENTMCNC: 33 GM/DL — SIGNIFICANT CHANGE UP (ref 32–36)
MCV RBC AUTO: 80.4 FL — SIGNIFICANT CHANGE UP (ref 80–100)
MCV RBC AUTO: 81.6 FL — SIGNIFICANT CHANGE UP (ref 80–100)
MONOCYTES # BLD AUTO: 0.55 K/UL — SIGNIFICANT CHANGE UP (ref 0–0.9)
MONOCYTES # BLD AUTO: 0.68 K/UL — SIGNIFICANT CHANGE UP (ref 0–0.9)
MONOCYTES NFR BLD AUTO: 5.7 % — SIGNIFICANT CHANGE UP (ref 2–14)
MONOCYTES NFR BLD AUTO: 7.3 % — SIGNIFICANT CHANGE UP (ref 2–14)
NEUTROPHILS # BLD AUTO: 6.39 K/UL — SIGNIFICANT CHANGE UP (ref 1.8–7.4)
NEUTROPHILS # BLD AUTO: 7.68 K/UL — HIGH (ref 1.8–7.4)
NEUTROPHILS NFR BLD AUTO: 68.3 % — SIGNIFICANT CHANGE UP (ref 43–77)
NEUTROPHILS NFR BLD AUTO: 79 % — HIGH (ref 43–77)
PCO2 BLDV: 44 MMHG — SIGNIFICANT CHANGE UP (ref 42–55)
PH BLDV: 7.44 — HIGH (ref 7.32–7.43)
PHOSPHATE SERPL-MCNC: 3.7 MG/DL — SIGNIFICANT CHANGE UP (ref 2.4–4.7)
PHOSPHATE SERPL-MCNC: 3.8 MG/DL — SIGNIFICANT CHANGE UP (ref 2.4–4.7)
PHOSPHATE SERPL-MCNC: 4 MG/DL — SIGNIFICANT CHANGE UP (ref 2.4–4.7)
PHOSPHATE SERPL-MCNC: 4.1 MG/DL — SIGNIFICANT CHANGE UP (ref 2.4–4.7)
PLATELET # BLD AUTO: 309 K/UL — SIGNIFICANT CHANGE UP (ref 150–400)
PLATELET # BLD AUTO: 345 K/UL — SIGNIFICANT CHANGE UP (ref 150–400)
PO2 BLDV: <42 MMHG — SIGNIFICANT CHANGE UP (ref 25–45)
POTASSIUM BLDV-SCNC: 3.5 MMOL/L — SIGNIFICANT CHANGE UP (ref 3.5–5.1)
POTASSIUM SERPL-MCNC: 3.5 MMOL/L — SIGNIFICANT CHANGE UP (ref 3.5–5.3)
POTASSIUM SERPL-MCNC: 3.6 MMOL/L — SIGNIFICANT CHANGE UP (ref 3.5–5.3)
POTASSIUM SERPL-MCNC: 3.6 MMOL/L — SIGNIFICANT CHANGE UP (ref 3.5–5.3)
POTASSIUM SERPL-MCNC: 3.7 MMOL/L — SIGNIFICANT CHANGE UP (ref 3.5–5.3)
POTASSIUM SERPL-SCNC: 3.5 MMOL/L — SIGNIFICANT CHANGE UP (ref 3.5–5.3)
POTASSIUM SERPL-SCNC: 3.6 MMOL/L — SIGNIFICANT CHANGE UP (ref 3.5–5.3)
POTASSIUM SERPL-SCNC: 3.6 MMOL/L — SIGNIFICANT CHANGE UP (ref 3.5–5.3)
POTASSIUM SERPL-SCNC: 3.7 MMOL/L — SIGNIFICANT CHANGE UP (ref 3.5–5.3)
PROT SERPL-MCNC: 6.5 G/DL — LOW (ref 6.6–8.7)
PROT SERPL-MCNC: 6.7 G/DL — SIGNIFICANT CHANGE UP (ref 6.6–8.7)
PROTHROM AB SERPL-ACNC: 12.6 SEC — SIGNIFICANT CHANGE UP (ref 9.5–13)
RBC # BLD: 3.32 M/UL — LOW (ref 4.2–5.8)
RBC # BLD: 3.37 M/UL — LOW (ref 4.2–5.8)
RBC # FLD: 14.6 % — HIGH (ref 10.3–14.5)
RBC # FLD: 14.7 % — HIGH (ref 10.3–14.5)
SAO2 % BLDV: 65.5 % — SIGNIFICANT CHANGE UP
SODIUM SERPL-SCNC: 136 MMOL/L — SIGNIFICANT CHANGE UP (ref 135–145)
SODIUM SERPL-SCNC: 137 MMOL/L — SIGNIFICANT CHANGE UP (ref 135–145)
SPECIMEN SOURCE: SIGNIFICANT CHANGE UP
TROPONIN T, HIGH SENSITIVITY RESULT: HIGH NG/L (ref 0–51)
TROPONIN T, HIGH SENSITIVITY RESULT: HIGH NG/L (ref 0–51)
WBC # BLD: 9.36 K/UL — SIGNIFICANT CHANGE UP (ref 3.8–10.5)
WBC # BLD: 9.72 K/UL — SIGNIFICANT CHANGE UP (ref 3.8–10.5)
WBC # FLD AUTO: 9.36 K/UL — SIGNIFICANT CHANGE UP (ref 3.8–10.5)
WBC # FLD AUTO: 9.72 K/UL — SIGNIFICANT CHANGE UP (ref 3.8–10.5)

## 2024-02-15 PROCEDURE — 99233 SBSQ HOSP IP/OBS HIGH 50: CPT

## 2024-02-15 PROCEDURE — 71045 X-RAY EXAM CHEST 1 VIEW: CPT | Mod: 26

## 2024-02-15 PROCEDURE — 93010 ELECTROCARDIOGRAM REPORT: CPT

## 2024-02-15 PROCEDURE — 99291 CRITICAL CARE FIRST HOUR: CPT

## 2024-02-15 PROCEDURE — 99232 SBSQ HOSP IP/OBS MODERATE 35: CPT

## 2024-02-15 RX ORDER — MAGNESIUM SULFATE 500 MG/ML
2 VIAL (ML) INJECTION ONCE
Refills: 0 | Status: COMPLETED | OUTPATIENT
Start: 2024-02-15 | End: 2024-02-15

## 2024-02-15 RX ORDER — INSULIN GLARGINE 100 [IU]/ML
10 INJECTION, SOLUTION SUBCUTANEOUS EVERY MORNING
Refills: 0 | Status: DISCONTINUED | OUTPATIENT
Start: 2024-02-15 | End: 2024-02-18

## 2024-02-15 RX ORDER — NOREPINEPHRINE BITARTRATE/D5W 8 MG/250ML
0.05 PLASTIC BAG, INJECTION (ML) INTRAVENOUS
Qty: 16 | Refills: 0 | Status: DISCONTINUED | OUTPATIENT
Start: 2024-02-15 | End: 2024-02-15

## 2024-02-15 RX ORDER — POTASSIUM CHLORIDE 20 MEQ
40 PACKET (EA) ORAL ONCE
Refills: 0 | Status: DISCONTINUED | OUTPATIENT
Start: 2024-02-15 | End: 2024-02-15

## 2024-02-15 RX ORDER — HYDROMORPHONE HYDROCHLORIDE 2 MG/ML
0.5 INJECTION INTRAMUSCULAR; INTRAVENOUS; SUBCUTANEOUS ONCE
Refills: 0 | Status: DISCONTINUED | OUTPATIENT
Start: 2024-02-15 | End: 2024-02-15

## 2024-02-15 RX ORDER — ISOSORBIDE MONONITRATE 60 MG/1
30 TABLET, EXTENDED RELEASE ORAL DAILY
Refills: 0 | Status: DISCONTINUED | OUTPATIENT
Start: 2024-02-15 | End: 2024-02-16

## 2024-02-15 RX ORDER — POTASSIUM CHLORIDE 20 MEQ
20 PACKET (EA) ORAL ONCE
Refills: 0 | Status: COMPLETED | OUTPATIENT
Start: 2024-02-15 | End: 2024-02-15

## 2024-02-15 RX ORDER — POTASSIUM CHLORIDE 20 MEQ
40 PACKET (EA) ORAL ONCE
Refills: 0 | Status: COMPLETED | OUTPATIENT
Start: 2024-02-15 | End: 2024-02-15

## 2024-02-15 RX ORDER — SODIUM CHLORIDE 9 MG/ML
500 INJECTION, SOLUTION INTRAVENOUS ONCE
Refills: 0 | Status: COMPLETED | OUTPATIENT
Start: 2024-02-15 | End: 2024-02-15

## 2024-02-15 RX ORDER — NOREPINEPHRINE BITARTRATE/D5W 8 MG/250ML
0.05 PLASTIC BAG, INJECTION (ML) INTRAVENOUS
Qty: 8 | Refills: 0 | Status: DISCONTINUED | OUTPATIENT
Start: 2024-02-15 | End: 2024-02-18

## 2024-02-15 RX ADMIN — SODIUM CHLORIDE 500 MILLILITER(S): 9 INJECTION, SOLUTION INTRAVENOUS at 06:30

## 2024-02-15 RX ADMIN — CHLORHEXIDINE GLUCONATE 15 MILLILITER(S): 213 SOLUTION TOPICAL at 05:12

## 2024-02-15 RX ADMIN — Medication 9.18 MICROGRAM(S)/KG/MIN: at 00:18

## 2024-02-15 RX ADMIN — Medication 40 MILLIEQUIVALENT(S): at 22:59

## 2024-02-15 RX ADMIN — PROPOFOL 7.34 MICROGRAM(S)/KG/MIN: 10 INJECTION, EMULSION INTRAVENOUS at 00:18

## 2024-02-15 RX ADMIN — MIDAZOLAM HYDROCHLORIDE 2 MILLIGRAM(S): 1 INJECTION, SOLUTION INTRAMUSCULAR; INTRAVENOUS at 19:28

## 2024-02-15 RX ADMIN — Medication 81 MILLIGRAM(S): at 12:47

## 2024-02-15 RX ADMIN — PANTOPRAZOLE SODIUM 40 MILLIGRAM(S): 20 TABLET, DELAYED RELEASE ORAL at 12:48

## 2024-02-15 RX ADMIN — SERTRALINE 100 MILLIGRAM(S): 25 TABLET, FILM COATED ORAL at 12:48

## 2024-02-15 RX ADMIN — DEXMEDETOMIDINE HYDROCHLORIDE IN 0.9% SODIUM CHLORIDE 4.59 MICROGRAM(S)/KG/HR: 4 INJECTION INTRAVENOUS at 09:36

## 2024-02-15 RX ADMIN — Medication 20 MILLIEQUIVALENT(S): at 17:55

## 2024-02-15 RX ADMIN — DEXMEDETOMIDINE HYDROCHLORIDE IN 0.9% SODIUM CHLORIDE 4.59 MICROGRAM(S)/KG/HR: 4 INJECTION INTRAVENOUS at 03:28

## 2024-02-15 RX ADMIN — DEXMEDETOMIDINE HYDROCHLORIDE IN 0.9% SODIUM CHLORIDE 4.59 MICROGRAM(S)/KG/HR: 4 INJECTION INTRAVENOUS at 12:54

## 2024-02-15 RX ADMIN — CHLORHEXIDINE GLUCONATE 1 APPLICATION(S): 213 SOLUTION TOPICAL at 05:12

## 2024-02-15 RX ADMIN — Medication 1 MILLIGRAM(S): at 00:18

## 2024-02-15 RX ADMIN — PROPOFOL 7.34 MICROGRAM(S)/KG/MIN: 10 INJECTION, EMULSION INTRAVENOUS at 09:36

## 2024-02-15 RX ADMIN — Medication 4: at 00:55

## 2024-02-15 RX ADMIN — CHLORHEXIDINE GLUCONATE 1 APPLICATION(S): 213 SOLUTION TOPICAL at 05:14

## 2024-02-15 RX ADMIN — INSULIN GLARGINE 10 UNIT(S): 100 INJECTION, SOLUTION SUBCUTANEOUS at 08:52

## 2024-02-15 RX ADMIN — ATORVASTATIN CALCIUM 80 MILLIGRAM(S): 80 TABLET, FILM COATED ORAL at 23:00

## 2024-02-15 RX ADMIN — DEXMEDETOMIDINE HYDROCHLORIDE IN 0.9% SODIUM CHLORIDE 4.59 MICROGRAM(S)/KG/HR: 4 INJECTION INTRAVENOUS at 23:57

## 2024-02-15 RX ADMIN — HEPARIN SODIUM 1550 UNIT(S)/HR: 5000 INJECTION INTRAVENOUS; SUBCUTANEOUS at 12:55

## 2024-02-15 RX ADMIN — Medication 2: at 06:33

## 2024-02-15 RX ADMIN — CHLORHEXIDINE GLUCONATE 15 MILLILITER(S): 213 SOLUTION TOPICAL at 17:55

## 2024-02-15 RX ADMIN — Medication 10 MILLIGRAM(S): at 12:48

## 2024-02-15 RX ADMIN — HEPARIN SODIUM 1550 UNIT(S)/HR: 5000 INJECTION INTRAVENOUS; SUBCUTANEOUS at 06:48

## 2024-02-15 RX ADMIN — Medication 25 GRAM(S): at 22:59

## 2024-02-15 RX ADMIN — ARIPIPRAZOLE 5 MILLIGRAM(S): 15 TABLET ORAL at 12:48

## 2024-02-15 RX ADMIN — QUETIAPINE FUMARATE 50 MILLIGRAM(S): 200 TABLET, FILM COATED ORAL at 23:00

## 2024-02-15 RX ADMIN — PROPOFOL 7.34 MICROGRAM(S)/KG/MIN: 10 INJECTION, EMULSION INTRAVENOUS at 03:28

## 2024-02-15 RX ADMIN — CEFTRIAXONE 1000 MILLIGRAM(S): 500 INJECTION, POWDER, FOR SOLUTION INTRAMUSCULAR; INTRAVENOUS at 03:27

## 2024-02-15 NOTE — PROGRESS NOTE ADULT - ASSESSMENT
78yr man  PMHx HTN, HLD, T2DM, CKD, plaque psoriasis, anxiety, depression, history of dysphagia admitted with cardiogenic shock 2/2 NSTEMI with Respiratory failure.    Cardiogenic Shock/NSTEMI  CTs consulted for possible CABG    Respiratory Failure  on vent - wean per MICU    WES/CKD  avoid nephrotoxic agents    Functional Quadriplegia  Assist with care  Turn reposition    Encounter for Palliative Care  Informed by MICU team family waiting to hear from CTS   Met with family introduced    78yr man  PMHx HTN, HLD, T2DM, CKD, plaque psoriasis, anxiety, depression, history of dysphagia admitted with cardiogenic shock 2/2 NSTEMI with Respiratory failure.    Cardiogenic Shock/NSTEMI  CTs consulted for possible CABG    Respiratory Failure  on vent - wean per MICU    WES/CKD  avoid nephrotoxic agents    Functional Quadriplegia  Assist with care  Turn reposition    Encounter for Palliative Care  Informed by MICU team family waiting to hear from CTS   Met with family -wife, son and daughter. Introduced  role of Palliative Care  Family aware of his current condition and affirm they are waiting to hear from CTS   Son inquired how father would be after all this.  Son states father hx of dysphagia and declined a PEG some time back.  He loves to eat but would often cough while eating.   Explained to family how coughing after eating is often a sign of aspiration.    Informed family given recent events, now intubated likely can  have decline in from his baseline functional status. Concern for further dysphagia and maybe need to reexplore PEG again.    Son inquired  what if they didn't want father  to go through all these procedures, directed to vent.  Informed them the option for comfort.  Wife states that is likely what she would want for him.  I encouraged them to speak to the CTS team  first and get all information about  their options.  From there, they can decide together.  Psychosocial support.

## 2024-02-15 NOTE — PROGRESS NOTE ADULT - ASSESSMENT
79 yo male, PMHx HTN, HLD, T2DM, CKD, plaque psoriasis, anxiety, depression, history of dysphagia who declined PEG placement, who initially presented with progressively worsening substernal chest pain, admitted to MICU:    # Cardiogenic shock  # NSTEMI  # Acute biventricular systolic heart failure  # Pericardial effusion  # Acute hypoxic respiratory failure  # Acute pulmonary edema  # Aspiration pneumonitis/pneumonia  # Hyperkalemia/hypomagnesemia/hypophosphatemia/metabolic acidosis–lactic acidosis  # WES on CKD  # Hyperglycemia with underlying type II diabetes mellitus    Neuro: Sedated on Dexmedetomidine, Propofol resumed overnight for vent synchrony. Continue PRN Fentanyl and Versed. Plan for SAT in AM.  Pulm: Full vent support, FiO2 minimal. Plan for SBT in AM.  CV: Remains on Dobutamine at 5 mcg/kg/min RHC with normal CO/CI on inotropic support. End points of perfusion are improving with therapy, lactate has cleared and UOP has markedly improved with offloading now independently making 100-200cc/hr off diuretics. CVP improved from 15 to 3. Underwent LHC revealing severe multivessel disease. CT surgery consulted for CABG and evaluation for candidacy is underway. Continue aspirin, will discuss need for continued Heparin therapy beyond this point. Continue statin therapy. Defer GDMT until shock resolves.  GI: OGT converted to NGT given history of baseline dysphagia. Will plan to start tube feeds following spontaneous breathing trial this morning.  Renal: WES/CKD, renal indices plateaued and UOP has improved as above.  ID: Empiric antibiotics for aspiration pneumonia with Ceftriaxone. Culture data is negative, would complete empiric course of abx.  Heme: Heparin gtt as above pending Cardiology recommendations  Endo: Hyperglycemic, added Lantus 10u daily to keep targeted -180 while critically ill.    Discussed at length with patient's wife and children at bedside on multidisciplinary rounds.   77 yo male, PMHx HTN, HLD, T2DM, CKD, plaque psoriasis, anxiety, depression, history of dysphagia who declined PEG placement, who initially presented with progressively worsening substernal chest pain, admitted to MICU:    # Cardiogenic shock  # NSTEMI  # Acute biventricular systolic heart failure  # Pericardial effusion  # Acute hypoxic respiratory failure  # Acute pulmonary edema  # Aspiration pneumonitis/pneumonia  # Hyperkalemia/hypomagnesemia/hypophosphatemia/metabolic acidosis–lactic acidosis  # WES on CKD  # Hyperglycemia with underlying type II diabetes mellitus    Neuro: Sedated on Dexmedetomidine, Propofol resumed overnight for vent synchrony. Continue PRN Fentanyl and Versed. Plan for SAT in AM. Continue home psychiatric medication regimen.  Pulm: Full vent support, FiO2 minimal. Plan for SBT in AM.  CV: Remains on Dobutamine at 5 mcg/kg/min RHC with normal CO/CI on inotropic support. End points of perfusion are improving with therapy, lactate has cleared and UOP has markedly improved with offloading now independently making 100-200cc/hr off diuretics. CVP improved from 15 to 3. Underwent LHC revealing severe multivessel disease. CT surgery consulted for CABG and evaluation for candidacy is underway. Continue aspirin, will discuss need for continued Heparin therapy beyond this point. Continue statin therapy. Defer GDMT until shock resolves.  GI: OGT converted to NGT given history of baseline dysphagia. Will plan to start tube feeds following spontaneous breathing trial this morning.  Renal: WES/CKD, renal indices plateaued and UOP has improved as above. Can likely d/c HD catheter today.  ID: Empiric antibiotics for aspiration pneumonia with Ceftriaxone. Culture data is negative, would complete empiric course of abx.  Heme: Heparin gtt as above pending Cardiology recommendations  Endo: Hyperglycemic, added Lantus 10u daily to keep targeted -180 while critically ill.    Discussed at length with patient's wife and children at bedside on multidisciplinary rounds.

## 2024-02-15 NOTE — PROGRESS NOTE ADULT - PROBLEM SELECTOR PLAN 1
Patient with crushing chest pain overnight with troponin peaked at 5209.   Patient with worsening WES as well (baseline Cr 1 in 2022),  Today Creatinine 2.74.   Patient intubated and started on levophed and dobutamine.   Levophed subsequently weaned off.   Continue dobutamine at 5 mcg/kg/min.   Continue heparin gtt and rectal aspirin.   aspirin 81mg and Lipitor  80mg po qhs via NGT.  S/P LHC - MVD - CTS to evaluating for possible for Bypass, if not will do a PCI  Patient will need to be optimized. Patient with crushing chest pain overnight with troponin peaked at 5209.   Patient with worsening WES as well (baseline Cr 1 in 2022),  Today Creatinine 2.74.   Patient intubated and started on levophed and dobutamine.   Continue heparin gtt and rectal aspirin.   aspirin 81mg and Lipitor  80mg po qhs via NGT.  S/P [facility] - MVD - CTS to evaluating for possible for Bypass, if not will do a PCI  Patient will need to be optimized.

## 2024-02-15 NOTE — PROGRESS NOTE ADULT - SUBJECTIVE AND OBJECTIVE BOX
ADVANCED HEART FAILURE - PROGRESS NOTE  [address] [place] [address]  Office Phone: [phone]/Fax: [phone]  Service/On Call Phone [phone]  _______________________________________________________________________________________________________    Subjective:  - NAEO  - remains intubated and sedated but follows commands per RN    Medications:  acetaminophen     Tablet .. 650 milliGRAM(s) Oral every 6 hours PRN  ARIPiprazole 5 milliGRAM(s) Oral daily  aspirin  chewable 81 milliGRAM(s) Oral daily  atorvastatin 80 milliGRAM(s) Oral at bedtime  busPIRone 10 milliGRAM(s) Oral <User Schedule>  cefTRIAXone Injectable. 1000 milliGRAM(s) IV Push every 24 hours  cefTRIAXone Injectable.      chlorhexidine 0.12% Liquid 15 milliLiter(s) Oral Mucosa every 12 hours  chlorhexidine 2% Cloths 1 Application(s) Topical <User Schedule>  chlorhexidine 4% Liquid 1 Application(s) Topical <User Schedule>  clonazePAM  Tablet 1 milliGRAM(s) Oral daily PRN  dexMEDEtomidine Infusion 0.3 MICROgram(s)/kG/Hr IV Continuous <Continuous>  dextrose 50% Injectable 25 Gram(s) IV Push once  dextrose 50% Injectable 25 Gram(s) IV Push once  dextrose 50% Injectable 12.5 Gram(s) IV Push once  DOBUTamine Infusion 2.5 MICROgram(s)/kG/Min IV Continuous <Continuous>  fentaNYL    Injectable 50 MICROGram(s) IV Push every 2 hours PRN  heparin   Injectable 3800 Unit(s) IV Push every 6 hours PRN  heparin  Infusion.  Unit(s)/Hr IV Continuous <Continuous>  influenza  Vaccine (HIGH DOSE) 0.7 milliLiter(s) IntraMuscular once  insulin glargine Injectable (LANTUS) 10 Unit(s) SubCutaneous every morning  insulin lispro (ADMELOG) corrective regimen sliding scale   SubCutaneous every 6 hours  midazolam Injectable 2 milliGRAM(s) IV Push every 2 hours PRN  norepinephrine Infusion 0.05 MICROgram(s)/kG/Min IV Continuous <Continuous>  pantoprazole  Injectable 40 milliGRAM(s) IV Push daily  propofol Infusion 20 MICROgram(s)/kG/Min IV Continuous <Continuous>  QUEtiapine 50 milliGRAM(s) Oral at bedtime  sertraline 100 milliGRAM(s) Oral daily  sodium chloride 0.9% lock flush 10 milliLiter(s) IV Push every 1 hour PRN      ICU Vital Signs Last 24 Hrs  T(C): 36.7, Max: 36.7 (02-15 @ 09:00)  HR: 89 (87 - 112)  BP: --  BP(mean): --  ABP: 100/56 (72/47 - 120/62)  ABP(mean): 73 (56 - 86)  RR: 20 (14 - 21)  SpO2: 100% (100% - 100%)    Weight in k.9 (24)      I&O's Summary Last 24 Hrs    IN: 1146.7 mL / OUT: 3680 mL / NET: -2533.3 mL      Tele: SR 80s with short runs of AT    Physical Exam:    General: NAD.  Neck: JVP not elevated.  Respiratory: Mechanically ventilated.  CV: RRR. Normal S1 and S2. No murmurs, rub, or gallops. Radial pulses normal.  Abdomen: Soft, non-distended  Extremities: Warm, no edema  Neurology: EMELIA, following commands  Psych: EMELIA    Labs:    ( 02-15-24 @ 04:48 )               8.8    9.72  )--------( 309                  27.5     ( 02-15-24 @ 04:48 )     137  |  95  |  75.5  ---------------------<  175  3.6  |  24.0  |  2.74    Ca 8.8  Phos 4.1  Mg 2.0    ( 02-15-24 @ 04:48 )  TPro  6.7  /  Alb  3.3  /  TBili  0.3  /  DBili  x   /  AST  29  /  ALT  35  /  AlkPhos  94  ( 24 @ 16:32 )  TPro  6.8  /  Alb  3.5  /  TBili  0.4  /  DBili  x   /  AST  38  /  ALT  35  /  AlkPhos  93    PTT/PT/INR - ( 02-15-24 @ 04:48 )  PTT: 72.1 sec / PT: x     / INR: x        ( 24 @ 05:12 )  TropHS 4832  / CK x     / CKMB x      ( 24 @ 01:50 )  TropHS 4934  / CK x     / CKMB x        VBG - ( 02-15-24 @ 10:42 )  pH: 7.440 / pCO2: 44    / pO2: <42   / Oxygen saturation: 65.5      ABG - ( 02-15-24 @ 03:59 )  pH: 7.480 / pCO2: 35    / pO2: 168   / HCO3: 26    / Base Excess: 2.6   / SaO2: 100.0    Blood Gas Venous - Lactate: 1.20 (02-15-24 @ 10:42)

## 2024-02-15 NOTE — PROGRESS NOTE ADULT - ASSESSMENT
79 y/o M with PMHx anxiety, HTN, HLD, CKD, plaque psoriasis, DM2 who presents to [facility] ED with 1 weeks of chest pain at rest, that woke him out of sleep today. Patient states he had been having chest pain but was mild and tolerable. Today the pain became sharp and he felt it midsternal with radiation to left side and he also had 6 diarrhea bowel movements. Hypotensive on presentation, given IV fluid bolus but had respiratory distress, placed on bipap and levophed initiated to maintain BP. He endorses having chest pain and diarrhea today and denies back pain, headache, dizziness, diaphoresis, syncope.  on 2/14 patient was complaining of chest pain again with uptrending troponins, unchanged EKG. Patient was still in respiratory distress despite BiPAP and CPAP with fever of 101.1 and cold extremities. Patient was started on levophed and dobutamine and intubated.     Bedside Hemodynamics:  2/15 ( 2.5, levo 0.03): CVP 4, SVO2 65.5%, Harpreet CO/CI 4.5/2.7    Cardiac Studies:  [facility] 2/14/24 which showed prox left main 60% stenosis, pros LAD with 70% stenosis, mid LAD with 80% stenosis, first diagonal 100% stenosis, prox circ 100% stenosis, distal RCA with 90% stenosis, ramus with 90% stenosis  [facility] 2/14/24: RA 6, PA 22/10/15, PCWP 8, CO/CI 5.5/3.1

## 2024-02-15 NOTE — PROGRESS NOTE ADULT - ASSESSMENT
78M with anxiety, HTN, HLD, CKD, plaque psoriasis, dysphagia, should be on thick liquid but does not comply as per daughter,DM2 who presents with burning substernal chest pain at rest that started 1wk ago but acutely progressed this morning to sharp pain with radiation to Left arm with associated multiple bouts of diarrhea. On ED arrival, afebrile, hypotensive to SBP 80-90s despite 1L IVF requiring levophed. EKG with nonspecific ST changes, troponin 2344->2477. Labs otherwise notable for K 5.6, BUN/Cr 43.5/2.31 (baseline Cr ~1 in 2022), BNP 88419, lactate 6.2. CXR with pulmonary edema. Placed on BiPAP for SOB and hypoxia. STAT TTE with EF<20%, multiple segmental abnormalities, mod MR, mod TR, mod pericardial effusion without tamponade. Received total lasix 40mg IV in ED. ICU consulted for further management of cardiogenic shock. Underwent cardiac cath 2/14 and found to have multivessel coronary disease. CT surgery called to evaluate.

## 2024-02-15 NOTE — PROGRESS NOTE ADULT - NS ATTEND AMEND GEN_ALL_CORE FT
77 y/o M admitted with NSTEMI, s/p LHC with multivessel disease (LM 60%, pLAD 70%, mLAD 80%, D1 100%, pLCx 100%, dRCA 90%, RI 90%). TTE with LVEF <20%, moderate MR/TR, moderate pericardial effusion. In shock state; s/p RHC with RA 6, PA 22/10/15, PCWP 8, CO/CI 5.5/3.1. Was started on dobutamine and bumex gtt, as well as antibiotics for aspiration PNA. Bumex was d/c'ed yesterday as PCWP is 8 and patient is not significantly fluid overloaded on exam. Patient was started on levophed gtt. Dobutamine was dc'ed this AM with resulting hypotension; currently on 2.5 mcg/kg/min. Wean dobutamine as tolerated, maintain levophed. Will add GDMT as pressors are weaned. CTS following for CABG eval; if patient is not a candidate for surgery, will plan for high risk PCI with interventional cardiology. Advanced HF following.

## 2024-02-15 NOTE — PROGRESS NOTE ADULT - SUBJECTIVE AND OBJECTIVE BOX
Patient is a 78y old  Male who presents with a chief complaint of Cardiogenic Shock (14 Feb 2024 16:48)      BRIEF HOSPITAL COURSE: 79 yo male, PMHx HTN, HLD, T2DM, CKD, plaque psoriasis, anxiety, depression, history of dysphagia who declined PEG placement, who initially presented with progressively worsening substernal chest pain that onset the night prior with multiple episodes of diarrhea as well as 1 episode of nausea and vomiting. No fever, chills, dysuria, frequency, flank pain, abdominal pain, recent URI, loss of consciousness, dizziness, palpitations. Initial EKG on arrival without ST elevation though inferolateral Q waves. Labs remarkable for elevated troponin, WES, anion gap metabolic lactic acidosis. CXR with pulmonary edema and possible underlying pneumonia. ER course complicated by hypotension for which he received 1L IV fluid bolus subsequently developed significant hypoxia and respiratory distress requiring bilevel placement and placement on norepinephrine infusion following which medical ICU consulted. He developed worsening shock, respiratory distress, and fevers. He was intubated 2/14, started on Dobutamine and Bumex infusion with improvement in hypoxia, shock, and UOP. 2/14 underwent RHC with normal right sided pressures and normal CO/CI on Dobutamine. LHC revealed severe multivessel disease; CT surgery was consulted for CABG evaluation.         Events last 24 hours: Remains on Dobutamine at 5 mcg/kg/min, UOP remains brisk off diuretics. CVP low at 3. Hemodynamics stable off Norepinephrine.          PAST MEDICAL & SURGICAL HISTORY:  Diabetes      Hyperlipidemia      Anxiety with depression      Insomnia      No significant past surgical history          Review of Systems: Due to altered mental status/intubation, subjective information was not able to be obtained from the patient. History was obtained, to the extent possible, from review of the chart and collateral sources of information.          Medications:  cefTRIAXone Injectable. 1000 milliGRAM(s) IV Push every 24 hours  cefTRIAXone Injectable.        DOBUTamine Infusion 5 MICROgram(s)/kG/Min IV Continuous <Continuous>  norepinephrine Infusion 0.05 MICROgram(s)/kG/Min IV Continuous <Continuous>      acetaminophen     Tablet .. 650 milliGRAM(s) Oral every 6 hours PRN  ARIPiprazole 5 milliGRAM(s) Oral daily  busPIRone 10 milliGRAM(s) Oral <User Schedule>  clonazePAM  Tablet 1 milliGRAM(s) Oral daily PRN  dexMEDEtomidine Infusion 0.3 MICROgram(s)/kG/Hr IV Continuous <Continuous>  fentaNYL    Injectable 50 MICROGram(s) IV Push every 2 hours PRN  midazolam Injectable 2 milliGRAM(s) IV Push every 2 hours PRN  propofol Infusion 20 MICROgram(s)/kG/Min IV Continuous <Continuous>  QUEtiapine 50 milliGRAM(s) Oral at bedtime  sertraline 100 milliGRAM(s) Oral daily      aspirin  chewable 81 milliGRAM(s) Oral daily  heparin   Injectable 3800 Unit(s) IV Push every 6 hours PRN  heparin  Infusion.  Unit(s)/Hr IV Continuous <Continuous>    pantoprazole  Injectable 40 milliGRAM(s) IV Push daily      atorvastatin 80 milliGRAM(s) Oral at bedtime  dextrose 50% Injectable 12.5 Gram(s) IV Push once  dextrose 50% Injectable 25 Gram(s) IV Push once  dextrose 50% Injectable 25 Gram(s) IV Push once  insulin lispro (ADMELOG) corrective regimen sliding scale   SubCutaneous every 6 hours    sodium chloride 0.9% lock flush 10 milliLiter(s) IV Push every 1 hour PRN    influenza  Vaccine (HIGH DOSE) 0.7 milliLiter(s) IntraMuscular once    chlorhexidine 0.12% Liquid 15 milliLiter(s) Oral Mucosa every 12 hours  chlorhexidine 2% Cloths 1 Application(s) Topical <User Schedule>  chlorhexidine 4% Liquid 1 Application(s) Topical <User Schedule>        Mode: AC/ CMV (Assist Control/ Continuous Mandatory Ventilation)  RR (machine): 20  TV (machine): 450  FiO2: 40  PEEP: 6  MAP: 10  PIP: 21      ICU Vital Signs Last 24 Hrs  T(C): 35.9 (14 Feb 2024 16:00), Max: 36.4 (14 Feb 2024 11:08)  T(F): 96.6 (14 Feb 2024 16:00), Max: 97.5 (14 Feb 2024 11:08)  HR: 107 (15 Feb 2024 04:00) (88 - 110)  BP: --  BP(mean): --  ABP: 92/53 (15 Feb 2024 04:00) (89/48 - 126/64)  ABP(mean): 68 (15 Feb 2024 04:00) (62 - 87)  RR: 20 (15 Feb 2024 04:00) (14 - 29)  SpO2: 100% (15 Feb 2024 04:00) (100% - 100%)    O2 Parameters below as of 15 Feb 2024 04:00  Patient On (Oxygen Delivery Method): ventilator            ABG - ( 15 Feb 2024 03:59 )  pH, Arterial: 7.480 pH, Blood: x     /  pCO2: 35    /  pO2: 168   / HCO3: 26    / Base Excess: 2.6   /  SaO2: 100.0               I&O's Detail    13 Feb 2024 07:01  -  14 Feb 2024 07:00  --------------------------------------------------------  IN:    Bumetanide: 100 mL    Dexmedetomidine: 71.1 mL    DOBUTamine: 40 mL    DOBUTamine: 29.1 mL    Furosemide: 67.5 mL    Heparin Infusion: 1130.5 mL    IV PiggyBack: 100 mL    IV PiggyBack: 100 mL    IV PiggyBack: 200 mL    Norepinephrine: 46 mL    Propofol: 21 mL  Total IN: 1905.2 mL    OUT:    Indwelling Catheter - Urethral (mL): 2060 mL    Propofol: 0 mL    Voided (mL): 405 mL  Total OUT: 2465 mL    Total NET: -559.8 mL      14 Feb 2024 07:01  -  15 Feb 2024 04:55  --------------------------------------------------------  IN:    Bumetanide: 80 mL    Dexmedetomidine: 359.4 mL    DOBUTamine: 203.7 mL    Heparin Infusion: 285.5 mL    Norepinephrine: 4.4 mL    Propofol: 66 mL  Total IN: 999 mL    OUT:    Indwelling Catheter - Urethral (mL): 3285 mL  Total OUT: 3285 mL    Total NET: -2286 mL            LABS:                        8.9    11.37 )-----------( 260      ( 14 Feb 2024 16:32 )             26.5     02-14    137  |  95<L>  |  70.9<H>  ----------------------------<  187<H>  4.0   |  24.0  |  2.64<H>    Ca    8.7      14 Feb 2024 22:30  Phos  4.0     02-14  Mg     2.0     02-14    TPro  6.8  /  Alb  3.5  /  TBili  0.4  /  DBili  x   /  AST  38  /  ALT  35  /  AlkPhos  93  02-14          CAPILLARY BLOOD GLUCOSE      POCT Blood Glucose.: 216 mg/dL (15 Feb 2024 00:53)    PT/INR - ( 13 Feb 2024 19:42 )   PT: 13.9 sec;   INR: 1.26 ratio         PTT - ( 14 Feb 2024 22:30 )  PTT:63.8 sec  Urinalysis Basic - ( 14 Feb 2024 22:30 )    Color: x / Appearance: x / SG: x / pH: x  Gluc: 187 mg/dL / Ketone: x  / Bili: x / Urobili: x   Blood: x / Protein: x / Nitrite: x   Leuk Esterase: x / RBC: x / WBC x   Sq Epi: x / Non Sq Epi: x / Bacteria: x      CULTURES:  Rapid RVP Result: NotDetec (02-13 @ 22:06)  Culture Results:   No growth (02-12 @ 22:40)  Rapid RVP Result: NotDetec (02-12 @ 17:58)  Culture Results:   No growth at 48 Hours (02-12 @ 17:56)  Culture Results:   No growth at 48 Hours (02-12 @ 17:46)            Physical Examination:    General: Frail. No acute distress.      HEENT: Pupils equal, reactive to light.  Symmetric.    PULM: Clear to auscultation bilaterally.    CVS: Sinus tachycardia    ABD: Soft, nondistended, nontender    EXT: No edema    SKIN: Warm and well perfused    NEURO: Sedated        < from: Cardiac Catheterization (02.14.24 @ 13:28) >  Diagnostic Conclusions:     Severe multivessel disease with a high Syntax score, preserved CO/CI  and normal RHC pressures.  CTSx consult for  consideration of CABG.       < end of copied text >  RADIOLOGY: ***        INVASIVE LINES: R IJ HD cath, L IJ CVC, L axillary A line  INDWELLING HOOD: Y   VTE PROPHYLAXIS: Heparin gtt  CAM ICU: N/A   CODE STATUS: FULL         CRITICAL CARE TIME SPENT: 48 minutes spent performing frequent bedside reassessments and augmenting plan of care to address problems of acute critical illness that pose high probability of life threatening deterioration and/or end organ damage/dysfunction and discussing goals of care, non-inclusive of time spent on procedures performed.

## 2024-02-15 NOTE — PROGRESS NOTE ADULT - NS ATTEND AMEND GEN_ALL_CORE FT
Overall no acute changes overnight  Would wean dobutamine, based on central sat his cardiac index is 2.7 on  2.5  Appreciate CTS recs to assess his CABG candidacy

## 2024-02-15 NOTE — PROGRESS NOTE ADULT - ASSESSMENT
79 y/o M with PMHx anxiety, HTN, HLD, CKD, plaque psoriasis, DM2 who presents to [facility] ED with 1 weeks of chest pain at rest, that woke him out of sleep today. Patient states he had been having chest pain but was mild and tolerable. Today the pain became sharp and he felt it midsternal with radiation to left side and he also had 6 diarrhea bowel movements. Hypotensive on presentation, given IV fluid bolus but had respiratory distress, placed on bipap and levophed initiated to maintain BP.    HsTnT 2344->2477, lactate 6.2, BNP 23446

## 2024-02-15 NOTE — PROGRESS NOTE ADULT - SUBJECTIVE AND OBJECTIVE BOX
Patient discussed on morning rounds with Dr. Camacho     SUBJECTIVE ASSESSMENT:  78y Male seen and examined. Intubated and sedated.         Vital Signs Last 24 Hrs  T(C): 36.6 (15 Feb 2024 14:00), Max: 36.7 (15 Feb 2024 09:00)  T(F): 97.9 (15 Feb 2024 14:00), Max: 98.1 (15 Feb 2024 09:00)  HR: 83 (15 Feb 2024 14:45) (81 - 112)  BP: --  BP(mean): --  RR: 20 (15 Feb 2024 14:45) (14 - 21)  SpO2: 100% (15 Feb 2024 14:45) (100% - 100%)    Parameters below as of 15 Feb 2024 14:00  Patient On (Oxygen Delivery Method): ventilator    O2 Concentration (%): 40  I&O's Detail    14 Feb 2024 07:01  -  15 Feb 2024 07:00  --------------------------------------------------------  IN:    Bumetanide: 80 mL    Dexmedetomidine: 405.3 mL    DOBUTamine: 9.2 mL    DOBUTamine: 213.4 mL    Heparin Infusion: 332 mL    Norepinephrine: 3.4 mL    Norepinephrine: 4.4 mL    Propofol: 99 mL  Total IN: 1146.7 mL    OUT:    Indwelling Catheter - Urethral (mL): 3680 mL  Total OUT: 3680 mL    Total NET: -2533.3 mL      15 Feb 2024 07:01  -  15 Feb 2024 15:07  --------------------------------------------------------  IN:    Dexmedetomidine: 76.5 mL    DOBUTamine: 23 mL    Heparin Infusion: 77.5 mL    Norepinephrine: 17 mL    Propofol: 55 mL  Total IN: 249 mL    OUT:    Indwelling Catheter - Urethral (mL): 245 mL  Total OUT: 245 mL    Total NET: 4 mL    PHYSICAL EXAM:    General: Patient lying comfortably in bed, no acute distress     Neurological: Intubated and sedated. No focal neurological deficits     Cardiovascular: S1S2, RRR     Respiratory: Clear to ausculation bilaterally, no wheeze/rhonchi/rales    Gastrointestinal: + BS, soft, non tender, non distended     Extremities: Warm and well perfused. No edema, no calf tenderness     Vascular: palpable peripheral pulses b/l       LABS:                        8.8    9.72  )-----------( 309      ( 15 Feb 2024 04:48 )             27.5       PT/INR - ( 13 Feb 2024 19:42 )   PT: 13.9 sec;   INR: 1.26 ratio         PTT - ( 15 Feb 2024 04:48 )  PTT:72.1 sec    02-15    137  |  95<L>  |  75.5<H>  ----------------------------<  175<H>  3.6   |  24.0  |  2.74<H>    Ca    8.8      15 Feb 2024 04:48  Phos  4.1     02-15  Mg     2.0     02-15    TPro  6.7  /  Alb  3.3  /  TBili  0.3<L>  /  DBili  x   /  AST  29  /  ALT  35  /  AlkPhos  94  02-15      Urinalysis Basic - ( 15 Feb 2024 04:48 )    Color: x / Appearance: x / SG: x / pH: x  Gluc: 175 mg/dL / Ketone: x  / Bili: x / Urobili: x   Blood: x / Protein: x / Nitrite: x   Leuk Esterase: x / RBC: x / WBC x   Sq Epi: x / Non Sq Epi: x / Bacteria: x        MEDICATIONS  (STANDING):  ARIPiprazole 5 milliGRAM(s) Oral daily  aspirin  chewable 81 milliGRAM(s) Oral daily  atorvastatin 80 milliGRAM(s) Oral at bedtime  busPIRone 10 milliGRAM(s) Oral <User Schedule>  cefTRIAXone Injectable. 1000 milliGRAM(s) IV Push every 24 hours  cefTRIAXone Injectable.      chlorhexidine 0.12% Liquid 15 milliLiter(s) Oral Mucosa every 12 hours  chlorhexidine 2% Cloths 1 Application(s) Topical <User Schedule>  chlorhexidine 4% Liquid 1 Application(s) Topical <User Schedule>  dexMEDEtomidine Infusion 0.3 MICROgram(s)/kG/Hr (4.59 mL/Hr) IV Continuous <Continuous>  dextrose 50% Injectable 25 Gram(s) IV Push once  dextrose 50% Injectable 25 Gram(s) IV Push once  dextrose 50% Injectable 12.5 Gram(s) IV Push once  DOBUTamine Infusion 2.5 MICROgram(s)/kG/Min (4.59 mL/Hr) IV Continuous <Continuous>  heparin  Infusion.  Unit(s)/Hr (7.5 mL/Hr) IV Continuous <Continuous>  influenza  Vaccine (HIGH DOSE) 0.7 milliLiter(s) IntraMuscular once  insulin glargine Injectable (LANTUS) 10 Unit(s) SubCutaneous every morning  insulin lispro (ADMELOG) corrective regimen sliding scale   SubCutaneous every 6 hours  norepinephrine Infusion 0.05 MICROgram(s)/kG/Min (5.74 mL/Hr) IV Continuous <Continuous>  pantoprazole  Injectable 40 milliGRAM(s) IV Push daily  propofol Infusion 20 MICROgram(s)/kG/Min (7.34 mL/Hr) IV Continuous <Continuous>  QUEtiapine 50 milliGRAM(s) Oral at bedtime  sertraline 100 milliGRAM(s) Oral daily    MEDICATIONS  (PRN):  acetaminophen     Tablet .. 650 milliGRAM(s) Oral every 6 hours PRN Temp greater or equal to 38C (100.4F)  clonazePAM  Tablet 1 milliGRAM(s) Oral daily PRN for anxiety  fentaNYL    Injectable 50 MICROGram(s) IV Push every 2 hours PRN vent synchrony or pain  heparin   Injectable 3800 Unit(s) IV Push every 6 hours PRN For aPTT less than 40  midazolam Injectable 2 milliGRAM(s) IV Push every 2 hours PRN Vent synchrony or anxiety  sodium chloride 0.9% lock flush 10 milliLiter(s) IV Push every 1 hour PRN Pre/post blood products, medications, blood draw, and to maintain line patency        RADIOLOGY & ADDITIONAL TESTS:

## 2024-02-15 NOTE — PROGRESS NOTE ADULT - PROBLEM SELECTOR PLAN 1
patient with NSTEMI cardiogenic shock with EF 20%  Patient Intubated and sedated currently on dobutamine 2.5mcg/kg/min, tried to wean this am and became hypotensive. On Levophed/heparin/propofol/precedex.   Negative 2.5L last 24 hours, bumex gtt turned off.   Continue asa/statin   Patient not surgical candidate at this time. Patient high risk for surgery at this time. Consider PCI unless patient makes recovery (extuabted, ambulatory, hemodynamically stable). Then can reconsider for CABG.   Discussed with Dr. Camacho

## 2024-02-15 NOTE — PROGRESS NOTE ADULT - SUBJECTIVE AND OBJECTIVE BOX
[facility]                                                         CARDIOLOGY AT [facility]                                                                  [address] [facility] [place] [place] [address]- [facility]                                                         Telephone: [phone]. Fax:[phone]                                                                             PROGRESS NOTE    Reason for follow up:   HFrEF / NSTEMI  Update:   ON dobutamine / norepi and heparin IV  Rapid response yesterday and now vented  BP soft 86/48  S/P LHC - MVD - CTS to see and CABG vs high risk PCI    Review of symptoms:   Cardiac:  No chest pain. No dyspnea. No palpitations.  Respiratory: no cough. No dyspnea  Gastrointestinal: No diarrhea. No abdominal pain. No bleeding.   Neuro: No focal neuro complaints.    Vitals:  T(C): 36.6 (02-15-24 @ 08:00), Max: 36.6 (02-15-24 @ 08:00)  HR: 93 (02-15-24 @ 08:47) (88 - 112)  BP: -- 106/75 bedside  RR: 20 (02-15-24 @ 07:45) (14 - 25)  SpO2: 100% (02-15-24 @ 08:47) (100% - 100%)  I&O's Summary  14 Feb 2024 07:01  -  15 Feb 2024 07:00  --------------------------------------------------------  IN: 1146.7 mL / OUT: 3680 mL / NET: -2533.3 mL  Weight (kg): 61.2 (02-12 @ 14:06)    PHYSICAL EXAM:  Appearance:  Vented, obtunded  HEENT:  ET tube in place,  dry oral mucosa  Neurologic: A & O x 0, no acute alarms noted.    Cardiovascular: RRR S1 S2, No murmur, no rubs/gallops. No JVD  Respiratory: Lungs clear to auscultation, unlabored   Gastrointestinal:  Soft, Non-tender, + BS  Lower Extremities: + Peripheral Pulses, No clubbing, cyanosis, or edema  Psychiatry: Patient is obtunded.    Skin: warm and dry.  Site:  No bleeding, no pain, no bruising, no  hematoma, no swelling, and + PP   CURRENT CARDIAC MEDICATIONS:  Dobutamine Infusion 2.5 MICROgram(s)/kG/Min IV Continuous <Continuous>  norepinephrine Infusion 0.05 MICROgram(s)/kG/Min IV Continuous <Continuous>    CURRENT OTHER MEDICATIONS:  cefTRIAXone Injectable. 1000 milliGRAM(s) IV Push every 24 hours  cefTRIAXone Injectable.      acetaminophen     Tablet .. 650 milliGRAM(s) Oral every 6 hours PRN Temp greater or equal to 38C (100.4F)  ARIPiprazole 5 milliGRAM(s) Oral daily  busPIRone 10 milliGRAM(s) Oral <User Schedule>  clonazePAM  Tablet 1 milliGRAM(s) Oral daily PRN for anxiety  dexMEDEtomidine Infusion 0.3 MICROgram(s)/kG/Hr (4.59 mL/Hr) IV Continuous <Continuous>  fentaNYL    Injectable 50 MICROGram(s) IV Push every 2 hours PRN vent synchrony or pain  midazolam Injectable 2 milliGRAM(s) IV Push every 2 hours PRN Vent synchrony or anxiety  propofol Infusion 20 MICROgram(s)/kG/Min (7.34 mL/Hr) IV Continuous <Continuous>  QUEtiapine 50 milliGRAM(s) Oral at bedtime  sertraline 100 milliGRAM(s) Oral daily  pantoprazole  Injectable 40 milliGRAM(s) IV Push daily  atorvastatin 80 milliGRAM(s) Oral at bedtime  insulin glargine Injectable (LANTUS) 10 Unit(s) SubCutaneous every morning  insulin lispro (ADMELOG) corrective regimen sliding scale   SubCutaneous every 6 hours  aspirin  chewable 81 milliGRAM(s) Oral daily  chlorhexidine 0.12% Liquid 15 milliLiter(s) Oral Mucosa every 12 hours  chlorhexidine 2% Cloths 1 Application(s) Topical <User Schedule>  chlorhexidine 4% Liquid 1 Application(s) Topical <User Schedule>  heparin   Injectable 3800 Unit(s) IV Push every 6 hours PRN For aPTT less than 40  heparin  Infusion.  Unit(s)/Hr (7.5 mL/Hr) IV Continuous <Continuous>  influenza  Vaccine (HIGH DOSE) 0.7 milliLiter(s) IntraMuscular once  sodium chloride 0.9% lock flush 10 milliLiter(s) IV Push every 1 hour PRN Pre/post blood products, medications, blood draw, and to maintain line patency      LABS:	 	                          8.8    9.72  )-----------( 309      ( 15 Feb 2024 04:48 )             27.5     02-15    137  |  95<L>  |  75.5<H>  ----------------------------<  175<H>  3.6   |  24.0  |  2.74<H>    Ca    8.8      15 Feb 2024 04:48  Phos  4.1     02-15  Mg     2.0     02-15    TPro  6.7  /  Alb  3.3  /  TBili  0.3<L>  /  DBili  x   /  AST  29  /  ALT  35  /  AlkPhos  94  02-15    PT/INR/PTT ( 15 Feb 2024 04:48 )                       :                       :      X            :       72.1                  .        .                   .              .           .       X           .                                       Lipid Profile: Date: 02-13 @ 11:15  Total cholesterol 179; Direct LDL: --; HDL: 49; Triglycerides:94  Date: 02-13 @ 03:08  Total cholesterol 183; Direct LDL: --; HDL: 48; Triglycerides:76    TSH: Thyroid Stimulating Hormone, Serum: 2.49 uIU/mL    TELEMETRY:   SR, 90's, no acute alarms, no desats noted.    	                                                                [facility]                                                         CARDIOLOGY AT [facility]                                                                  [address] [facility] [place] [place] [address]- [facility]                                                         Telephone: [phone]. Fax:[phone]                                                                             PROGRESS NOTE    Reason for follow up:   HFrEF / NSTEMI  Update:   ON dobutamine / norepi and heparin IV  Rapid response yesterday and now vented  BP soft 86/48  S/P LHC - MVD - CTS to see and CABG vs high risk PCI    Review of symptoms:   Cardiac:  No chest pain. No dyspnea. No palpitations.  Respiratory: no cough. No dyspnea  Gastrointestinal: No diarrhea. No abdominal pain. No bleeding.   Neuro: No focal neuro complaints.    Vitals:  T(C): 36.6 (02-15-24 @ 08:00), Max: 36.6 (02-15-24 @ 08:00)  HR: 93 (02-15-24 @ 08:47) (88 - 112)  BP: -- 106/75 bedside  RR: 20 (02-15-24 @ 07:45) (14 - 25)  SpO2: 100% (02-15-24 @ 08:47) (100% - 100%)  I&O's Summary  14 Feb 2024 07:01  -  15 Feb 2024 07:00  --------------------------------------------------------  IN: 1146.7 mL / OUT: 3680 mL / NET: -2533.3 mL  Weight (kg): 61.2 (02-12 @ 14:06)    PHYSICAL EXAM:  Appearance:  Vented, obtunded  HEENT:  ET tube in place,  dry oral mucosa  Neurologic: A & O x 0, no acute alarms noted.    Cardiovascular: RRR S1 S2, No murmur, no rubs/gallops. No JVD  Respiratory: Lungs clear to auscultation, unlabored   Gastrointestinal:  Soft, Non-tender, + BS  Lower Extremities: + Peripheral Pulses, No clubbing, cyanosis, or edema  Psychiatry: Patient is obtunded.    Skin: warm and dry.  Site:  No bleeding, no pain, no bruising, no  hematoma, no swelling, and + PP   CURRENT CARDIAC MEDICATIONS:  Dobutamine Infusion 2.5 MICROgram(s)/kG/Min IV Continuous <Continuous>  norepinephrine Infusion 0.05 MICROgram(s)/kG/Min IV Continuous <Continuous>    CURRENT OTHER MEDICATIONS:  cefTRIAXone Injectable. 1000 milliGRAM(s) IV Push every 24 hours  cefTRIAXone Injectable.      acetaminophen     Tablet .. 650 milliGRAM(s) Oral every 6 hours PRN Temp greater or equal to 38C (100.4F)  ARIPiprazole 5 milliGRAM(s) Oral daily  busPIRone 10 milliGRAM(s) Oral <User Schedule>  clonazePAM  Tablet 1 milliGRAM(s) Oral daily PRN for anxiety  dexMEDEtomidine Infusion 0.3 MICROgram(s)/kG/Hr (4.59 mL/Hr) IV Continuous <Continuous>  fentaNYL    Injectable 50 MICROGram(s) IV Push every 2 hours PRN vent synchrony or pain  midazolam Injectable 2 milliGRAM(s) IV Push every 2 hours PRN Vent synchrony or anxiety  propofol Infusion 20 MICROgram(s)/kG/Min (7.34 mL/Hr) IV Continuous <Continuous>  QUEtiapine 50 milliGRAM(s) Oral at bedtime  sertraline 100 milliGRAM(s) Oral daily  pantoprazole  Injectable 40 milliGRAM(s) IV Push daily  atorvastatin 80 milliGRAM(s) Oral at bedtime  insulin glargine Injectable (LANTUS) 10 Unit(s) SubCutaneous every morning  insulin lispro (ADMELOG) corrective regimen sliding scale   SubCutaneous every 6 hours  aspirin  chewable 81 milliGRAM(s) Oral daily  chlorhexidine 0.12% Liquid 15 milliLiter(s) Oral Mucosa every 12 hours  chlorhexidine 2% Cloths 1 Application(s) Topical <User Schedule>  chlorhexidine 4% Liquid 1 Application(s) Topical <User Schedule>  heparin   Injectable 3800 Unit(s) IV Push every 6 hours PRN For aPTT less than 40  heparin  Infusion.  Unit(s)/Hr (7.5 mL/Hr) IV Continuous <Continuous>  influenza  Vaccine (HIGH DOSE) 0.7 milliLiter(s) IntraMuscular once  sodium chloride 0.9% lock flush 10 milliLiter(s) IV Push every 1 hour PRN Pre/post blood products, medications, blood draw, and to maintain line patency      LABS:	 	                          8.8    9.72  )-----------( 309      ( 15 Feb 2024 04:48 )             27.5     02-15    137  |  95<L>  |  75.5<H>  ----------------------------<  175<H>  3.6   |  24.0  |  2.74<H>    Ca    8.8      15 Feb 2024 04:48  Phos  4.1     02-15  Mg     2.0     02-15    TPro  6.7  /  Alb  3.3  /  TBili  0.3<L>  /  DBili  x   /  AST  29  /  ALT  35  /  AlkPhos  94  02-15    PT/INR/PTT ( 15 Feb 2024 04:48 )                       :                       :      X            :       72.1                  .        .                   .              .           .       X           .                                       Lipid Profile: Date: 02-13 @ 11:15  Total cholesterol 179; Direct LDL: --; HDL: 49; Triglycerides:94  Date: 02-13 @ 03:08  Total cholesterol 183; Direct LDL: --; HDL: 48; Triglycerides:76    TSH: Thyroid Stimulating Hormone, Serum: 2.49 uIU/mL    TELEMETRY:   SR, 90's, runs of SVT noted with rates up to 150, short burst 6 to 10 seconds, no desats noted.

## 2024-02-15 NOTE — PROGRESS NOTE ADULT - ASSESSMENT
WES on CKD  2  Anion Gap  Lactic Acidosis  Metabolic Acidosis  Cardiogenic Shock    -Baseline Creatinine 1  -WES likely 2/2 hypoperfusion from cardiogenic shock, now plateau suggestive of ATN  -Urine studies pending   -Was on bumex gtt and had adequate urine output. No won dobutamine and levo per ICU  -Inotrope per heart failure team  -Trend Lactic acid, improving  -No indication for RRT     D/w ICU  Critical Care time 50minutes   2/14/24-d/w daughter and wife

## 2024-02-15 NOTE — GOALS OF CARE CONVERSATION - ADVANCED CARE PLANNING - CONVERSATION DETAILS
Met with patient's wife and children at bedside to discuss goals of care. We spoke initially in regards to DNR, including that resuscitative attempts at end of life would only serve to inflict further suffering and not improve his chances of meaningful survival in a manner that he would consider to be quality of life. They were all in agreement with DNR. We then discussed the option for DNI once he is extubated. After lengthy discussion, it was agreed upon that patient would not wish to endure a second intubation, and that if he declines again to the point of needing to be reintubated, we are moving farther from their goal of medical and functional optimization in order to undergo coronary revascularization if offered and ultimately surviving this hospital stay and returning home with meaningful quality of life. Additionally, he would not want a tracheostomy if he had recurrent respiratory failure and as such, patient's wife and two children are in agreement with DNI in addition to DNR.

## 2024-02-15 NOTE — PROGRESS NOTE ADULT - SUBJECTIVE AND OBJECTIVE BOX
CC:  Follow up GOC , Symptoms    OVERNIGHT EVENTS:  noted events    Present Symptoms:   Dyspnea:  No  on vent  Nausea/Vomiting:  No    Anxiety:  No     Depression:    Unable  Fatigue:   Unable  Loss of appetite:     NA   Constipation: Not Reported     Pain: No Signs            Location            Duration            Character            Severity            Factors            Effect    Pain AD Score:  http://geriatrictoolkit.[email or web address]/[email or web address] (press ctrl + left click to view)    Review of Systems: Reviewed as above  Further ROS unable to  obtain due to poor mentation hi      MEDICATIONS  (STANDING):  ARIPiprazole 5 milliGRAM(s) Oral daily  aspirin  chewable 81 milliGRAM(s) Oral daily  atorvastatin 80 milliGRAM(s) Oral at bedtime  busPIRone 10 milliGRAM(s) Oral <User Schedule>  cefTRIAXone Injectable. 1000 milliGRAM(s) IV Push every 24 hours  cefTRIAXone Injectable.      chlorhexidine 0.12% Liquid 15 milliLiter(s) Oral Mucosa every 12 hours  chlorhexidine 2% Cloths 1 Application(s) Topical <User Schedule>  chlorhexidine 4% Liquid 1 Application(s) Topical <User Schedule>  dexMEDEtomidine Infusion 0.3 MICROgram(s)/kG/Hr (4.59 mL/Hr) IV Continuous <Continuous>  dextrose 50% Injectable 25 Gram(s) IV Push once  dextrose 50% Injectable 25 Gram(s) IV Push once  dextrose 50% Injectable 12.5 Gram(s) IV Push once  DOBUTamine Infusion 2.5 MICROgram(s)/kG/Min (4.59 mL/Hr) IV Continuous <Continuous>  heparin  Infusion.  Unit(s)/Hr (7.5 mL/Hr) IV Continuous <Continuous>  influenza  Vaccine (HIGH DOSE) 0.7 milliLiter(s) IntraMuscular once  insulin glargine Injectable (LANTUS) 10 Unit(s) SubCutaneous every morning  insulin lispro (ADMELOG) corrective regimen sliding scale   SubCutaneous every 6 hours  norepinephrine Infusion 0.05 MICROgram(s)/kG/Min (5.74 mL/Hr) IV Continuous <Continuous>  pantoprazole  Injectable 40 milliGRAM(s) IV Push daily  propofol Infusion 20 MICROgram(s)/kG/Min (7.34 mL/Hr) IV Continuous <Continuous>  QUEtiapine 50 milliGRAM(s) Oral at bedtime  sertraline 100 milliGRAM(s) Oral daily    MEDICATIONS  (PRN):  acetaminophen     Tablet .. 650 milliGRAM(s) Oral every 6 hours PRN Temp greater or equal to 38C (100.4F)  clonazePAM  Tablet 1 milliGRAM(s) Oral daily PRN for anxiety  fentaNYL    Injectable 50 MICROGram(s) IV Push every 2 hours PRN vent synchrony or pain  heparin   Injectable 3800 Unit(s) IV Push every 6 hours PRN For aPTT less than 40  midazolam Injectable 2 milliGRAM(s) IV Push every 2 hours PRN Vent synchrony or anxiety  sodium chloride 0.9% lock flush 10 milliLiter(s) IV Push every 1 hour PRN Pre/post blood products, medications, blood draw, and to maintain line patency      PHYSICAL EXAM:    Vital Signs Last 24 Hrs  T(C): 36.6 (15 Feb 2024 12:00), Max: 36.7 (15 Feb 2024 09:00)  T(F): 97.9 (15 Feb 2024 12:00), Max: 98.1 (15 Feb 2024 09:00)  HR: 88 (15 Feb 2024 12:20) (84 - 112)  BP: --  BP(mean): --  RR: 20 (15 Feb 2024 12:00) (14 - 21)  SpO2: 100% (15 Feb 2024 12:20) (100% - 100%)    Parameters below as of 15 Feb 2024 12:00  Patient On (Oxygen Delivery Method): ventilator    O2 Concentration (%): 40    Karnofsky:10  %  General: Elderly man intubated        HEENT:  NCAT    (x  )  ET tube     Lungs: comfortable  non labored  CV:  RR  MSK: bedbound  Skin:  warm/dry  Neuro  sedated    LABS:                          8.8    9.72  )-----------( 309      ( 15 Feb 2024 04:48 )             27.5     02-15    137  |  95<L>  |  75.5<H>  ----------------------------<  175<H>  3.6   |  24.0  |  2.74<H>    Ca    8.8      15 Feb 2024 04:48  Phos  4.1     02-15  Mg     2.0     02-15    TPro  6.7  /  Alb  3.3  /  TBili  0.3<L>  /  DBili  x   /  AST  29  /  ALT  35  /  AlkPhos  94  02-15    PT/INR - ( 13 Feb 2024 19:42 )   PT: 13.9 sec;   INR: 1.26 ratio         PTT - ( 15 Feb 2024 04:48 )  PTT:72.1 sec  Urinalysis Basic - ( 15 Feb 2024 04:48 )    Color: x / Appearance: x / SG: x / pH: x  Gluc: 175 mg/dL / Ketone: x  / Bili: x / Urobili: x   Blood: x / Protein: x / Nitrite: x   Leuk Esterase: x / RBC: x / WBC x   Sq Epi: x / Non Sq Epi: x / Bacteria: x      I&O's Summary    14 Feb 2024 07:01  -  15 Feb 2024 07:00  --------------------------------------------------------  IN: 1146.7 mL / OUT: 3680 mL / NET: -2533.3 mL    15 Feb 2024 07:01  -  15 Feb 2024 13:41  --------------------------------------------------------  IN: 249 mL / OUT: 245 mL / NET: 4 mL        RADIOLOGY & ADDITIONAL STUDIES:  Imaging Reviewed  ( x  )   < from: TTE Limited W or WO Ultrasound Enhancing Agent (02.13.24 @ 21:29) >    TRANSTHORACIC ECHOCARDIOGRAM REPORT  ________________________________________________________________________________                                      _______       Pt. Name:       CABRERA DENSON Study Date:    2/13/2024  MRN:            [id number]         YOB: 1945  Accession #:    [id number]        Age:           78 years  Account#:       [id number]       Gender:        M  Heart Rate:                      Height:        65.00 in (165.10 cm)  Rhythm:                          Weight:        134.64 lb (61.07 kg)  Blood Pressure: 78/53 mmHg       BSA/BMI:       1.67 m² / 22.41 kg/m²  ________________________________________________________________________________________  Referring Physician:    [id number] Kin Darnell  Interpreting Physician: Melissa Pablo MD  Primary Sonographer:    Prudence Hamilton    CPT:               ECHO TTE W/O CON F/U [facility] - 71543.m;LIMITED SPECTRAL - 74264.m  Indication(s):     Cardiogenic shock - R57.0  Procedure:         Limited transthoracic echocardiogram.  Ordering Location: [facility]  Admission Status:  Inpatient    _______________________________________________________________________________________     CONCLUSIONS:      1. Left ventricular systolic function is severely decreased with an ejection fraction visually estimated at 25 to 30 %.   2. Normal right ventricular cavity size and normal systolic function.   3. Estimated pulmonary artery systolic pressure is 44 mmHg.   4. Trace pericardial effusion noted adjacent to the right ventricle.    ________________________________________________________________________________________  FINDINGS:     Left Ventricle:  Left ventricular systolic function is severely decreased with an ejection fraction visually estimated at 25 to 30%.     Right Ventricle:  The right ventricular cavity is normal in size and normal systolic function.     Mitral Valve:  There is mitral valve thickening of the anterior and posterior leaflets. There is mild mitral regurgitation.     Tricuspid Valve:  Structurally normal tricuspid valve with normal leaflet excursion. Estimated pulmonary artery systolic pressure is 44 mmHg.     Pericardium:  There is a trace pericardial effusion noted adjacent to the right ventricle.     Systemic Veins:  The inferior vena cava is dilated (dilated >2.1cm) with abnormal inspiratory collapse (abnormal <50%) consistent with elevated right atrial pressure (~15, range 10-20mmHg).  ____________________________________________________________________  QUANTITATIVE DATA:  Left Ventricle Measurements: (Indexed to BSA)     Visualized LV EF%: 25 to 30%       Tricuspid Valve Measurements:     TR Vmax:          2.7 m/s  TR Peak Gradient: 28.9 mmHg  RA Pressure:      15 mmHg  PASP:             44 mmHg    ________________________________________________________________________________________  Electronically signed on 2/14/2024 at 9:18:35 AM by Melissa Pablo MD           < end of copied text >        ADVANCE DIRECTIVE PREFERENCES    Full Code

## 2024-02-15 NOTE — PROGRESS NOTE ADULT - PROBLEM SELECTOR PLAN 2
Patient initially with echocardiogram showing EF <20%, multiple RWMA, mod MR/TR, mild pulmonary HTN, and moderate pericardial effusion.   BNP > 4K on admission  Repeat echocardiogram last night with EF 25-30% on dobutamine with trace pericardial effusion.   Advanced heart failure following.   Continue dobutamine at 5mcg/kg/min at this time.   RHC with swan placement today   GDMT when tolerating    Bumex d.c .   Hold ARNi/ACEi/ARB, SGLT2i, and MRA at this time.  Strict I and O's -2533ml in 24 hours  appearing euvolemic  S/P RHC yesterday  Site precautions:  No lifting > 10 pounds, no bathing or submerging in water x 5 days. Patient initially with echocardiogram showing EF <20%, multiple RWMA, mod MR/TR, mild pulmonary HTN, and moderate pericardial effusion.   BNP > 4K on admission  Repeat echocardiogram last night with EF 25-30% on dobutamine with trace pericardial effusion.   Advanced heart failure following.   Continue dobutamine at 5mcg/kg/min at this time.   GDMT when tolerating    Bumex d.c .   Hold ARNi/ACEi/ARB, SGLT2i, and MRA at this time.  Strict I and O's -2533ml in 24 hours  appearing euvolemic  S/P RHC yesterday  Site precautions:  No lifting > 10 pounds, no bathing or submerging in water x 5 days.

## 2024-02-15 NOTE — PROGRESS NOTE ADULT - PROBLEM SELECTOR PLAN 1
Patient presented with chest pain and was found to have an NSTEMI and low EF. Cath revealed multivessel disease. He was empirically started on dobutamine for hypotension and respiratory distress  RHC  showed low filling pressures with normal cardiac output on  5  Bumex gtt stopped   Will stop  (currently on 2.5)  A swan was not left in so will monitor other signs of end organ perfusion such as lactate, UOP, LFTs and creatinine.

## 2024-02-15 NOTE — PROGRESS NOTE ADULT - SUBJECTIVE AND OBJECTIVE BOX
Patient is a 78y old  Male who presents with a chief complaint of Cardiogenic Shock (14 Feb 2024 16:48)       HPI:  78M with anxiety, HTN, HLD, CKD, plaque psoriasis, DM2 who presents with burning substernal chest pain at rest that started 1wk ago but acutely progressed this morning to sharp pain with radiation to L arm with associated multiple bouts of diarrhea. On ED arrival, afebrile, hypotensive to SBP 80-90s despite 1L IVF requiring levophed. EKG with nonspecific ST changes, troponin 2344->2477. Labs otherwise notable for K 5.6, BUN/Cr 43.5/2.31 (baseline Cr ~1 in 2022), BNP 16447, lactate 6.2. CXR with pulmonary edema. Placed on BiPAP for SOB and hypoxia. STAT TTE with EF<20%, multiple segmental abnormalities, mod MR, mod TR, mod pericardial effusion without tamponade. Received total lasix 40mg IV in ED. ICU consulted for further management of cardiogenic shock.   Intubated. History obtained from daughter and wife. Has not seen nephrologist in the past.        PAST MEDICAL & SURGICAL HISTORY:  Diabetes      Hyperlipidemia      Anxiety with depression      Insomnia      No significant past surgical history           FAMILY HISTORY:  No pertinent family history in first degree relatives    [place]    Social History:Non smoker    MEDICATIONS  (STANDING):  atorvastatin 80 milliGRAM(s) Oral at bedtime  cefTRIAXone Injectable. 1000 milliGRAM(s) IV Push every 24 hours  cefTRIAXone Injectable.      chlorhexidine 0.12% Liquid 15 milliLiter(s) Oral Mucosa every 12 hours  chlorhexidine 2% Cloths 1 Application(s) Topical <User Schedule>  chlorhexidine 4% Liquid 1 Application(s) Topical <User Schedule>  dexMEDEtomidine Infusion 0.3 MICROgram(s)/kG/Hr (4.59 mL/Hr) IV Continuous <Continuous>  dextrose 50% Injectable 25 Gram(s) IV Push once  dextrose 50% Injectable 12.5 Gram(s) IV Push once  dextrose 50% Injectable 25 Gram(s) IV Push once  DOBUTamine Infusion 5 MICROgram(s)/kG/Min (9.18 mL/Hr) IV Continuous <Continuous>  heparin  Infusion.  Unit(s)/Hr (7.5 mL/Hr) IV Continuous <Continuous>  influenza  Vaccine (HIGH DOSE) 0.7 milliLiter(s) IntraMuscular once  insulin lispro (ADMELOG) corrective regimen sliding scale   SubCutaneous every 6 hours  norepinephrine Infusion 0.05 MICROgram(s)/kG/Min (5.74 mL/Hr) IV Continuous <Continuous>  pantoprazole  Injectable 40 milliGRAM(s) IV Push daily  propofol Infusion 20 MICROgram(s)/kG/Min (7.34 mL/Hr) IV Continuous <Continuous>    MEDICATIONS  (PRN):  acetaminophen     Tablet .. 650 milliGRAM(s) Oral every 6 hours PRN Temp greater or equal to 38C (100.4F)  fentaNYL    Injectable 50 MICROGram(s) IV Push every 2 hours PRN vent synchrony or pain  heparin   Injectable 3800 Unit(s) IV Push every 6 hours PRN For aPTT less than 40  midazolam Injectable 2 milliGRAM(s) IV Push every 2 hours PRN Vent synchrony or anxiety  sodium chloride 0.9% lock flush 10 milliLiter(s) IV Push every 1 hour PRN Pre/post blood products, medications, blood draw, and to maintain line patency   Meds reviewed    Allergies    No Known Allergies    Intolerances         REVIEW OF SYSTEMS:    Review of Systems:   UTO 2/2 intubation      ICU Vital Signs Last 24 Hrs  T(C): 36.6 (15 Feb 2024 12:00), Max: 36.7 (15 Feb 2024 09:00)  T(F): 97.9 (15 Feb 2024 12:00), Max: 98.1 (15 Feb 2024 09:00)  HR: 88 (15 Feb 2024 12:20) (84 - 112)  BP: --  BP(mean): --  ABP: 100/54 (15 Feb 2024 12:00) (71/46 - 131/73)  ABP(mean): 72 (15 Feb 2024 12:00) (55 - 96)  RR: 20 (15 Feb 2024 12:00) (14 - 21)  SpO2: 100% (15 Feb 2024 12:20) (100% - 100%)    O2 Parameters below as of 15 Feb 2024 12:00  Patient On (Oxygen Delivery Method): ventilator    O2 Concentration (%): 40        PHYSICAL EXAM:    GENERAL: ill appearing  HEAD:  Atraumatic, Normocephalic  EYES: EOMI, conjunctiva and sclera clear  ENMT: No Drainage from nares, No drainage from ears  NERVOUS SYSTEM:  intubated  CHEST/LUNG: decrased  EXTREMITIES:  No Edema  SKIN: No rashes No obvious ecchymosis      LABS:                        8.9    11.37 )-----------( 260      ( 14 Feb 2024 16:32 )             26.5     02-14    137  |  96  |  69.5<H>  ----------------------------<  151<H>  3.9   |  25.0  |  2.58<H>    Ca    8.7      14 Feb 2024 16:32  Phos  4.1     02-14  Mg     2.1     02-14    TPro  6.8  /  Alb  3.5  /  TBili  0.4  /  DBili  x   /  AST  38  /  ALT  35  /  AlkPhos  93  02-14    PT/INR - ( 13 Feb 2024 19:42 )   PT: 13.9 sec;   INR: 1.26 ratio         PTT - ( 14 Feb 2024 15:48 )  PTT:40.2 sec  Urinalysis Basic - ( 14 Feb 2024 16:32 )    Color: x / Appearance: x / SG: x / pH: x  Gluc: 151 mg/dL / Ketone: x  / Bili: x / Urobili: x   Blood: x / Protein: x / Nitrite: x   Leuk Esterase: x / RBC: x / WBC x   Sq Epi: x / Non Sq Epi: x / Bacteria: x      Magnesium: 2.1 mg/dL (02-14 @ 16:32)  Phosphorus: 4.1 mg/dL (02-14 @ 16:32)  Magnesium: 2.1 mg/dL (02-14 @ 05:12)  Phosphorus: 5.3 mg/dL (02-14 @ 05:12)  Magnesium: 2.0 mg/dL (02-14 @ 01:50)  Phosphorus: 5.1 mg/dL (02-14 @ 01:50)    ABG - ( 14 Feb 2024 03:52 )  pH, Arterial: 7.380 pH, Blood: x     /  pCO2: 39    /  pO2: 136   / HCO3: 23    / Base Excess: -2.0  /  SaO2: 100.0                 RADIOLOGY & ADDITIONAL TESTS:

## 2024-02-15 NOTE — CHART NOTE - NSCHARTNOTEFT_GEN_A_CORE
Pt failed SBT after about one hour off sedation, had apneic episodes. Will keep pt off sedation and try again.

## 2024-02-16 LAB
ALBUMIN SERPL ELPH-MCNC: 3.3 G/DL — SIGNIFICANT CHANGE UP (ref 3.3–5.2)
ALP SERPL-CCNC: 91 U/L — SIGNIFICANT CHANGE UP (ref 40–120)
ALT FLD-CCNC: 33 U/L — SIGNIFICANT CHANGE UP
ANION GAP SERPL CALC-SCNC: 20 MMOL/L — HIGH (ref 5–17)
APTT BLD: 25.5 SEC — SIGNIFICANT CHANGE UP (ref 24.5–35.6)
AST SERPL-CCNC: 22 U/L — SIGNIFICANT CHANGE UP
BASOPHILS # BLD AUTO: 0.03 K/UL — SIGNIFICANT CHANGE UP (ref 0–0.2)
BASOPHILS NFR BLD AUTO: 0.3 % — SIGNIFICANT CHANGE UP (ref 0–2)
BILIRUB SERPL-MCNC: 0.4 MG/DL — SIGNIFICANT CHANGE UP (ref 0.4–2)
BUN SERPL-MCNC: 84.2 MG/DL — HIGH (ref 8–20)
CALCIUM SERPL-MCNC: 9 MG/DL — SIGNIFICANT CHANGE UP (ref 8.4–10.5)
CHLORIDE SERPL-SCNC: 97 MMOL/L — SIGNIFICANT CHANGE UP (ref 96–108)
CO2 SERPL-SCNC: 22 MMOL/L — SIGNIFICANT CHANGE UP (ref 22–29)
CREAT SERPL-MCNC: 2.7 MG/DL — HIGH (ref 0.5–1.3)
EGFR: 23 ML/MIN/1.73M2 — LOW
EOSINOPHIL # BLD AUTO: 0.06 K/UL — SIGNIFICANT CHANGE UP (ref 0–0.5)
EOSINOPHIL NFR BLD AUTO: 0.6 % — SIGNIFICANT CHANGE UP (ref 0–6)
GAS PNL BLDA: SIGNIFICANT CHANGE UP
GAS PNL BLDV: SIGNIFICANT CHANGE UP
GLUCOSE BLDC GLUCOMTR-MCNC: 144 MG/DL — HIGH (ref 70–99)
GLUCOSE BLDC GLUCOMTR-MCNC: 164 MG/DL — HIGH (ref 70–99)
GLUCOSE BLDC GLUCOMTR-MCNC: 175 MG/DL — HIGH (ref 70–99)
GLUCOSE BLDC GLUCOMTR-MCNC: 176 MG/DL — HIGH (ref 70–99)
GLUCOSE BLDC GLUCOMTR-MCNC: 188 MG/DL — HIGH (ref 70–99)
GLUCOSE SERPL-MCNC: 137 MG/DL — HIGH (ref 70–99)
HCT VFR BLD CALC: 25.5 % — LOW (ref 39–50)
HGB BLD-MCNC: 8.5 G/DL — LOW (ref 13–17)
IMM GRANULOCYTES NFR BLD AUTO: 0.3 % — SIGNIFICANT CHANGE UP (ref 0–0.9)
INR BLD: 1.1 RATIO — SIGNIFICANT CHANGE UP (ref 0.85–1.18)
LYMPHOCYTES # BLD AUTO: 2.15 K/UL — SIGNIFICANT CHANGE UP (ref 1–3.3)
LYMPHOCYTES # BLD AUTO: 21.9 % — SIGNIFICANT CHANGE UP (ref 13–44)
MAGNESIUM SERPL-MCNC: 2.4 MG/DL — SIGNIFICANT CHANGE UP (ref 1.6–2.6)
MCHC RBC-ENTMCNC: 26.9 PG — LOW (ref 27–34)
MCHC RBC-ENTMCNC: 33.3 GM/DL — SIGNIFICANT CHANGE UP (ref 32–36)
MCV RBC AUTO: 80.7 FL — SIGNIFICANT CHANGE UP (ref 80–100)
MONOCYTES # BLD AUTO: 0.84 K/UL — SIGNIFICANT CHANGE UP (ref 0–0.9)
MONOCYTES NFR BLD AUTO: 8.5 % — SIGNIFICANT CHANGE UP (ref 2–14)
NEUTROPHILS # BLD AUTO: 6.72 K/UL — SIGNIFICANT CHANGE UP (ref 1.8–7.4)
NEUTROPHILS NFR BLD AUTO: 68.4 % — SIGNIFICANT CHANGE UP (ref 43–77)
PHOSPHATE SERPL-MCNC: 3.5 MG/DL — SIGNIFICANT CHANGE UP (ref 2.4–4.7)
PLATELET # BLD AUTO: 359 K/UL — SIGNIFICANT CHANGE UP (ref 150–400)
POTASSIUM SERPL-MCNC: 4.2 MMOL/L — SIGNIFICANT CHANGE UP (ref 3.5–5.3)
POTASSIUM SERPL-SCNC: 4.2 MMOL/L — SIGNIFICANT CHANGE UP (ref 3.5–5.3)
PROT SERPL-MCNC: 6.4 G/DL — LOW (ref 6.6–8.7)
PROTHROM AB SERPL-ACNC: 12.2 SEC — SIGNIFICANT CHANGE UP (ref 9.5–13)
RBC # BLD: 3.16 M/UL — LOW (ref 4.2–5.8)
RBC # FLD: 14.7 % — HIGH (ref 10.3–14.5)
SODIUM SERPL-SCNC: 139 MMOL/L — SIGNIFICANT CHANGE UP (ref 135–145)
WBC # BLD: 9.83 K/UL — SIGNIFICANT CHANGE UP (ref 3.8–10.5)
WBC # FLD AUTO: 9.83 K/UL — SIGNIFICANT CHANGE UP (ref 3.8–10.5)

## 2024-02-16 PROCEDURE — 99291 CRITICAL CARE FIRST HOUR: CPT

## 2024-02-16 PROCEDURE — 93010 ELECTROCARDIOGRAM REPORT: CPT

## 2024-02-16 PROCEDURE — 99232 SBSQ HOSP IP/OBS MODERATE 35: CPT

## 2024-02-16 PROCEDURE — 99233 SBSQ HOSP IP/OBS HIGH 50: CPT

## 2024-02-16 RX ORDER — HEPARIN SODIUM 5000 [USP'U]/ML
5000 INJECTION INTRAVENOUS; SUBCUTANEOUS EVERY 8 HOURS
Refills: 0 | Status: DISCONTINUED | OUTPATIENT
Start: 2024-02-16 | End: 2024-03-06

## 2024-02-16 RX ORDER — SODIUM CHLORIDE 9 MG/ML
500 INJECTION, SOLUTION INTRAVENOUS ONCE
Refills: 0 | Status: COMPLETED | OUTPATIENT
Start: 2024-02-16 | End: 2024-02-16

## 2024-02-16 RX ADMIN — Medication 650 MILLIGRAM(S): at 22:32

## 2024-02-16 RX ADMIN — Medication 2: at 23:56

## 2024-02-16 RX ADMIN — PANTOPRAZOLE SODIUM 40 MILLIGRAM(S): 20 TABLET, DELAYED RELEASE ORAL at 13:02

## 2024-02-16 RX ADMIN — HEPARIN SODIUM 5000 UNIT(S): 5000 INJECTION INTRAVENOUS; SUBCUTANEOUS at 13:21

## 2024-02-16 RX ADMIN — HEPARIN SODIUM 5000 UNIT(S): 5000 INJECTION INTRAVENOUS; SUBCUTANEOUS at 05:05

## 2024-02-16 RX ADMIN — FENTANYL CITRATE 50 MICROGRAM(S): 50 INJECTION INTRAVENOUS at 03:29

## 2024-02-16 RX ADMIN — FENTANYL CITRATE 50 MICROGRAM(S): 50 INJECTION INTRAVENOUS at 03:44

## 2024-02-16 RX ADMIN — Medication 81 MILLIGRAM(S): at 13:01

## 2024-02-16 RX ADMIN — CHLORHEXIDINE GLUCONATE 15 MILLILITER(S): 213 SOLUTION TOPICAL at 17:55

## 2024-02-16 RX ADMIN — SERTRALINE 100 MILLIGRAM(S): 25 TABLET, FILM COATED ORAL at 13:01

## 2024-02-16 RX ADMIN — CHLORHEXIDINE GLUCONATE 15 MILLILITER(S): 213 SOLUTION TOPICAL at 05:05

## 2024-02-16 RX ADMIN — CHLORHEXIDINE GLUCONATE 1 APPLICATION(S): 213 SOLUTION TOPICAL at 05:06

## 2024-02-16 RX ADMIN — CEFTRIAXONE 1000 MILLIGRAM(S): 500 INJECTION, POWDER, FOR SOLUTION INTRAMUSCULAR; INTRAVENOUS at 03:29

## 2024-02-16 RX ADMIN — MIDAZOLAM HYDROCHLORIDE 2 MILLIGRAM(S): 1 INJECTION, SOLUTION INTRAMUSCULAR; INTRAVENOUS at 04:44

## 2024-02-16 RX ADMIN — Medication 5.74 MICROGRAM(S)/KG/MIN: at 16:51

## 2024-02-16 RX ADMIN — Medication 10 MILLIGRAM(S): at 08:37

## 2024-02-16 RX ADMIN — ARIPIPRAZOLE 5 MILLIGRAM(S): 15 TABLET ORAL at 13:00

## 2024-02-16 RX ADMIN — QUETIAPINE FUMARATE 50 MILLIGRAM(S): 200 TABLET, FILM COATED ORAL at 22:32

## 2024-02-16 RX ADMIN — INSULIN GLARGINE 10 UNIT(S): 100 INJECTION, SOLUTION SUBCUTANEOUS at 08:37

## 2024-02-16 RX ADMIN — Medication 650 MILLIGRAM(S): at 23:39

## 2024-02-16 RX ADMIN — HEPARIN SODIUM 5000 UNIT(S): 5000 INJECTION INTRAVENOUS; SUBCUTANEOUS at 22:31

## 2024-02-16 RX ADMIN — ATORVASTATIN CALCIUM 80 MILLIGRAM(S): 80 TABLET, FILM COATED ORAL at 22:33

## 2024-02-16 RX ADMIN — Medication 2: at 18:24

## 2024-02-16 RX ADMIN — Medication 2: at 13:20

## 2024-02-16 RX ADMIN — Medication 2: at 00:06

## 2024-02-16 RX ADMIN — DEXMEDETOMIDINE HYDROCHLORIDE IN 0.9% SODIUM CHLORIDE 4.59 MICROGRAM(S)/KG/HR: 4 INJECTION INTRAVENOUS at 12:14

## 2024-02-16 RX ADMIN — SODIUM CHLORIDE 1000 MILLILITER(S): 9 INJECTION, SOLUTION INTRAVENOUS at 17:30

## 2024-02-16 NOTE — PROGRESS NOTE ADULT - ASSESSMENT
78yr man  PMHx HTN, HLD, T2DM, CKD, plaque psoriasis, anxiety, depression, history of dysphagia admitted with cardiogenic shock 2/2 NSTEMI with Respiratory failure.    Cardiogenic Shock/NSTEMI  Per Cardio and CTS not  a candidate for CABG or PCI    Respiratory Failure  on vent - wean per MICU    WES/CKD  avoid nephrotoxic agents    Functional Quadriplegia  Assist with care  Turn reposition    Encounter for Palliative Care  Noted - per CTS and Cardio Not  a candidate for PCI or CABG  In progress   78yr man  PMHx HTN, HLD, T2DM, CKD, plaque psoriasis, anxiety, depression, history of dysphagia admitted with cardiogenic shock 2/2 NSTEMI with Respiratory failure.    Cardiogenic Shock/NSTEMI  Per Cardio and CTS not  a candidate for CABG or PCI    Respiratory Failure  on vent - wean per MICU  Failed SBTx2 yesterday    WES/CKD  avoid nephrotoxic agents    Functional Quadriplegia  Assist with care  Turn reposition    Encounter for Palliative Care  Noted - per CTS and Cardio Not  a candidate for PCI or CABG  Spoke to Dr. Landis - continued weaning trials for now  Wife and daughter at bedside.  Daughter is an ER nurse at [employer]  Daughter acknowledges DNR directive from yesterday.   She tells me she does not know when it would be the right time to let him go.  Informed her, will need to give a little more time - MICU doing weaning trials.  If father unable to wean then will have to decide trach/peg.  She  was tearful and stated she would not want that for him.    For now, encouraged her to spend time with her father.   Psychosocial support.

## 2024-02-16 NOTE — PROGRESS NOTE ADULT - PROBLEM SELECTOR PLAN 1
-Patient presented with chest pain and was found to have an NSTEMI and low EF. Cath revealed multivessel disease. He was empirically started on dobutamine for hypotension and respiratory distress.  -RHC  showed low filling pressures with normal cardiac output on  5  -Bumex gtt stopped   -Dobutamine weaned off 2/15. Harpreet CI stable today at 2.4.  -A swan was not left in so will monitor other signs of end organ perfusion such as lactate, UOP, LFTs and creatinine.

## 2024-02-16 NOTE — PROGRESS NOTE ADULT - PROBLEM SELECTOR PLAN 2
Patient initially with echocardiogram showing EF <20%, multiple RWMA, mod MR/TR, mild pulmonary HTN, and moderate pericardial effusion.   BNP > 4K on admission  Repeat echocardiogram last night with EF 25-30%/   Advanced heart failure following.   Dobutamine was d.c   BP remains soft on Levophed - continue to titrate as tolerated.     GDMT when tolerating    Bumex d.c .   Hold ARNi/ACEi/ARB, SGLT2i, and MRA at this time.  Strict I and O's -2533ml in 24 hours  appearing euvolemic  Site precautions:  No lifting > 10 pounds, no bathing or submerging in water x 5 days.

## 2024-02-16 NOTE — PROGRESS NOTE ADULT - SUBJECTIVE AND OBJECTIVE BOX
[facility]                                                         CARDIOLOGY AT [facility]                                                                  [address], [id number] [place] [address]- [facility]                                                         Telephone: [phone]. Fax:[phone]                                                                             PROGRESS NOTE    Reason for follow up:   HFrEF/NSTEMI  Update:   Aashish guzman on Levophed low dose.   Attempting to wake patient up yesterday and was stating chest pain to ICU PA - started on Imdur and Dilaudid.  Deemed not a surgical candidate at this time, Intervention to consider High risk PCI.        Review of symptoms:   Limited with nodding head to yes and no questions      Vitals:  T(C): 36.8 (02-16-24 @ 07:30), Max: 37.4 (02-15-24 @ 23:00)  HR: 63 (02-16-24 @ 08:53) (63 - 94)  BP:  123/61  RR: 20 (02-16-24 @ 06:45) (16 - 24)  SpO2: 100% (02-16-24 @ 08:53) (100% - 100%)  I&O's Summary    15 Feb 2024 07:01  -  16 Feb 2024 07:00  --------------------------------------------------------  IN: 1001.6 mL / OUT: 1405 mL / NET: -403.4 mL  Weight (kg): 61.2 (02-12 @ 14:06)    PHYSICAL EXAM:  Appearance  Vented, eyes opens, nods with yes and no questions.    HEENT:  Atraumatic. Normocephalic.  Normal oral mucosa  Neurologic: A & O x 1,   Cardiovascular: RRR S1 S2, No murmur, no rubs/gallops. No JVD  Respiratory: Lungs clear to auscultation, unlabored   Gastrointestinal:  Soft, Non-tender, + BS  Lower Extremities: diminished Peripheral Pulses, No clubbing, cyanosis, or edema  Psychiatry: Patient is calm. No agitation.   Skin: warm and dry.    CURRENT CARDIAC MEDICATIONS:  isosorbide   mononitrate ER Tablet (IMDUR) 30 milliGRAM(s) Oral daily  norepinephrine Infusion 0.05 MICROgram(s)/kG/Min IV Continuous <Continuous>    CURRENT OTHER MEDICATIONS:  cefTRIAXone Injectable. 1000 milliGRAM(s) IV Push every 24 hours  cefTRIAXone Injectable.      acetaminophen     Tablet .. 650 milliGRAM(s) Oral every 6 hours PRN Temp greater or equal to 38C (100.4F)  ARIPiprazole 5 milliGRAM(s) Oral daily  busPIRone 10 milliGRAM(s) Oral <User Schedule>  clonazePAM  Tablet 1 milliGRAM(s) Oral daily PRN for anxiety  dexMEDEtomidine Infusion 0.3 MICROgram(s)/kG/Hr (4.59 mL/Hr) IV Continuous <Continuous>  fentaNYL    Injectable 50 MICROGram(s) IV Push every 2 hours PRN vent synchrony or pain  midazolam Injectable 2 milliGRAM(s) IV Push every 2 hours PRN Vent synchrony or anxiety  QUEtiapine 50 milliGRAM(s) Oral at bedtime  sertraline 100 milliGRAM(s) Oral daily  pantoprazole  Injectable 40 milliGRAM(s) IV Push daily  atorvastatin 80 milliGRAM(s) Oral at bedtime  insulin glargine Injectable (LANTUS) 10 Unit(s) SubCutaneous every morning  insulin lispro (ADMELOG) corrective regimen sliding scale   SubCutaneous every 6 hours  aspirin  chewable 81 milliGRAM(s) Oral daily  chlorhexidine 0.12% Liquid 15 milliLiter(s) Oral Mucosa every 12 hours  chlorhexidine 2% Cloths 1 Application(s) Topical <User Schedule>  chlorhexidine 4% Liquid 1 Application(s) Topical <User Schedule>  heparin   Injectable 5000 Unit(s) SubCutaneous every 8 hours  influenza  Vaccine (HIGH DOSE) 0.7 milliLiter(s) IntraMuscular once  sodium chloride 0.9% lock flush 10 milliLiter(s) IV Push every 1 hour PRN Pre/post blood products, medications, blood draw, and to maintain line patency      LABS:	 	                        8.5    9.83  )-----------( 359      ( 16 Feb 2024 04:35 )             25.5     02-16    139  |  97  |  84.2<H>  ----------------------------<  137<H>  4.2   |  22.0  |  2.70<H>    Ca    9.0      16 Feb 2024 04:35  Phos  3.5     02-16  Mg     2.4     02-16    TPro  6.4<L>  /  Alb  3.3  /  TBili  0.4  /  DBili  x   /  AST  22  /  ALT  33  /  AlkPhos  91  02-16    PT/INR/PTT ( 16 Feb 2024 04:35 )                       :                       :      12.2         :       25.5                  .        .                   .              .           .       1.10        .                                       Lipid Profile: Date: 02-13 @ 11:15  Total cholesterol 179; Direct LDL: --; HDL: 49; Triglycerides:94  Date: 02-13 @ 03:08  Total cholesterol 183; Direct LDL: --; HDL: 48; Triglycerides:76    TSH: Thyroid Stimulating Hormone, Serum: 2.49 uIU/mL    TELEMETRY:   Sr 64, no acute alarms noted.

## 2024-02-16 NOTE — PROGRESS NOTE ADULT - SUBJECTIVE AND OBJECTIVE BOX
[facility] Advanced Heart Failure - Progress Note  [address] [place] [address]  Office Phone: [phone]/Fax: [phone]  Service/On Call Phone [phone]    Subjective/Objective: NAEO. Failed SBT earlier back on full vent support.    Medications:  acetaminophen     Tablet .. 650 milliGRAM(s) Oral every 6 hours PRN  ARIPiprazole 5 milliGRAM(s) Oral daily  aspirin  chewable 81 milliGRAM(s) Oral daily  atorvastatin 80 milliGRAM(s) Oral at bedtime  busPIRone 10 milliGRAM(s) Oral <User Schedule>  cefTRIAXone Injectable. 1000 milliGRAM(s) IV Push every 24 hours  cefTRIAXone Injectable.      chlorhexidine 0.12% Liquid 15 milliLiter(s) Oral Mucosa every 12 hours  chlorhexidine 2% Cloths 1 Application(s) Topical <User Schedule>  chlorhexidine 4% Liquid 1 Application(s) Topical <User Schedule>  clonazePAM  Tablet 1 milliGRAM(s) Oral daily PRN  dexMEDEtomidine Infusion 0.3 MICROgram(s)/kG/Hr IV Continuous <Continuous>  dextrose 50% Injectable 25 Gram(s) IV Push once  dextrose 50% Injectable 25 Gram(s) IV Push once  dextrose 50% Injectable 12.5 Gram(s) IV Push once  fentaNYL    Injectable 50 MICROGram(s) IV Push every 2 hours PRN  heparin   Injectable 5000 Unit(s) SubCutaneous every 8 hours  influenza  Vaccine (HIGH DOSE) 0.7 milliLiter(s) IntraMuscular once  insulin glargine Injectable (LANTUS) 10 Unit(s) SubCutaneous every morning  insulin lispro (ADMELOG) corrective regimen sliding scale   SubCutaneous every 6 hours  isosorbide   mononitrate ER Tablet (IMDUR) 30 milliGRAM(s) Oral daily  midazolam Injectable 2 milliGRAM(s) IV Push every 2 hours PRN  norepinephrine Infusion 0.05 MICROgram(s)/kG/Min IV Continuous <Continuous>  pantoprazole  Injectable 40 milliGRAM(s) IV Push daily  QUEtiapine 50 milliGRAM(s) Oral at bedtime  sertraline 100 milliGRAM(s) Oral daily  sodium chloride 0.9% lock flush 10 milliLiter(s) IV Push every 1 hour PRN    Vital Signs Last 24 Hours  T(C): 36.9 (24 @ 10:00), Max: 37.4 (02-15-24 @ 23:00)  HR: 72 (24 @ 15:59) (63 - 89)  BP: --  RR: 20 (24 @ 13:00) (16 - 24)  SpO2: 100% (24 @ 15:59) (100% - 100%)    Weight in k.9 ( @ 02:00)    I&O's Summary  15 Feb 2024 07:01  -  2024 07:00  --------------------------------------------------------  IN: 1001.6 mL / OUT: 1405 mL / NET: -403.4 mL    2024 07:01  -  2024 16:24  --------------------------------------------------------  IN: 288 mL / OUT: 350 mL / NET: -62 mL    Tele: SR    Physical Exam:  General: Intubated, in no distress.  HEENT: EOMs intact.  Neck: Neck supple. JVP not significnatly elevated.   Chest: anterior rhonchi.  CV: RRR. Normal S1 and S2. No murmurs, rub, or gallops. Warm peripherally.  PV: No LE edema noted. Pulses full/equal in all four extremities.  Abdomen: Soft, non-distended.  Skin: warm, dry.    Labs:                        8.5    9.83  )-----------( 359      ( 2024 04:35 )             25.5         139  |  97  |  84.2<H>  ----------------------------<  137<H>  4.2   |  22.0  |  2.70<H>    Ca    9.0      2024 04:35  Phos  3.5     02-  Mg     2.4     -16    TPro  6.4<L>  /  Alb  3.3  /  TBili  0.4  /  DBili  x   /  AST  22  /  ALT  33  /  AlkPhos  91  02-16    PT/INR - ( 2024 04:35 )   PT: 12.2 sec;   INR: 1.10 ratio         PTT - ( 2024 04:35 )  PTT:25.5 sec    Lactate, Blood: 2.5 mmol/L ( @ 19:40)

## 2024-02-16 NOTE — PROGRESS NOTE ADULT - PROBLEM SELECTOR PLAN 2
Noted to have triple vessel disease  Not candidate for CABG per CTS  May consider high risk PCI if pt is able to be weaned from ventilator

## 2024-02-16 NOTE — CONSULT NOTE ADULT - ASSESSMENT
79 y/o M admitted with NSTEMI, s/p C with multivessel disease (LM 60%, pLAD 70%, mLAD 80%, D1 100%, pLCx 100%, dRCA 90%, RI 90%). TTE with LVEF <20%, moderate MR/TR, moderate pericardial effusion. RHC  showed low filling pressures with normal cardiac output on  5; bumex gtt was discontinued. Antibiotics initiated for aspiration pneumonia. Patient was started on levophed gtt for hypotension.  Dobutamine was discontinued.  CTS following for CABG eval and deemed not a candidate for CT surgery at this time. Patient remains vented, failed Spontaneous Breathing Trials. Interventional Cardiology reconsulted for possible revascularization.     PLAN:  -Case reviewed with Dr Edwards and General Cardiology Team  -Patient with multiple active medical issues including hypoxic respiratory failure requiring ventilation; inability to wean from ventilator; aspiration pneumonia on IV abx and requiring IV pressors; WES/ ?ATN on CKD Scr 2.7  -At this time risks outweight benefit for PCI.   -Would recommend medical management at this time  -Please reconsult interventional cardiology as needed

## 2024-02-16 NOTE — PROGRESS NOTE ADULT - PROBLEM SELECTOR PLAN 1
Patient intubated in ICU on levophed, dobutamine has been d.c  BP remains soft on low dose Levophed.   Continue heparin gtt  Aspirin 81mg and Lipitor  80mg po qhs via NGT.  S/P C - MVD - CTS stating not a candidate for surgery at this time - Patient will need to be optimized,   Interventional to consider high risk PCI - will need to be medically optimized as well, after extubation, no pressors, and VS stable.    Imdur and dilaudid added.

## 2024-02-16 NOTE — PROGRESS NOTE ADULT - ASSESSMENT
77 yo male, PMHx HTN, HLD, T2DM, CKD, plaque psoriasis, anxiety, depression, history of dysphagia who declined PEG placement, who initially presented with progressively worsening substernal chest pain, admitted to MICU:    # Cardiogenic shock  # NSTEMI  # Acute biventricular systolic heart failure  # Pericardial effusion  # Acute hypoxic respiratory failure  # Acute pulmonary edema  # Aspiration pneumonitis/pneumonia  # Hyperkalemia/hypomagnesemia/hypophosphatemia/metabolic acidosis–lactic acidosis  # WES on CKD  # Hyperglycemia with underlying type II diabetes mellitus    Neuro: Sedated on Dexmedetomidine and PRN Fentanyl and Versed for vent synchrony. Plan for repeat SAT in AM. Continue home psychiatric medication regimen.  Pulm: Full vent support, FiO2 minimal. Failed SBT this morning for apnea, would retrial again later today.  CV: Weaned off Dobutamine overnight with drop in mvO2 though lactate remains negative and UOP remains stable. Supporting hemodynamics with Norepinephrine gtt keeping targeted MAP goal >65. CVP remains low off diuretics with overall net fluid balance. Continue aspirin, completed Heparin gtt for ACS. Continue statin therapy. Defer GDMT until shock resolves. LHC revealed severe multivessel disease; CT surgery consulted, not a candidate for CABG. Interventional Cardiology to reassess for viability study and possible high risk PCI once medically optimized. Presently chest pain free and troponins are downtrending again.  GI: Started tube feeds  Renal: WES likely ischemic ATN on baseline CKD, renal indices plateaued and UOP has improved as above. Will d/c HD catheter.  ID: Empiric antibiotics for aspiration pneumonia with Ceftriaxone. Culture data is negative, would complete empiric course of abx. Afebrile.  Heme: Heparin gtt for ACS d/c'd. Started Heparin SC for DVT ppx.  Endo: Hyperglycemia improved, continue Lantus 10u daily with ISS q6 to keep targeted -180 while critically ill.    Discussed with patient's wife and children at bedside. Goals of care addressed at length, now DNR and DNI.

## 2024-02-16 NOTE — PROGRESS NOTE ADULT - NS ATTEND AMEND GEN_ALL_CORE FT
Patient was deemed not a candidate for CABG and at this time is not a PCI candidate  Patient unfortuntately has failed multiple CPAP trials  The family was at bedside today and they are leaning towards comfort care but will assess after evaluating his condition over the coming days

## 2024-02-16 NOTE — PROGRESS NOTE ADULT - ASSESSMENT
WES on CKD  2  Anion Gap  Lactic Acidosis  Metabolic Acidosis  Cardiogenic Shock  Acute respiratory failure on vent     -Baseline Creatinine 1  -WES likely 2/2 hypoperfusion from cardiogenic shock, now plateau suggestive of ATN. Non-oliguric   -Urine studies pending   -Was on bumex gtt and had adequate urine output. S/p dobutamine drip. Diuresis as per ICU  -Inotrope per heart failure team  -Trend Lactic acid, improving  -No indication for RRT     D/w ICU  Critical Care time 50minutes   2/14/24-d/w daughter and wife

## 2024-02-16 NOTE — PROGRESS NOTE ADULT - NS ATTEND AMEND GEN_ALL_CORE FT
77 y/o M admitted with NSTEMI, s/p C with multivessel disease (LM 60%, pLAD 70%, mLAD 80%, D1 100%, pLCx 100%, dRCA 90%, RI 90%). TTE with LVEF <20%, moderate MR/TR, moderate pericardial effusion. In shock state; s/p RHC with RA 6, PA 22/10/15, PCWP 8, CO/CI 5.5/3.1. Was initially started on dobutamine, levophed, and bumex gtt, as well as antibiotics for aspiration PNA; bumex was d/c'ed due to low filling pressures; dobutamine was stopped; patient remains on levophed. Continue to wean as tolerated. Add further GDMT as pressors are weaned. Patient was seen by CTS and interventional cardiology; not a candidate for revascularization with CABG or high risk PCI at this time. Will continue medical management. Advanced HF following.

## 2024-02-16 NOTE — PROGRESS NOTE ADULT - ASSESSMENT
Initial HF Consult Dr. Dory Bender    77 y/o M with PMHx anxiety, HTN, HLD, CKD, plaque psoriasis, DM2 who presents to [facility] ED with 1 weeks of chest pain at rest, that woke him out of sleep today. Patient states he had been having chest pain but was mild and tolerable. The pain became sharp and he felt it midsternal with radiation to left side and he also had 6 diarrhea bowel movements. Hypotensive on presentation, given IV fluid bolus but had respiratory distress, placed on bipap and levophed initiated to maintain BP.   On 2/14 patient was complaining of chest pain again with uptrending troponins, unchanged EKG. Patient was still in respiratory distress despite BiPAP and CPAP with fever of 101.1 and cold extremities. Patient was started on levophed and dobutamine and intubated.   He underwent C 2/14 which revealed MVD. CTS consulted and he is not a surgical candidate. He was weaned off  on 2/15. Has failed SBT.     Bedside Hemodynamics:  2/16 (Levo 0.06): CVP 2, SVO2 59.3%, Harpreet CO/CI 4.0/2.4     2/15 ( 2.5, levo 0.03): CVP 4, SVO2 65.5%, Harpreet CO/CI 4.5/2.7    Cardiac Studies:  [facility] 2/14/24 which showed prox left main 60% stenosis, pros LAD with 70% stenosis, mid LAD with 80% stenosis, first diagonal 100% stenosis, prox circ 100% stenosis, distal RCA with 90% stenosis, ramus with 90% stenosis  [facility] 2/14/24: RA 6, PA 22/10/15, PCWP 8, CO/CI 5.5/3.1

## 2024-02-16 NOTE — PROGRESS NOTE ADULT - NS PANP COMMENT GEN_ALL_CORE FT
cardiogenic shock  NSTEMI, s/p LHC, multiple vessel disease, not CABG candidate, and no plan for high risk PCI too  biV failure   AHRF  aspiration pneumonia  Rajendra on CKD,    Off dobutamine in AM, however remains on levophed with uptitrating dose  trial of IVF   continue antibiotics  has been failing SBT due to apnea   may consider obtain ct head to r/o CNS relationship for apnea   rob [facility]

## 2024-02-16 NOTE — PROGRESS NOTE ADULT - ASSESSMENT
77 y/o M with PMHx anxiety, HTN, HLD, CKD, plaque psoriasis, DM2 who presents to [facility] ED with 1 weeks of chest pain at rest, that woke him out of sleep today. Patient states he had been having chest pain but was mild and tolerable. Today the pain became sharp and he felt it midsternal with radiation to left side and he also had 6 diarrhea bowel movements. Hypotensive on presentation, given IV fluid bolus but had respiratory distress, placed on bipap and levophed initiated to maintain BP.    HsTnT 2344->2477, lactate 6.2, BNP [id number]

## 2024-02-16 NOTE — PROGRESS NOTE ADULT - SUBJECTIVE AND OBJECTIVE BOX
Patient is a 78y old  Male who presents with a chief complaint of Cardiogenic Shock (15 Feb 2024 15:07)      BRIEF HOSPITAL COURSE: ***      Events last 24 hours: ***      PAST MEDICAL & SURGICAL HISTORY:  Diabetes      Hyperlipidemia      Anxiety with depression      Insomnia      No significant past surgical history              SOCIAL HISTORY:        Review of Systems:  CONSTITUTIONAL: No fever, chills, or fatigue  EYES: No visual disturbances  ENMT:  No difficulty hearing  NECK: No pain  RESPIRATORY: No cough. No shortness of breath  CARDIOVASCULAR: No chest pain, palpitations, or leg swelling  GASTROINTESTINAL: No abdominal pain. No nausea, vomiting, diarrhea, or constipation. No hematemesis, melena or hematochezia  GENITOURINARY: No dysuria, frequency, hematuria, or incontinence  NEUROLOGICAL: No headaches, loss of strength, numbness, or tremors  SKIN: No rashes  MUSCULOSKELETAL: No back or extremity pain. No calf pain  PSYCHIATRIC: No depression, anxiety, or difficulty sleeping      [  ] Due to altered mental status/intubation, subjective information was not able to be obtained from the patient. History was obtained, to the extent possible, from review of the chart and collateral sources of information.        Medications:  cefTRIAXone Injectable. 1000 milliGRAM(s) IV Push every 24 hours  cefTRIAXone Injectable.        isosorbide   mononitrate ER Tablet (IMDUR) 30 milliGRAM(s) Oral daily  norepinephrine Infusion 0.05 MICROgram(s)/kG/Min IV Continuous <Continuous>      acetaminophen     Tablet .. 650 milliGRAM(s) Oral every 6 hours PRN  ARIPiprazole 5 milliGRAM(s) Oral daily  busPIRone 10 milliGRAM(s) Oral <User Schedule>  clonazePAM  Tablet 1 milliGRAM(s) Oral daily PRN  dexMEDEtomidine Infusion 0.3 MICROgram(s)/kG/Hr IV Continuous <Continuous>  fentaNYL    Injectable 50 MICROGram(s) IV Push every 2 hours PRN  midazolam Injectable 2 milliGRAM(s) IV Push every 2 hours PRN  QUEtiapine 50 milliGRAM(s) Oral at bedtime  sertraline 100 milliGRAM(s) Oral daily      aspirin  chewable 81 milliGRAM(s) Oral daily    pantoprazole  Injectable 40 milliGRAM(s) IV Push daily      atorvastatin 80 milliGRAM(s) Oral at bedtime  dextrose 50% Injectable 25 Gram(s) IV Push once  dextrose 50% Injectable 25 Gram(s) IV Push once  dextrose 50% Injectable 12.5 Gram(s) IV Push once  insulin glargine Injectable (LANTUS) 10 Unit(s) SubCutaneous every morning  insulin lispro (ADMELOG) corrective regimen sliding scale   SubCutaneous every 6 hours    sodium chloride 0.9% lock flush 10 milliLiter(s) IV Push every 1 hour PRN    influenza  Vaccine (HIGH DOSE) 0.7 milliLiter(s) IntraMuscular once    chlorhexidine 0.12% Liquid 15 milliLiter(s) Oral Mucosa every 12 hours  chlorhexidine 2% Cloths 1 Application(s) Topical <User Schedule>  chlorhexidine 4% Liquid 1 Application(s) Topical <User Schedule>        Mode: AC/ CMV (Assist Control/ Continuous Mandatory Ventilation)  RR (machine): 20  TV (machine): 450  FiO2: 40  PEEP: 6  MAP: 10  PIP: 19      ICU Vital Signs Last 24 Hrs  T(C): 37.1 (16 Feb 2024 03:00), Max: 37.4 (15 Feb 2024 23:00)  T(F): 98.8 (16 Feb 2024 03:00), Max: 99.3 (15 Feb 2024 23:00)  HR: 71 (16 Feb 2024 03:00) (69 - 112)  BP: --  BP(mean): --  ABP: 110/53 (16 Feb 2024 03:00) (71/46 - 131/73)  ABP(mean): 75 (16 Feb 2024 03:00) (55 - 96)  RR: 20 (16 Feb 2024 03:00) (16 - 24)  SpO2: 100% (16 Feb 2024 03:00) (100% - 100%)    O2 Parameters below as of 15 Feb 2024 20:00  Patient On (Oxygen Delivery Method): ventilator            ABG - ( 15 Feb 2024 16:23 )  pH, Arterial: 7.540 pH, Blood: x     /  pCO2: 33    /  pO2: 142   / HCO3: 28    / Base Excess: 5.7   /  SaO2: 100.0               I&O's Detail    14 Feb 2024 07:01  -  15 Feb 2024 07:00  --------------------------------------------------------  IN:    Bumetanide: 80 mL    Dexmedetomidine: 405.3 mL    DOBUTamine: 9.2 mL    DOBUTamine: 213.4 mL    Heparin Infusion: 332 mL    Norepinephrine: 3.4 mL    Norepinephrine: 4.4 mL    Propofol: 99 mL  Total IN: 1146.7 mL    OUT:    Indwelling Catheter - Urethral (mL): 3680 mL  Total OUT: 3680 mL    Total NET: -2533.3 mL      15 Feb 2024 07:01  -  16 Feb 2024 03:14  --------------------------------------------------------  IN:    Dexmedetomidine: 249.4 mL    DOBUTamine: 64.4 mL    Glucerna 1.5: 50 mL    Heparin Infusion: 217 mL    IV PiggyBack: 50 mL    Norepinephrine: 108.3 mL    Propofol: 66 mL  Total IN: 805.1 mL    OUT:    Indwelling Catheter - Urethral (mL): 1195 mL  Total OUT: 1195 mL    Total NET: -389.9 mL            LABS:                        8.8    9.36  )-----------( 345      ( 15 Feb 2024 16:15 )             26.7     02-15    137  |  96  |  77.6<H>  ----------------------------<  151<H>  3.6   |  23.0  |  2.59<H>    Ca    8.8      15 Feb 2024 21:47  Phos  3.8     02-15  Mg     2.0     02-15    TPro  6.5<L>  /  Alb  3.2<L>  /  TBili  0.3<L>  /  DBili  x   /  AST  29  /  ALT  36  /  AlkPhos  91  02-15          CAPILLARY BLOOD GLUCOSE      POCT Blood Glucose.: 164 mg/dL (16 Feb 2024 00:01)    PT/INR - ( 15 Feb 2024 16:15 )   PT: 12.6 sec;   INR: 1.14 ratio         PTT - ( 15 Feb 2024 16:15 )  PTT:81.4 sec  Urinalysis Basic - ( 15 Feb 2024 21:47 )    Color: x / Appearance: x / SG: x / pH: x  Gluc: 151 mg/dL / Ketone: x  / Bili: x / Urobili: x   Blood: x / Protein: x / Nitrite: x   Leuk Esterase: x / RBC: x / WBC x   Sq Epi: x / Non Sq Epi: x / Bacteria: x      CULTURES:  Culture Results:   Normal Respiratory Hamida present (02-14 @ 05:18)  Culture Results:   No growth at 24 hours (02-14 @ 01:50)  Culture Results:   No growth at 24 hours (02-14 @ 01:45)  Rapid RVP Result: NotDetec (02-13 @ 22:06)  Culture Results:   No growth (02-12 @ 22:40)  Rapid RVP Result: NotDetec (02-12 @ 17:58)  Culture Results:   No growth at 72 Hours (02-12 @ 17:56)  Culture Results:   No growth at 72 Hours (02-12 @ 17:46)          Physical Examination:    General: Frail. No acute distress.      HEENT: Pupils equal, reactive to light.  Symmetric.    PULM: Clear to auscultation bilaterally.    CVS: Sinus tachycardia    ABD: Soft, nondistended, nontender    EXT: No edema    SKIN: Warm and well perfused    NEURO: RASS -1          RADIOLOGY: ***        INVASIVE LINES:  INDWELLING HOOD:  VTE PROPHYLAXIS:  CAM ICU:  CODE STATUS:        CRITICAL CARE TIME SPENT: 42 minutes spent performing frequent bedside reassessments and augmenting plan of care to address problems of acute critical illness that pose high probability of life threatening deterioration and/or end organ damage/dysfunction and discussing goals of care, non-inclusive of time spent on procedures performed. Patient is a 78y old  Male who presents with a chief complaint of Cardiogenic Shock (15 Feb 2024 15:07)      BRIEF HOSPITAL COURSE: 77 yo male, PMHx HTN, HLD, T2DM, CKD, plaque psoriasis, anxiety, depression, history of dysphagia who declined PEG placement, who initially presented with progressively worsening substernal chest pain that onset the night prior with multiple episodes of diarrhea as well as 1 episode of nausea and vomiting. No fever, chills, dysuria, frequency, flank pain, abdominal pain, recent URI, loss of consciousness, dizziness, palpitations. Initial EKG on arrival without ST elevation though inferolateral Q waves. Labs remarkable for elevated troponin, WES, anion gap metabolic lactic acidosis. CXR with pulmonary edema and possible underlying pneumonia. ER course complicated by hypotension for which he received 1L IV fluid bolus subsequently developed significant hypoxia and respiratory distress requiring bilevel placement and placement on norepinephrine infusion following which medical ICU consulted. He developed worsening shock, respiratory distress, and fevers. He was intubated 2/14, started on Dobutamine and Bumex infusion with improvement in hypoxia, shock, and UOP. 2/14 underwent RHC with normal right sided pressures and normal CO/CI on Dobutamine. LHC revealed severe multivessel disease; CT surgery was consulted for CABG evaluation.       Events last 24 hours: Remains intubated on full vent support. Awoke on Dexmedetomidine and is following commands, failed SBT for apnea.       PAST MEDICAL & SURGICAL HISTORY:  Diabetes      Hyperlipidemia      Anxiety with depression      Insomnia      No significant past surgical history              SOCIAL HISTORY:        Review of Systems:  CONSTITUTIONAL: No fever, chills, or fatigue  EYES: No visual disturbances  ENMT:  No difficulty hearing  NECK: No pain  RESPIRATORY: No cough. No shortness of breath  CARDIOVASCULAR: No chest pain, palpitations, or leg swelling  GASTROINTESTINAL: No abdominal pain. No nausea, vomiting, diarrhea, or constipation. No hematemesis, melena or hematochezia  GENITOURINARY: No dysuria, frequency, hematuria, or incontinence  NEUROLOGICAL: No headaches, loss of strength, numbness, or tremors  SKIN: No rashes  MUSCULOSKELETAL: No back or extremity pain. No calf pain  PSYCHIATRIC: No depression, anxiety, or difficulty sleeping      [  ] Due to altered mental status/intubation, subjective information was not able to be obtained from the patient. History was obtained, to the extent possible, from review of the chart and collateral sources of information.        Medications:  cefTRIAXone Injectable. 1000 milliGRAM(s) IV Push every 24 hours  cefTRIAXone Injectable.        isosorbide   mononitrate ER Tablet (IMDUR) 30 milliGRAM(s) Oral daily  norepinephrine Infusion 0.05 MICROgram(s)/kG/Min IV Continuous <Continuous>      acetaminophen     Tablet .. 650 milliGRAM(s) Oral every 6 hours PRN  ARIPiprazole 5 milliGRAM(s) Oral daily  busPIRone 10 milliGRAM(s) Oral <User Schedule>  clonazePAM  Tablet 1 milliGRAM(s) Oral daily PRN  dexMEDEtomidine Infusion 0.3 MICROgram(s)/kG/Hr IV Continuous <Continuous>  fentaNYL    Injectable 50 MICROGram(s) IV Push every 2 hours PRN  midazolam Injectable 2 milliGRAM(s) IV Push every 2 hours PRN  QUEtiapine 50 milliGRAM(s) Oral at bedtime  sertraline 100 milliGRAM(s) Oral daily      aspirin  chewable 81 milliGRAM(s) Oral daily    pantoprazole  Injectable 40 milliGRAM(s) IV Push daily      atorvastatin 80 milliGRAM(s) Oral at bedtime  dextrose 50% Injectable 25 Gram(s) IV Push once  dextrose 50% Injectable 25 Gram(s) IV Push once  dextrose 50% Injectable 12.5 Gram(s) IV Push once  insulin glargine Injectable (LANTUS) 10 Unit(s) SubCutaneous every morning  insulin lispro (ADMELOG) corrective regimen sliding scale   SubCutaneous every 6 hours    sodium chloride 0.9% lock flush 10 milliLiter(s) IV Push every 1 hour PRN    influenza  Vaccine (HIGH DOSE) 0.7 milliLiter(s) IntraMuscular once    chlorhexidine 0.12% Liquid 15 milliLiter(s) Oral Mucosa every 12 hours  chlorhexidine 2% Cloths 1 Application(s) Topical <User Schedule>  chlorhexidine 4% Liquid 1 Application(s) Topical <User Schedule>        Mode: AC/ CMV (Assist Control/ Continuous Mandatory Ventilation)  RR (machine): 20  TV (machine): 450  FiO2: 40  PEEP: 6  MAP: 10  PIP: 19      ICU Vital Signs Last 24 Hrs  T(C): 37.1 (16 Feb 2024 03:00), Max: 37.4 (15 Feb 2024 23:00)  T(F): 98.8 (16 Feb 2024 03:00), Max: 99.3 (15 Feb 2024 23:00)  HR: 71 (16 Feb 2024 03:00) (69 - 112)  BP: --  BP(mean): --  ABP: 110/53 (16 Feb 2024 03:00) (71/46 - 131/73)  ABP(mean): 75 (16 Feb 2024 03:00) (55 - 96)  RR: 20 (16 Feb 2024 03:00) (16 - 24)  SpO2: 100% (16 Feb 2024 03:00) (100% - 100%)    O2 Parameters below as of 15 Feb 2024 20:00  Patient On (Oxygen Delivery Method): ventilator            ABG - ( 15 Feb 2024 16:23 )  pH, Arterial: 7.540 pH, Blood: x     /  pCO2: 33    /  pO2: 142   / HCO3: 28    / Base Excess: 5.7   /  SaO2: 100.0               I&O's Detail    14 Feb 2024 07:01  -  15 Feb 2024 07:00  --------------------------------------------------------  IN:    Bumetanide: 80 mL    Dexmedetomidine: 405.3 mL    DOBUTamine: 9.2 mL    DOBUTamine: 213.4 mL    Heparin Infusion: 332 mL    Norepinephrine: 3.4 mL    Norepinephrine: 4.4 mL    Propofol: 99 mL  Total IN: 1146.7 mL    OUT:    Indwelling Catheter - Urethral (mL): 3680 mL  Total OUT: 3680 mL    Total NET: -2533.3 mL      15 Feb 2024 07:01  -  16 Feb 2024 03:14  --------------------------------------------------------  IN:    Dexmedetomidine: 249.4 mL    DOBUTamine: 64.4 mL    Glucerna 1.5: 50 mL    Heparin Infusion: 217 mL    IV PiggyBack: 50 mL    Norepinephrine: 108.3 mL    Propofol: 66 mL  Total IN: 805.1 mL    OUT:    Indwelling Catheter - Urethral (mL): 1195 mL  Total OUT: 1195 mL    Total NET: -389.9 mL            LABS:                        8.8    9.36  )-----------( 345      ( 15 Feb 2024 16:15 )             26.7     02-15    137  |  96  |  77.6<H>  ----------------------------<  151<H>  3.6   |  23.0  |  2.59<H>    Ca    8.8      15 Feb 2024 21:47  Phos  3.8     02-15  Mg     2.0     02-15    TPro  6.5<L>  /  Alb  3.2<L>  /  TBili  0.3<L>  /  DBili  x   /  AST  29  /  ALT  36  /  AlkPhos  91  02-15          CAPILLARY BLOOD GLUCOSE      POCT Blood Glucose.: 164 mg/dL (16 Feb 2024 00:01)    PT/INR - ( 15 Feb 2024 16:15 )   PT: 12.6 sec;   INR: 1.14 ratio         PTT - ( 15 Feb 2024 16:15 )  PTT:81.4 sec  Urinalysis Basic - ( 15 Feb 2024 21:47 )    Color: x / Appearance: x / SG: x / pH: x  Gluc: 151 mg/dL / Ketone: x  / Bili: x / Urobili: x   Blood: x / Protein: x / Nitrite: x   Leuk Esterase: x / RBC: x / WBC x   Sq Epi: x / Non Sq Epi: x / Bacteria: x      CULTURES:  Culture Results:   Normal Respiratory Hamida present (02-14 @ 05:18)  Culture Results:   No growth at 24 hours (02-14 @ 01:50)  Culture Results:   No growth at 24 hours (02-14 @ 01:45)  Rapid RVP Result: NotDetec (02-13 @ 22:06)  Culture Results:   No growth (02-12 @ 22:40)  Rapid RVP Result: NotDetec (02-12 @ 17:58)  Culture Results:   No growth at 72 Hours (02-12 @ 17:56)  Culture Results:   No growth at 72 Hours (02-12 @ 17:46)          Physical Examination:    General: Frail. No acute distress.      HEENT: Pupils equal, reactive to light.  Symmetric.    PULM: Clear to auscultation bilaterally.    CVS: Sinus tachycardia    ABD: Soft, nondistended, nontender    EXT: No edema    SKIN: Warm and well perfused    NEURO: RASS -1          RADIOLOGY: ***        INVASIVE LINES:  INDWELLING HOOD:  VTE PROPHYLAXIS:  CAM ICU:  CODE STATUS:        CRITICAL CARE TIME SPENT: 42 minutes spent performing frequent bedside reassessments and augmenting plan of care to address problems of acute critical illness that pose high probability of life threatening deterioration and/or end organ damage/dysfunction and discussing goals of care, non-inclusive of time spent on procedures performed. Patient is a 78y old  Male who presents with a chief complaint of Cardiogenic Shock (15 Feb 2024 15:07)      BRIEF HOSPITAL COURSE: 79 yo male, PMHx HTN, HLD, T2DM, CKD, plaque psoriasis, anxiety, depression, history of dysphagia who declined PEG placement, who initially presented with progressively worsening substernal chest pain that onset the night prior with multiple episodes of diarrhea as well as 1 episode of nausea and vomiting. No fever, chills, dysuria, frequency, flank pain, abdominal pain, recent URI, loss of consciousness, dizziness, palpitations. Initial EKG on arrival without ST elevation though inferolateral Q waves. Labs remarkable for elevated troponin, WES, anion gap metabolic lactic acidosis. CXR with pulmonary edema and possible underlying pneumonia. ER course complicated by hypotension for which he received 1L IV fluid bolus subsequently developed significant hypoxia and respiratory distress requiring bilevel placement and placement on norepinephrine infusion following which medical ICU consulted. He developed worsening shock, respiratory distress, and fevers. He was intubated 2/14, started on Dobutamine and Bumex infusion with improvement in hypoxia, shock, and UOP. 2/14 underwent RHC with normal right sided pressures and normal CO/CI on Dobutamine. LHC revealed severe multivessel disease; CT surgery was consulted for CABG evaluation.       Events last 24 hours: Awoke on Dexmedetomidine and is following commands, though failed SBT for apnea. Placed back on full vent support. Weaned off Dobutamine overnight, mvO2 67 decreased to 59 off inotropes, though lactate remains normal and UOP is unchanged. Supporting hemodynamics on Norepinephrine gtt.          PAST MEDICAL & SURGICAL HISTORY:  Diabetes      Hyperlipidemia      Anxiety with depression      Insomnia      No significant past surgical history          Review of Systems: Due to intubation, subjective information was not able to be obtained from the patient. History was obtained, to the extent possible, from review of the chart and collateral sources of information.          Medications:  cefTRIAXone Injectable. 1000 milliGRAM(s) IV Push every 24 hours  cefTRIAXone Injectable.        isosorbide   mononitrate ER Tablet (IMDUR) 30 milliGRAM(s) Oral daily  norepinephrine Infusion 0.05 MICROgram(s)/kG/Min IV Continuous <Continuous>      acetaminophen     Tablet .. 650 milliGRAM(s) Oral every 6 hours PRN  ARIPiprazole 5 milliGRAM(s) Oral daily  busPIRone 10 milliGRAM(s) Oral <User Schedule>  clonazePAM  Tablet 1 milliGRAM(s) Oral daily PRN  dexMEDEtomidine Infusion 0.3 MICROgram(s)/kG/Hr IV Continuous <Continuous>  fentaNYL    Injectable 50 MICROGram(s) IV Push every 2 hours PRN  midazolam Injectable 2 milliGRAM(s) IV Push every 2 hours PRN  QUEtiapine 50 milliGRAM(s) Oral at bedtime  sertraline 100 milliGRAM(s) Oral daily      aspirin  chewable 81 milliGRAM(s) Oral daily    pantoprazole  Injectable 40 milliGRAM(s) IV Push daily      atorvastatin 80 milliGRAM(s) Oral at bedtime  dextrose 50% Injectable 25 Gram(s) IV Push once  dextrose 50% Injectable 25 Gram(s) IV Push once  dextrose 50% Injectable 12.5 Gram(s) IV Push once  insulin glargine Injectable (LANTUS) 10 Unit(s) SubCutaneous every morning  insulin lispro (ADMELOG) corrective regimen sliding scale   SubCutaneous every 6 hours    sodium chloride 0.9% lock flush 10 milliLiter(s) IV Push every 1 hour PRN    influenza  Vaccine (HIGH DOSE) 0.7 milliLiter(s) IntraMuscular once    chlorhexidine 0.12% Liquid 15 milliLiter(s) Oral Mucosa every 12 hours  chlorhexidine 2% Cloths 1 Application(s) Topical <User Schedule>  chlorhexidine 4% Liquid 1 Application(s) Topical <User Schedule>          Mode: AC/ CMV (Assist Control/ Continuous Mandatory Ventilation)  RR (machine): 20  TV (machine): 450  FiO2: 40  PEEP: 6  MAP: 10  PIP: 19          ICU Vital Signs Last 24 Hrs  T(C): 37.1 (16 Feb 2024 03:00), Max: 37.4 (15 Feb 2024 23:00)  T(F): 98.8 (16 Feb 2024 03:00), Max: 99.3 (15 Feb 2024 23:00)  HR: 71 (16 Feb 2024 03:00) (69 - 112)  BP: --  BP(mean): --  ABP: 110/53 (16 Feb 2024 03:00) (71/46 - 131/73)  ABP(mean): 75 (16 Feb 2024 03:00) (55 - 96)  RR: 20 (16 Feb 2024 03:00) (16 - 24)  SpO2: 100% (16 Feb 2024 03:00) (100% - 100%)    O2 Parameters below as of 15 Feb 2024 20:00  Patient On (Oxygen Delivery Method): ventilator            ABG - ( 15 Feb 2024 16:23 )  pH, Arterial: 7.540 pH, Blood: x     /  pCO2: 33    /  pO2: 142   / HCO3: 28    / Base Excess: 5.7   /  SaO2: 100.0               I&O's Detail    14 Feb 2024 07:01  -  15 Feb 2024 07:00  --------------------------------------------------------  IN:    Bumetanide: 80 mL    Dexmedetomidine: 405.3 mL    DOBUTamine: 9.2 mL    DOBUTamine: 213.4 mL    Heparin Infusion: 332 mL    Norepinephrine: 3.4 mL    Norepinephrine: 4.4 mL    Propofol: 99 mL  Total IN: 1146.7 mL    OUT:    Indwelling Catheter - Urethral (mL): 3680 mL  Total OUT: 3680 mL    Total NET: -2533.3 mL      15 Feb 2024 07:01  -  16 Feb 2024 03:14  --------------------------------------------------------  IN:    Dexmedetomidine: 249.4 mL    DOBUTamine: 64.4 mL    Glucerna 1.5: 50 mL    Heparin Infusion: 217 mL    IV PiggyBack: 50 mL    Norepinephrine: 108.3 mL    Propofol: 66 mL  Total IN: 805.1 mL    OUT:    Indwelling Catheter - Urethral (mL): 1195 mL  Total OUT: 1195 mL    Total NET: -389.9 mL            LABS:                        8.8    9.36  )-----------( 345      ( 15 Feb 2024 16:15 )             26.7     02-15    137  |  96  |  77.6<H>  ----------------------------<  151<H>  3.6   |  23.0  |  2.59<H>    Ca    8.8      15 Feb 2024 21:47  Phos  3.8     02-15  Mg     2.0     02-15    TPro  6.5<L>  /  Alb  3.2<L>  /  TBili  0.3<L>  /  DBili  x   /  AST  29  /  ALT  36  /  AlkPhos  91  02-15          CAPILLARY BLOOD GLUCOSE      POCT Blood Glucose.: 164 mg/dL (16 Feb 2024 00:01)    PT/INR - ( 15 Feb 2024 16:15 )   PT: 12.6 sec;   INR: 1.14 ratio         PTT - ( 15 Feb 2024 16:15 )  PTT:81.4 sec  Urinalysis Basic - ( 15 Feb 2024 21:47 )    Color: x / Appearance: x / SG: x / pH: x  Gluc: 151 mg/dL / Ketone: x  / Bili: x / Urobili: x   Blood: x / Protein: x / Nitrite: x   Leuk Esterase: x / RBC: x / WBC x   Sq Epi: x / Non Sq Epi: x / Bacteria: x      CULTURES:  Culture Results:   Normal Respiratory Hamida present (02-14 @ 05:18)  Culture Results:   No growth at 24 hours (02-14 @ 01:50)  Culture Results:   No growth at 24 hours (02-14 @ 01:45)  Rapid RVP Result: NotDetec (02-13 @ 22:06)  Culture Results:   No growth (02-12 @ 22:40)  Rapid RVP Result: NotDetec (02-12 @ 17:58)  Culture Results:   No growth at 72 Hours (02-12 @ 17:56)  Culture Results:   No growth at 72 Hours (02-12 @ 17:46)          Physical Examination:    General: Frail. No acute distress.      HEENT: Pupils equal, reactive to light.  Symmetric.    PULM: Clear to auscultation bilaterally.    CVS: Sinus tachycardia    ABD: Soft, nondistended, nontender    EXT: No edema    SKIN: Warm and well perfused    NEURO: RASS -1          RADIOLOGY: < from: Xray Chest 1 View-PORTABLE IMMEDIATE (Xray Chest 1 View-PORTABLE IMMEDIATE .) (02.15.24 @ 06:28) >  IMPRESSION:  Enteric tube extends into left hemiabdomen. Tip not included   on image. Side port may be near the GE junction. ET tube and other lines   as above.    Previous bilateral perihilar opacities are improved with some residual,   predominantly in the lower lungs, seen. The findings likely represent   improving pulmonary edema.    --- End of Report ---            ABEL RUBY MD; Attending Radiologist  This document has been electronically signed. Feb 15 2024  2:23PM    < end of copied text >          INVASIVE LINES: R IJ HD cath, L IJ TLC, L ax A line  INDWELLING HOOD: +   VTE PROPHYLAXIS: Heparin SC  CAM ICU: N/A  CODE STATUS: DNR/DNI        CRITICAL CARE TIME SPENT: 52 minutes spent performing frequent bedside reassessments and augmenting plan of care to address problems of acute critical illness that pose high probability of life threatening deterioration and/or end organ damage/dysfunction and discussing goals of care, non-inclusive of time spent on procedures performed.

## 2024-02-16 NOTE — CHART NOTE - NSCHARTNOTEFT_GEN_A_CORE
Palliative care social work note.    SW had made multiple attempts today to visit family at bedside to provide support and address their understanding and thoughts into goals of care. Family not present at bedside. Palliative care team remains available.

## 2024-02-16 NOTE — CHART NOTE - NSCHARTNOTEFT_GEN_A_CORE
pt not surgical candidate at this time given multiple ongoing medical issues (respiratory failure, WES/ATN, pressory sppport). CT surgery to sign off. please reconsult as needed.    d/w Dr. Camacho

## 2024-02-16 NOTE — PROGRESS NOTE ADULT - SUBJECTIVE AND OBJECTIVE BOX
Patient is a 78y old  Male who presents with a chief complaint of Cardiogenic Shock (14 Feb 2024 16:48)       HPI:  78M with anxiety, HTN, HLD, CKD, plaque psoriasis, DM2 who presents with burning substernal chest pain at rest that started 1wk ago but acutely progressed this morning to sharp pain with radiation to L arm with associated multiple bouts of diarrhea. On ED arrival, afebrile, hypotensive to SBP 80-90s despite 1L IVF requiring levophed. EKG with nonspecific ST changes, troponin 2344->2477. Labs otherwise notable for K 5.6, BUN/Cr 43.5/2.31 (baseline Cr ~1 in 2022), BNP 53029, lactate 6.2. CXR with pulmonary edema. Placed on BiPAP for SOB and hypoxia. STAT TTE with EF<20%, multiple segmental abnormalities, mod MR, mod TR, mod pericardial effusion without tamponade. Received total lasix 40mg IV in ED. ICU consulted for further management of cardiogenic shock.   Intubated. History obtained from daughter and wife. Has not seen nephrologist in the past.        PAST MEDICAL & SURGICAL HISTORY:  Diabetes      Hyperlipidemia      Anxiety with depression      Insomnia      No significant past surgical history           FAMILY HISTORY:  No pertinent family history in first degree relatives    [place]    Social History:Non smoker    MEDICATIONS  (STANDING):  atorvastatin 80 milliGRAM(s) Oral at bedtime  cefTRIAXone Injectable. 1000 milliGRAM(s) IV Push every 24 hours  cefTRIAXone Injectable.      chlorhexidine 0.12% Liquid 15 milliLiter(s) Oral Mucosa every 12 hours  chlorhexidine 2% Cloths 1 Application(s) Topical <User Schedule>  chlorhexidine 4% Liquid 1 Application(s) Topical <User Schedule>  dexMEDEtomidine Infusion 0.3 MICROgram(s)/kG/Hr (4.59 mL/Hr) IV Continuous <Continuous>  dextrose 50% Injectable 25 Gram(s) IV Push once  dextrose 50% Injectable 12.5 Gram(s) IV Push once  dextrose 50% Injectable 25 Gram(s) IV Push once  DOBUTamine Infusion 5 MICROgram(s)/kG/Min (9.18 mL/Hr) IV Continuous <Continuous>  heparin  Infusion.  Unit(s)/Hr (7.5 mL/Hr) IV Continuous <Continuous>  influenza  Vaccine (HIGH DOSE) 0.7 milliLiter(s) IntraMuscular once  insulin lispro (ADMELOG) corrective regimen sliding scale   SubCutaneous every 6 hours  norepinephrine Infusion 0.05 MICROgram(s)/kG/Min (5.74 mL/Hr) IV Continuous <Continuous>  pantoprazole  Injectable 40 milliGRAM(s) IV Push daily  propofol Infusion 20 MICROgram(s)/kG/Min (7.34 mL/Hr) IV Continuous <Continuous>    MEDICATIONS  (PRN):  acetaminophen     Tablet .. 650 milliGRAM(s) Oral every 6 hours PRN Temp greater or equal to 38C (100.4F)  fentaNYL    Injectable 50 MICROGram(s) IV Push every 2 hours PRN vent synchrony or pain  heparin   Injectable 3800 Unit(s) IV Push every 6 hours PRN For aPTT less than 40  midazolam Injectable 2 milliGRAM(s) IV Push every 2 hours PRN Vent synchrony or anxiety  sodium chloride 0.9% lock flush 10 milliLiter(s) IV Push every 1 hour PRN Pre/post blood products, medications, blood draw, and to maintain line patency   Meds reviewed    Allergies    No Known Allergies    Intolerances         REVIEW OF SYSTEMS:    Review of Systems:   UTO 2/2 intubation      ICU Vital Signs Last 24 Hrs  T(C): 36.9 (16 Feb 2024 10:00), Max: 37.4 (15 Feb 2024 23:00)  T(F): 98.4 (16 Feb 2024 10:00), Max: 99.3 (15 Feb 2024 23:00)  HR: 66 (16 Feb 2024 13:00) (63 - 92)  BP: --  BP(mean): --  ABP: 104/49 (16 Feb 2024 13:00) (96/50 - 127/64)  ABP(mean): 69 (16 Feb 2024 13:00) (68 - 88)  RR: 20 (16 Feb 2024 13:00) (16 - 24)  SpO2: 100% (16 Feb 2024 13:00) (100% - 100%)    O2 Parameters below as of 16 Feb 2024 04:00  Patient On (Oxygen Delivery Method): ventilator            PHYSICAL EXAM:    GENERAL: ill appearing  HEAD:  Atraumatic, Normocephalic  EYES: EOMI, conjunctiva and sclera clear  ENMT: No Drainage from nares, No drainage from ears  NERVOUS SYSTEM:  intubated  CHEST/LUNG: decrased  EXTREMITIES:  No Edema  SKIN: No rashes No obvious ecchymosis      LABS:                        8.5    9.83  )-----------( 359      ( 16 Feb 2024 04:35 )             25.5     02-16    139  |  97  |  84.2<H>  ----------------------------<  137<H>  4.2   |  22.0  |  2.70<H>    Ca    9.0      16 Feb 2024 04:35  Phos  3.5     02-16  Mg     2.4     02-16    TPro  6.4<L>  /  Alb  3.3  /  TBili  0.4  /  DBili  x   /  AST  22  /  ALT  33  /  AlkPhos  91  02-16    PT/INR - ( 16 Feb 2024 04:35 )   PT: 12.2 sec;   INR: 1.10 ratio         PTT - ( 16 Feb 2024 04:35 )  PTT:25.5 sec  Urinalysis Basic - ( 16 Feb 2024 04:35 )    Color: x / Appearance: x / SG: x / pH: x  Gluc: 137 mg/dL / Ketone: x  / Bili: x / Urobili: x   Blood: x / Protein: x / Nitrite: x   Leuk Esterase: x / RBC: x / WBC x   Sq Epi: x / Non Sq Epi: x / Bacteria: x        ABG - ( 15 Feb 2024 16:23 )  pH, Arterial: 7.540 pH, Blood: x     /  pCO2: 33    /  pO2: 142   / HCO3: 28    / Base Excess: 5.7   /  SaO2: 100.0

## 2024-02-16 NOTE — PROGRESS NOTE ADULT - SUBJECTIVE AND OBJECTIVE BOX
CC:  Follow up GOC , Symptoms    OVERNIGHT EVENTS:  not a candidate for CABG and PCI    Present Symptoms:   Dyspnea:  No  on vent  Nausea/Vomiting:  No    Anxiety:  No     Depression:  Unable  Fatigue:  Yes     Loss of appetite:     NA on TF  Constipation: Not Reported     Pain: No Signs            Location            Duration            Character            Severity            Factors            Effect    Pain AD Score:  [email or web address] (press ctrl + left click to view)    Review of Systems: Reviewed as above  Further ROS unable to  obtain due to poor mentation      MEDICATIONS  (STANDING):  ARIPiprazole 5 milliGRAM(s) Oral daily  aspirin  chewable 81 milliGRAM(s) Oral daily  atorvastatin 80 milliGRAM(s) Oral at bedtime  busPIRone 10 milliGRAM(s) Oral <User Schedule>  cefTRIAXone Injectable. 1000 milliGRAM(s) IV Push every 24 hours  cefTRIAXone Injectable.      chlorhexidine 0.12% Liquid 15 milliLiter(s) Oral Mucosa every 12 hours  chlorhexidine 2% Cloths 1 Application(s) Topical <User Schedule>  chlorhexidine 4% Liquid 1 Application(s) Topical <User Schedule>  dexMEDEtomidine Infusion 0.3 MICROgram(s)/kG/Hr (4.59 mL/Hr) IV Continuous <Continuous>  dextrose 50% Injectable 12.5 Gram(s) IV Push once  dextrose 50% Injectable 25 Gram(s) IV Push once  dextrose 50% Injectable 25 Gram(s) IV Push once  heparin   Injectable 5000 Unit(s) SubCutaneous every 8 hours  influenza  Vaccine (HIGH DOSE) 0.7 milliLiter(s) IntraMuscular once  insulin glargine Injectable (LANTUS) 10 Unit(s) SubCutaneous every morning  insulin lispro (ADMELOG) corrective regimen sliding scale   SubCutaneous every 6 hours  isosorbide   mononitrate ER Tablet (IMDUR) 30 milliGRAM(s) Oral daily  norepinephrine Infusion 0.05 MICROgram(s)/kG/Min (5.74 mL/Hr) IV Continuous <Continuous>  pantoprazole  Injectable 40 milliGRAM(s) IV Push daily  QUEtiapine 50 milliGRAM(s) Oral at bedtime  sertraline 100 milliGRAM(s) Oral daily    MEDICATIONS  (PRN):  acetaminophen     Tablet .. 650 milliGRAM(s) Oral every 6 hours PRN Temp greater or equal to 38C (100.4F)  clonazePAM  Tablet 1 milliGRAM(s) Oral daily PRN for anxiety  fentaNYL    Injectable 50 MICROGram(s) IV Push every 2 hours PRN vent synchrony or pain  midazolam Injectable 2 milliGRAM(s) IV Push every 2 hours PRN Vent synchrony or anxiety  sodium chloride 0.9% lock flush 10 milliLiter(s) IV Push every 1 hour PRN Pre/post blood products, medications, blood draw, and to maintain line patency      PHYSICAL EXAM:    Vital Signs Last 24 Hrs  T(C): 36.9 (16 Feb 2024 10:00), Max: 37.4 (15 Feb 2024 23:00)  T(F): 98.4 (16 Feb 2024 10:00), Max: 99.3 (15 Feb 2024 23:00)  HR: 66 (16 Feb 2024 12:30) (63 - 92)  BP: --  BP(mean): --  RR: 20 (16 Feb 2024 12:00) (16 - 24)  SpO2: 100% (16 Feb 2024 12:30) (100% - 100%)    Parameters below as of 16 Feb 2024 04:00  Patient On (Oxygen Delivery Method): ventilator    Karnofsky:  10%  General: Elderly man NAD    HEENT:  NCAT   ( x )  ET tube     Lungs: comfortable  non labored  CV:  RR  GI:  soft NTND  MSK: bedbound  Skin:  warm/dry  Neuro  sedated    LABS:                          8.5    9.83  )-----------( 359      ( 16 Feb 2024 04:35 )             25.5     02-16    139  |  97  |  84.2<H>  ----------------------------<  137<H>  4.2   |  22.0  |  2.70<H>    Ca    9.0      16 Feb 2024 04:35  Phos  3.5     02-16  Mg     2.4     02-16    TPro  6.4<L>  /  Alb  3.3  /  TBili  0.4  /  DBili  x   /  AST  22  /  ALT  33  /  AlkPhos  91  02-16    PT/INR - ( 16 Feb 2024 04:35 )   PT: 12.2 sec;   INR: 1.10 ratio         PTT - ( 16 Feb 2024 04:35 )  PTT:25.5 sec  Urinalysis Basic - ( 16 Feb 2024 04:35 )    Color: x / Appearance: x / SG: x / pH: x  Gluc: 137 mg/dL / Ketone: x  / Bili: x / Urobili: x   Blood: x / Protein: x / Nitrite: x   Leuk Esterase: x / RBC: x / WBC x   Sq Epi: x / Non Sq Epi: x / Bacteria: x      I&O's Summary    15 Feb 2024 07:01  -  16 Feb 2024 07:00  --------------------------------------------------------  IN: 1001.6 mL / OUT: 1405 mL / NET: -403.4 mL    16 Feb 2024 07:01  -  16 Feb 2024 12:47  --------------------------------------------------------  IN: 258 mL / OUT: 350 mL / NET: -92 mL        RADIOLOGY & ADDITIONAL STUDIES:  Imaging Reviewed  (  x )   < from: TTE Limited W or WO Ultrasound Enhancing Agent (02.13.24 @ 21:29) >    TRANSTHORACIC ECHOCARDIOGRAM REPORT  ________________________________________________________________________________                                      _______       Pt. Name:       CABRERA DENSON Study Date:    2/13/2024  MRN:            [id number]         YOB: 1945  Accession #:    [id number]        Age:           78 years  Account#:       [id number]       Gender:        M  Heart Rate:                      Height:        65.00 in (165.10 cm)  Rhythm:                          Weight:        134.64 lb (61.07 kg)  Blood Pressure: 78/53 mmHg       BSA/BMI:       1.67 m² / 22.41 kg/m²  ________________________________________________________________________________________  Referring Physician:    [id number] Kin Darnell  Interpreting Physician: Melissa Pablo MD  Primary Sonographer:    Prudence Hamilton    CPT:               ECHO TTE W/O CON F/U LTD - 72723.m;LIMITED SPECTRAL - 62386.m  Indication(s):     Cardiogenic shock - R57.0  Procedure:         Limited transthoracic echocardiogram.  Ordering Location: [facility]  Admission Status:  Inpatient    _______________________________________________________________________________________     CONCLUSIONS:      1. Left ventricular systolic function is severely decreased with an ejection fraction visually estimated at 25 to 30 %.   2. Normal right ventricular cavity size and normal systolic function.   3. Estimated pulmonary artery systolic pressure is 44 mmHg.   4. Trace pericardial effusion noted adjacent to the right ventricle.      < end of copied text >      ADVANCE DIRECTIVE PREFERENCES    DNR/I  and continuing medical treatments

## 2024-02-16 NOTE — CONSULT NOTE ADULT - SUBJECTIVE AND OBJECTIVE BOX
[facility]                                              INTERVENTIONAL CARDIOLOGY AT [place] [address]                                             Telephone: [phone]. Fax:[phone]                                                       INTERVENTIONAL CARDIOLOGY CONSULTATION NOTE                                                                                             History obtained by: Patient and medical record  Reason for Consultation: Evaluation for cardiac catheterization  Available pt records reviewed: Yes [ x ] No [  ]    Chief complaint:    Patient is a 78y old  Male who presents with a chief complaint of Cardiogenic Shock (2024 10:11)      HPI:   79 y/o M admitted with NSTEMI, s/p LHC with multivessel disease (LM 60%, pLAD 70%, mLAD 80%, D1 100%, pLCx 100%, dRCA 90%, RI 90%). TTE with LVEF <20%, moderate MR/TR, moderate pericardial effusion. RHC  showed low filling pressures with normal cardiac output on  5; bumex gtt was discontinued. Antibiotics initiated for aspiration pneumonia. Patient was started on levophed gtt for hypotension.  Dobutamine was discontinued.  CTS following for CABG eval and deemed not a candidate for CT surgery at this time. Patient remains vented, failed Spontaneous Breathing Trials. Interventional Cardiology reconsulted for possible revascularization.     PAST MEDICAL HISTORY  Diabetes    Hyperlipidemia    Anxiety with depression    Insomnia    PAST SURGICAL HISTORY  No significant past surgical history    FAMILY HISTORY:  No pertinent family history in first degree relatives     ]    HOME MEDICATIONS:  ARIPiprazole 5 mg oral tablet: 1 tab(s) orally once a day (2024 21:31)  busPIRone 10 mg oral tablet: 1 tab(s) orally once a day (2024 19:32)  KlonoPIN 1 mg oral tablet: 1 tab(s) orally once a day as needed for  anxiety (2024 21:31)  lisinopril 40 mg oral tablet: 1 tab(s) orally once a day (2024 21:31)  metFORMIN 500 mg oral tablet: 1 tab(s) orally 2 times a day (2024 19:32)  QUEtiapine 50 mg oral tablet: 1 tab(s) orally once a day (at bedtime) (2024 19:32)  sertraline 100 mg oral tablet: 1 tab(s) orally once a day (2024 21:31)  simvastatin 20 mg oral tablet: 1 tab(s) orally once a day (2024 21:31)      CURRENT CARDIAC MEDICATIONS:  isosorbide   mononitrate ER Tablet (IMDUR) 30 milliGRAM(s) Oral daily  norepinephrine Infusion 0.05 MICROgram(s)/kG/Min IV Continuous <Continuous>      Antianginal Therapies:        Beta Blockers: n/a       Calcium Channel Blockers: n/a        Long Acting Nitrates: isosorbide   mononitrate ER Tablet (IMDUR) 30 milliGRAM(s) Oral daily       Ranexa: n/a    ALLERGIES:   No Known Allergies      REVIEW OF SYMPTOMS:   UNABLE TO ASSESS PATIENT REMAINS VENTED AND SOMNOLENT ON PRECEDEX GTT. PATIENT AROUSES TO VERBAL STIMULI BUT WILL NOT NOD HEAD TO QUESTIONS    VITAL SIGNS:  T(C): 36.9 (24 @ 10:00), Max: 37.4 (02-15-24 @ 23:00)  T(F): 98.4 (24 @ 10:00), Max: 99.3 (02-15-24 @ 23:00)  HR: 63 (24 @ 10:00) (63 - 92)  BP: --  RR: 20 (24 @ 10:00) (16 - 24)  SpO2: 100% (24 @ 10:00) (100% - 100%)    INTAKE AND OUTPUT:     02-15 @ 07:01  -   @ 07:00  --------------------------------------------------------  IN: 1001.6 mL / OUT: 1405 mL / NET: -403.4 mL        PHYSICAL EXAM:  Constitutional: Remains sedated on precedex gtt; remains vented. no apparent distress.   HEENT: Atraumatic and normocephalic , neck is supple . no JVD. No carotid bruit.  CNS: Patient asleep, arouses easily to verbal stimuli. Moving all extremities spontaneously; not following commands  Respiratory: Vented, coarse breath sounds  Cardiovascular: RRR normal s1 s2. No murmur. No rubs or gallop.  Gastrointestinal: Soft, non-tender. +Bowel sounds.   Extremities: warm; dry; no edema  Psychiatric: sedated on precedex infusion  Skin: Warm and dry    LABS:                            8.5    9.83  )-----------( 359      ( 2024 04:35 )             25.5     02-16    139  |  97  |  84.2<H>  ----------------------------<  137<H>  4.2   |  22.0  |  2.70<H>    Ca    9.0      2024 04:35  Phos  3.5     02-16  Mg     2.4     02-16    TPro  6.4<L>  /  Alb  3.3  /  TBili  0.4  /  DBili  x   /  AST  22  /  ALT  33  /  AlkPhos  91  02-16    PT/INR - ( 2024 04:35 )   PT: 12.2 sec;   INR: 1.10 ratio         PTT - ( 2024 04:35 )  PTT:25.5 sec  Urinalysis Basic - ( 2024 04:35 )    Color: x / Appearance: x / SG: x / pH: x  Gluc: 137 mg/dL / Ketone: x  / Bili: x / Urobili: x   Blood: x / Protein: x / Nitrite: x   Leuk Esterase: x / RBC: x / WBC x   Sq Epi: x / Non Sq Epi: x / Bacteria: x    ECG: < from: 12 Lead ECG (24 @ 07:37) >    Diagnosis Line Normal sinus rhythm  Low voltage QRS  Left anterior fascicular block  Anteroseptal infarct , age undetermined  Abnormal ECG    < end of copied text >    Prior ECG: Yes [ X ] No [  ]    CARDIAC TESTING   ECHO: < from: TTE Limited W or WO Ultrasound Enhancing Agent (24 @ 21:29) >     CONCLUSIONS:      1. Left ventricular systolic function is severely decreased with an ejection fraction visually estimated at 25 to 30 %.   2. Normal right ventricular cavity size and normal systolic function.   3. Estimated pulmonary artery systolic pressure is 44 mmHg.   4. Trace pericardial effusion noted adjacent to the right ventricle.    < end of copied text >      CATH: < from: Cardiac Catheterization (24 @ 13:28) >  Diagnostic Findings:     Coronary Angiography   The coronary circulation is right dominant. Cardiac catheterization  was performed electively.    LM   Left main artery: The segment is normal sized and severely calcified.  Proximal left main: There is a 60 % stenosis.    LAD   Left anterior descending artery: The segment is normal sized and  moderately calcified. Proximal left anterior descending: There  is a 70 % stenosis. Mid left anterior descending: There is an 80 %  stenosis. First diagonal: There is a 100 % stenosis.    CX   Circumflex: The segment is normal sized. Proximal circumflex: There is  a 100 % stenosis.    RCA   Right coronary artery: The segment is large and severely calcified.  Distal right coronary artery: There is a 90 % stenosis. Right  Posterolateral Segment: There is a 90 % stenosis. Right posterior  descending artery: There is a 90 % stenosis.    Ramus   Ramus intermedius: both upper and lower poles. There is a 90 %  stenosis.      < end of copied text >      ELECTROPHYSIOLOGY:

## 2024-02-17 LAB
ALBUMIN SERPL ELPH-MCNC: 3.2 G/DL — LOW (ref 3.3–5.2)
ALP SERPL-CCNC: 95 U/L — SIGNIFICANT CHANGE UP (ref 40–120)
ALT FLD-CCNC: 26 U/L — SIGNIFICANT CHANGE UP
ANION GAP SERPL CALC-SCNC: 18 MMOL/L — HIGH (ref 5–17)
AST SERPL-CCNC: 15 U/L — SIGNIFICANT CHANGE UP
BASOPHILS # BLD AUTO: 0.02 K/UL — SIGNIFICANT CHANGE UP (ref 0–0.2)
BASOPHILS NFR BLD AUTO: 0.2 % — SIGNIFICANT CHANGE UP (ref 0–2)
BILIRUB SERPL-MCNC: 0.4 MG/DL — SIGNIFICANT CHANGE UP (ref 0.4–2)
BUN SERPL-MCNC: 90.6 MG/DL — HIGH (ref 8–20)
CALCIUM SERPL-MCNC: 8.7 MG/DL — SIGNIFICANT CHANGE UP (ref 8.4–10.5)
CHLORIDE SERPL-SCNC: 102 MMOL/L — SIGNIFICANT CHANGE UP (ref 96–108)
CO2 SERPL-SCNC: 23 MMOL/L — SIGNIFICANT CHANGE UP (ref 22–29)
CREAT SERPL-MCNC: 2.35 MG/DL — HIGH (ref 0.5–1.3)
EGFR: 27 ML/MIN/1.73M2 — LOW
EOSINOPHIL # BLD AUTO: 0.05 K/UL — SIGNIFICANT CHANGE UP (ref 0–0.5)
EOSINOPHIL NFR BLD AUTO: 0.6 % — SIGNIFICANT CHANGE UP (ref 0–6)
GLUCOSE BLDC GLUCOMTR-MCNC: 177 MG/DL — HIGH (ref 70–99)
GLUCOSE BLDC GLUCOMTR-MCNC: 220 MG/DL — HIGH (ref 70–99)
GLUCOSE BLDC GLUCOMTR-MCNC: 222 MG/DL — HIGH (ref 70–99)
GLUCOSE BLDC GLUCOMTR-MCNC: 250 MG/DL — HIGH (ref 70–99)
GLUCOSE SERPL-MCNC: 223 MG/DL — HIGH (ref 70–99)
HCT VFR BLD CALC: 25.4 % — LOW (ref 39–50)
HGB BLD-MCNC: 8.3 G/DL — LOW (ref 13–17)
IMM GRANULOCYTES NFR BLD AUTO: 0.2 % — SIGNIFICANT CHANGE UP (ref 0–0.9)
LYMPHOCYTES # BLD AUTO: 1.77 K/UL — SIGNIFICANT CHANGE UP (ref 1–3.3)
LYMPHOCYTES # BLD AUTO: 20.1 % — SIGNIFICANT CHANGE UP (ref 13–44)
MAGNESIUM SERPL-MCNC: 2.3 MG/DL — SIGNIFICANT CHANGE UP (ref 1.6–2.6)
MCHC RBC-ENTMCNC: 26.6 PG — LOW (ref 27–34)
MCHC RBC-ENTMCNC: 32.7 GM/DL — SIGNIFICANT CHANGE UP (ref 32–36)
MCV RBC AUTO: 81.4 FL — SIGNIFICANT CHANGE UP (ref 80–100)
MONOCYTES # BLD AUTO: 0.78 K/UL — SIGNIFICANT CHANGE UP (ref 0–0.9)
MONOCYTES NFR BLD AUTO: 8.8 % — SIGNIFICANT CHANGE UP (ref 2–14)
NEUTROPHILS # BLD AUTO: 6.18 K/UL — SIGNIFICANT CHANGE UP (ref 1.8–7.4)
NEUTROPHILS NFR BLD AUTO: 70.1 % — SIGNIFICANT CHANGE UP (ref 43–77)
PHOSPHATE SERPL-MCNC: 3.7 MG/DL — SIGNIFICANT CHANGE UP (ref 2.4–4.7)
PLATELET # BLD AUTO: 316 K/UL — SIGNIFICANT CHANGE UP (ref 150–400)
POTASSIUM SERPL-MCNC: 3.8 MMOL/L — SIGNIFICANT CHANGE UP (ref 3.5–5.3)
POTASSIUM SERPL-SCNC: 3.8 MMOL/L — SIGNIFICANT CHANGE UP (ref 3.5–5.3)
PROT SERPL-MCNC: 6.4 G/DL — LOW (ref 6.6–8.7)
RBC # BLD: 3.12 M/UL — LOW (ref 4.2–5.8)
RBC # FLD: 14.7 % — HIGH (ref 10.3–14.5)
SODIUM SERPL-SCNC: 143 MMOL/L — SIGNIFICANT CHANGE UP (ref 135–145)
WBC # BLD: 8.82 K/UL — SIGNIFICANT CHANGE UP (ref 3.8–10.5)
WBC # FLD AUTO: 8.82 K/UL — SIGNIFICANT CHANGE UP (ref 3.8–10.5)

## 2024-02-17 PROCEDURE — 99291 CRITICAL CARE FIRST HOUR: CPT

## 2024-02-17 PROCEDURE — 71250 CT THORAX DX C-: CPT | Mod: 26

## 2024-02-17 PROCEDURE — 70450 CT HEAD/BRAIN W/O DYE: CPT | Mod: 26

## 2024-02-17 RX ORDER — FUROSEMIDE 40 MG
80 TABLET ORAL ONCE
Refills: 0 | Status: COMPLETED | OUTPATIENT
Start: 2024-02-17 | End: 2024-02-17

## 2024-02-17 RX ADMIN — CEFTRIAXONE 1000 MILLIGRAM(S): 500 INJECTION, POWDER, FOR SOLUTION INTRAMUSCULAR; INTRAVENOUS at 05:24

## 2024-02-17 RX ADMIN — Medication 4: at 11:46

## 2024-02-17 RX ADMIN — CHLORHEXIDINE GLUCONATE 15 MILLILITER(S): 213 SOLUTION TOPICAL at 17:33

## 2024-02-17 RX ADMIN — ATORVASTATIN CALCIUM 80 MILLIGRAM(S): 80 TABLET, FILM COATED ORAL at 21:24

## 2024-02-17 RX ADMIN — Medication 10 MILLIGRAM(S): at 08:54

## 2024-02-17 RX ADMIN — FENTANYL CITRATE 50 MICROGRAM(S): 50 INJECTION INTRAVENOUS at 11:36

## 2024-02-17 RX ADMIN — CHLORHEXIDINE GLUCONATE 1 APPLICATION(S): 213 SOLUTION TOPICAL at 05:24

## 2024-02-17 RX ADMIN — QUETIAPINE FUMARATE 50 MILLIGRAM(S): 200 TABLET, FILM COATED ORAL at 21:26

## 2024-02-17 RX ADMIN — FENTANYL CITRATE 50 MICROGRAM(S): 50 INJECTION INTRAVENOUS at 01:31

## 2024-02-17 RX ADMIN — CHLORHEXIDINE GLUCONATE 15 MILLILITER(S): 213 SOLUTION TOPICAL at 05:24

## 2024-02-17 RX ADMIN — Medication 4: at 05:24

## 2024-02-17 RX ADMIN — ARIPIPRAZOLE 5 MILLIGRAM(S): 15 TABLET ORAL at 11:37

## 2024-02-17 RX ADMIN — FENTANYL CITRATE 50 MICROGRAM(S): 50 INJECTION INTRAVENOUS at 02:00

## 2024-02-17 RX ADMIN — HEPARIN SODIUM 5000 UNIT(S): 5000 INJECTION INTRAVENOUS; SUBCUTANEOUS at 05:24

## 2024-02-17 RX ADMIN — FENTANYL CITRATE 50 MICROGRAM(S): 50 INJECTION INTRAVENOUS at 12:00

## 2024-02-17 RX ADMIN — Medication 2: at 17:33

## 2024-02-17 RX ADMIN — Medication 80 MILLIGRAM(S): at 21:24

## 2024-02-17 RX ADMIN — SERTRALINE 100 MILLIGRAM(S): 25 TABLET, FILM COATED ORAL at 11:38

## 2024-02-17 RX ADMIN — INSULIN GLARGINE 10 UNIT(S): 100 INJECTION, SOLUTION SUBCUTANEOUS at 08:53

## 2024-02-17 RX ADMIN — HEPARIN SODIUM 5000 UNIT(S): 5000 INJECTION INTRAVENOUS; SUBCUTANEOUS at 13:19

## 2024-02-17 RX ADMIN — HEPARIN SODIUM 5000 UNIT(S): 5000 INJECTION INTRAVENOUS; SUBCUTANEOUS at 21:25

## 2024-02-17 RX ADMIN — MIDAZOLAM HYDROCHLORIDE 2 MILLIGRAM(S): 1 INJECTION, SOLUTION INTRAMUSCULAR; INTRAVENOUS at 07:41

## 2024-02-17 RX ADMIN — PANTOPRAZOLE SODIUM 40 MILLIGRAM(S): 20 TABLET, DELAYED RELEASE ORAL at 11:37

## 2024-02-17 RX ADMIN — MIDAZOLAM HYDROCHLORIDE 2 MILLIGRAM(S): 1 INJECTION, SOLUTION INTRAMUSCULAR; INTRAVENOUS at 21:49

## 2024-02-17 RX ADMIN — Medication 81 MILLIGRAM(S): at 11:38

## 2024-02-17 NOTE — PROGRESS NOTE ADULT - ASSESSMENT
77 y/o M with a h/o HTN, HLD, T2DM, CKD, plaque psoriasis, anxiety, depression, history of dysphagia who declined PEG placement, with:     79 y/o M with a h/o HTN, HLD, T2DM, CKD, plaque psoriasis, anxiety, depression, history of dysphagia who declined PEG placement, with:    # Cardiogenic shock  # Acute systolic heart failure  # NSTEMI  # Multivessel CAD  # Acute hypoxemic respiratory failure  # Acute pulmonary edema  # Aspiration pneumonitis/pneumonia  # WES on CKD    - actively titrating norepinephrine infusion to maintain a MAP > 65, increasing dose rate requirement  - dobutamine remains off, lactate 1.00, nonoliguric, clinically warm and well perfused  - diuresis PRN  - unable to start GDMT at this time due to hemodynamic instability  - continue aspirin and high intensity statin  - not a candidate for CABG or high risk PCI at this time given MSOF  - cardiothoracic surgery and cardiology input appreciated  - actively titrating ventilator settings to maintain SpO2 > 92%  - continues to fail spontaneous breathing trials due to apnea, will perform again later this morning  - on/off dexmedetomidine gtt with IV fentanyl boluses PRN to promote vent synchrony  - blood cultures negative for growth thus far, sputum culture growing normal resp anita  - continue empiric ceftriaxone for now  - WES secondary to ischemic ATN, optimize end-organ perfusion as above  - trend BUN/Cr, electrolytes, acid-base balance, and monitor hourly UOP  - no indication for urgent RRT at this time    Case discussed with MICU physician, Dr. Sarmiento.

## 2024-02-17 NOTE — DIETITIAN NUTRITION RISK NOTIFICATION - TREATMENT: THE FOLLOWING DIET HAS BEEN RECOMMENDED
Diet, NPO with Tube Feed:   Tube Feeding Modality: Nasogastric  Glucerna 1.5 Thomas (GLUCERNA1.5)  Total Volume for 24 Hours (mL): 1200  Continuous  Starting Tube Feed Rate {mL per Hour}: 10  Increase Tube Feed Rate by (mL): 10     Every 6 hours  Until Goal Tube Feed Rate (mL per Hour): 50  Tube Feed Duration (in Hours): 24  Tube Feed Start Time: 22:00 (02-15-24 @ 21:30) [Active]

## 2024-02-17 NOTE — PROGRESS NOTE ADULT - SUBJECTIVE AND OBJECTIVE BOX
[facility]                                                         CARDIOLOGY AT [facility]                                                                  [address], [place] [address]                                                         Telephone: [phone]. Fax:[phone]                                                                             PROGRESS NOTE    Reason for follow up: HFrEF, Multivessel CAD  Update: Patient weaned off levophed earlier this morning, but has continued to fail spontaneous breathing trials. Patient evaluated by CT Surgery and not a candidate for CABG, and per Interventional Cardiology is not stable enough at this time for high risk PCI.         Vitals:  T(C): 37.3 (02-17-24 @ 08:00), Max: 38.2 (02-16-24 @ 22:45)  HR: 77 (02-17-24 @ 09:00) (65 - 106)  BP: --  RR: 20 (02-17-24 @ 09:00) (17 - 22)  SpO2: 100% (02-17-24 @ 09:00) (98% - 100%)  Wt(kg): --  I&O's Summary    16 Feb 2024 07:01  -  17 Feb 2024 07:00  --------------------------------------------------------  IN: 1222 mL / OUT: 1395 mL / NET: -173 mL    17 Feb 2024 07:01  -  17 Feb 2024 10:01  --------------------------------------------------------  IN: 150 mL / OUT: 150 mL / NET: 0 mL      Weight (kg): 61.2 (02-12 @ 14:06)    PHYSICAL EXAM:  Appearance: Intubated  HEENT:  Atraumatic. Normocephalic.  Normal oral mucosa  Neurologic: A & O x 1, no gross focal deficits.   Cardiovascular: RRR S1 S2,  no rubs/gallops. No JVD, II/VI systolic murmur lsb  Respiratory: Lungs clear to auscultation, unlabored   Gastrointestinal:  Soft, Non-tender, + BS  Lower Extremities: 2+ Peripheral Pulses, No clubbing, cyanosis, or edema  Psychiatry: Patient is calm. No agitation.   Skin: warm and dry.    CURRENT CARDIAC MEDICATIONS:  norepinephrine Infusion 0.05 MICROgram(s)/kG/Min IV Continuous <Continuous>      CURRENT OTHER MEDICATIONS:  cefTRIAXone Injectable. 1000 milliGRAM(s) IV Push every 24 hours  cefTRIAXone Injectable.      acetaminophen     Tablet .. 650 milliGRAM(s) Oral every 6 hours PRN Temp greater or equal to 38C (100.4F)  ARIPiprazole 5 milliGRAM(s) Oral daily  busPIRone 10 milliGRAM(s) Oral <User Schedule>  clonazePAM  Tablet 1 milliGRAM(s) Oral daily PRN for anxiety  dexMEDEtomidine Infusion 0.3 MICROgram(s)/kG/Hr (4.59 mL/Hr) IV Continuous <Continuous>  fentaNYL    Injectable 50 MICROGram(s) IV Push every 2 hours PRN vent synchrony or pain  midazolam Injectable 2 milliGRAM(s) IV Push every 2 hours PRN Vent synchrony or anxiety  QUEtiapine 50 milliGRAM(s) Oral at bedtime  sertraline 100 milliGRAM(s) Oral daily  pantoprazole  Injectable 40 milliGRAM(s) IV Push daily  atorvastatin 80 milliGRAM(s) Oral at bedtime  dextrose 50% Injectable 12.5 Gram(s) IV Push once, Stop order after: 1 Doses  dextrose 50% Injectable 25 Gram(s) IV Push once, Stop order after: 1 Doses  dextrose 50% Injectable 25 Gram(s) IV Push once, Stop order after: 1 Doses  insulin glargine Injectable (LANTUS) 10 Unit(s) SubCutaneous every morning  insulin lispro (ADMELOG) corrective regimen sliding scale   SubCutaneous every 6 hours  aspirin  chewable 81 milliGRAM(s) Oral daily  chlorhexidine 0.12% Liquid 15 milliLiter(s) Oral Mucosa every 12 hours  chlorhexidine 2% Cloths 1 Application(s) Topical <User Schedule>  chlorhexidine 4% Liquid 1 Application(s) Topical <User Schedule>  heparin   Injectable 5000 Unit(s) SubCutaneous every 8 hours  influenza  Vaccine (HIGH DOSE) 0.7 milliLiter(s) IntraMuscular once  sodium chloride 0.9% lock flush 10 milliLiter(s) IV Push every 1 hour PRN Pre/post blood products, medications, blood draw, and to maintain line patency      LABS:	 	                            8.3    8.82  )-----------( 316      ( 17 Feb 2024 03:45 )             25.4     02-17    143  |  102  |  90.6<H>  ----------------------------<  223<H>  3.8   |  23.0  |  2.35<H>    Ca    8.7      17 Feb 2024 03:45  Phos  3.7     02-17  Mg     2.3     02-17    TPro  6.4<L>  /  Alb  3.2<L>  /  TBili  0.4  /  DBili  x   /  AST  15  /  ALT  26  /  AlkPhos  95  02-17    PT/INR/PTT ( 16 Feb 2024 04:35 )                       :                       :      12.2         :       25.5                  .        .                   .              .           .       1.10        .                                       Lipid Profile: Date: 02-13 @ 11:15  Total cholesterol 179; Direct LDL: --; HDL: 49; Triglycerides:94  Date: 02-13 @ 03:08  Total cholesterol 183; Direct LDL: --; HDL: 48; Triglycerides:76    HgA1c:   TSH: Thyroid Stimulating Hormone, Serum: 2.49 uIU/mL      TELEMETRY:   ECG:    DIAGNOSTIC TESTING:  [ ] Echocardiogram:   [ ]  Catheterization:  [ ] Stress Test:    OTHER: 	                                                                [facility]                                                         CARDIOLOGY AT [facility]                                                                  [address], [place] [address]                                                         Telephone: [phone]. Fax:[phone]                                                                             PROGRESS NOTE    Reason for follow up: HFrEF, Multivessel CAD  Update: Patient weaned off levophed earlier this morning, but has continued to fail spontaneous breathing trials. Patient evaluated by CT Surgery and not a candidate for CABG, and per Interventional Cardiology is not stable enough at this time for high risk PCI.         Vitals:  T(C): 37.3 (02-17-24 @ 08:00), Max: 38.2 (02-16-24 @ 22:45)  HR: 77 (02-17-24 @ 09:00) (65 - 106)  BP: --  RR: 20 (02-17-24 @ 09:00) (17 - 22)  SpO2: 100% (02-17-24 @ 09:00) (98% - 100%)  Wt(kg): --  I&O's Summary    16 Feb 2024 07:01  -  17 Feb 2024 07:00  --------------------------------------------------------  IN: 1222 mL / OUT: 1395 mL / NET: -173 mL    17 Feb 2024 07:01  -  17 Feb 2024 10:01  --------------------------------------------------------  IN: 150 mL / OUT: 150 mL / NET: 0 mL      Weight (kg): 61.2 (02-12 @ 14:06)    PHYSICAL EXAM:  Appearance: Intubated  HEENT:  Atraumatic. Normocephalic.  Normal oral mucosa  Neurologic: A & O x 1, no gross focal deficits.   Cardiovascular: RRR S1 S2,  no rubs/gallops. No JVD, II/VI systolic murmur lsb  Respiratory: Coarse breath sounds bilaterally   Gastrointestinal:  Soft, Non-tender, + BS  Lower Extremities: 2+ Peripheral Pulses, No clubbing, cyanosis, or edema  Psychiatry: Patient is calm. No agitation.   Skin: warm and dry.    CURRENT CARDIAC MEDICATIONS:  norepinephrine Infusion 0.05 MICROgram(s)/kG/Min IV Continuous <Continuous>      CURRENT OTHER MEDICATIONS:  cefTRIAXone Injectable. 1000 milliGRAM(s) IV Push every 24 hours  cefTRIAXone Injectable.      acetaminophen     Tablet .. 650 milliGRAM(s) Oral every 6 hours PRN Temp greater or equal to 38C (100.4F)  ARIPiprazole 5 milliGRAM(s) Oral daily  busPIRone 10 milliGRAM(s) Oral <User Schedule>  clonazePAM  Tablet 1 milliGRAM(s) Oral daily PRN for anxiety  dexMEDEtomidine Infusion 0.3 MICROgram(s)/kG/Hr (4.59 mL/Hr) IV Continuous <Continuous>  fentaNYL    Injectable 50 MICROGram(s) IV Push every 2 hours PRN vent synchrony or pain  midazolam Injectable 2 milliGRAM(s) IV Push every 2 hours PRN Vent synchrony or anxiety  QUEtiapine 50 milliGRAM(s) Oral at bedtime  sertraline 100 milliGRAM(s) Oral daily  pantoprazole  Injectable 40 milliGRAM(s) IV Push daily  atorvastatin 80 milliGRAM(s) Oral at bedtime  dextrose 50% Injectable 12.5 Gram(s) IV Push once, Stop order after: 1 Doses  dextrose 50% Injectable 25 Gram(s) IV Push once, Stop order after: 1 Doses  dextrose 50% Injectable 25 Gram(s) IV Push once, Stop order after: 1 Doses  insulin glargine Injectable (LANTUS) 10 Unit(s) SubCutaneous every morning  insulin lispro (ADMELOG) corrective regimen sliding scale   SubCutaneous every 6 hours  aspirin  chewable 81 milliGRAM(s) Oral daily  chlorhexidine 0.12% Liquid 15 milliLiter(s) Oral Mucosa every 12 hours  chlorhexidine 2% Cloths 1 Application(s) Topical <User Schedule>  chlorhexidine 4% Liquid 1 Application(s) Topical <User Schedule>  heparin   Injectable 5000 Unit(s) SubCutaneous every 8 hours  influenza  Vaccine (HIGH DOSE) 0.7 milliLiter(s) IntraMuscular once  sodium chloride 0.9% lock flush 10 milliLiter(s) IV Push every 1 hour PRN Pre/post blood products, medications, blood draw, and to maintain line patency      LABS:	 	                            8.3    8.82  )-----------( 316      ( 17 Feb 2024 03:45 )             25.4     02-17    143  |  102  |  90.6<H>  ----------------------------<  223<H>  3.8   |  23.0  |  2.35<H>    Ca    8.7      17 Feb 2024 03:45  Phos  3.7     02-17  Mg     2.3     02-17    TPro  6.4<L>  /  Alb  3.2<L>  /  TBili  0.4  /  DBili  x   /  AST  15  /  ALT  26  /  AlkPhos  95  02-17    PT/INR/PTT ( 16 Feb 2024 04:35 )                       :                       :      12.2         :       25.5                  .        .                   .              .           .       1.10        .                                       Lipid Profile: Date: 02-13 @ 11:15  Total cholesterol 179; Direct LDL: --; HDL: 49; Triglycerides:94  Date: 02-13 @ 03:08  Total cholesterol 183; Direct LDL: --; HDL: 48; Triglycerides:76    HgA1c:   TSH: Thyroid Stimulating Hormone, Serum: 2.49 uIU/mL      TELEMETRY: SR, short burst of atrial tachycardia  ECG:    DIAGNOSTIC TESTING:  [ ] Echocardiogram:   < from: TTE Limited W or WO Ultrasound Enhancing Agent (02.13.24 @ 21:29) >  CONCLUSIONS:      1. Left ventricular systolic function is severely decreased with an ejection fraction visually estimated at 25 to 30 %.   2. Normal right ventricular cavity size and normal systolic function.   3. Estimated pulmonary artery systolic pressure is 44 mmHg.   4. Trace pericardial effusion noted adjacent to the right ventricle.      < end of copied text >    [ ]  Catheterization:  < from: Cardiac Catheterization (02.14.24 @ 13:28) >  Diagnostic Findings:     Coronary Angiography   The coronary circulation is right dominant. Cardiac catheterization  was performed electively.    LM   Left main artery: The segment is normal sized and severely calcified.  Proximal left main: There is a 60 % stenosis.    LAD   Left anterior descending artery: The segment is normal sized and  moderately calcified. Proximal left anterior descending: There  is a 70 % stenosis. Mid left anterior descending: There is an 80 %  stenosis. First diagonal: There is a 100 % stenosis.    CX   Circumflex: The segment is normal sized. Proximal circumflex: There is  a 100 % stenosis.    RCA   Right coronary artery: The segment is large and severely calcified.  Distal right coronary artery: There is a 90 % stenosis. Right  Posterolateral Segment: There is a 90 % stenosis. Right posterior  descending artery: There is a 90 % stenosis.    Ramus   Ramus intermedius: both upper and lower poles. There is a 90 %  stenosis.    < end of copied text >    [ ] Stress Test:    OTHER:

## 2024-02-17 NOTE — DIETITIAN NUTRITION RISK NOTIFICATION - ADDITIONAL COMMENTS/DIETITIAN RECOMMENDATIONS
Change enteral regime (per ICU protocol) Glucerna 1.5 @ 60 ml/hr x 18 hrs (1080 ml, 1620 kcals, 89 g pro, 821 ml free water)

## 2024-02-17 NOTE — PROGRESS NOTE ADULT - ASSESSMENT
A/P: 77 y/o M with PMHx anxiety, HTN, HLD, CKD, plaque psoriasis, DM2 who presents to [facility] ED with 1 weeks of chest pain at rest, that woke him out of sleep today. Patient states he had been having chest pain but was mild and tolerable. Today the pain became sharp and he felt it midsternal with radiation to left side and he also had 6 diarrhea bowel movements. Hypotensive on presentation, given IV fluid bolus but had respiratory distress, placed on bipap and levophed initiated to maintain BP.    HsTnT 2344->2477, lactate 6.2, BNP 68041

## 2024-02-17 NOTE — PROGRESS NOTE ADULT - PROBLEM SELECTOR PLAN 1
- Patient still intubated, but weaned off levophed and dobutamine.   - No longer on heparin gtt.   - LHC with LM 60%, pLAD 70%, mLAD 80%, D1 100%, pLCx 100%, dRCA 90%, RI 90%.   - Evaluated by CT Surgery, and deemed not a surgical candidate.   - Start Plavix 75mg PO qday.   - Continue aspirin and lipitor.   - Eventual high risk PCI.   - Start Imdur when BP allows.

## 2024-02-17 NOTE — PROGRESS NOTE ADULT - SUBJECTIVE AND OBJECTIVE BOX
Patient is a 78y old  Male who presents with a chief complaint of Cardiogenic Shock (14 Feb 2024 16:48)       HPI:  78M with anxiety, HTN, HLD, CKD, plaque psoriasis, DM2 who presents with burning substernal chest pain at rest that started 1wk ago but acutely progressed this morning to sharp pain with radiation to L arm with associated multiple bouts of diarrhea. On ED arrival, afebrile, hypotensive to SBP 80-90s despite 1L IVF requiring levophed. EKG with nonspecific ST changes, troponin 2344->2477. Labs otherwise notable for K 5.6, BUN/Cr 43.5/2.31 (baseline Cr ~1 in 2022), BNP 30658, lactate 6.2. CXR with pulmonary edema. Placed on BiPAP for SOB and hypoxia. STAT TTE with EF<20%, multiple segmental abnormalities, mod MR, mod TR, mod pericardial effusion without tamponade. Received total lasix 40mg IV in ED. ICU consulted for further management of cardiogenic shock.   Intubated. History obtained from daughter and wife. Has not seen nephrologist in the past.      intubated    PAST MEDICAL & SURGICAL HISTORY:  Diabetes      Hyperlipidemia      Anxiety with depression      Insomnia      No significant past surgical history           FAMILY HISTORY:  No pertinent family history in first degree relatives    [place]    Social History:Non smoker    MEDICATIONS  (STANDING):  atorvastatin 80 milliGRAM(s) Oral at bedtime  cefTRIAXone Injectable. 1000 milliGRAM(s) IV Push every 24 hours  cefTRIAXone Injectable.      chlorhexidine 0.12% Liquid 15 milliLiter(s) Oral Mucosa every 12 hours  chlorhexidine 2% Cloths 1 Application(s) Topical <User Schedule>  chlorhexidine 4% Liquid 1 Application(s) Topical <User Schedule>  dexMEDEtomidine Infusion 0.3 MICROgram(s)/kG/Hr (4.59 mL/Hr) IV Continuous <Continuous>  dextrose 50% Injectable 25 Gram(s) IV Push once  dextrose 50% Injectable 12.5 Gram(s) IV Push once  dextrose 50% Injectable 25 Gram(s) IV Push once  DOBUTamine Infusion 5 MICROgram(s)/kG/Min (9.18 mL/Hr) IV Continuous <Continuous>  heparin  Infusion.  Unit(s)/Hr (7.5 mL/Hr) IV Continuous <Continuous>  influenza  Vaccine (HIGH DOSE) 0.7 milliLiter(s) IntraMuscular once  insulin lispro (ADMELOG) corrective regimen sliding scale   SubCutaneous every 6 hours  norepinephrine Infusion 0.05 MICROgram(s)/kG/Min (5.74 mL/Hr) IV Continuous <Continuous>  pantoprazole  Injectable 40 milliGRAM(s) IV Push daily  propofol Infusion 20 MICROgram(s)/kG/Min (7.34 mL/Hr) IV Continuous <Continuous>    MEDICATIONS  (PRN):  acetaminophen     Tablet .. 650 milliGRAM(s) Oral every 6 hours PRN Temp greater or equal to 38C (100.4F)  fentaNYL    Injectable 50 MICROGram(s) IV Push every 2 hours PRN vent synchrony or pain  heparin   Injectable 3800 Unit(s) IV Push every 6 hours PRN For aPTT less than 40  midazolam Injectable 2 milliGRAM(s) IV Push every 2 hours PRN Vent synchrony or anxiety  sodium chloride 0.9% lock flush 10 milliLiter(s) IV Push every 1 hour PRN Pre/post blood products, medications, blood draw, and to maintain line patency   Meds reviewed    Allergies    No Known Allergies    Intolerances         REVIEW OF SYSTEMS:    Review of Systems:   UTO 2/2 intubation      ICU Vital Signs Last 24 Hrs  T(C): 37.5 (17 Feb 2024 20:00), Max: 38.2 (16 Feb 2024 22:45)  T(F): 99.5 (17 Feb 2024 20:00), Max: 100.8 (16 Feb 2024 22:45)  HR: 79 (17 Feb 2024 20:07) (67 - 106)  BP: --  BP(mean): --  ABP: 118/48 (17 Feb 2024 20:00) (84/41 - 133/70)  ABP(mean): 75 (17 Feb 2024 20:00) (57 - 95)  RR: 20 (17 Feb 2024 20:00) (10 - 22)  SpO2: 100% (17 Feb 2024 20:07) (98% - 100%)    O2 Parameters below as of 17 Feb 2024 20:00  Patient On (Oxygen Delivery Method): ventilator    O2 Concentration (%): 40                PHYSICAL EXAM:    GENERAL: ill appearing  HEAD:  Atraumatic, Normocephalic  EYES: EOMI, conjunctiva and sclera clear  ENMT: No Drainage from nares, No drainage from ears  NERVOUS SYSTEM:  intubated  CHEST/LUNG: decrased  EXTREMITIES:  No Edema  SKIN: No rashes No obvious ecchymosis      LABS:                                   8.3    8.82  )-----------( 316      ( 17 Feb 2024 03:45 )             25.4     02-17    143  |  102  |  90.6<H>  ----------------------------<  223<H>  3.8   |  23.0  |  2.35<H>    Ca    8.7      17 Feb 2024 03:45  Phos  3.7     02-17  Mg     2.3     02-17    TPro  6.4<L>  /  Alb  3.2<L>  /  TBili  0.4  /  DBili  x   /  AST  15  /  ALT  26  /  AlkPhos  95  02-17    PT/INR - ( 16 Feb 2024 04:35 )   PT: 12.2 sec;   INR: 1.10 ratio         PTT - ( 16 Feb 2024 04:35 )  PTT:25.5 sec  Urinalysis Basic - ( 17 Feb 2024 03:45 )    Color: x / Appearance: x / SG: x / pH: x  Gluc: 223 mg/dL / Ketone: x  / Bili: x / Urobili: x   Blood: x / Protein: x / Nitrite: x   Leuk Esterase: x / RBC: x / WBC x   Sq Epi: x / Non Sq Epi: x / Bacteria: x        ABG - ( 16 Feb 2024 17:09 )  pH, Arterial: 7.430 pH, Blood: x     /  pCO2: 35    /  pO2: 135   / HCO3: 23    / Base Excess: -1.1  /  SaO2: 100.0                   ABG - ( 15 Feb 2024 16:23 )  pH, Arterial: 7.540 pH, Blood: x     /  pCO2: 33    /  pO2: 142   / HCO3: 28    / Base Excess: 5.7   /  SaO2: 100.0

## 2024-02-17 NOTE — PROGRESS NOTE ADULT - SUBJECTIVE AND OBJECTIVE BOX
Patient is a 78y old  Male who presents with a chief complaint of Cardiogenic Shock (16 Feb 2024 16:24)      BRIEF HOSPITAL COURSE: 79 y/o M with a h/o HTN, HLD, T2DM, CKD, plaque psoriasis, anxiety, depression, history of dysphagia who declined PEG placement, admitted on 2/12 with progressively worsening substernal chest pain that onset the night prior with multiple episodes of diarrhea as well as 1 episode of nausea and vomiting. Initial EKG on arrival without ST elevation though inferolateral Q waves. Labs remarkable for elevated troponin, WES, anion gap metabolic lactic acidosis. CXR with pulmonary edema and possible underlying pneumonia. ER course complicated by hypotension for which he received 1L IV fluid bolus subsequently developed significant hypoxia and respiratory distress requiring IV vasopressor and NIPPV. He developed worsening shock, respiratory distress, and fevers. He was intubated 2/14, started on Dobutamine and Bumex infusion with improvement in hypoxia, shock, and UOP. 2/14 underwent RHC with normal right sided pressures and normal CO/CI on Dobutamine. LHC revealed severe multivessel disease. Deemed not a candidate for CABG or high risk PCI.      Events last 24 hours: Dependent on IV vasopressor support. Off dobutamine. Off sedation. Continues to fail SBTs for apnea.        PAST MEDICAL & SURGICAL HISTORY:  Diabetes      Hyperlipidemia      Anxiety with depression      Insomnia      No significant past surgical history          Review of Systems:  Unable to obtain secondary to intubation.      Medications:  cefTRIAXone Injectable.      cefTRIAXone Injectable. 1000 milliGRAM(s) IV Push every 24 hours  norepinephrine Infusion 0.05 MICROgram(s)/kG/Min IV Continuous <Continuous>  acetaminophen     Tablet .. 650 milliGRAM(s) Oral every 6 hours PRN  ARIPiprazole 5 milliGRAM(s) Oral daily  busPIRone 10 milliGRAM(s) Oral <User Schedule>  clonazePAM  Tablet 1 milliGRAM(s) Oral daily PRN  dexMEDEtomidine Infusion 0.3 MICROgram(s)/kG/Hr IV Continuous <Continuous>  fentaNYL    Injectable 50 MICROGram(s) IV Push every 2 hours PRN  midazolam Injectable 2 milliGRAM(s) IV Push every 2 hours PRN  QUEtiapine 50 milliGRAM(s) Oral at bedtime  sertraline 100 milliGRAM(s) Oral daily  aspirin  chewable 81 milliGRAM(s) Oral daily  heparin   Injectable 5000 Unit(s) SubCutaneous every 8 hours  pantoprazole  Injectable 40 milliGRAM(s) IV Push daily  atorvastatin 80 milliGRAM(s) Oral at bedtime  dextrose 50% Injectable 25 Gram(s) IV Push once  dextrose 50% Injectable 12.5 Gram(s) IV Push once  dextrose 50% Injectable 25 Gram(s) IV Push once  insulin glargine Injectable (LANTUS) 10 Unit(s) SubCutaneous every morning  insulin lispro (ADMELOG) corrective regimen sliding scale   SubCutaneous every 6 hours  sodium chloride 0.9% lock flush 10 milliLiter(s) IV Push every 1 hour PRN  influenza  Vaccine (HIGH DOSE) 0.7 milliLiter(s) IntraMuscular once  chlorhexidine 0.12% Liquid 15 milliLiter(s) Oral Mucosa every 12 hours  chlorhexidine 2% Cloths 1 Application(s) Topical <User Schedule>  chlorhexidine 4% Liquid 1 Application(s) Topical <User Schedule>        Mode: AC/ CMV (Assist Control/ Continuous Mandatory Ventilation)  RR (machine): 20  TV (machine): 450  FiO2: 40  PEEP: 6  MAP: 11  PIP: 24      ICU Vital Signs Last 24 Hrs  T(C): 38 (16 Feb 2024 23:00), Max: 38.2 (16 Feb 2024 22:45)  T(F): 100.4 (16 Feb 2024 23:00), Max: 100.8 (16 Feb 2024 22:45)  HR: 71 (17 Feb 2024 00:41) (63 - 106)  BP: --  BP(mean): --  ABP: 96/43 (17 Feb 2024 00:30) (84/41 - 135/68)  ABP(mean): 62 (17 Feb 2024 00:30) (57 - 95)  RR: 20 (17 Feb 2024 00:30) (17 - 21)  SpO2: 100% (17 Feb 2024 00:41) (98% - 100%)    O2 Parameters below as of 17 Feb 2024 00:00  Patient On (Oxygen Delivery Method): ventilator            ABG - ( 16 Feb 2024 17:09 )  pH, Arterial: 7.430 pH, Blood: x     /  pCO2: 35    /  pO2: 135   / HCO3: 23    / Base Excess: -1.1  /  SaO2: 100.0               I&O's Detail    15 Feb 2024 07:01  -  16 Feb 2024 07:00  --------------------------------------------------------  IN:    Dexmedetomidine: 325.9 mL    DOBUTamine: 64.4 mL    Glucerna 1.5: 140 mL    Heparin Infusion: 217 mL    IV PiggyBack: 50 mL    Norepinephrine: 138.3 mL    Propofol: 66 mL  Total IN: 1001.6 mL    OUT:    Indwelling Catheter - Urethral (mL): 1405 mL  Total OUT: 1405 mL    Total NET: -403.4 mL      16 Feb 2024 07:01  -  17 Feb 2024 01:58  --------------------------------------------------------  IN:    Dexmedetomidine: 157.7 mL    Enteral Tube Flush: 30 mL    Glucerna 1.5: 540 mL    Norepinephrine: 122 mL  Total IN: 849.7 mL    OUT:    Indwelling Catheter - Urethral (mL): 995 mL  Total OUT: 995 mL    Total NET: -145.3 mL            LABS:                        8.5    9.83  )-----------( 359      ( 16 Feb 2024 04:35 )             25.5     02-16    139  |  97  |  84.2<H>  ----------------------------<  137<H>  4.2   |  22.0  |  2.70<H>    Ca    9.0      16 Feb 2024 04:35  Phos  3.5     02-16  Mg     2.4     02-16    TPro  6.4<L>  /  Alb  3.3  /  TBili  0.4  /  DBili  x   /  AST  22  /  ALT  33  /  AlkPhos  91  02-16          CAPILLARY BLOOD GLUCOSE      POCT Blood Glucose.: 175 mg/dL (16 Feb 2024 23:53)    PT/INR - ( 16 Feb 2024 04:35 )   PT: 12.2 sec;   INR: 1.10 ratio         PTT - ( 16 Feb 2024 04:35 )  PTT:25.5 sec  Urinalysis Basic - ( 16 Feb 2024 04:35 )    Color: x / Appearance: x / SG: x / pH: x  Gluc: 137 mg/dL / Ketone: x  / Bili: x / Urobili: x   Blood: x / Protein: x / Nitrite: x   Leuk Esterase: x / RBC: x / WBC x   Sq Epi: x / Non Sq Epi: x / Bacteria: x      CULTURES:  Culture Results:   Normal Respiratory Hamida present (02-14-24 @ 05:18)  Culture Results:   No growth at 48 Hours (02-14-24 @ 01:50)  Culture Results:   No growth at 48 Hours (02-14-24 @ 01:45)  Rapid RVP Result: NotDetec (02-13-24 @ 22:06)  Culture Results:   No growth (02-12-24 @ 22:40)  Rapid RVP Result: NotDetec (02-12-24 @ 17:58)  Culture Results:   No growth at 72 Hours (02-12-24 @ 17:56)  Culture Results:   No growth at 72 Hours (02-12-24 @ 17:46)        Physical Examination:    General: No acute distress.  intubated, awake, periodically desynchronous with vent    HEENT: Pupils equal, reactive to light.  Symmetric.    PULM: Clear to auscultation bilaterally, no significant sputum production    CVS: Regular rate and rhythm, no murmurs, rubs, or gallops    ABD: Soft, nondistended, nontender, normoactive bowel sounds, no masses    EXT: No edema, nontender    SKIN: Warm and well perfused, no rashes noted.    NEURO: awake, follows commands, moves all extremities        RADIOLOGY:     < from: Xray Chest 1 View-PORTABLE IMMEDIATE (Xray Chest 1 View-PORTABLE IMMEDIATE .) (02.15.24 @ 06:28) >  INTERPRETATION:    The heart is not enlarged.  ET tube tip is approximately 5.4 cm above the cony.  Enteric tube extends into the left hemiabdomen. Tip not included on   image. Side portmay be near the GE junction.  Right IJ catheter and left IJ line are not significantly changed in   position.  There is a left midline catheter.  Mildly elevated left hemidiaphragm.  Previous bilateral perihilar opacities are improved with some residual,   predominantly in the lower lungs seen.  No pleural effusion or pneumothorax seen.  There is osteoarthritic degenerative change of the spine.      IMPRESSION:  Enteric tube extends into left hemiabdomen. Tip not included   on image. Side port may be near the GE junction. ET tube and other lines   as above.    Previous bilateral perihilar opacities are improved with some residual,   predominantly in the lower lungs, seen. The findings likely represent   improving pulmonary edema.            CRITICAL CARE TIME SPENT: 34 mins  Time spent evaluating/treating patient with medical issues that pose a high risk for life threatening deterioration and/or end-organ damage, reviewing data/labs/imaging, discussing case with multidisciplinary team, discussing plan/goals of care with patient/family. Non-inclusive of procedure time. The date of entry of this note reflects the date of services rendered.   Patient is a 78y old  Male who presents with a chief complaint of Cardiogenic Shock (16 Feb 2024 16:24)      BRIEF HOSPITAL COURSE: 79 y/o M with a h/o HTN, HLD, T2DM, CKD, plaque psoriasis, anxiety, depression, history of dysphagia who declined PEG placement, admitted on 2/12 with progressively worsening substernal chest pain that onset the night prior with multiple episodes of diarrhea as well as 1 episode of nausea and vomiting. Initial EKG on arrival without ST elevation though inferolateral Q waves. Labs remarkable for elevated troponin, WES, anion gap metabolic lactic acidosis. CXR with pulmonary edema and possible underlying pneumonia. ER course complicated by hypotension for which he received 1L IV fluid bolus subsequently developed significant hypoxia and respiratory distress requiring IV vasopressor and NIPPV. He developed worsening shock, respiratory distress, and fevers. He was intubated 2/14, started on Dobutamine and Bumex infusion with improvement in hypoxia, shock, and UOP. 2/14 underwent RHC with normal right sided pressures and normal CO/CI on Dobutamine. LHC revealed severe multivessel disease. Deemed not a candidate for CABG or high risk PCI.      Events last 24 hours: Dependent on IV vasopressor support with increasing requirement. Off dobutamine. Remains on full mechanical ventilator support requiring intermittent sedation for periods of vent desynchrony. Continues to fail SBTs for apnea.        PAST MEDICAL & SURGICAL HISTORY:  Diabetes      Hyperlipidemia      Anxiety with depression      Insomnia      No significant past surgical history          Review of Systems:  Unable to obtain secondary to intubation.      Medications:  cefTRIAXone Injectable.      cefTRIAXone Injectable. 1000 milliGRAM(s) IV Push every 24 hours  norepinephrine Infusion 0.05 MICROgram(s)/kG/Min IV Continuous <Continuous>  acetaminophen     Tablet .. 650 milliGRAM(s) Oral every 6 hours PRN  ARIPiprazole 5 milliGRAM(s) Oral daily  busPIRone 10 milliGRAM(s) Oral <User Schedule>  clonazePAM  Tablet 1 milliGRAM(s) Oral daily PRN  dexMEDEtomidine Infusion 0.3 MICROgram(s)/kG/Hr IV Continuous <Continuous>  fentaNYL    Injectable 50 MICROGram(s) IV Push every 2 hours PRN  midazolam Injectable 2 milliGRAM(s) IV Push every 2 hours PRN  QUEtiapine 50 milliGRAM(s) Oral at bedtime  sertraline 100 milliGRAM(s) Oral daily  aspirin  chewable 81 milliGRAM(s) Oral daily  heparin   Injectable 5000 Unit(s) SubCutaneous every 8 hours  pantoprazole  Injectable 40 milliGRAM(s) IV Push daily  atorvastatin 80 milliGRAM(s) Oral at bedtime  dextrose 50% Injectable 25 Gram(s) IV Push once  dextrose 50% Injectable 12.5 Gram(s) IV Push once  dextrose 50% Injectable 25 Gram(s) IV Push once  insulin glargine Injectable (LANTUS) 10 Unit(s) SubCutaneous every morning  insulin lispro (ADMELOG) corrective regimen sliding scale   SubCutaneous every 6 hours  sodium chloride 0.9% lock flush 10 milliLiter(s) IV Push every 1 hour PRN  influenza  Vaccine (HIGH DOSE) 0.7 milliLiter(s) IntraMuscular once  chlorhexidine 0.12% Liquid 15 milliLiter(s) Oral Mucosa every 12 hours  chlorhexidine 2% Cloths 1 Application(s) Topical <User Schedule>  chlorhexidine 4% Liquid 1 Application(s) Topical <User Schedule>        Mode: AC/ CMV (Assist Control/ Continuous Mandatory Ventilation)  RR (machine): 20  TV (machine): 450  FiO2: 40  PEEP: 6  MAP: 11  PIP: 24      ICU Vital Signs Last 24 Hrs  T(C): 38 (16 Feb 2024 23:00), Max: 38.2 (16 Feb 2024 22:45)  T(F): 100.4 (16 Feb 2024 23:00), Max: 100.8 (16 Feb 2024 22:45)  HR: 71 (17 Feb 2024 00:41) (63 - 106)  BP: --  BP(mean): --  ABP: 96/43 (17 Feb 2024 00:30) (84/41 - 135/68)  ABP(mean): 62 (17 Feb 2024 00:30) (57 - 95)  RR: 20 (17 Feb 2024 00:30) (17 - 21)  SpO2: 100% (17 Feb 2024 00:41) (98% - 100%)    O2 Parameters below as of 17 Feb 2024 00:00  Patient On (Oxygen Delivery Method): ventilator            ABG - ( 16 Feb 2024 17:09 )  pH, Arterial: 7.430 pH, Blood: x     /  pCO2: 35    /  pO2: 135   / HCO3: 23    / Base Excess: -1.1  /  SaO2: 100.0               I&O's Detail    15 Feb 2024 07:01  -  16 Feb 2024 07:00  --------------------------------------------------------  IN:    Dexmedetomidine: 325.9 mL    DOBUTamine: 64.4 mL    Glucerna 1.5: 140 mL    Heparin Infusion: 217 mL    IV PiggyBack: 50 mL    Norepinephrine: 138.3 mL    Propofol: 66 mL  Total IN: 1001.6 mL    OUT:    Indwelling Catheter - Urethral (mL): 1405 mL  Total OUT: 1405 mL    Total NET: -403.4 mL      16 Feb 2024 07:01  -  17 Feb 2024 01:58  --------------------------------------------------------  IN:    Dexmedetomidine: 157.7 mL    Enteral Tube Flush: 30 mL    Glucerna 1.5: 540 mL    Norepinephrine: 122 mL  Total IN: 849.7 mL    OUT:    Indwelling Catheter - Urethral (mL): 995 mL  Total OUT: 995 mL    Total NET: -145.3 mL            LABS:                        8.5    9.83  )-----------( 359      ( 16 Feb 2024 04:35 )             25.5     02-16    139  |  97  |  84.2<H>  ----------------------------<  137<H>  4.2   |  22.0  |  2.70<H>    Ca    9.0      16 Feb 2024 04:35  Phos  3.5     02-16  Mg     2.4     02-16    TPro  6.4<L>  /  Alb  3.3  /  TBili  0.4  /  DBili  x   /  AST  22  /  ALT  33  /  AlkPhos  91  02-16          CAPILLARY BLOOD GLUCOSE      POCT Blood Glucose.: 175 mg/dL (16 Feb 2024 23:53)    PT/INR - ( 16 Feb 2024 04:35 )   PT: 12.2 sec;   INR: 1.10 ratio         PTT - ( 16 Feb 2024 04:35 )  PTT:25.5 sec  Urinalysis Basic - ( 16 Feb 2024 04:35 )    Color: x / Appearance: x / SG: x / pH: x  Gluc: 137 mg/dL / Ketone: x  / Bili: x / Urobili: x   Blood: x / Protein: x / Nitrite: x   Leuk Esterase: x / RBC: x / WBC x   Sq Epi: x / Non Sq Epi: x / Bacteria: x      CULTURES:  Culture Results:   Normal Respiratory Hamida present (02-14-24 @ 05:18)  Culture Results:   No growth at 48 Hours (02-14-24 @ 01:50)  Culture Results:   No growth at 48 Hours (02-14-24 @ 01:45)  Rapid RVP Result: NotDetec (02-13-24 @ 22:06)  Culture Results:   No growth (02-12-24 @ 22:40)  Rapid RVP Result: NotDetec (02-12-24 @ 17:58)  Culture Results:   No growth at 72 Hours (02-12-24 @ 17:56)  Culture Results:   No growth at 72 Hours (02-12-24 @ 17:46)        Physical Examination:    General: No acute distress.  intubated, awake, periodically desynchronous with vent    HEENT: Pupils equal, reactive to light.  Symmetric.    PULM: Clear to auscultation bilaterally, no significant sputum production    CVS: Regular rate and rhythm, no murmurs, rubs, or gallops    ABD: Soft, nondistended, nontender, normoactive bowel sounds, no masses    EXT: No edema, nontender    SKIN: Warm and well perfused, no rashes noted.    NEURO: awake, follows commands, moves all extremities        RADIOLOGY:     < from: Xray Chest 1 View-PORTABLE IMMEDIATE (Xray Chest 1 View-PORTABLE IMMEDIATE .) (02.15.24 @ 06:28) >  INTERPRETATION:    The heart is not enlarged.  ET tube tip is approximately 5.4 cm above the coyn.  Enteric tube extends into the left hemiabdomen. Tip not included on   image. Side portmay be near the GE junction.  Right IJ catheter and left IJ line are not significantly changed in   position.  There is a left midline catheter.  Mildly elevated left hemidiaphragm.  Previous bilateral perihilar opacities are improved with some residual,   predominantly in the lower lungs seen.  No pleural effusion or pneumothorax seen.  There is osteoarthritic degenerative change of the spine.      IMPRESSION:  Enteric tube extends into left hemiabdomen. Tip not included   on image. Side port may be near the GE junction. ET tube and other lines   as above.    Previous bilateral perihilar opacities are improved with some residual,   predominantly in the lower lungs, seen. The findings likely represent   improving pulmonary edema.            CRITICAL CARE TIME SPENT: 34 mins  Time spent evaluating/treating patient with medical issues that pose a high risk for life threatening deterioration and/or end-organ damage, reviewing data/labs/imaging, discussing case with multidisciplinary team, discussing plan/goals of care with patient/family. Non-inclusive of procedure time. The date of entry of this note reflects the date of services rendered.

## 2024-02-17 NOTE — PROGRESS NOTE ADULT - NS ATTEND AMEND GEN_ALL_CORE FT
77 y/o M admitted with NSTEMI, s/p [facility] with multivessel disease (LM 60%, pLAD 70%, mLAD 80%, D1 100%, pLCx 100%, dRCA 90%, RI 90%). TTE with LVEF <20%, moderate MR/TR, moderate pericardial effusion. Was initially started on dobutamine, levophed, and bumex gtt, as well as antibiotics for aspiration PNA. Now off pressors. Appears euvolemic on exam. Would start lopressor if BP remains stable off pressors; add further GDMT as tolerated. Patient was seen by CTS and interventional cardiology; not a candidate for revascularization with CABG or high risk PCI at this time. Will continue medical management. Add plavix 75mg daily. No longer on heparin gtt. Has been unable to tolerate SBT; family to decide trach/PEG vs. comfort measures if he is unable to be weaned.

## 2024-02-17 NOTE — PROGRESS NOTE ADULT - ASSESSMENT
WES on CKD  2  Anion Gap  Lactic Acidosis  Metabolic Acidosis  Cardiogenic Shock  Acute respiratory failure on vent     -Baseline Creatinine 1  -WES likely 2/2 hypoperfusion from cardiogenic shock, now plateau suggestive of ATN. Non-oliguric   -Urine studies pending   -Was on bumex gtt and had adequate urine output. S/p dobutamine drip. Diuresis as per ICU  -Inotrope per heart failure team  -Trend Lactic acid, improving  -No indication for RRT  -Creatinine improving     D/w ICU  Critical Care time 35 minutes   2/14/24-d/w daughter and wife

## 2024-02-17 NOTE — PROGRESS NOTE ADULT - PROBLEM SELECTOR PLAN 2
- Patient initially with echocardiogram showing EF <20%, multiple RWMA, mod MR/TR, mild pulmonary HTN, and moderate pericardial effusion.   - Repeat echocardiogram on Dobutamine with EF 25-30%.   - Weaned off dobutamine and levophed.   - If BP is stable, add low dose metoprolol 12.5mg PO BID as with some atrial tachycardia.   - Diuresis PRN.   - Will add GDMT as tolerated.

## 2024-02-17 NOTE — CHART NOTE - NSCHARTNOTEFT_GEN_A_CORE
Source: Patient [ ]  Family [ ]   other [x ] EMR and staff     Current Diet: Diet, NPO with Tube Feed:   Tube Feeding Modality: Nasogastric  Glucerna 1.5 Thomas (GLUCERNA1.5)  Total Volume for 24 Hours (mL): 1200  Continuous  Starting Tube Feed Rate {mL per Hour}: 10  Increase Tube Feed Rate by (mL): 10     Every 6 hours  Until Goal Tube Feed Rate (mL per Hour): 50  Tube Feed Duration (in Hours): 24  Tube Feed Start Time: 22:00 (02-15-24 @ 21:30)    Enteral /Parenteral Nutrition:  Glucerna @ 50ml/hr (x24 hours) 1200ml/day; 1800kcal, 100gm protein     Current Weight:   2/17: 63.8 kg   2/15: 64.9 kg   2/12: 61.2 kg     % Weight Change: Unsure of accuracy of weights; will continue to monitor weights for trends.     Pertinent Medications: MEDICATIONS  (STANDING):  atorvastatin 80 milliGRAM(s) Oral at bedtime  chlorhexidine 4% Liquid 1 Application(s) Topical <User Schedule>  dexMEDEtomidine Infusion 0.3 MICROgram(s)/kG/Hr (4.59 mL/Hr) IV Continuous <Continuous>  insulin glargine Injectable (LANTUS) 10 Unit(s) SubCutaneous every morning  insulin lispro (ADMELOG) corrective regimen sliding scale   SubCutaneous every 6 hours  norepinephrine Infusion 0.05 MICROgram(s)/kG/Min (5.74 mL/Hr) IV Continuous <Continuous>  pantoprazole  Injectable 40 milliGRAM(s) IV Push daily    MEDICATIONS  (PRN):  acetaminophen     Tablet .. 650 milliGRAM(s) Oral every 6 hours PRN Temp greater or equal to 38C (100.4F)  clonazePAM  Tablet 1 milliGRAM(s) Oral daily PRN for anxiety  sodium chloride 0.9% lock flush 10 milliLiter(s) IV Push every 1 hour PRN Pre/post blood products, medications, blood draw, and to maintain line patency    Pertinent Labs: CBC Full  -  ( 17 Feb 2024 03:45 )  WBC Count : 8.82 K/uL  RBC Count : 3.12 M/uL  Hemoglobin : 8.3 g/dL  Hematocrit : 25.4 %  Platelet Count - Automated : 316 K/uL      Skin: R groin cath site.     Nutrition focused physical exam conducted - found signs of malnutrition [ ]absent [x ]present    Subcutaneous fat loss: Severe  [ ] Orbital fat pads region, [x ]Buccal fat region, [x ]Triceps region,  [x ]Ribs region    Muscle wasting: Severe [x ]Temples region, [x ]Clavicle region, [x ]Shoulder region, [ ]Scapula region, [ ]Interosseous region,  [ ]thigh region, [ ]Calf region    Estimated Needs:   [x ] no change since previous assessment  [ ] recalculated:     Hospital Course:   Pt is a 79 y/o M with a h/o HTN, HLD, T2DM, CKD, plaque psoriasis, anxiety, depression, history of dysphagia who declined PEG placement, admitted on 2/12 with progressively worsening substernal chest pain that onset the night prior with multiple episodes of diarrhea as well as 1 episode of nausea and vomiting. Initial EKG on arrival without ST elevation though inferolateral Q waves. Labs remarkable for elevated troponin, WES, anion gap metabolic lactic acidosis. CXR with pulmonary edema and possible underlying pneumonia. ER course complicated by hypotension for which he received 1L IV fluid bolus subsequently developed significant hypoxia and respiratory distress requiring IV vasopressor and NIPPV. He developed worsening shock, respiratory distress, and fevers. He was intubated 2/14, started on Dobutamine and Bumex infusion with improvement in hypoxia, shock, and UOP. 2/14 underwent RHC with normal right sided pressures and normal CO/CI on Dobutamine. LHC revealed severe multivessel disease. Deemed not a candidate for CABG or high risk PCI.      Current Nutrition Diagnosis:  Pt remains at high nutrition risk secondary to severe (chronic) protein calorie malnutrition related to inability to meet sufficient protein energy needs in setting of history of dysphagia, now with Cardiogenic shock  Acute systolic heart failure, NSTEMI, Multivessel CAD, Acute hypoxemic respiratory failure, Acute pulmonary edema, Aspiration pneumonitis/pneumonia WES on CKD as evidenced by physical signs of severe muscle/fat loss, meeting < 75% estimated energy needs > 1 month. Pt remains on full vent support at this time. Pt receiving enteral feeds of Glucerna @ 60ml/hr (x24 hours); (propofol currently on hold). Current regime slightly exceeding current estimated nutrition needs. Recommendations below:       Recommendations:   1. MVI daily via tolerated route   2. Check weight daily to monitor trends   3. Change enteral regime (per ICU protocol) Glucerna 1.5 @ 60 ml/hr x 18 hrs (1080 ml, 1620 kcals, 89 g pro, 821 ml free water)  4. Thiamine 300mg (x3 days)     Monitoring and Evaluation:   [ ] PO intake [x ] Tolerance to diet prescription [X] Weights  [X] Follow up per protocol [X] Labs:

## 2024-02-18 LAB
ALBUMIN SERPL ELPH-MCNC: 3.5 G/DL — SIGNIFICANT CHANGE UP (ref 3.3–5.2)
ALP SERPL-CCNC: 97 U/L — SIGNIFICANT CHANGE UP (ref 40–120)
ALT FLD-CCNC: 19 U/L — SIGNIFICANT CHANGE UP
ANION GAP SERPL CALC-SCNC: 16 MMOL/L — SIGNIFICANT CHANGE UP (ref 5–17)
AST SERPL-CCNC: 12 U/L — SIGNIFICANT CHANGE UP
BASOPHILS # BLD AUTO: 0.02 K/UL — SIGNIFICANT CHANGE UP (ref 0–0.2)
BASOPHILS NFR BLD AUTO: 0.2 % — SIGNIFICANT CHANGE UP (ref 0–2)
BILIRUB SERPL-MCNC: 0.2 MG/DL — LOW (ref 0.4–2)
BUN SERPL-MCNC: 92.6 MG/DL — HIGH (ref 8–20)
CALCIUM SERPL-MCNC: 9.1 MG/DL — SIGNIFICANT CHANGE UP (ref 8.4–10.5)
CHLORIDE SERPL-SCNC: 101 MMOL/L — SIGNIFICANT CHANGE UP (ref 96–108)
CO2 SERPL-SCNC: 27 MMOL/L — SIGNIFICANT CHANGE UP (ref 22–29)
CREAT SERPL-MCNC: 2.24 MG/DL — HIGH (ref 0.5–1.3)
CULTURE RESULTS: SIGNIFICANT CHANGE UP
CULTURE RESULTS: SIGNIFICANT CHANGE UP
EGFR: 29 ML/MIN/1.73M2 — LOW
EOSINOPHIL # BLD AUTO: 0.51 K/UL — HIGH (ref 0–0.5)
EOSINOPHIL NFR BLD AUTO: 4.5 % — SIGNIFICANT CHANGE UP (ref 0–6)
GLUCOSE BLDC GLUCOMTR-MCNC: 168 MG/DL — HIGH (ref 70–99)
GLUCOSE BLDC GLUCOMTR-MCNC: 196 MG/DL — HIGH (ref 70–99)
GLUCOSE BLDC GLUCOMTR-MCNC: 222 MG/DL — HIGH (ref 70–99)
GLUCOSE BLDC GLUCOMTR-MCNC: 248 MG/DL — HIGH (ref 70–99)
GLUCOSE SERPL-MCNC: 242 MG/DL — HIGH (ref 70–99)
HCT VFR BLD CALC: 25.7 % — LOW (ref 39–50)
HGB BLD-MCNC: 8.4 G/DL — LOW (ref 13–17)
IMM GRANULOCYTES NFR BLD AUTO: 0.4 % — SIGNIFICANT CHANGE UP (ref 0–0.9)
LYMPHOCYTES # BLD AUTO: 1.84 K/UL — SIGNIFICANT CHANGE UP (ref 1–3.3)
LYMPHOCYTES # BLD AUTO: 16.3 % — SIGNIFICANT CHANGE UP (ref 13–44)
MAGNESIUM SERPL-MCNC: 2.2 MG/DL — SIGNIFICANT CHANGE UP (ref 1.6–2.6)
MCHC RBC-ENTMCNC: 27.2 PG — SIGNIFICANT CHANGE UP (ref 27–34)
MCHC RBC-ENTMCNC: 32.7 GM/DL — SIGNIFICANT CHANGE UP (ref 32–36)
MCV RBC AUTO: 83.2 FL — SIGNIFICANT CHANGE UP (ref 80–100)
MONOCYTES # BLD AUTO: 1.21 K/UL — HIGH (ref 0–0.9)
MONOCYTES NFR BLD AUTO: 10.7 % — SIGNIFICANT CHANGE UP (ref 2–14)
NEUTROPHILS # BLD AUTO: 7.66 K/UL — HIGH (ref 1.8–7.4)
NEUTROPHILS NFR BLD AUTO: 67.9 % — SIGNIFICANT CHANGE UP (ref 43–77)
PHOSPHATE SERPL-MCNC: 4.1 MG/DL — SIGNIFICANT CHANGE UP (ref 2.4–4.7)
PLATELET # BLD AUTO: 281 K/UL — SIGNIFICANT CHANGE UP (ref 150–400)
POTASSIUM SERPL-MCNC: 4 MMOL/L — SIGNIFICANT CHANGE UP (ref 3.5–5.3)
POTASSIUM SERPL-SCNC: 4 MMOL/L — SIGNIFICANT CHANGE UP (ref 3.5–5.3)
PROT SERPL-MCNC: 6.8 G/DL — SIGNIFICANT CHANGE UP (ref 6.6–8.7)
RBC # BLD: 3.09 M/UL — LOW (ref 4.2–5.8)
RBC # FLD: 15 % — HIGH (ref 10.3–14.5)
SODIUM SERPL-SCNC: 144 MMOL/L — SIGNIFICANT CHANGE UP (ref 135–145)
SPECIMEN SOURCE: SIGNIFICANT CHANGE UP
SPECIMEN SOURCE: SIGNIFICANT CHANGE UP
WBC # BLD: 11.28 K/UL — HIGH (ref 3.8–10.5)
WBC # FLD AUTO: 11.28 K/UL — HIGH (ref 3.8–10.5)

## 2024-02-18 PROCEDURE — 99291 CRITICAL CARE FIRST HOUR: CPT

## 2024-02-18 RX ORDER — INSULIN GLARGINE 100 [IU]/ML
15 INJECTION, SOLUTION SUBCUTANEOUS EVERY MORNING
Refills: 0 | Status: DISCONTINUED | OUTPATIENT
Start: 2024-02-18 | End: 2024-03-03

## 2024-02-18 RX ORDER — METOPROLOL TARTRATE 50 MG
12.5 TABLET ORAL EVERY 12 HOURS
Refills: 0 | Status: DISCONTINUED | OUTPATIENT
Start: 2024-02-18 | End: 2024-02-18

## 2024-02-18 RX ORDER — METOPROLOL TARTRATE 50 MG
12.5 TABLET ORAL EVERY 12 HOURS
Refills: 0 | Status: DISCONTINUED | OUTPATIENT
Start: 2024-02-18 | End: 2024-02-24

## 2024-02-18 RX ORDER — CLOPIDOGREL BISULFATE 75 MG/1
75 TABLET, FILM COATED ORAL DAILY
Refills: 0 | Status: DISCONTINUED | OUTPATIENT
Start: 2024-02-18 | End: 2024-02-23

## 2024-02-18 RX ADMIN — Medication 4: at 00:37

## 2024-02-18 RX ADMIN — Medication 12.5 MILLIGRAM(S): at 05:36

## 2024-02-18 RX ADMIN — ATORVASTATIN CALCIUM 80 MILLIGRAM(S): 80 TABLET, FILM COATED ORAL at 22:34

## 2024-02-18 RX ADMIN — CEFTRIAXONE 1000 MILLIGRAM(S): 500 INJECTION, POWDER, FOR SOLUTION INTRAMUSCULAR; INTRAVENOUS at 04:43

## 2024-02-18 RX ADMIN — Medication 2: at 23:24

## 2024-02-18 RX ADMIN — HEPARIN SODIUM 5000 UNIT(S): 5000 INJECTION INTRAVENOUS; SUBCUTANEOUS at 05:36

## 2024-02-18 RX ADMIN — HEPARIN SODIUM 5000 UNIT(S): 5000 INJECTION INTRAVENOUS; SUBCUTANEOUS at 22:33

## 2024-02-18 RX ADMIN — Medication 1 APPLICATION(S): at 22:32

## 2024-02-18 RX ADMIN — PANTOPRAZOLE SODIUM 40 MILLIGRAM(S): 20 TABLET, DELAYED RELEASE ORAL at 11:57

## 2024-02-18 RX ADMIN — Medication 2: at 12:06

## 2024-02-18 RX ADMIN — Medication 10 MILLIGRAM(S): at 08:44

## 2024-02-18 RX ADMIN — SERTRALINE 100 MILLIGRAM(S): 25 TABLET, FILM COATED ORAL at 12:07

## 2024-02-18 RX ADMIN — QUETIAPINE FUMARATE 50 MILLIGRAM(S): 200 TABLET, FILM COATED ORAL at 22:32

## 2024-02-18 RX ADMIN — Medication 4: at 18:31

## 2024-02-18 RX ADMIN — ARIPIPRAZOLE 5 MILLIGRAM(S): 15 TABLET ORAL at 11:57

## 2024-02-18 RX ADMIN — Medication 4: at 06:15

## 2024-02-18 RX ADMIN — CHLORHEXIDINE GLUCONATE 1 APPLICATION(S): 213 SOLUTION TOPICAL at 05:36

## 2024-02-18 RX ADMIN — Medication 81 MILLIGRAM(S): at 11:55

## 2024-02-18 RX ADMIN — CHLORHEXIDINE GLUCONATE 1 APPLICATION(S): 213 SOLUTION TOPICAL at 05:37

## 2024-02-18 RX ADMIN — CLOPIDOGREL BISULFATE 75 MILLIGRAM(S): 75 TABLET, FILM COATED ORAL at 11:56

## 2024-02-18 RX ADMIN — INSULIN GLARGINE 10 UNIT(S): 100 INJECTION, SOLUTION SUBCUTANEOUS at 08:43

## 2024-02-18 RX ADMIN — HEPARIN SODIUM 5000 UNIT(S): 5000 INJECTION INTRAVENOUS; SUBCUTANEOUS at 15:13

## 2024-02-18 RX ADMIN — Medication 12.5 MILLIGRAM(S): at 18:29

## 2024-02-18 RX ADMIN — CHLORHEXIDINE GLUCONATE 15 MILLILITER(S): 213 SOLUTION TOPICAL at 05:37

## 2024-02-18 NOTE — PROGRESS NOTE ADULT - PROBLEM SELECTOR PLAN 1
Patient remains intubated, but weaned off levophed and dobutamine.    Plan to extubate today as discussed with nursing.    [facility] with LM 60%, pLAD 70%, mLAD 80%, D1 100%, pLCx 100%, dRCA 90%, RI 90%.   Evaluated by CT Surgery, and deemed not a surgical candidate.   Being eventual high risk PCI  Ct Plavix 75mg PO qday.   Continue aspirin and Lipitor.   Restart Imdur when BP allows.

## 2024-02-18 NOTE — PROGRESS NOTE ADULT - SUBJECTIVE AND OBJECTIVE BOX
[facility]                                                         CARDIOLOGY AT [facility]                                                                  [address] [facility], [place] [place] [address]                                                         Telephone: [phone]. Fax:[phone]                                                                             PROGRESS NOTE    Reason for follow up:   Update:   Patient is more awake and alert, plan to extubate today as discussed with nursing    Review of symptoms:   Cardiac:  No chest pain. No dyspnea. No palpitations.  Respiratory: no cough. No dyspnea  Gastrointestinal: No diarrhea. No abdominal pain. No bleeding.   Neuro: No focal neuro complaints.    Vitals:  T(C): 38 (02-18-24 @ 04:08), Max: 38 (02-17-24 @ 23:35)  HR: 81 (02-18-24 @ 09:05) (74 - 89)  BP: 123/68 (02-18-24 @ 09:05) (107/59 - 123/68)  RR: 22 (02-18-24 @ 09:05) (10 - 22)  SpO2: 100% (02-18-24 @ 09:05) (99% - 100%)  Wt(kg): --  I&O's Summary    17 Feb 2024 07:01  -  18 Feb 2024 07:00  --------------------------------------------------------  IN: 1130 mL / OUT: 2505 mL / NET: -1375 mL    PHYSICAL EXAM:  Appearance: Comfortable. No acute distress  HEENT:  Atraumatic. Normocephalic.  Normal oral mucosa  Neurologic: A & O x, vented, nods with questioning.    Cardiovascular: RRR S1 S2, No murmur, no rubs/gallops. No JVD  Respiratory: Lungs clear to auscultation, unlabored   Gastrointestinal:  Soft, Non-tender, + BS  Lower Extremities: + Peripheral Pulses, No clubbing, cyanosis, or edema  Psychiatry: Patient is calm. No agitation.   Skin: warm and dry.    CURRENT CARDIAC MEDICATIONS:  metoprolol tartrate 12.5 milliGRAM(s) Enteral Tube every 12 hours      CURRENT OTHER MEDICATIONS:  cefTRIAXone Injectable. 1000 milliGRAM(s) IV Push every 24 hours  cefTRIAXone Injectable.      acetaminophen     Tablet .. 650 milliGRAM(s) Oral every 6 hours PRN Temp greater or equal to 38C (100.4F)  ARIPiprazole 5 milliGRAM(s) Oral daily  busPIRone 10 milliGRAM(s) Oral <User Schedule>  clonazePAM  Tablet 1 milliGRAM(s) Oral daily PRN for anxiety  fentaNYL    Injectable 50 MICROGram(s) IV Push every 2 hours PRN vent synchrony or pain  midazolam Injectable 2 milliGRAM(s) IV Push every 2 hours PRN Vent synchrony or anxiety  QUEtiapine 50 milliGRAM(s) Oral at bedtime  sertraline 100 milliGRAM(s) Oral daily  pantoprazole  Injectable 40 milliGRAM(s) IV Push daily  atorvastatin 80 milliGRAM(s) Oral at bedtime  insulin glargine Injectable (LANTUS) 15 Unit(s) SubCutaneous every morning  insulin lispro (ADMELOG) corrective regimen sliding scale   SubCutaneous every 6 hours  aspirin  chewable 81 milliGRAM(s) Oral daily  chlorhexidine 2% Cloths 1 Application(s) Topical <User Schedule>  chlorhexidine 4% Liquid 1 Application(s) Topical <User Schedule>  clopidogrel Tablet 75 milliGRAM(s) Enteral Tube daily  heparin   Injectable 5000 Unit(s) SubCutaneous every 8 hours  influenza  Vaccine (HIGH DOSE) 0.7 milliLiter(s) IntraMuscular once  sodium chloride 0.9% lock flush 10 milliLiter(s) IV Push every 1 hour PRN Pre/post blood products, medications, blood draw, and to maintain line patency      LABS:	 	                            8.4    11.28 )-----------( 281      ( 18 Feb 2024 04:30 )             25.7     02-18    144  |  101  |  92.6<H>  ----------------------------<  242<H>  4.0   |  27.0  |  2.24<H>    Ca    9.1      18 Feb 2024 04:30  Phos  4.1     02-18  Mg     2.2     02-18    TPro  6.8  /  Alb  3.5  /  TBili  0.2<L>  /  DBili  x   /  AST  12  /  ALT  19  /  AlkPhos  97  02-18    PT/INR/PTT ( 16 Feb 2024 04:35 )                       :                       :      12.2         :       25.5                  .        .                   .              .           .       1.10        .                                       Lipid Profile: Date: 02-13 @ 11:15  Total cholesterol 179; Direct LDL: --; HDL: 49; Triglycerides:94  Date: 02-13 @ 03:08  Total cholesterol 183; Direct LDL: --; HDL: 48; Triglycerides:76    TSH: Thyroid Stimulating Hormone, Serum: 2.49 uIU/mL    TELEMETRY:   SR, 80's, no acute alarms ntoed.

## 2024-02-18 NOTE — PROGRESS NOTE ADULT - NS ATTEND AMEND GEN_ALL_CORE FT
79 y/o M admitted with NSTEMI, s/p [facility] with multivessel disease (LM 60%, pLAD 70%, mLAD 80%, D1 100%, pLCx 100%, dRCA 90%, RI 90%). TTE with LVEF <20%, moderate MR/TR, moderate pericardial effusion. Was initially started on dobutamine, levophed, and bumex gtt, as well as antibiotics for aspiration PNA. Now off pressors. Extubated this AM. Denies CP/dyspnea, complains of chest congestion and cough. Appears euvolemic on exam. Was started on lopressor; will add further GDMT as tolerated. Avoid ACE/ARB/ARNI/MRA due to CKD. Patient was seen by CTS and interventional cardiology; not a candidate for revascularization with CABG or high risk PCI. Will readdress with interventional cardiology next week as patient is extubated and hemodynamically stable. Recommend chest PT, incentive spirometry.     D/w patient's wife and son at bedside

## 2024-02-18 NOTE — PROGRESS NOTE ADULT - ASSESSMENT
79 y/o M with a h/o HTN, HLD, T2DM, CKD, plaque psoriasis, anxiety, depression, history of dysphagia who declined PEG placement, with:    # Cardiogenic shock  # Acute systolic heart failure  # NSTEMI  # Multivessel CAD  # Acute hypoxemic respiratory failure  # Acute pulmonary edema  # Aspiration pneumonitis/pneumonia  # WES on CKD    - actively titrating ventilator settings to maintain SpO2 > 92%  - tolerated brief PSV overnight, awake and interactive, hopefully can attempt extubation tomorrow with family present  - CT chest reveals small-moderate sized bilateral pleural effusions with compressive atelectasis  - 80mg IV furosemide x 1 to help optimize resp status  - off sedation, interactive, CT brain negative for acute pathology  - weaned off norepinephrine infusion yesterday  - slow introduction of GDMT, add metoprolol 12.5mg BID  - monitor hemodynamics closely, maintain a MAP > 65, clinically warm and well perfused  - continue aspirin and high intensity statin, start Plavix 75mg QD as per cardiology  - not a candidate for CABG, tentative plan for eventual high risk PCI pending further recovery as per cardiology  - blood cultures negative for growth thus far, sputum culture growing normal resp anita  - continue empiric ceftriaxone for now  - WES secondary to ischemic ATN, optimize end-organ perfusion as above  - trend BUN/Cr, electrolytes, acid-base balance, and monitor hourly UOP  - no indication for urgent RRT at this time    Case discussed with MICU physician, Dr. Butterfield.    54 minutes accounts for the total time spent on this patient encounter, which includes assessing and addressing the problems and their associated risks as mentioned above, reviewing the medical record/laboratory data/imaging studies, interviewing and physically examining the patient, as well as coordinating care with the multidisciplinary team. The date of entry of this note reflects the date of services rendered.     77 y/o M with a h/o HTN, HLD, T2DM, CKD, plaque psoriasis, anxiety, depression, history of dysphagia who declined PEG placement, with:    # Cardiogenic shock  # Acute systolic heart failure  # NSTEMI  # Multivessel CAD  # Acute hypoxemic respiratory failure  # Acute pulmonary edema  # Aspiration pneumonitis/pneumonia  # WES on CKD    - actively titrating ventilator settings to maintain SpO2 > 92%  - reduce resp rate to 14 bpm, forced hyperventilation may be contributing to apnea during SBTs  - tolerated brief PSV overnight, awake and interactive, hopefully can attempt extubation today with family present  - CT chest reveals small-moderate sized bilateral pleural effusions with compressive atelectasis  - 80mg IV furosemide x 1 to help optimize resp status  - off sedation, interactive, CT brain negative for acute pathology  - weaned off norepinephrine infusion yesterday  - slow introduction of GDMT, add metoprolol 12.5mg BID  - monitor hemodynamics closely, maintain a MAP > 65, clinically warm and well perfused  - continue aspirin and high intensity statin, start Plavix 75mg QD as per cardiology  - not a candidate for CABG, tentative plan for eventual high risk PCI pending further recovery as per cardiology  - blood cultures negative for growth thus far, sputum culture growing normal resp anita  - continue empiric ceftriaxone for now  - WES secondary to ischemic ATN, optimize end-organ perfusion as above  - trend BUN/Cr, electrolytes, acid-base balance, and monitor hourly UOP  - no indication for urgent RRT at this time    Case discussed with MICU physician, Dr. Butterfield.    54 minutes accounts for the total time spent on this patient encounter, which includes assessing and addressing the problems and their associated risks as mentioned above, reviewing the medical record/laboratory data/imaging studies, interviewing and physically examining the patient, as well as coordinating care with the multidisciplinary team. The date of entry of this note reflects the date of services rendered.

## 2024-02-18 NOTE — PROGRESS NOTE ADULT - SUBJECTIVE AND OBJECTIVE BOX
Patient is a 78y old  Male who presents with a chief complaint of Cardiogenic Shock (14 Feb 2024 16:48)       HPI:  78M with anxiety, HTN, HLD, CKD, plaque psoriasis, DM2 who presents with burning substernal chest pain at rest that started 1wk ago but acutely progressed this morning to sharp pain with radiation to L arm with associated multiple bouts of diarrhea. On ED arrival, afebrile, hypotensive to SBP 80-90s despite 1L IVF requiring levophed. EKG with nonspecific ST changes, troponin 2344->2477. Labs otherwise notable for K 5.6, BUN/Cr 43.5/2.31 (baseline Cr ~1 in 2022), BNP 17885, lactate 6.2. CXR with pulmonary edema. Placed on BiPAP for SOB and hypoxia. STAT TTE with EF<20%, multiple segmental abnormalities, mod MR, mod TR, mod pericardial effusion without tamponade. Received total lasix 40mg IV in ED. ICU consulted for further management of cardiogenic shock.   Intubated. History obtained from daughter and wife. Has not seen nephrologist in the past.      intubated    PAST MEDICAL & SURGICAL HISTORY:  Diabetes      Hyperlipidemia      Anxiety with depression      Insomnia      No significant past surgical history           FAMILY HISTORY:  No pertinent family history in first degree relatives    [place]    Social History:Non smoker    MEDICATIONS  (STANDING):  atorvastatin 80 milliGRAM(s) Oral at bedtime  cefTRIAXone Injectable. 1000 milliGRAM(s) IV Push every 24 hours  cefTRIAXone Injectable.      chlorhexidine 0.12% Liquid 15 milliLiter(s) Oral Mucosa every 12 hours  chlorhexidine 2% Cloths 1 Application(s) Topical <User Schedule>  chlorhexidine 4% Liquid 1 Application(s) Topical <User Schedule>  dexMEDEtomidine Infusion 0.3 MICROgram(s)/kG/Hr (4.59 mL/Hr) IV Continuous <Continuous>  dextrose 50% Injectable 25 Gram(s) IV Push once  dextrose 50% Injectable 12.5 Gram(s) IV Push once  dextrose 50% Injectable 25 Gram(s) IV Push once  DOBUTamine Infusion 5 MICROgram(s)/kG/Min (9.18 mL/Hr) IV Continuous <Continuous>  heparin  Infusion.  Unit(s)/Hr (7.5 mL/Hr) IV Continuous <Continuous>  influenza  Vaccine (HIGH DOSE) 0.7 milliLiter(s) IntraMuscular once  insulin lispro (ADMELOG) corrective regimen sliding scale   SubCutaneous every 6 hours  norepinephrine Infusion 0.05 MICROgram(s)/kG/Min (5.74 mL/Hr) IV Continuous <Continuous>  pantoprazole  Injectable 40 milliGRAM(s) IV Push daily  propofol Infusion 20 MICROgram(s)/kG/Min (7.34 mL/Hr) IV Continuous <Continuous>    MEDICATIONS  (PRN):  acetaminophen     Tablet .. 650 milliGRAM(s) Oral every 6 hours PRN Temp greater or equal to 38C (100.4F)  fentaNYL    Injectable 50 MICROGram(s) IV Push every 2 hours PRN vent synchrony or pain  heparin   Injectable 3800 Unit(s) IV Push every 6 hours PRN For aPTT less than 40  midazolam Injectable 2 milliGRAM(s) IV Push every 2 hours PRN Vent synchrony or anxiety  sodium chloride 0.9% lock flush 10 milliLiter(s) IV Push every 1 hour PRN Pre/post blood products, medications, blood draw, and to maintain line patency   Meds reviewed    Allergies    No Known Allergies    Intolerances         REVIEW OF SYSTEMS:    Review of Systems:   UTO 2/2 intubation      ICU Vital Signs Last 24 Hrs  T(C): 37 (18 Feb 2024 19:54), Max: 38 (17 Feb 2024 23:35)  T(F): 98.6 (18 Feb 2024 19:54), Max: 100.4 (17 Feb 2024 23:35)  HR: 77 (18 Feb 2024 20:00) (73 - 92)  BP: 101/58 (18 Feb 2024 20:00) (101/58 - 123/68)  BP(mean): 72 (18 Feb 2024 20:00) (72 - 86)  ABP: 122/47 (18 Feb 2024 05:00) (103/43 - 122/47)  ABP(mean): 76 (18 Feb 2024 05:00) (66 - 76)  RR: 22 (18 Feb 2024 20:00) (11 - 22)  SpO2: 99% (18 Feb 2024 20:00) (96% - 100%)    O2 Parameters below as of 18 Feb 2024 09:05    O2 Flow (L/min): 10  O2 Concentration (%): 40        PHYSICAL EXAM:    GENERAL: ill appearing  HEAD:  Atraumatic, Normocephalic  EYES: EOMI, conjunctiva and sclera clear  ENMT: No Drainage from nares, No drainage from ears  NERVOUS SYSTEM:  intubated  CHEST/LUNG: decrased  EXTREMITIES:  No Edema  SKIN: No rashes No obvious ecchymosis      LABS:                                    8.4    11.28 )-----------( 281      ( 18 Feb 2024 04:30 )             25.7     02-18    144  |  101  |  92.6<H>  ----------------------------<  242<H>  4.0   |  27.0  |  2.24<H>    Ca    9.1      18 Feb 2024 04:30  Phos  4.1     02-18  Mg     2.2     02-18    TPro  6.8  /  Alb  3.5  /  TBili  0.2<L>  /  DBili  x   /  AST  12  /  ALT  19  /  AlkPhos  97  02-18      Urinalysis Basic - ( 18 Feb 2024 04:30 )    Color: x / Appearance: x / SG: x / pH: x  Gluc: 242 mg/dL / Ketone: x  / Bili: x / Urobili: x   Blood: x / Protein: x / Nitrite: x   Leuk Esterase: x / RBC: x / WBC x   Sq Epi: x / Non Sq Epi: x / Bacteria: x                        ABG - ( 15 Feb 2024 16:23 )  pH, Arterial: 7.540 pH, Blood: x     /  pCO2: 33    /  pO2: 142   / HCO3: 28    / Base Excess: 5.7   /  SaO2: 100.0

## 2024-02-18 NOTE — PROGRESS NOTE ADULT - SUBJECTIVE AND OBJECTIVE BOX
Patient is a 78y old  Male who presents with a chief complaint of Cardiogenic Shock (17 Feb 2024 20:31)      BRIEF HOSPITAL COURSE: 79 y/o M with a h/o HTN, HLD, T2DM, CKD, plaque psoriasis, anxiety, depression, history of dysphagia who declined PEG placement, admitted on 2/12 with progressively worsening substernal chest pain that onset the night prior with multiple episodes of diarrhea as well as 1 episode of nausea and vomiting. Initial EKG on arrival without ST elevation though inferolateral Q waves. Labs remarkable for elevated troponin, WES, anion gap metabolic lactic acidosis. CXR with pulmonary edema and possible underlying pneumonia. ER course complicated by hypotension for which he received 1L IV fluid bolus subsequently developed significant hypoxia and respiratory distress requiring IV vasopressor and NIPPV. He developed worsening shock, respiratory distress, and fevers. He was intubated 2/14, started on Dobutamine and Bumex infusion with improvement in hypoxia, shock, and UOP. 2/14 underwent RHC with normal right sided pressures and normal CO/CI on Dobutamine. LHC revealed severe multivessel disease. Deemed not a candidate for CABG or high risk PCI.      Events last 24 hours: Weaned off IV vasopressor support. Remains on full mechanical ventilator support. Tolerated some PSV however continues to experience periods of apnea. Off sedation. Somnolent but easily arousable and interactive. CT brain negative for acute pathology. CT chest reveals small-moderate sized bilateral pleural effusions with compressive atelectasis.        PAST MEDICAL & SURGICAL HISTORY:  Diabetes      Hyperlipidemia      Anxiety with depression      Insomnia      No significant past surgical history          Review of Systems:  Unable to obtain secondary to intubation.      Medications:  cefTRIAXone Injectable. 1000 milliGRAM(s) IV Push every 24 hours  cefTRIAXone Injectable.      norepinephrine Infusion 0.05 MICROgram(s)/kG/Min IV Continuous <Continuous>  acetaminophen     Tablet .. 650 milliGRAM(s) Oral every 6 hours PRN  ARIPiprazole 5 milliGRAM(s) Oral daily  busPIRone 10 milliGRAM(s) Oral <User Schedule>  clonazePAM  Tablet 1 milliGRAM(s) Oral daily PRN  fentaNYL    Injectable 50 MICROGram(s) IV Push every 2 hours PRN  midazolam Injectable 2 milliGRAM(s) IV Push every 2 hours PRN  QUEtiapine 50 milliGRAM(s) Oral at bedtime  sertraline 100 milliGRAM(s) Oral daily  aspirin  chewable 81 milliGRAM(s) Oral daily  heparin   Injectable 5000 Unit(s) SubCutaneous every 8 hours  pantoprazole  Injectable 40 milliGRAM(s) IV Push daily  atorvastatin 80 milliGRAM(s) Oral at bedtime  dextrose 50% Injectable 25 Gram(s) IV Push once  dextrose 50% Injectable 12.5 Gram(s) IV Push once  dextrose 50% Injectable 25 Gram(s) IV Push once  insulin glargine Injectable (LANTUS) 10 Unit(s) SubCutaneous every morning  insulin lispro (ADMELOG) corrective regimen sliding scale   SubCutaneous every 6 hours  sodium chloride 0.9% lock flush 10 milliLiter(s) IV Push every 1 hour PRN  influenza  Vaccine (HIGH DOSE) 0.7 milliLiter(s) IntraMuscular once  chlorhexidine 0.12% Liquid 15 milliLiter(s) Oral Mucosa every 12 hours  chlorhexidine 2% Cloths 1 Application(s) Topical <User Schedule>  chlorhexidine 4% Liquid 1 Application(s) Topical <User Schedule>        Mode: CPAP with PS  FiO2: 40  PEEP: 6  PS: 10  MAP: 10      ICU Vital Signs Last 24 Hrs  T(C): 38 (17 Feb 2024 23:35), Max: 38 (17 Feb 2024 23:35)  T(F): 100.4 (17 Feb 2024 23:35), Max: 100.4 (17 Feb 2024 23:35)  HR: 81 (18 Feb 2024 04:08) (70 - 87)  BP: --  BP(mean): --  ABP: 112/46 (18 Feb 2024 00:00) (96/41 - 125/57)  ABP(mean): 71 (18 Feb 2024 00:00) (61 - 83)  RR: 18 (18 Feb 2024 00:00) (10 - 22)  SpO2: 100% (18 Feb 2024 04:08) (98% - 100%)    O2 Parameters below as of 18 Feb 2024 00:00  Patient On (Oxygen Delivery Method): ventilator    O2 Concentration (%): 40        ABG - ( 16 Feb 2024 17:09 )  pH, Arterial: 7.430 pH, Blood: x     /  pCO2: 35    /  pO2: 135   / HCO3: 23    / Base Excess: -1.1  /  SaO2: 100.0               I&O's Detail    16 Feb 2024 07:01  -  17 Feb 2024 07:00  --------------------------------------------------------  IN:    Dexmedetomidine: 157.7 mL    Enteral Tube Flush: 30 mL    Glucerna 1.5: 880 mL    Norepinephrine: 154.3 mL  Total IN: 1222 mL    OUT:    Indwelling Catheter - Urethral (mL): 1395 mL  Total OUT: 1395 mL    Total NET: -173 mL      17 Feb 2024 07:01  -  18 Feb 2024 04:22  --------------------------------------------------------  IN:    Enteral Tube Flush: 50 mL    Glucerna 1.5: 900 mL  Total IN: 950 mL    OUT:    Indwelling Catheter - Urethral (mL): 1680 mL  Total OUT: 1680 mL    Total NET: -730 mL            LABS:                        8.3    8.82  )-----------( 316      ( 17 Feb 2024 03:45 )             25.4     02-17    143  |  102  |  90.6<H>  ----------------------------<  223<H>  3.8   |  23.0  |  2.35<H>    Ca    8.7      17 Feb 2024 03:45  Phos  3.7     02-17  Mg     2.3     02-17    TPro  6.4<L>  /  Alb  3.2<L>  /  TBili  0.4  /  DBili  x   /  AST  15  /  ALT  26  /  AlkPhos  95  02-17          CAPILLARY BLOOD GLUCOSE      POCT Blood Glucose.: 248 mg/dL (18 Feb 2024 00:31)    PT/INR - ( 16 Feb 2024 04:35 )   PT: 12.2 sec;   INR: 1.10 ratio         PTT - ( 16 Feb 2024 04:35 )  PTT:25.5 sec  Urinalysis Basic - ( 17 Feb 2024 03:45 )    Color: x / Appearance: x / SG: x / pH: x  Gluc: 223 mg/dL / Ketone: x  / Bili: x / Urobili: x   Blood: x / Protein: x / Nitrite: x   Leuk Esterase: x / RBC: x / WBC x   Sq Epi: x / Non Sq Epi: x / Bacteria: x      CULTURES:  Culture Results:   Normal Respiratory Hamida present (02-14-24 @ 05:18)  Culture Results:   No growth at 72 Hours (02-14-24 @ 01:50)  Culture Results:   No growth at 72 Hours (02-14-24 @ 01:45)  Rapid RVP Result: NotDetec (02-13-24 @ 22:06)  Culture Results:   No growth (02-12-24 @ 22:40)  Rapid RVP Result: NotDetec (02-12-24 @ 17:58)  Culture Results:   No growth at 5 days (02-12-24 @ 17:56)  Culture Results:   No growth at 5 days (02-12-24 @ 17:46)        Physical Examination:    General: No acute distress.  intubated, awake, interactive    HEENT: Pupils equal, reactive to light.  Symmetric.    PULM: Clear to auscultation bilaterally, no significant sputum production    CVS: Regular rate and rhythm, no murmurs, rubs, or gallops    ABD: Soft, nondistended, nontender, normoactive bowel sounds, no masses    EXT: No edema, nontender    SKIN: Warm and well perfused, no rashes noted.    NEURO: awake, follows commands, moves all extremities          RADIOLOGY:     < from: CT Head No Cont (02.17.24 @ 12:31) >  FINDINGS: Evaluation is limited secondary to streak and beam hardening   artifact generated by the overlying tubes and catheters.    There is no acute intracranial hemorrhage, mass effect, shift of the   midline structures, herniation, extra-axial fluid collection, or   hydrocephalus.    There is diffuse cerebral volume loss with prominence of the sulci,   fissures, and cisternal spaces which is normal for the patient's age.   There is mild deep and periventricular white matter hypoattenuation   statistically compatible with microvascular changes given calcific   atherosclerotic disease of the intracranial arteries.    The paranasal sinuses and tympanomastoid cavities are clear. The   calvarium is intact. The imaged orbits are unremarkable.    IMPRESSION: Limited study secondary to streak and beam hardening artifact   generated by the overlying tubesand catheters.    No acute intracranial hemorrhage, mass effect, or shift of the midline   structures.    Similar-appearing mild chronic white matter microvascular type changes.          < from: CT Chest No Cont (02.17.24 @ 12:31) >  FINDINGS:    LUNGS AND AIRWAYS/PLEURA: Patent central airways. ET tube tip in the mid   trachea. Small to moderate bilateral pleural effusions with underlying   passive atelectasis. No pneumothorax.  MEDIASTINUM AND OLIVIA: No lymphadenopathy.  VESSELS: Left jugular catheter tip in the superior vena cava.   Atherosclerotic aorta without aneurysm. Coronary artery calcifications   and/or stents.  HEART: Heart size is normal. No pericardial effusion.  CHEST WALL AND LOWER NECK: Within normal limits.  VISUALIZED UPPER ABDOMEN: Enteric catheter tip terminating in the distal   stomach or duodenal bulb. Layering high density in the gallbladder either   sludge or vicarious excretion of contrast. 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  BONES: Upper thoracic degenerative changes.    IMPRESSION:  Small to moderate bilateral pleural effusions with underlying passive   atelectasis.    Enteric catheter tip in the distal stomach or duodenal bulb and should be   retracted.    ET tube and left central line in satisfactory position.    Sludge versus excreted contrast in the gallbladder.

## 2024-02-18 NOTE — PROGRESS NOTE ADULT - ASSESSMENT
77 y/o M with PMHx anxiety, HTN, HLD, CKD, plaque psoriasis, DM2 who presents to [facility] ED with 1 weeks of chest pain at rest.  Hypotensive on presentation, given IV fluid bolus but had respiratory distress, placed on bipap and levophed and vasopressin and inturbated.    HsTnT 2344->2477, lactate 6.2, BNP 93296.

## 2024-02-18 NOTE — PROGRESS NOTE ADULT - PROBLEM SELECTOR PLAN 2
Patient initially with echocardiogram showing EF <20%, multiple RWMA, mod MR/TR, mild pulmonary HTN, and moderate pericardial effusion.   Repeat echocardiogram on Dobutamine with EF 25-30%.   Not on pressors bp 107/59Weaned off dobutamine and levophed.   Ct metoprolol 12.5mg PO BID.   Patient is euvolemic on exam   Diuresis PRN.   GDMT as tolerated.  ON bb  No diuretics - Lasix 80mg given yesterday  Net - 1375mg/24hours      -1375 ml  Plan to extubate later today

## 2024-02-19 LAB
ANION GAP SERPL CALC-SCNC: 19 MMOL/L — HIGH (ref 5–17)
BASOPHILS # BLD AUTO: 0.04 K/UL — SIGNIFICANT CHANGE UP (ref 0–0.2)
BASOPHILS NFR BLD AUTO: 0.3 % — SIGNIFICANT CHANGE UP (ref 0–2)
BUN SERPL-MCNC: 97 MG/DL — HIGH (ref 8–20)
CALCIUM SERPL-MCNC: 9.5 MG/DL — SIGNIFICANT CHANGE UP (ref 8.4–10.5)
CHLORIDE SERPL-SCNC: 100 MMOL/L — SIGNIFICANT CHANGE UP (ref 96–108)
CO2 SERPL-SCNC: 26 MMOL/L — SIGNIFICANT CHANGE UP (ref 22–29)
CREAT SERPL-MCNC: 2.3 MG/DL — HIGH (ref 0.5–1.3)
CULTURE RESULTS: SIGNIFICANT CHANGE UP
CULTURE RESULTS: SIGNIFICANT CHANGE UP
EGFR: 28 ML/MIN/1.73M2 — LOW
EOSINOPHIL # BLD AUTO: 0.62 K/UL — HIGH (ref 0–0.5)
EOSINOPHIL NFR BLD AUTO: 4.7 % — SIGNIFICANT CHANGE UP (ref 0–6)
GLUCOSE BLDC GLUCOMTR-MCNC: 161 MG/DL — HIGH (ref 70–99)
GLUCOSE BLDC GLUCOMTR-MCNC: 205 MG/DL — HIGH (ref 70–99)
GLUCOSE BLDC GLUCOMTR-MCNC: 226 MG/DL — HIGH (ref 70–99)
GLUCOSE BLDC GLUCOMTR-MCNC: 232 MG/DL — HIGH (ref 70–99)
GLUCOSE SERPL-MCNC: 144 MG/DL — HIGH (ref 70–99)
HCT VFR BLD CALC: 31.6 % — LOW (ref 39–50)
HGB BLD-MCNC: 9.8 G/DL — LOW (ref 13–17)
IMM GRANULOCYTES NFR BLD AUTO: 0.5 % — SIGNIFICANT CHANGE UP (ref 0–0.9)
LYMPHOCYTES # BLD AUTO: 2.92 K/UL — SIGNIFICANT CHANGE UP (ref 1–3.3)
LYMPHOCYTES # BLD AUTO: 22 % — SIGNIFICANT CHANGE UP (ref 13–44)
MAGNESIUM SERPL-MCNC: 2.4 MG/DL — SIGNIFICANT CHANGE UP (ref 1.6–2.6)
MCHC RBC-ENTMCNC: 26.6 PG — LOW (ref 27–34)
MCHC RBC-ENTMCNC: 31 GM/DL — LOW (ref 32–36)
MCV RBC AUTO: 85.9 FL — SIGNIFICANT CHANGE UP (ref 80–100)
MONOCYTES # BLD AUTO: 1.47 K/UL — HIGH (ref 0–0.9)
MONOCYTES NFR BLD AUTO: 11.1 % — SIGNIFICANT CHANGE UP (ref 2–14)
NEUTROPHILS # BLD AUTO: 8.18 K/UL — HIGH (ref 1.8–7.4)
NEUTROPHILS NFR BLD AUTO: 61.4 % — SIGNIFICANT CHANGE UP (ref 43–77)
PHOSPHATE SERPL-MCNC: 4 MG/DL — SIGNIFICANT CHANGE UP (ref 2.4–4.7)
PLATELET # BLD AUTO: 322 K/UL — SIGNIFICANT CHANGE UP (ref 150–400)
POTASSIUM SERPL-MCNC: 4.6 MMOL/L — SIGNIFICANT CHANGE UP (ref 3.5–5.3)
POTASSIUM SERPL-SCNC: 4.6 MMOL/L — SIGNIFICANT CHANGE UP (ref 3.5–5.3)
RBC # BLD: 3.68 M/UL — LOW (ref 4.2–5.8)
RBC # FLD: 14.9 % — HIGH (ref 10.3–14.5)
SODIUM SERPL-SCNC: 145 MMOL/L — SIGNIFICANT CHANGE UP (ref 135–145)
SPECIMEN SOURCE: SIGNIFICANT CHANGE UP
SPECIMEN SOURCE: SIGNIFICANT CHANGE UP
WBC # BLD: 13.29 K/UL — HIGH (ref 3.8–10.5)
WBC # FLD AUTO: 13.29 K/UL — HIGH (ref 3.8–10.5)

## 2024-02-19 PROCEDURE — 99222 1ST HOSP IP/OBS MODERATE 55: CPT

## 2024-02-19 PROCEDURE — 99232 SBSQ HOSP IP/OBS MODERATE 35: CPT

## 2024-02-19 PROCEDURE — 99291 CRITICAL CARE FIRST HOUR: CPT

## 2024-02-19 RX ADMIN — ATORVASTATIN CALCIUM 80 MILLIGRAM(S): 80 TABLET, FILM COATED ORAL at 21:27

## 2024-02-19 RX ADMIN — CEFTRIAXONE 1000 MILLIGRAM(S): 500 INJECTION, POWDER, FOR SOLUTION INTRAMUSCULAR; INTRAVENOUS at 04:22

## 2024-02-19 RX ADMIN — PANTOPRAZOLE SODIUM 40 MILLIGRAM(S): 20 TABLET, DELAYED RELEASE ORAL at 11:07

## 2024-02-19 RX ADMIN — Medication 1 APPLICATION(S): at 17:48

## 2024-02-19 RX ADMIN — Medication 4: at 05:48

## 2024-02-19 RX ADMIN — HEPARIN SODIUM 5000 UNIT(S): 5000 INJECTION INTRAVENOUS; SUBCUTANEOUS at 21:27

## 2024-02-19 RX ADMIN — Medication 12.5 MILLIGRAM(S): at 17:53

## 2024-02-19 RX ADMIN — ARIPIPRAZOLE 5 MILLIGRAM(S): 15 TABLET ORAL at 11:05

## 2024-02-19 RX ADMIN — SERTRALINE 100 MILLIGRAM(S): 25 TABLET, FILM COATED ORAL at 11:06

## 2024-02-19 RX ADMIN — Medication 12.5 MILLIGRAM(S): at 06:40

## 2024-02-19 RX ADMIN — Medication 1 APPLICATION(S): at 05:49

## 2024-02-19 RX ADMIN — Medication 4: at 11:12

## 2024-02-19 RX ADMIN — CLOPIDOGREL BISULFATE 75 MILLIGRAM(S): 75 TABLET, FILM COATED ORAL at 11:10

## 2024-02-19 RX ADMIN — Medication 81 MILLIGRAM(S): at 11:09

## 2024-02-19 RX ADMIN — INSULIN GLARGINE 15 UNIT(S): 100 INJECTION, SOLUTION SUBCUTANEOUS at 09:00

## 2024-02-19 RX ADMIN — QUETIAPINE FUMARATE 50 MILLIGRAM(S): 200 TABLET, FILM COATED ORAL at 21:28

## 2024-02-19 RX ADMIN — HEPARIN SODIUM 5000 UNIT(S): 5000 INJECTION INTRAVENOUS; SUBCUTANEOUS at 13:00

## 2024-02-19 RX ADMIN — CHLORHEXIDINE GLUCONATE 1 APPLICATION(S): 213 SOLUTION TOPICAL at 05:49

## 2024-02-19 RX ADMIN — HEPARIN SODIUM 5000 UNIT(S): 5000 INJECTION INTRAVENOUS; SUBCUTANEOUS at 05:48

## 2024-02-19 RX ADMIN — Medication 1 MILLIGRAM(S): at 11:10

## 2024-02-19 RX ADMIN — Medication 2: at 18:01

## 2024-02-19 RX ADMIN — Medication 4: at 23:22

## 2024-02-19 RX ADMIN — Medication 10 MILLIGRAM(S): at 09:00

## 2024-02-19 NOTE — PROGRESS NOTE ADULT - PROBLEM SELECTOR PLAN 1
Extubated, sitting up, speech and swallow pending with plans to d.c ng tube.  Downgraded to telemetry  Telemetry SR with no acute alarms.    LHC with LM 60%, pLAD 70%, mLAD 80%, D1 100%, pLCx 100%, dRCA 90%, RI 90%.   Evaluated by CT Surgery, and deemed not a surgical candidate.   Being eventual high risk PCI when optimized.    Ct Plavix 75mg PO qday.   Continue aspirin and Lipitor.   Restart Imdur when BP allows.

## 2024-02-19 NOTE — PROGRESS NOTE ADULT - ASSESSMENT
78-year-old male with HTN, HLD, DM admitted with chest pain and diarrhea.  Hypotensive on arrival requiring Levophed.  TTE with a EF of less than 20%.  Intubated on 2/14/2024. s/p LHC showing MVD, poor candidate for CABG.  Patient extubated yesterday.  Nephrology following for acute kidney injury:    Acute kidney injury on CKD, NOS  NSTEMI  Acute systolic CHF  Anemia, unspecified  DM    ·	Baseline serum creatinine unclear was 1 mg/deciliter in September 22  ·	WES was secondary to hypoperfusion from cardiogenic shock  ·	Serum creatinine on admission 2.3 and had stayed stable around the same, 2.3 mg/deciliter today  ·	Patient euvolemic, diuretics on hold (initially was on dobutamine and Bumex drip)  ·	Maintain MAP more than 65 and avoid nephrotoxins, Will follow.

## 2024-02-19 NOTE — PROGRESS NOTE ADULT - ASSESSMENT
79 y/o male with PMH of HTN, HLD, CKD, plaque psoriasis, DM-2 who came to the ED (02/12/24) complaining of chest pain radiating to left arm associated with multiple episodes of diarrhea.  On ED arrival, afebrile, hypotensive to SBP 80-90s despite 1L IVF requiring Levophed. EKG with nonspecific ST changes, troponin 2344->2477. Labs otherwise notable for K 5.6, BUN/Cr 43.5/2.31 (baseline Cr ~1 in 2022), BNP 89242, lactate 6.2. CXR with pulmonary edema. Placed on BiPAP ,given Lasix 40mg. TTE with EF<20%, multiple segmental abnormalities, mod MR, mod TR, mod pericardial effusion without tamponade; started on Heparin drip for NSTEMI, cardiology consulted. Admitted to MICU for further management of cardiogenic shock. On 02/14/24: patient became obtunded, febrile, intubated for airway protection. Interventional cardiology also on board; S/P LHC: Severe multi-vessel CAD including LM, CABG recommended. CT surgery deemed patient poor candidate for CABG. HF team also following; s/p RHC: shows low filling pressures with normal cardiac output. Nephrology following for WES. Patient was extubated yesterday (02/18/24); NG tube in place. Palliative team following, patient DNR/DNI. Patient now downgraded to medical floor.     NSTEMI   Now off Heparin drip   S/p LHC: severe multiple-vessel CAD, not a candidate for CABG   Plavix 75mg   Asprin 81mg   Atorvastatin 80mg   Cardiology following     Acute Systolic CHF  Off Bumex and Lasix drip   HF following   Echo done   S/p RHC     Cardiogenic shock   Now off Levophed and Dobutamine   Monitor BP     Acute metabolic encephalopathy   Requiring intubation for airway protection   Now s/p extubation   Saturating well on RA   Aspiration precaution     WES on CKD  Nephrology following   Monitor renal function     DM-2  Lantus 15 units AM   Insulin sliding scale     Depression   Buspirone 10mg   Aripiprazole 5mg   Seroquel 50mg HS   Sertraline 100mg     Supportive   DVT prophylaxis: Heparin sc  GI prophylaxis: PPI 40mg   Diet: NG tube     Plan of care discussed with ICU PA and nurse.

## 2024-02-19 NOTE — PROGRESS NOTE ADULT - NS ATTEND AMEND GEN_ALL_CORE FT
77 y/o M admitted with NSTEMI, s/p [facility] with multivessel disease (LM 60%, pLAD 70%, mLAD 80%, D1 100%, pLCx 100%, dRCA 90%, RI 90%). TTE with LVEF <20%, moderate MR/TR, moderate pericardial effusion. Was initially started on dobutamine, levophed, and bumex gtt, as well as antibiotics for aspiration PNA. Now off pressors. Extubated 2/18. He appears more lethargic today, easily arousable but less responsive to questioning. Appears euvolemic on exam. Was started on lopressor; will add further GDMT as tolerated. Avoid ACE/ARB/ARNI/MRA due to CKD. Patient was seen by CTS and interventional cardiology; not a candidate for revascularization with CABG or high risk PCI. Will readdress with interventional cardiology next week as patient is extubated and hemodynamically stable. Recommend chest PT, incentive spirometry.     D/w patient's wife at bedside

## 2024-02-19 NOTE — PROGRESS NOTE ADULT - ASSESSMENT
79 y/o M with PMHx anxiety, HTN, HLD, CKD, plaque psoriasis, DM2 who presents to [facility] ED with 1 weeks of chest pain at rest.  Hypotensive on presentation, given IV fluid bolus but had respiratory distress, placed on bipap and levophed and vasopressin and inturbated.    HsTnT 2344->2477, lactate 6.2, BNP 36478.

## 2024-02-19 NOTE — PROGRESS NOTE ADULT - ASSESSMENT
78 y/oM PMH of HTN, HLD, CKD, plaque psoriasis, DM-2 who came to the ED (02/12/24) complaining of chest pain radiating to left arm associated with multiple episodes of diarrhea.  On ED arrival, afebrile, hypotensive to SBP 80-90s despite 1L IVF requiring Levophed. EKG with nonspecific ST changes, troponin 2344->2477. Labs otherwise notable for K 5.6, BUN/Cr 43.5/2.31 (baseline Cr ~1 in 2022), BNP 95918, lactate 6.2. CXR with pulmonary edema. Placed on BiPAP ,given Lasix 40mg. TTE with EF<20%, multiple segmental abnormalities, mod MR, mod TR, mod pericardial effusion without tamponade; started on Heparin drip for NSTEMI, cardiology consulted. Admitted to MICU for further management of cardiogenic shock. On 02/14/24: patient became obtunded, febrile, intubated for airway protection. Interventional cardiology also on board; S/P LHC: Severe multi-vessel CAD including LM, CABG recommended. CT surgery deemed patient poor candidate for CABG. HF team also following; s/p RHC: shows low filling pressures with normal cardiac output. Nephrology following for WES. Patient extubated (02/18/24); NG tube in place. Palliative team following, patient DNR/DNI. Patient now downgraded to medical floor 2/19    NSTEMI   -s/p Heparin drip   -S/p LHC: severe multiple-vessel CAD, not a candidate for CABG   -Plavix 75mg   -Asprin 81mg   -Atorvastatin 80mg   -Cardiology following     Acute Systolic CHF  -s/p Bumex and Lasix drip   -HF following   -Echo done   -S/p RHC     Cardiogenic shock   -s/p Levophed and Dobutamine   -Monitor BP     Acute metabolic encephalopathy   Requiring intubation for airway protection   -Now s/p extubation 2/18  -Aspiration precautions  -f/u SLP   -cont TF for now via NGT     WES on CKD  -Nephrology following   -Monitor renal function     DM-2  -Lantus 15 units AM   -Insulin sliding scale     Depression   -Buspirone 10mg   -Aripiprazole 5mg   -Seroquel 50mg HS   -Sertraline 100mg     Supportive   DVT prophylaxis: Heparin sc

## 2024-02-19 NOTE — PROGRESS NOTE ADULT - SUBJECTIVE AND OBJECTIVE BOX
CABRERA BADR    [id number]    78y      Male    CC: cardiogenic shock     INTERVAL HPI/OVERNIGHT EVENTS: pt seen and examined. asking for SLP     REVIEW OF SYSTEMS:    RESPIRATORY: No cough, wheezing, hemoptysis; No shortness of breath  CARDIOVASCULAR: No chest pain, palpitations  GASTROINTESTINAL: No abdominal or epigastric pain. No nausea, vomiting    Vital Signs Last 24 Hrs  T(C): 36.6 (19 Feb 2024 15:26), Max: 37 (18 Feb 2024 19:54)  T(F): 97.8 (19 Feb 2024 15:26), Max: 98.6 (18 Feb 2024 19:54)  HR: 81 (19 Feb 2024 17:00) (75 - 92)  BP: 123/66 (19 Feb 2024 17:00) (96/50 - 132/79)  BP(mean): 84 (19 Feb 2024 17:00) (64 - 95)  RR: 20 (19 Feb 2024 17:00) (14 - 22)  SpO2: 100% (19 Feb 2024 17:00) (94% - 100%)    Parameters below as of 19 Feb 2024 08:00  Patient On (Oxygen Delivery Method): room air        PHYSICAL EXAM:    GENERAL: NAD  HEENT: +NGT  CHEST/LUNG: decreased; respirations unlabored   HEART: S1S2+, Regular rate and rhythm  ABDOMEN: Soft, Nontender, Nondistended; Bowel sounds present  SKIN: warm, dry   NEURO: Awake, alert  PSYCH: normal affect     LABS:                        9.8    13.29 )-----------( 322      ( 19 Feb 2024 02:40 )             31.6     02-19    145  |  100  |  97.0<H>  ----------------------------<  144<H>  4.6   |  26.0  |  2.30<H>    Ca    9.5      19 Feb 2024 02:40  Phos  4.0     02-19  Mg     2.4     02-19    TPro  6.8  /  Alb  3.5  /  TBili  0.2<L>  /  DBili  x   /  AST  12  /  ALT  19  /  AlkPhos  97  02-18      Urinalysis Basic - ( 19 Feb 2024 02:40 )    Color: x / Appearance: x / SG: x / pH: x  Gluc: 144 mg/dL / Ketone: x  / Bili: x / Urobili: x   Blood: x / Protein: x / Nitrite: x   Leuk Esterase: x / RBC: x / WBC x   Sq Epi: x / Non Sq Epi: x / Bacteria: x          MEDICATIONS  (STANDING):  ARIPiprazole 5 milliGRAM(s) Oral daily  aspirin  chewable 81 milliGRAM(s) Oral daily  atorvastatin 80 milliGRAM(s) Oral at bedtime  busPIRone 10 milliGRAM(s) Oral <User Schedule>  cefTRIAXone Injectable. 1000 milliGRAM(s) IV Push every 24 hours  cefTRIAXone Injectable.      chlorhexidine 2% Cloths 1 Application(s) Topical <User Schedule>  clopidogrel Tablet 75 milliGRAM(s) Enteral Tube daily  dextrose 50% Injectable 12.5 Gram(s) IV Push once  dextrose 50% Injectable 25 Gram(s) IV Push once  dextrose 50% Injectable 25 Gram(s) IV Push once  heparin   Injectable 5000 Unit(s) SubCutaneous every 8 hours  influenza  Vaccine (HIGH DOSE) 0.7 milliLiter(s) IntraMuscular once  insulin glargine Injectable (LANTUS) 15 Unit(s) SubCutaneous every morning  insulin lispro (ADMELOG) corrective regimen sliding scale   SubCutaneous every 6 hours  metoprolol tartrate 12.5 milliGRAM(s) Enteral Tube every 12 hours  pantoprazole  Injectable 40 milliGRAM(s) IV Push daily  QUEtiapine 50 milliGRAM(s) Oral at bedtime  sertraline 100 milliGRAM(s) Oral daily  triamcinolone 0.1% Cream 1 Application(s) Topical two times a day    MEDICATIONS  (PRN):  acetaminophen     Tablet .. 650 milliGRAM(s) Oral every 6 hours PRN Temp greater or equal to 38C (100.4F)  clonazePAM  Tablet 1 milliGRAM(s) Oral daily PRN for anxiety  sodium chloride 0.9% lock flush 10 milliLiter(s) IV Push every 1 hour PRN Pre/post blood products, medications, blood draw, and to maintain line patency      RADIOLOGY & ADDITIONAL TESTS:

## 2024-02-19 NOTE — PROGRESS NOTE ADULT - SUBJECTIVE AND OBJECTIVE BOX
[facility]                                                         CARDIOLOGY AT [facility]                                                                  [address], [place] [address]                                                         Telephone: [phone]. Fax:[phone]                                                                             PROGRESS NOTE    Reason for follow up: NSTEMI/HFrEF/WES  Update:   Extubated/sitting up/more awake and alert, downgraded to telemetry    Review of symptoms:     Cardiac:  No chest pain. No dyspnea. No palpitations.  Respiratory: no cough. No dyspnea  Gastrointestinal: No diarrhea. No abdominal pain. No bleeding.   Neuro: No focal neuro complaints.    Vitals:  T(C): 36.7 (02-19-24 @ 08:00), Max: 37.6 (02-18-24 @ 16:00)  HR: 76 (02-19-24 @ 08:00) (73 - 92)  BP: 109/61 (02-19-24 @ 08:00) (96/50 - 123/68)  RR: 16 (02-19-24 @ 08:00) (11 - 22)  SpO2: 98% (02-19-24 @ 08:00) (94% - 100%)  I&O's Summary    18 Feb 2024 07:01  -  19 Feb 2024 07:00  --------------------------------------------------------  IN: 700 mL / OUT: 701 mL / NET: -1 mL    PHYSICAL EXAM:  Appearance: Comfortable. No acute distress  HEENT:  Atraumatic. Normocephalic.  Normal oral mucosa  Neurologic: A & O x 1, no gross focal deficits.  Cardiovascular: RRR S1 S2, No murmur, no rubs/gallops. No JVD  Respiratory:  Diminished bilaterally  Gastrointestinal:  Soft, Non-tender, + BS  Lower Extremities: + Peripheral Pulses, No clubbing, cyanosis, or edema  Psychiatry: Patient is calm. No agitation.   Skin: warm and dry.    CURRENT CARDIAC MEDICATIONS:  metoprolol tartrate 12.5 milliGRAM(s) Enteral Tube every 12 hours    CURRENT OTHER MEDICATIONS:  cefTRIAXone Injectable. 1000 milliGRAM(s) IV Push every 24 hours  cefTRIAXone Injectable.      acetaminophen     Tablet .. 650 milliGRAM(s) Oral every 6 hours PRN Temp greater or equal to 38C (100.4F)  ARIPiprazole 5 milliGRAM(s) Oral daily  busPIRone 10 milliGRAM(s) Oral <User Schedule>  clonazePAM  Tablet 1 milliGRAM(s) Oral daily PRN for anxiety  QUEtiapine 50 milliGRAM(s) Oral at bedtime  sertraline 100 milliGRAM(s) Oral daily  pantoprazole  Injectable 40 milliGRAM(s) IV Push daily  atorvastatin 80 milliGRAM(s) Oral at bedtime  dextrose 50% Injectable 25 Gram(s) IV Push once, Stop order after: 1 Doses  dextrose 50% Injectable 12.5 Gram(s) IV Push once, Stop order after: 1 Doses  dextrose 50% Injectable 25 Gram(s) IV Push once, Stop order after: 1 Doses  insulin glargine Injectable (LANTUS) 15 Unit(s) SubCutaneous every morning  insulin lispro (ADMELOG) corrective regimen sliding scale   SubCutaneous every 6 hours  aspirin  chewable 81 milliGRAM(s) Oral daily  chlorhexidine 2% Cloths 1 Application(s) Topical <User Schedule>  clopidogrel Tablet 75 milliGRAM(s) Enteral Tube daily  heparin   Injectable 5000 Unit(s) SubCutaneous every 8 hours  influenza  Vaccine (HIGH DOSE) 0.7 milliLiter(s) IntraMuscular once  sodium chloride 0.9% lock flush 10 milliLiter(s) IV Push every 1 hour PRN Pre/post blood products, medications, blood draw, and to maintain line patency  triamcinolone 0.1% Cream 1 Application(s) Topical two times a day      LABS:	 	                            9.8    13.29 )-----------( 322      ( 19 Feb 2024 02:40 )             31.6     02-19    145  |  100  |  97.0<H>  ----------------------------<  144<H>  4.6   |  26.0  |  2.30<H>    Ca    9.5      19 Feb 2024 02:40  Phos  4.0     02-19  Mg     2.4     02-19    TPro  6.8  /  Alb  3.5  /  TBili  0.2<L>  /  DBili  x   /  AST  12  /  ALT  19  /  AlkPhos  97  02-18    PT/INR/PTT ( 16 Feb 2024 04:35 )                       :                       :      12.2         :       25.5                  .        .                   .              .           .       1.10        .                                       Lipid Profile: Date: 02-13 @ 11:15  Total cholesterol 179; Direct LDL: --; HDL: 49; Triglycerides:94  Date: 02-13 @ 03:08  Total cholesterol 183; Direct LDL: --; HDL: 48; Triglycerides:76    TSH: Thyroid Stimulating Hormone, Serum: 2.49 uIU/mL    TELEMETRY:   Sr with PVC"s, no acute alarms noted.

## 2024-02-19 NOTE — PROGRESS NOTE ADULT - SUBJECTIVE AND OBJECTIVE BOX
Reason for visit: WES    Subjective: No acute overnight event. Patient denied any cardiac or urinary complains. No fever/chills. Being downgraded out of ICU    ROS: All systems were reviewed in detail pertinent positive and negative mentioned above, rest are negative.    Physical Exam:  Gen: no acute distress  MS: alert, conversing normally  Eyes: EOMI, no icterus  HENT: NCAT, MMM  CV: rhythm reg reg, rate normal, no m/g/r  Chest: CTAB, no w/r/r,  Abd: soft, NT, ND  Extremities: No edema    =======================================================  Vital Signs Last 24 Hrs  T(C): 36.8 (19 Feb 2024 12:00), Max: 37.6 (18 Feb 2024 16:00)  T(F): 98.3 (19 Feb 2024 12:00), Max: 99.7 (18 Feb 2024 16:00)  HR: 87 (19 Feb 2024 14:00) (75 - 92)  BP: 132/79 (19 Feb 2024 14:00) (96/50 - 132/79)  BP(mean): 95 (19 Feb 2024 14:00) (64 - 95)  RR: 16 (19 Feb 2024 14:00) (11 - 22)  SpO2: 100% (19 Feb 2024 14:00) (94% - 100%)    Parameters below as of 19 Feb 2024 08:00  Patient On (Oxygen Delivery Method): room air      I&O's Summary    18 Feb 2024 07:01  -  19 Feb 2024 07:00  --------------------------------------------------------  IN: 700 mL / OUT: 701 mL / NET: -1 mL    19 Feb 2024 07:01  -  19 Feb 2024 14:47  --------------------------------------------------------  IN: 400 mL / OUT: 500 mL / NET: -100 mL      =======================================================  Current Antibiotics:  cefTRIAXone Injectable. 1000 milliGRAM(s) IV Push every 24 hours  cefTRIAXone Injectable.        Other medications:  ARIPiprazole 5 milliGRAM(s) Oral daily  aspirin  chewable 81 milliGRAM(s) Oral daily  atorvastatin 80 milliGRAM(s) Oral at bedtime  busPIRone 10 milliGRAM(s) Oral <User Schedule>  chlorhexidine 2% Cloths 1 Application(s) Topical <User Schedule>  clopidogrel Tablet 75 milliGRAM(s) Enteral Tube daily  dextrose 50% Injectable 25 Gram(s) IV Push once  dextrose 50% Injectable 12.5 Gram(s) IV Push once  dextrose 50% Injectable 25 Gram(s) IV Push once  heparin   Injectable 5000 Unit(s) SubCutaneous every 8 hours  influenza  Vaccine (HIGH DOSE) 0.7 milliLiter(s) IntraMuscular once  insulin glargine Injectable (LANTUS) 15 Unit(s) SubCutaneous every morning  insulin lispro (ADMELOG) corrective regimen sliding scale   SubCutaneous every 6 hours  metoprolol tartrate 12.5 milliGRAM(s) Enteral Tube every 12 hours  pantoprazole  Injectable 40 milliGRAM(s) IV Push daily  QUEtiapine 50 milliGRAM(s) Oral at bedtime  sertraline 100 milliGRAM(s) Oral daily  triamcinolone 0.1% Cream 1 Application(s) Topical two times a day    =======================================================  02-19    145  |  100  |  97.0<H>  ----------------------------<  144<H>  4.6   |  26.0  |  2.30<H>    Ca    9.5      19 Feb 2024 02:40  Phos  4.0     02-19  Mg     2.4     02-19    TPro  6.8  /  Alb  3.5  /  TBili  0.2<L>  /  DBili  x   /  AST  12  /  ALT  19  /  AlkPhos  97  02-18    Creatinine: 2.30 mg/dL (02-19-24 @ 02:40)  Creatinine: 2.24 mg/dL (02-18-24 @ 04:30)  Creatinine: 2.35 mg/dL (02-17-24 @ 03:45)  Creatinine: 2.70 mg/dL (02-16-24 @ 04:35)  Creatinine: 2.59 mg/dL (02-15-24 @ 21:47)  Creatinine: 2.56 mg/dL (02-15-24 @ 20:16)  Creatinine: 2.74 mg/dL (02-15-24 @ 16:15)  Creatinine: 2.74 mg/dL (02-15-24 @ 04:48)  Creatinine: 2.64 mg/dL (02-14-24 @ 22:30)  Creatinine: 2.58 mg/dL (02-14-24 @ 16:32)    =======================================================

## 2024-02-19 NOTE — PROGRESS NOTE ADULT - SUBJECTIVE AND OBJECTIVE BOX
79 y/o male with PMH of HTN, HLD, CKD, plaque psoriasis, DM-2 who came to the ED (02/12/24) complaining of chest pain radiating to left arm associated with multiple episodes of diarrhea.  On ED arrival, afebrile, hypotensive to SBP 80-90s despite 1L IVF requiring Levophed. EKG with nonspecific ST changes, troponin 2344->2477. Labs otherwise notable for K 5.6, BUN/Cr 43.5/2.31 (baseline Cr ~1 in 2022), BNP 98739, lactate 6.2. CXR with pulmonary edema. Placed on BiPAP ,given Lasix 40mg. TTE with EF<20%, multiple segmental abnormalities, mod MR, mod TR, mod pericardial effusion without tamponade; started on Heparin drip for NSTEMI, cardiology consulted. Admitted to MICU for further management of cardiogenic shock. On 02/14/24: patient became obtunded, febrile, intubated for airway protection. Interventional cardiology also on board; S/P LHC: Severe multi-vessel CAD including LM, CABG recommended. CT surgery deemed patient poor candidate for CABG. HF team also following; s/p RHC: shows low filling pressures with normal cardiac output. Nephrology following for WES. Patient was extubated yesterday (02/18/24); NG tube in place. Palliative team following, patient DNR/DNI. Patient now downgraded to medical floor.     Patient seen and examined at bed side, sleeping, not in acute distress, sleeping comfortably.       PAST MEDICAL & SURGICAL HISTORY:  Diabetes    Hyperlipidemia    Anxiety with depression    Insomnia    No significant past surgical history        REVIEW OF SYSTEMS    MEDICATIONS  (STANDING):  ARIPiprazole 5 milliGRAM(s) Oral daily  aspirin  chewable 81 milliGRAM(s) Oral daily  atorvastatin 80 milliGRAM(s) Oral at bedtime  busPIRone 10 milliGRAM(s) Oral <User Schedule>  cefTRIAXone Injectable. 1000 milliGRAM(s) IV Push every 24 hours  cefTRIAXone Injectable.      chlorhexidine 2% Cloths 1 Application(s) Topical <User Schedule>  clopidogrel Tablet 75 milliGRAM(s) Enteral Tube daily  dextrose 50% Injectable 25 Gram(s) IV Push once  dextrose 50% Injectable 25 Gram(s) IV Push once  dextrose 50% Injectable 12.5 Gram(s) IV Push once  heparin   Injectable 5000 Unit(s) SubCutaneous every 8 hours  influenza  Vaccine (HIGH DOSE) 0.7 milliLiter(s) IntraMuscular once  insulin glargine Injectable (LANTUS) 15 Unit(s) SubCutaneous every morning  insulin lispro (ADMELOG) corrective regimen sliding scale   SubCutaneous every 6 hours  metoprolol tartrate 12.5 milliGRAM(s) Enteral Tube every 12 hours  pantoprazole  Injectable 40 milliGRAM(s) IV Push daily  QUEtiapine 50 milliGRAM(s) Oral at bedtime  sertraline 100 milliGRAM(s) Oral daily  triamcinolone 0.1% Cream 1 Application(s) Topical two times a day    MEDICATIONS  (PRN):  acetaminophen     Tablet .. 650 milliGRAM(s) Oral every 6 hours PRN Temp greater or equal to 38C (100.4F)  clonazePAM  Tablet 1 milliGRAM(s) Oral daily PRN for anxiety  sodium chloride 0.9% lock flush 10 milliLiter(s) IV Push every 1 hour PRN Pre/post blood products, medications, blood draw, and to maintain line patency      Allergies: No Known Allergies    SOCIAL HISTORY: , lives with family.     FAMILY HISTORY:  No pertinent family history in first degree relatives      Vital Signs Last 24 Hrs  T(C): 36.3 (18 Feb 2024 23:40), Max: 38 (18 Feb 2024 04:08)  T(F): 97.3 (18 Feb 2024 23:40), Max: 100.4 (18 Feb 2024 04:08)  HR: 80 (19 Feb 2024 00:00) (73 - 92)  BP: 104/55 (19 Feb 2024 00:00) (96/50 - 123/68)  BP(mean): 70 (19 Feb 2024 00:00) (64 - 86)  RR: 15 (19 Feb 2024 00:00) (11 - 22)  SpO2: 98% (19 Feb 2024 00:00) (94% - 100%)    Parameters below as of 18 Feb 2024 09:05    O2 Flow (L/min): 10  O2 Concentration (%): 40    PHYSICAL EXAM:      Constitutional: well groomed, sleeping comfortably in bed, not in acute distress.     Eyes: PERRLA     ENMT: NG tube in place for feeding and medications.     Respiratory: on RA, not in acute respiratory distress    Cardiovascular: s1, s2, RRR    Gastrointestinal: soft, non-tender, non-distended, BS+     Extremities: no edema     Neurological: sleeping, unable to assess; s/p Seroquel.     Skin: warm, no rash    Musculoskeletal: no joint edema, erythema    Psychiatric: calm.         LABS:                        8.4    11.28 )-----------( 281      ( 18 Feb 2024 04:30 )             25.7     02-18    144  |  101  |  92.6<H>  ----------------------------<  242<H>  4.0   |  27.0  |  2.24<H>    Ca    9.1      18 Feb 2024 04:30  Phos  4.1     02-18  Mg     2.2     02-18    TPro  6.8  /  Alb  3.5  /  TBili  0.2<L>  /  DBili  x   /  AST  12  /  ALT  19  /  AlkPhos  97  02-18      Urinalysis Basic - ( 18 Feb 2024 04:30 )    Color: x / Appearance: x / SG: x / pH: x  Gluc: 242 mg/dL / Ketone: x  / Bili: x / Urobili: x   Blood: x / Protein: x / Nitrite: x   Leuk Esterase: x / RBC: x / WBC x   Sq Epi: x / Non Sq Epi: x / Bacteria: x

## 2024-02-19 NOTE — PROGRESS NOTE ADULT - PROBLEM SELECTOR PLAN 2
Patient initially with echocardiogram showing EF <20%, multiple RWMA, mod MR/TR, mild pulmonary HTN, and moderate pericardial effusion.   Repeat echocardiogram on Dobutamine with EF 25-30%.   Not on pressors bp 107/59Weaned off dobutamine and levophed.   Patient is euvolemic on exam   Diuresis PRN.   GDMT as tolerated.  ON bb - Ct metoprolol 12.5mg PO BID.   No diuretics - euvolemic on exam.  BP remains soft - consider afterload reducing when bp is optimized.

## 2024-02-20 DIAGNOSIS — N17.9 ACUTE KIDNEY FAILURE, UNSPECIFIED: ICD-10-CM

## 2024-02-20 DIAGNOSIS — R13.10 DYSPHAGIA, UNSPECIFIED: ICD-10-CM

## 2024-02-20 LAB
ANION GAP SERPL CALC-SCNC: 18 MMOL/L — HIGH (ref 5–17)
BASOPHILS # BLD AUTO: 0.03 K/UL — SIGNIFICANT CHANGE UP (ref 0–0.2)
BASOPHILS NFR BLD AUTO: 0.3 % — SIGNIFICANT CHANGE UP (ref 0–2)
BUN SERPL-MCNC: 101.8 MG/DL — HIGH (ref 8–20)
CALCIUM SERPL-MCNC: 9.8 MG/DL — SIGNIFICANT CHANGE UP (ref 8.4–10.5)
CHLORIDE SERPL-SCNC: 104 MMOL/L — SIGNIFICANT CHANGE UP (ref 96–108)
CO2 SERPL-SCNC: 29 MMOL/L — SIGNIFICANT CHANGE UP (ref 22–29)
CREAT SERPL-MCNC: 2.02 MG/DL — HIGH (ref 0.5–1.3)
EGFR: 33 ML/MIN/1.73M2 — LOW
EOSINOPHIL # BLD AUTO: 0.35 K/UL — SIGNIFICANT CHANGE UP (ref 0–0.5)
EOSINOPHIL NFR BLD AUTO: 3.4 % — SIGNIFICANT CHANGE UP (ref 0–6)
GLUCOSE BLDC GLUCOMTR-MCNC: 191 MG/DL — HIGH (ref 70–99)
GLUCOSE BLDC GLUCOMTR-MCNC: 223 MG/DL — HIGH (ref 70–99)
GLUCOSE BLDC GLUCOMTR-MCNC: 225 MG/DL — HIGH (ref 70–99)
GLUCOSE SERPL-MCNC: 165 MG/DL — HIGH (ref 70–99)
HCT VFR BLD CALC: 32.9 % — LOW (ref 39–50)
HGB BLD-MCNC: 9.8 G/DL — LOW (ref 13–17)
IMM GRANULOCYTES NFR BLD AUTO: 0.5 % — SIGNIFICANT CHANGE UP (ref 0–0.9)
LYMPHOCYTES # BLD AUTO: 2.19 K/UL — SIGNIFICANT CHANGE UP (ref 1–3.3)
LYMPHOCYTES # BLD AUTO: 21.3 % — SIGNIFICANT CHANGE UP (ref 13–44)
MAGNESIUM SERPL-MCNC: 2.6 MG/DL — SIGNIFICANT CHANGE UP (ref 1.6–2.6)
MCHC RBC-ENTMCNC: 26.3 PG — LOW (ref 27–34)
MCHC RBC-ENTMCNC: 29.8 GM/DL — LOW (ref 32–36)
MCV RBC AUTO: 88.2 FL — SIGNIFICANT CHANGE UP (ref 80–100)
MONOCYTES # BLD AUTO: 0.96 K/UL — HIGH (ref 0–0.9)
MONOCYTES NFR BLD AUTO: 9.3 % — SIGNIFICANT CHANGE UP (ref 2–14)
NEUTROPHILS # BLD AUTO: 6.72 K/UL — SIGNIFICANT CHANGE UP (ref 1.8–7.4)
NEUTROPHILS NFR BLD AUTO: 65.2 % — SIGNIFICANT CHANGE UP (ref 43–77)
PHOSPHATE SERPL-MCNC: 3.5 MG/DL — SIGNIFICANT CHANGE UP (ref 2.4–4.7)
PLATELET # BLD AUTO: 320 K/UL — SIGNIFICANT CHANGE UP (ref 150–400)
POTASSIUM SERPL-MCNC: 4.4 MMOL/L — SIGNIFICANT CHANGE UP (ref 3.5–5.3)
POTASSIUM SERPL-SCNC: 4.4 MMOL/L — SIGNIFICANT CHANGE UP (ref 3.5–5.3)
RBC # BLD: 3.73 M/UL — LOW (ref 4.2–5.8)
RBC # FLD: 14.9 % — HIGH (ref 10.3–14.5)
SODIUM SERPL-SCNC: 150 MMOL/L — HIGH (ref 135–145)
WBC # BLD: 10.3 K/UL — SIGNIFICANT CHANGE UP (ref 3.8–10.5)
WBC # FLD AUTO: 10.3 K/UL — SIGNIFICANT CHANGE UP (ref 3.8–10.5)

## 2024-02-20 PROCEDURE — 99232 SBSQ HOSP IP/OBS MODERATE 35: CPT

## 2024-02-20 PROCEDURE — 99233 SBSQ HOSP IP/OBS HIGH 50: CPT

## 2024-02-20 RX ADMIN — Medication 1 APPLICATION(S): at 06:00

## 2024-02-20 RX ADMIN — HEPARIN SODIUM 5000 UNIT(S): 5000 INJECTION INTRAVENOUS; SUBCUTANEOUS at 22:17

## 2024-02-20 RX ADMIN — Medication 2: at 17:37

## 2024-02-20 RX ADMIN — HEPARIN SODIUM 5000 UNIT(S): 5000 INJECTION INTRAVENOUS; SUBCUTANEOUS at 05:59

## 2024-02-20 RX ADMIN — QUETIAPINE FUMARATE 50 MILLIGRAM(S): 200 TABLET, FILM COATED ORAL at 22:17

## 2024-02-20 RX ADMIN — INSULIN GLARGINE 15 UNIT(S): 100 INJECTION, SOLUTION SUBCUTANEOUS at 08:58

## 2024-02-20 RX ADMIN — Medication 4: at 12:45

## 2024-02-20 RX ADMIN — Medication 1 APPLICATION(S): at 17:37

## 2024-02-20 RX ADMIN — Medication 81 MILLIGRAM(S): at 12:45

## 2024-02-20 RX ADMIN — CHLORHEXIDINE GLUCONATE 1 APPLICATION(S): 213 SOLUTION TOPICAL at 06:01

## 2024-02-20 RX ADMIN — CEFTRIAXONE 1000 MILLIGRAM(S): 500 INJECTION, POWDER, FOR SOLUTION INTRAMUSCULAR; INTRAVENOUS at 05:58

## 2024-02-20 RX ADMIN — Medication 10 MILLIGRAM(S): at 12:43

## 2024-02-20 RX ADMIN — CLOPIDOGREL BISULFATE 75 MILLIGRAM(S): 75 TABLET, FILM COATED ORAL at 12:45

## 2024-02-20 RX ADMIN — Medication 12.5 MILLIGRAM(S): at 17:37

## 2024-02-20 RX ADMIN — ARIPIPRAZOLE 5 MILLIGRAM(S): 15 TABLET ORAL at 12:43

## 2024-02-20 RX ADMIN — ATORVASTATIN CALCIUM 80 MILLIGRAM(S): 80 TABLET, FILM COATED ORAL at 22:17

## 2024-02-20 RX ADMIN — PANTOPRAZOLE SODIUM 40 MILLIGRAM(S): 20 TABLET, DELAYED RELEASE ORAL at 12:46

## 2024-02-20 RX ADMIN — SERTRALINE 100 MILLIGRAM(S): 25 TABLET, FILM COATED ORAL at 12:43

## 2024-02-20 RX ADMIN — Medication 12.5 MILLIGRAM(S): at 05:59

## 2024-02-20 RX ADMIN — HEPARIN SODIUM 5000 UNIT(S): 5000 INJECTION INTRAVENOUS; SUBCUTANEOUS at 12:46

## 2024-02-20 RX ADMIN — Medication 4: at 08:57

## 2024-02-20 NOTE — PHYSICAL THERAPY INITIAL EVALUATION ADULT - ADDITIONAL COMMENTS
Pt questionable historian: Pt reports living with spouse and 2 children (25 yo and the other younger) in a house with 2 [place] c rail. Pt reports amb without device. As per chart, pt has assist for ADLs.

## 2024-02-20 NOTE — PROGRESS NOTE ADULT - SUBJECTIVE AND OBJECTIVE BOX
CABRERA BADR    [id number]    78y      Male    CC: cardiogenic shock     INTERVAL HPI/OVERNIGHT EVENTS: pt seen and examined.     REVIEW OF SYSTEMS:    RESPIRATORY: No cough, wheezing, hemoptysis; No shortness of breath  CARDIOVASCULAR: No chest pain, palpitations  GASTROINTESTINAL: No abdominal or epigastric pain. No nausea, vomiting    Vital Signs Last 24 Hrs  T(C): 36.4 (20 Feb 2024 13:00), Max: 36.6 (19 Feb 2024 15:26)  T(F): 97.5 (20 Feb 2024 13:00), Max: 97.8 (19 Feb 2024 15:26)  HR: 77 (20 Feb 2024 13:00) (60 - 89)  BP: 127/73 (20 Feb 2024 13:00) (93/52 - 134/80)  BP(mean): 91 (20 Feb 2024 13:00) (65 - 91)  RR: 16 (20 Feb 2024 13:00) (13 - 20)  SpO2: 100% (20 Feb 2024 13:00) (96% - 100%)    Parameters below as of 20 Feb 2024 13:00  Patient On (Oxygen Delivery Method): room air        PHYSICAL EXAM:    GENERAL: NAD  HEENT: PERRL, +EOMI  NECK: soft, supple  CHEST/LUNG: Clear to auscultation bilaterally; No wheezing  HEART: S1S2+, Regular rate and rhythm; No murmurs, rubs, or gallops  ABDOMEN: Soft, Nontender, Nondistended; Bowel sounds present  SKIN: No rashes or lesions  NEURO: AAOX3, no focal deficits, no motor or sensory loss  PSYCH: normal mood    LABS:                        9.8    10.30 )-----------( 320      ( 20 Feb 2024 06:18 )             32.9     02-20    150<H>  |  104  |  101.8<H>  ----------------------------<  165<H>  4.4   |  29.0  |  2.02<H>    Ca    9.8      20 Feb 2024 06:18  Phos  3.5     02-20  Mg     2.6     02-20        Urinalysis Basic - ( 20 Feb 2024 06:18 )    Color: x / Appearance: x / SG: x / pH: x  Gluc: 165 mg/dL / Ketone: x  / Bili: x / Urobili: x   Blood: x / Protein: x / Nitrite: x   Leuk Esterase: x / RBC: x / WBC x   Sq Epi: x / Non Sq Epi: x / Bacteria: x          MEDICATIONS  (STANDING):  ARIPiprazole 5 milliGRAM(s) Oral daily  aspirin  chewable 81 milliGRAM(s) Oral daily  atorvastatin 80 milliGRAM(s) Oral at bedtime  busPIRone 10 milliGRAM(s) Oral <User Schedule>  chlorhexidine 2% Cloths 1 Application(s) Topical <User Schedule>  clopidogrel Tablet 75 milliGRAM(s) Enteral Tube daily  dextrose 50% Injectable 25 Gram(s) IV Push once  dextrose 50% Injectable 25 Gram(s) IV Push once  dextrose 50% Injectable 12.5 Gram(s) IV Push once  heparin   Injectable 5000 Unit(s) SubCutaneous every 8 hours  influenza  Vaccine (HIGH DOSE) 0.7 milliLiter(s) IntraMuscular once  insulin glargine Injectable (LANTUS) 15 Unit(s) SubCutaneous every morning  insulin lispro (ADMELOG) corrective regimen sliding scale   SubCutaneous every 6 hours  metoprolol tartrate 12.5 milliGRAM(s) Enteral Tube every 12 hours  pantoprazole  Injectable 40 milliGRAM(s) IV Push daily  QUEtiapine 50 milliGRAM(s) Oral at bedtime  sertraline 100 milliGRAM(s) Oral daily  triamcinolone 0.1% Cream 1 Application(s) Topical two times a day    MEDICATIONS  (PRN):  acetaminophen     Tablet .. 650 milliGRAM(s) Oral every 6 hours PRN Temp greater or equal to 38C (100.4F)  clonazePAM  Tablet 1 milliGRAM(s) Oral daily PRN for anxiety  sodium chloride 0.9% lock flush 10 milliLiter(s) IV Push every 1 hour PRN Pre/post blood products, medications, blood draw, and to maintain line patency      RADIOLOGY & ADDITIONAL TESTS:   CABRERA BADR    [id number]    78y      Male    CC: cardiogenic shock     INTERVAL HPI/OVERNIGHT EVENTS: pt seen and examined in am, family at bedside. requesting slp     REVIEW OF SYSTEMS:    RESPIRATORY: No cough, wheezing, hemoptysis; No shortness of breath  CARDIOVASCULAR: No chest pain, palpitations  GASTROINTESTINAL: No abdominal or epigastric pain. No nausea, vomiting    Vital Signs Last 24 Hrs  T(C): 36.4 (20 Feb 2024 13:00), Max: 36.6 (19 Feb 2024 15:26)  T(F): 97.5 (20 Feb 2024 13:00), Max: 97.8 (19 Feb 2024 15:26)  HR: 77 (20 Feb 2024 13:00) (60 - 89)  BP: 127/73 (20 Feb 2024 13:00) (93/52 - 134/80)  BP(mean): 91 (20 Feb 2024 13:00) (65 - 91)  RR: 16 (20 Feb 2024 13:00) (13 - 20)  SpO2: 100% (20 Feb 2024 13:00) (96% - 100%)    Parameters below as of 20 Feb 2024 13:00  Patient On (Oxygen Delivery Method): room air        PHYSICAL EXAM:    GENERAL: NAD  HEENT: +NGT  CHEST/LUNG: decreased; respirations unlabored   HEART: S1S2+, Regular rate and rhythm  ABDOMEN: Soft, Nontender, Nondistended; Bowel sounds present  SKIN: warm, dry   NEURO: Awake, alert  PSYCH: normal affect     LABS:                        9.8    10.30 )-----------( 320      ( 20 Feb 2024 06:18 )             32.9     02-20    150<H>  |  104  |  101.8<H>  ----------------------------<  165<H>  4.4   |  29.0  |  2.02<H>    Ca    9.8      20 Feb 2024 06:18  Phos  3.5     02-20  Mg     2.6     02-20        Urinalysis Basic - ( 20 Feb 2024 06:18 )    Color: x / Appearance: x / SG: x / pH: x  Gluc: 165 mg/dL / Ketone: x  / Bili: x / Urobili: x   Blood: x / Protein: x / Nitrite: x   Leuk Esterase: x / RBC: x / WBC x   Sq Epi: x / Non Sq Epi: x / Bacteria: x          MEDICATIONS  (STANDING):  ARIPiprazole 5 milliGRAM(s) Oral daily  aspirin  chewable 81 milliGRAM(s) Oral daily  atorvastatin 80 milliGRAM(s) Oral at bedtime  busPIRone 10 milliGRAM(s) Oral <User Schedule>  chlorhexidine 2% Cloths 1 Application(s) Topical <User Schedule>  clopidogrel Tablet 75 milliGRAM(s) Enteral Tube daily  dextrose 50% Injectable 25 Gram(s) IV Push once  dextrose 50% Injectable 25 Gram(s) IV Push once  dextrose 50% Injectable 12.5 Gram(s) IV Push once  heparin   Injectable 5000 Unit(s) SubCutaneous every 8 hours  influenza  Vaccine (HIGH DOSE) 0.7 milliLiter(s) IntraMuscular once  insulin glargine Injectable (LANTUS) 15 Unit(s) SubCutaneous every morning  insulin lispro (ADMELOG) corrective regimen sliding scale   SubCutaneous every 6 hours  metoprolol tartrate 12.5 milliGRAM(s) Enteral Tube every 12 hours  pantoprazole  Injectable 40 milliGRAM(s) IV Push daily  QUEtiapine 50 milliGRAM(s) Oral at bedtime  sertraline 100 milliGRAM(s) Oral daily  triamcinolone 0.1% Cream 1 Application(s) Topical two times a day    MEDICATIONS  (PRN):  acetaminophen     Tablet .. 650 milliGRAM(s) Oral every 6 hours PRN Temp greater or equal to 38C (100.4F)  clonazePAM  Tablet 1 milliGRAM(s) Oral daily PRN for anxiety  sodium chloride 0.9% lock flush 10 milliLiter(s) IV Push every 1 hour PRN Pre/post blood products, medications, blood draw, and to maintain line patency      RADIOLOGY & ADDITIONAL TESTS:

## 2024-02-20 NOTE — CHART NOTE - NSCHARTNOTEFT_GEN_A_CORE
Palliative care social work note.    SW contacted patients daughter Alisia to arrange family meeting. Daughter will be at bedside for tomorrow meeting 8-8:30 am. DR Leeanne maravilla.

## 2024-02-20 NOTE — PROGRESS NOTE ADULT - ASSESSMENT
78 y/oM PMH of HTN, HLD, CKD, plaque psoriasis, DM-2 who came to the ED (02/12/24) complaining of chest pain radiating to left arm associated with multiple episodes of diarrhea.  On ED arrival, afebrile, hypotensive to SBP 80-90s despite 1L IVF requiring Levophed. EKG with nonspecific ST changes, troponin 2344->2477. Labs otherwise notable for K 5.6, BUN/Cr 43.5/2.31 (baseline Cr ~1 in 2022), BNP 10076, lactate 6.2. CXR with pulmonary edema. Placed on BiPAP ,given Lasix 40mg. TTE with EF<20%, multiple segmental abnormalities, mod MR, mod TR, mod pericardial effusion without tamponade; started on Heparin drip for NSTEMI, cardiology consulted. Admitted to MICU for further management of cardiogenic shock. On 02/14/24: patient became obtunded, febrile, intubated for airway protection. Interventional cardiology also on board; S/P LHC: Severe multi-vessel CAD including LM, CABG recommended. CT surgery deemed patient poor candidate for CABG. HF team also following; s/p RHC: shows low filling pressures with normal cardiac output. Nephrology following for WES. Patient extubated (02/18/24); NG tube in place. Palliative team following, patient DNR/DNI. Patient now downgraded to medical floor 2/19    NSTEMI   -s/p Heparin drip   -S/p LHC: severe multiple-vessel CAD, not a candidate for CABG   -Plavix 75mg   -Asprin 81mg   -Atorvastatin 80mg   -Cardiology following     Acute Systolic CHF  -s/p Bumex and Lasix drip   -HF following   -Echo done   -S/p RHC     Cardiogenic shock   -s/p Levophed and Dobutamine   -Monitor BP     Acute metabolic encephalopathy   hx dysphagia  Requiring intubation for airway protection   -Now s/p extubation 2/18  -Aspiration precautions  -had been recommended for PEG in the past, declined previously  -SLP rec NPO  -cont TF for now via NGT     WES on CKD  -Nephrology following   -Monitor renal function     DM-2  -Lantus 15 units AM   -Insulin sliding scale     Depression   -Buspirone 10mg   -Aripiprazole 5mg   -Seroquel 50mg HS   -Sertraline 100mg     DVT prophylaxis: Heparin sc    Palliative following ; d/w Dr. Shirley

## 2024-02-20 NOTE — SWALLOW BEDSIDE ASSESSMENT ADULT - SWALLOW EVAL: DIAGNOSIS
Oral phase of swallow functional for puree and liquids via tsp. Suspect pharyngeal dysphagia across all consistencies trialed marked by multiple excessive swallows (8-10 per trial) and delayed cough post intake.

## 2024-02-20 NOTE — PROGRESS NOTE ADULT - SUBJECTIVE AND OBJECTIVE BOX
Reason for visit: WES    Subjective: No acute overnight event. Patient denied any cardiac or urinary complains. No fever/chills. Being downgraded out of ICU    ROS: All systems were reviewed in detail pertinent positive and negative mentioned above, rest are negative.    Physical Exam:  Gen: no acute distress  MS: alert, conversing normally  Eyes: EOMI, no icterus  HENT: NCAT, MMM  CV: rhythm reg reg, rate normal, no m/g/r  Chest: CTAB, no w/r/r,  Abd: soft, NT, ND  Extremities: No edema    Vital Signs Last 24 Hrs  T(C): 36.6 (20 Feb 2024 16:26), Max: 36.6 (20 Feb 2024 16:26)  T(F): 97.9 (20 Feb 2024 16:26), Max: 97.9 (20 Feb 2024 16:26)  HR: 78 (20 Feb 2024 16:26) (60 - 89)  BP: 138/75 (20 Feb 2024 16:26) (93/52 - 138/75)  BP(mean): 96 (20 Feb 2024 16:26) (65 - 96)  RR: 20 (20 Feb 2024 16:26) (13 - 20)  SpO2: 98% (20 Feb 2024 16:26) (96% - 100%)    Parameters below as of 20 Feb 2024 16:26  Patient On (Oxygen Delivery Method): room air      I&O's Summary    19 Feb 2024 07:01  -  20 Feb 2024 07:00  --------------------------------------------------------  IN: 650 mL / OUT: 850 mL / NET: -200 mL    20 Feb 2024 07:01  -  20 Feb 2024 17:25  --------------------------------------------------------  IN: 400 mL / OUT: 0 mL / NET: 400 mL      Current Antibiotics:    Other medications:  ARIPiprazole 5 milliGRAM(s) Oral daily  aspirin  chewable 81 milliGRAM(s) Oral daily  atorvastatin 80 milliGRAM(s) Oral at bedtime  busPIRone 10 milliGRAM(s) Oral <User Schedule>  chlorhexidine 2% Cloths 1 Application(s) Topical <User Schedule>  clopidogrel Tablet 75 milliGRAM(s) Enteral Tube daily  dextrose 50% Injectable 25 Gram(s) IV Push once  dextrose 50% Injectable 12.5 Gram(s) IV Push once  dextrose 50% Injectable 25 Gram(s) IV Push once  heparin   Injectable 5000 Unit(s) SubCutaneous every 8 hours  influenza  Vaccine (HIGH DOSE) 0.7 milliLiter(s) IntraMuscular once  insulin glargine Injectable (LANTUS) 15 Unit(s) SubCutaneous every morning  insulin lispro (ADMELOG) corrective regimen sliding scale   SubCutaneous every 6 hours  metoprolol tartrate 12.5 milliGRAM(s) Enteral Tube every 12 hours  pantoprazole  Injectable 40 milliGRAM(s) IV Push daily  QUEtiapine 50 milliGRAM(s) Oral at bedtime  sertraline 100 milliGRAM(s) Oral daily  triamcinolone 0.1% Cream 1 Application(s) Topical two times a day    02-20    150<H>  |  104  |  101.8<H>  ----------------------------<  165<H>  4.4   |  29.0  |  2.02<H>    Ca    9.8      20 Feb 2024 06:18  Phos  3.5     02-20  Mg     2.6     02-20      Creatinine: 2.02 mg/dL (02-20-24 @ 06:18)  Creatinine: 2.30 mg/dL (02-19-24 @ 02:40)  Creatinine: 2.24 mg/dL (02-18-24 @ 04:30)  Creatinine: 2.35 mg/dL (02-17-24 @ 03:45)  Creatinine: 2.70 mg/dL (02-16-24 @ 04:35)  Creatinine: 2.59 mg/dL (02-15-24 @ 21:47)  Creatinine: 2.56 mg/dL (02-15-24 @ 20:16)        =======================================================  Vital Signs Last 24 Hrs  T(C): 36.8 (19 Feb 2024 12:00), Max: 37.6 (18 Feb 2024 16:00)  T(F): 98.3 (19 Feb 2024 12:00), Max: 99.7 (18 Feb 2024 16:00)  HR: 87 (19 Feb 2024 14:00) (75 - 92)  BP: 132/79 (19 Feb 2024 14:00) (96/50 - 132/79)  BP(mean): 95 (19 Feb 2024 14:00) (64 - 95)  RR: 16 (19 Feb 2024 14:00) (11 - 22)  SpO2: 100% (19 Feb 2024 14:00) (94% - 100%)    Parameters below as of 19 Feb 2024 08:00  Patient On (Oxygen Delivery Method): room air      I&O's Summary    18 Feb 2024 07:01  -  19 Feb 2024 07:00  --------------------------------------------------------  IN: 700 mL / OUT: 701 mL / NET: -1 mL    19 Feb 2024 07:01  -  19 Feb 2024 14:47  --------------------------------------------------------  IN: 400 mL / OUT: 500 mL / NET: -100 mL      =======================================================  Current Antibiotics:  cefTRIAXone Injectable. 1000 milliGRAM(s) IV Push every 24 hours  cefTRIAXone Injectable.        Other medications:  ARIPiprazole 5 milliGRAM(s) Oral daily  aspirin  chewable 81 milliGRAM(s) Oral daily  atorvastatin 80 milliGRAM(s) Oral at bedtime  busPIRone 10 milliGRAM(s) Oral <User Schedule>  chlorhexidine 2% Cloths 1 Application(s) Topical <User Schedule>  clopidogrel Tablet 75 milliGRAM(s) Enteral Tube daily  dextrose 50% Injectable 25 Gram(s) IV Push once  dextrose 50% Injectable 12.5 Gram(s) IV Push once  dextrose 50% Injectable 25 Gram(s) IV Push once  heparin   Injectable 5000 Unit(s) SubCutaneous every 8 hours  influenza  Vaccine (HIGH DOSE) 0.7 milliLiter(s) IntraMuscular once  insulin glargine Injectable (LANTUS) 15 Unit(s) SubCutaneous every morning  insulin lispro (ADMELOG) corrective regimen sliding scale   SubCutaneous every 6 hours  metoprolol tartrate 12.5 milliGRAM(s) Enteral Tube every 12 hours  pantoprazole  Injectable 40 milliGRAM(s) IV Push daily  QUEtiapine 50 milliGRAM(s) Oral at bedtime  sertraline 100 milliGRAM(s) Oral daily  triamcinolone 0.1% Cream 1 Application(s) Topical two times a day    =======================================================  02-19    145  |  100  |  97.0<H>  ----------------------------<  144<H>  4.6   |  26.0  |  2.30<H>    Ca    9.5      19 Feb 2024 02:40  Phos  4.0     02-19  Mg     2.4     02-19    TPro  6.8  /  Alb  3.5  /  TBili  0.2<L>  /  DBili  x   /  AST  12  /  ALT  19  /  AlkPhos  97  02-18    Creatinine: 2.30 mg/dL (02-19-24 @ 02:40)  Creatinine: 2.24 mg/dL (02-18-24 @ 04:30)  Creatinine: 2.35 mg/dL (02-17-24 @ 03:45)  Creatinine: 2.70 mg/dL (02-16-24 @ 04:35)  Creatinine: 2.59 mg/dL (02-15-24 @ 21:47)  Creatinine: 2.56 mg/dL (02-15-24 @ 20:16)  Creatinine: 2.74 mg/dL (02-15-24 @ 16:15)  Creatinine: 2.74 mg/dL (02-15-24 @ 04:48)  Creatinine: 2.64 mg/dL (02-14-24 @ 22:30)  Creatinine: 2.58 mg/dL (02-14-24 @ 16:32)    =======================================================       Reason for visit: WES    Subjective: No acute overnight event. Patient denied any cardiac or urinary complains. No fever/chills.     ROS: All systems were reviewed in detail pertinent positive and negative mentioned above, rest are negative.    Physical Exam:  Gen: no acute distress  MS: alert, conversing normally  Eyes: EOMI, no icterus  HENT: NCAT, MMM  CV: rhythm reg reg, rate normal, no m/g/r  Chest: CTAB, no w/r/r,  Abd: soft, NT, ND  Extremities: No edema    Vital Signs Last 24 Hrs  T(C): 36.6 (20 Feb 2024 16:26), Max: 36.6 (20 Feb 2024 16:26)  T(F): 97.9 (20 Feb 2024 16:26), Max: 97.9 (20 Feb 2024 16:26)  HR: 78 (20 Feb 2024 16:26) (60 - 89)  BP: 138/75 (20 Feb 2024 16:26) (93/52 - 138/75)  BP(mean): 96 (20 Feb 2024 16:26) (65 - 96)  RR: 20 (20 Feb 2024 16:26) (13 - 20)  SpO2: 98% (20 Feb 2024 16:26) (96% - 100%)    Parameters below as of 20 Feb 2024 16:26  Patient On (Oxygen Delivery Method): room air      I&O's Summary    19 Feb 2024 07:01  -  20 Feb 2024 07:00  --------------------------------------------------------  IN: 650 mL / OUT: 850 mL / NET: -200 mL    20 Feb 2024 07:01  -  20 Feb 2024 17:25  --------------------------------------------------------  IN: 400 mL / OUT: 0 mL / NET: 400 mL      Current Antibiotics:    Other medications:  ARIPiprazole 5 milliGRAM(s) Oral daily  aspirin  chewable 81 milliGRAM(s) Oral daily  atorvastatin 80 milliGRAM(s) Oral at bedtime  busPIRone 10 milliGRAM(s) Oral <User Schedule>  chlorhexidine 2% Cloths 1 Application(s) Topical <User Schedule>  clopidogrel Tablet 75 milliGRAM(s) Enteral Tube daily  dextrose 50% Injectable 25 Gram(s) IV Push once  dextrose 50% Injectable 12.5 Gram(s) IV Push once  dextrose 50% Injectable 25 Gram(s) IV Push once  heparin   Injectable 5000 Unit(s) SubCutaneous every 8 hours  influenza  Vaccine (HIGH DOSE) 0.7 milliLiter(s) IntraMuscular once  insulin glargine Injectable (LANTUS) 15 Unit(s) SubCutaneous every morning  insulin lispro (ADMELOG) corrective regimen sliding scale   SubCutaneous every 6 hours  metoprolol tartrate 12.5 milliGRAM(s) Enteral Tube every 12 hours  pantoprazole  Injectable 40 milliGRAM(s) IV Push daily  QUEtiapine 50 milliGRAM(s) Oral at bedtime  sertraline 100 milliGRAM(s) Oral daily  triamcinolone 0.1% Cream 1 Application(s) Topical two times a day    02-20    150<H>  |  104  |  101.8<H>  ----------------------------<  165<H>  4.4   |  29.0  |  2.02<H>    Ca    9.8      20 Feb 2024 06:18  Phos  3.5     02-20  Mg     2.6     02-20      Creatinine: 2.02 mg/dL (02-20-24 @ 06:18)  Creatinine: 2.30 mg/dL (02-19-24 @ 02:40)  Creatinine: 2.24 mg/dL (02-18-24 @ 04:30)  Creatinine: 2.35 mg/dL (02-17-24 @ 03:45)  Creatinine: 2.70 mg/dL (02-16-24 @ 04:35)  Creatinine: 2.59 mg/dL (02-15-24 @ 21:47)  Creatinine: 2.56 mg/dL (02-15-24 @ 20:16)        =======================================================  Vital Signs Last 24 Hrs  T(C): 36.8 (19 Feb 2024 12:00), Max: 37.6 (18 Feb 2024 16:00)  T(F): 98.3 (19 Feb 2024 12:00), Max: 99.7 (18 Feb 2024 16:00)  HR: 87 (19 Feb 2024 14:00) (75 - 92)  BP: 132/79 (19 Feb 2024 14:00) (96/50 - 132/79)  BP(mean): 95 (19 Feb 2024 14:00) (64 - 95)  RR: 16 (19 Feb 2024 14:00) (11 - 22)  SpO2: 100% (19 Feb 2024 14:00) (94% - 100%)    Parameters below as of 19 Feb 2024 08:00  Patient On (Oxygen Delivery Method): room air      I&O's Summary    18 Feb 2024 07:01  -  19 Feb 2024 07:00  --------------------------------------------------------  IN: 700 mL / OUT: 701 mL / NET: -1 mL    19 Feb 2024 07:01  -  19 Feb 2024 14:47  --------------------------------------------------------  IN: 400 mL / OUT: 500 mL / NET: -100 mL      =======================================================  Current Antibiotics:  cefTRIAXone Injectable. 1000 milliGRAM(s) IV Push every 24 hours  cefTRIAXone Injectable.        Other medications:  ARIPiprazole 5 milliGRAM(s) Oral daily  aspirin  chewable 81 milliGRAM(s) Oral daily  atorvastatin 80 milliGRAM(s) Oral at bedtime  busPIRone 10 milliGRAM(s) Oral <User Schedule>  chlorhexidine 2% Cloths 1 Application(s) Topical <User Schedule>  clopidogrel Tablet 75 milliGRAM(s) Enteral Tube daily  dextrose 50% Injectable 25 Gram(s) IV Push once  dextrose 50% Injectable 12.5 Gram(s) IV Push once  dextrose 50% Injectable 25 Gram(s) IV Push once  heparin   Injectable 5000 Unit(s) SubCutaneous every 8 hours  influenza  Vaccine (HIGH DOSE) 0.7 milliLiter(s) IntraMuscular once  insulin glargine Injectable (LANTUS) 15 Unit(s) SubCutaneous every morning  insulin lispro (ADMELOG) corrective regimen sliding scale   SubCutaneous every 6 hours  metoprolol tartrate 12.5 milliGRAM(s) Enteral Tube every 12 hours  pantoprazole  Injectable 40 milliGRAM(s) IV Push daily  QUEtiapine 50 milliGRAM(s) Oral at bedtime  sertraline 100 milliGRAM(s) Oral daily  triamcinolone 0.1% Cream 1 Application(s) Topical two times a day    =======================================================  02-19    145  |  100  |  97.0<H>  ----------------------------<  144<H>  4.6   |  26.0  |  2.30<H>    Ca    9.5      19 Feb 2024 02:40  Phos  4.0     02-19  Mg     2.4     02-19    TPro  6.8  /  Alb  3.5  /  TBili  0.2<L>  /  DBili  x   /  AST  12  /  ALT  19  /  AlkPhos  97  02-18    Creatinine: 2.30 mg/dL (02-19-24 @ 02:40)  Creatinine: 2.24 mg/dL (02-18-24 @ 04:30)  Creatinine: 2.35 mg/dL (02-17-24 @ 03:45)  Creatinine: 2.70 mg/dL (02-16-24 @ 04:35)  Creatinine: 2.59 mg/dL (02-15-24 @ 21:47)  Creatinine: 2.56 mg/dL (02-15-24 @ 20:16)  Creatinine: 2.74 mg/dL (02-15-24 @ 16:15)  Creatinine: 2.74 mg/dL (02-15-24 @ 04:48)  Creatinine: 2.64 mg/dL (02-14-24 @ 22:30)  Creatinine: 2.58 mg/dL (02-14-24 @ 16:32)    =======================================================

## 2024-02-20 NOTE — PROGRESS NOTE ADULT - ASSESSMENT
78yr man  PMHx HTN, HLD, T2DM, CKD, plaque psoriasis, anxiety, depression, history of dysphagia admitted with cardiogenic shock 2/2 NSTEMI with Respiratory failure.    Cardiogenic Shock/NSTEMI  Per Cardio and CTS not  a candidate for CABG   Cardio following PCI?    Respiratory Failure  extubated  cont monitor O2 sats    Dysphagia  Patient with hx of dysphagia -reported offered a PEG last year but declined  Aspiration precautions  SLP eval? - likely to still be an issues    WES/CKD  avoid nephrotoxic agents    Functional Quadriplegia  Assist with care  Turn reposition    Encounter for Palliative Care  Patient extubated   PCI?  Patient with known hx  of dysphagia and offered a PEG last year but declined.  Family reports patient coughs after eating  SLP to reeval but given baseline dysphagia, will vazquez than likely to still be an issue  Plan to  reexplore GOC.

## 2024-02-20 NOTE — PROGRESS NOTE ADULT - SUBJECTIVE AND OBJECTIVE BOX
[facility] PHYSICIAN [facility]NERS                                                         CARDIOLOGY AT [facility] (formerly [facility])[3]                                                                  [address] [facility], [place]-[address] [place] [address]- [facility]                                                         Telephone: [phone]. Fax:[phone]                                                                             PROGRESS NOTE  Daughter who is a RN in CDU is at bedside    Reason for follow up: Non Stemi, multi vessel Cad  Update: Patient is complaining of thirst  Has NGT in   today  Looks dry    Review of symptoms:   Unable to obtain    Vitals:  T(C): 36.4 (02-20-24 @ 08:00), Max: 36.8 (02-19-24 @ 12:00)  HR: 60 (02-20-24 @ 08:00) (60 - 89)  BP: 125/71 (02-20-24 @ 08:00) (93/52 - 134/80)  RR: 16 (02-20-24 @ 08:00) (13 - 20)  SpO2: 100% (02-20-24 @ 08:00) (96% - 100%)  Wt(kg): --  I&O's Summary    19 Feb 2024 07:01  -  20 Feb 2024 07:00  --------------------------------------------------------  IN: 650 mL / OUT: 850 mL / NET: -200 mL    PHYSICAL EXAM:  Appearance:  Thin cachechic  HEENT:  Ngt in left nostril  Neurologic: Appears to be confused  Cardiovascular: RRR S1 S2, No murmur, no rubs/gallops. No JVD  Respiratory: Lungs clear to auscultation, unlabored   Gastrointestinal:  Soft, Non-tender, + BS  Lower Extremities: No edema  Psychiatry: Patient is calm. No agitation.   Skin: warm and dry.    CURRENT MEDICATIONS:  MEDICATIONS  (STANDING):  ARIPiprazole 5 milliGRAM(s) Oral daily  aspirin  chewable 81 milliGRAM(s) Oral daily  atorvastatin 80 milliGRAM(s) Oral at bedtime  busPIRone 10 milliGRAM(s) Oral <User Schedule>  clopidogrel Tablet 75 milliGRAM(s) Enteral Tube daily  heparin   Injectable 5000 Unit(s) SubCutaneous every 8 hours  influenza  Vaccine (HIGH DOSE) 0.7 milliLiter(s) IntraMuscular once  insulin glargine Injectable (LANTUS) 15 Unit(s) SubCutaneous every morning  insulin lispro (ADMELOG) corrective regimen sliding scale   SubCutaneous every 6 hours  metoprolol tartrate 12.5 milliGRAM(s) Enteral Tube every 12 hours  pantoprazole  Injectable 40 milliGRAM(s) IV Push daily  QUEtiapine 50 milliGRAM(s) Oral at bedtime  sertraline 100 milliGRAM(s) Oral daily  triamcinolone 0.1% Cream 1 Application(s) Topical two times a day    LABS:	 	                          9.8    10.30 )-----------( 320      ( 20 Feb 2024 06:18 )             32.9     02-20    150<H>  |  104  |  101.8<H>  ----------------------------<  165<H>  4.4   |  29.0  |  2.02<H>    Ca    9.8      20 Feb 2024 06:18  Phos  3.5     02-20  Mg     2.6     02-20    Lipid Profile: Date: 02-13 @ 11:15  Total cholesterol 179; Direct LDL: --; HDL: 49; Triglycerides:94  TELEMETRY: NSR with multifocal pvcs  DIAGNOSTIC TESTING:  [ ] Echocardiogram: < from: TTE Limited W or WO Ultrasound Enhancing Agent (02.13.24 @ 21:29) >   1. Left ventricular systolic function is severely decreased with an ejection fraction visually estimated at 25 to 30 %.   2. Normal right ventricular cavity size and normal systolic function.   3. Estimated pulmonary artery systolic pressure is 44 mmHg.   4. Trace pericardial effusion noted adjacent to the right ventricle.    < from: Cardiac Catheterization (02.14.24 @ 13:28) >  by Dr. Monk  Coronary Angiography   The coronary circulation is right dominant. Cardiac catheterization  was performed electively.    LM   Left main artery: The segment is normal sized and severely calcified.  Proximal left main: There is a 60 % stenosis.  LAD   Left anterior descending artery: The segment is normal sized and  moderately calcified. Proximal left anterior descending: There  is a 70 % stenosis. Mid left anterior descending: There is an 80 %  stenosis. First diagonal: There is a 100 % stenosis.  CX   Circumflex: The segment is normal sized. Proximal circumflex: There is  a 100 % stenosis.  RCA   Right coronary artery: The segment is large and severely calcified.  Distal right coronary artery: There is a 90 % stenosis. Right  Posterolateral Segment: There is a 90 % stenosis. Right posterior  descending artery: There is a 90 % stenosis.  Ramus   Ramus intermedius: both upper and lower poles. There is a 90 %  stenosis.                                                                    [facility] PHYSICIAN [facility]                                                         CARDIOLOGY AT [facility]                                                                  [address] [facility] [place] [place] [address]- [facility]                                                         Telephone: [phone]. Fax:[phone]                                                                             PROGRESS NOTE  Daughter who is a RN in CDU is at bedside    Reason for follow up: Non Stemi, multi vessel Cad  Update: Patient is complaining of thirst  Has NGT in   today  Looks dry  2/14  Right heart cath PCWP 12 RA 5 Co 5.59 Ci 3.3    Review of symptoms:   Unable to obtain    Vitals:  T(C): 36.4 (02-20-24 @ 08:00), Max: 36.8 (02-19-24 @ 12:00)  HR: 60 (02-20-24 @ 08:00) (60 - 89)  BP: 125/71 (02-20-24 @ 08:00) (93/52 - 134/80)  RR: 16 (02-20-24 @ 08:00) (13 - 20)  SpO2: 100% (02-20-24 @ 08:00) (96% - 100%)  Wt(kg): --  I&O's Summary    19 Feb 2024 07:01  -  20 Feb 2024 07:00  --------------------------------------------------------  IN: 650 mL / OUT: 850 mL / NET: -200 mL    PHYSICAL EXAM:  Appearance:  Thin cachechic  HEENT:  Ngt in left nostril  Neurologic: Appears to be confused  Cardiovascular: RRR S1 S2, No murmur, no rubs/gallops. No JVD  Respiratory: Lungs clear to auscultation, unlabored   Gastrointestinal:  Soft, Non-tender, + BS  Lower Extremities: No edema  Psychiatry: Patient is calm. No agitation.   Skin: warm and dry.    CURRENT MEDICATIONS:  MEDICATIONS  (STANDING):  ARIPiprazole 5 milliGRAM(s) Oral daily  aspirin  chewable 81 milliGRAM(s) Oral daily  atorvastatin 80 milliGRAM(s) Oral at bedtime  busPIRone 10 milliGRAM(s) Oral <User Schedule>  clopidogrel Tablet 75 milliGRAM(s) Enteral Tube daily  heparin   Injectable 5000 Unit(s) SubCutaneous every 8 hours  influenza  Vaccine (HIGH DOSE) 0.7 milliLiter(s) IntraMuscular once  insulin glargine Injectable (LANTUS) 15 Unit(s) SubCutaneous every morning  insulin lispro (ADMELOG) corrective regimen sliding scale   SubCutaneous every 6 hours  metoprolol tartrate 12.5 milliGRAM(s) Enteral Tube every 12 hours  pantoprazole  Injectable 40 milliGRAM(s) IV Push daily  QUEtiapine 50 milliGRAM(s) Oral at bedtime  sertraline 100 milliGRAM(s) Oral daily  triamcinolone 0.1% Cream 1 Application(s) Topical two times a day    LABS:	 	                          9.8    10.30 )-----------( 320      ( 20 Feb 2024 06:18 )             32.9     02-20    150<H>  |  104  |  101.8<H>  ----------------------------<  165<H>  4.4   |  29.0  |  2.02<H>    Ca    9.8      20 Feb 2024 06:18  Phos  3.5     02-20  Mg     2.6     02-20    Lipid Profile: Date: 02-13 @ 11:15  Total cholesterol 179; Direct LDL: --; HDL: 49; Triglycerides:94  TELEMETRY: NSR with multifocal pvcs  DIAGNOSTIC TESTING:  [ ] Echocardiogram: < from: TTE Limited W or WO Ultrasound Enhancing Agent (02.13.24 @ 21:29) >   1. Left ventricular systolic function is severely decreased with an ejection fraction visually estimated at 25 to 30 %.   2. Normal right ventricular cavity size and normal systolic function.   3. Estimated pulmonary artery systolic pressure is 44 mmHg.   4. Trace pericardial effusion noted adjacent to the right ventricle.    < from: TTE W or WO Ultrasound Enhancing Agent (02.12.24 @ 20:54) >      1. Technically difficult image quality. Patient on BIPAP for the study.   2. Left ventricular cavity is normal. Left ventricular systolic function is severely decreased with an ejection fraction of 20 % by Magana's method of disks with an ejection fraction visually estimated at <20 %.   3. There is no evidence of a left ventricular thrombus.   4. Multiple segmental abnormalities exist. See findings.   5. Normal right ventricular cavity size and mildly reduced systolic function.   6. Normal left and right atrial size.   7. Moderate mitral regurgitation.   8. Moderate tricuspid regurgitation.   9. Mild pulmonic regurgitation.  10. Estimated pulmonary artery systolic pressure is 43 mmHg, consistent with mild pulmonary hypertension.  11. Moderate pericardial effusion noted adjacent to the anterior right ventricle with no evidence of hemodynamic compromise (or echocardiographic evidence of cardiac tamponade).  12. Findings were discussed with patient, daughter, ER physician on 2/12/2024.    < end of copied text >      < from: Cardiac Catheterization (02.14.24 @ 13:28) >  by Dr. Monk  Coronary Angiography   The coronary circulation is right dominant. Cardiac catheterization  was performed electively.    LM   Left main artery: The segment is normal sized and severely calcified.  Proximal left main: There is a 60 % stenosis.  LAD   Left anterior descending artery: The segment is normal sized and  moderately calcified. Proximal left anterior descending: There  is a 70 % stenosis. Mid left anterior descending: There is an 80 %  stenosis. First diagonal: There is a 100 % stenosis.  CX   Circumflex: The segment is normal sized. Proximal circumflex: There is  a 100 % stenosis.  RCA   Right coronary artery: The segment is large and severely calcified.  Distal right coronary artery: There is a 90 % stenosis. Right  Posterolateral Segment: There is a 90 % stenosis. Right posterior  descending artery: There is a 90 % stenosis.  Ramus   Ramus intermedius: both upper and lower poles. There is a 90 %  stenosis.

## 2024-02-20 NOTE — PROGRESS NOTE ADULT - SUBJECTIVE AND OBJECTIVE BOX
CC:  Follow up GOC , Symptoms    OVERNIGHT EVENTS:  weekend events reviewed  extubated    Present Symptoms:   Dyspnea:  No   Nausea/Vomiting:  No   Anxiety:  No   Depression:  Unable- reported hx  Fatigue:  Yes     Loss of appetite:     NA - NPO  Constipation: Not Reported      Pain: No               Location            Duration            Character            Severity            Factors            Effect    Pain AD Score:  [email or web address] (press ctrl + left click to view)    Review of Systems: Reviewed as above  All others negative      MEDICATIONS  (STANDING):  ARIPiprazole 5 milliGRAM(s) Oral daily  aspirin  chewable 81 milliGRAM(s) Oral daily  atorvastatin 80 milliGRAM(s) Oral at bedtime  busPIRone 10 milliGRAM(s) Oral <User Schedule>  chlorhexidine 2% Cloths 1 Application(s) Topical <User Schedule>  clopidogrel Tablet 75 milliGRAM(s) Enteral Tube daily  dextrose 50% Injectable 25 Gram(s) IV Push once  dextrose 50% Injectable 12.5 Gram(s) IV Push once  dextrose 50% Injectable 25 Gram(s) IV Push once  heparin   Injectable 5000 Unit(s) SubCutaneous every 8 hours  influenza  Vaccine (HIGH DOSE) 0.7 milliLiter(s) IntraMuscular once  insulin glargine Injectable (LANTUS) 15 Unit(s) SubCutaneous every morning  insulin lispro (ADMELOG) corrective regimen sliding scale   SubCutaneous every 6 hours  metoprolol tartrate 12.5 milliGRAM(s) Enteral Tube every 12 hours  pantoprazole  Injectable 40 milliGRAM(s) IV Push daily  QUEtiapine 50 milliGRAM(s) Oral at bedtime  sertraline 100 milliGRAM(s) Oral daily  triamcinolone 0.1% Cream 1 Application(s) Topical two times a day    MEDICATIONS  (PRN):  acetaminophen     Tablet .. 650 milliGRAM(s) Oral every 6 hours PRN Temp greater or equal to 38C (100.4F)  clonazePAM  Tablet 1 milliGRAM(s) Oral daily PRN for anxiety  sodium chloride 0.9% lock flush 10 milliLiter(s) IV Push every 1 hour PRN Pre/post blood products, medications, blood draw, and to maintain line patency      PHYSICAL EXAM:    Vital Signs Last 24 Hrs  T(C): 36.4 (20 Feb 2024 08:00), Max: 36.8 (19 Feb 2024 12:00)  T(F): 97.6 (20 Feb 2024 08:00), Max: 98.3 (19 Feb 2024 12:00)  HR: 60 (20 Feb 2024 08:00) (60 - 89)  BP: 125/71 (20 Feb 2024 08:00) (93/52 - 134/80)  BP(mean): 68 (20 Feb 2024 00:00) (65 - 95)  RR: 16 (20 Feb 2024 08:00) (13 - 20)  SpO2: 100% (20 Feb 2024 08:00) (96% - 100%)    Parameters below as of 20 Feb 2024 08:00  Patient On (Oxygen Delivery Method): room air    Karnofsky: 40  %  General: M elderly awake NAD   - tired appearing     HEENT:  NCAT         Lungs: comfortable  non labored  CV:  RR  GI:   soft NT   Skin:  warm/dry  MSK - weakness  Neuro  awake alert no focal deficits  Psych calm    LABS:                          9.8    10.30 )-----------( 320      ( 20 Feb 2024 06:18 )             32.9     02-20    150<H>  |  104  |  101.8<H>  ----------------------------<  165<H>  4.4   |  29.0  |  2.02<H>    Ca    9.8      20 Feb 2024 06:18  Phos  3.5     02-20  Mg     2.6     02-20        Urinalysis Basic - ( 20 Feb 2024 06:18 )    Color: x / Appearance: x / SG: x / pH: x  Gluc: 165 mg/dL / Ketone: x  / Bili: x / Urobili: x   Blood: x / Protein: x / Nitrite: x   Leuk Esterase: x / RBC: x / WBC x   Sq Epi: x / Non Sq Epi: x / Bacteria: x      I&O's Summary    19 Feb 2024 07:01  -  20 Feb 2024 07:00  --------------------------------------------------------  IN: 650 mL / OUT: 850 mL / NET: -200 mL        RADIOLOGY & ADDITIONAL STUDIES:  Imaging Reviewed  (  x )   < from: CT Chest No Cont (02.17.24 @ 12:31) >    ACC: [id number] EXAM:  CT CHEST   ORDERED BY: GAGE ZHANG     PROCEDURE DATE:  02/17/2024          INTERPRETATION:  CLINICAL INFORMATION: 78 years  Male with hypoxic   respiratory failure.    COMPARISON: Chest CT 9/13/2022    CONTRAST/COMPLICATIONS:  IV Contrast: NONE  Oral Contrast: NONE  Complications: None reported at time of study completion    PROCEDURE:  CT of the Chest was performed.  Sagittal and coronal reformats were performed.    LIMITATION: Evaluation of the vascular structures and gayle is limited by   lack of IV contrast. Motion artifact. Streak artifact from patient's arms.    FINDINGS:    LUNGS AND AIRWAYS/PLEURA: Patent central airways. ET tube tip in the mid   trachea. Small to moderate bilateral pleural effusions with underlying   passive atelectasis. No pneumothorax.  MEDIASTINUM AND GAYLE: No lymphadenopathy.  VESSELS: Left jugular catheter tip in the superior vena cava.   Atherosclerotic aorta without aneurysm. Coronary artery calcifications   and/or stents.  HEART: Heart size is normal. No pericardial effusion.  CHEST WALL AND LOWER NECK: Within normal limits.  VISUALIZED UPPER ABDOMEN: Enteric catheter tip terminating in the distal   stomach or duodenal bulb. Layering high density in the gallbladder either   sludge or vicarious excretion of contrast. 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  BONES: Upper thoracic degenerative changes.    IMPRESSION:  Small to moderate bilateral pleural effusions with underlying passive   atelectasis.    Enteric catheter tip in the distal stomach or duodenal bulb and should be   retracted.    ET tube and left central line in satisfactory position.    Sludge versus excreted contrast in the gallbladder.        --- End of Report ---            < end of copied text >      < from: Xray Chest 1 View-PORTABLE IMMEDIATE (Xray Chest 1 View-PORTABLE IMMEDIATE .) (02.15.24 @ 06:28) >  ACC: [id number] EXAM:  XR CHEST PORTABLE IMMED 1V   ORDERED BY: CHANELLE ONTIVEROS     PROCEDURE DATE:  02/15/2024          INTERPRETATION:  TIME OF EXAM: February 15, 2024 at 5:17 AM.    CLINICAL INFORMATION: NG tube placement.    COMPARISON:  February 14, 2024.    TECHNIQUE:   AP Portable chest x-ray.    INTERPRETATION:    The heart is not enlarged.  ET tube tip is approximately 5.4 cm above the cony.  Enteric tube extends into the left hemiabdomen. Tip not included on   image. Side portmay be near the GE junction.  Right IJ catheter and left IJ line are not significantly changed in   position.  There is a left midline catheter.  Mildly elevated left hemidiaphragm.  Previous bilateral perihilar opacities are improved with some residual,   predominantly in the lower lungs seen.  No pleural effusion or pneumothorax seen.  There is osteoarthritic degenerative change of the spine.      IMPRESSION:  Enteric tube extends into left hemiabdomen. Tip not included   on image. Side port may be near the GE junction. ET tube and other lines   as above.    Previous bilateral perihilar opacities are improved with some residual,   predominantly in the lower lungs, seen. The findings likely represent   improving pulmonary edema.    --- End of Report ---        < end of copied text >      ADVANCE DIRECTIVE PREFERENCES    DNR/I  and continuing medical treatments

## 2024-02-20 NOTE — PROGRESS NOTE ADULT - ASSESSMENT
77 y/o M with PMHx  Dysphagia  (refused g tube in past) Anxiety, HTN, HLD, CKD, plaque psoriasis, DM2 who needed help with ADLS prior to hospital stay, who presents to [facility] ED with 1 weeks of chest pain at rest. In Cardiogenic shock on admit rxed with vasopressin and levophed.  Then course of dobutamine and Bumex drip (made Dnr/Dnr )who is now s/p cath with multivessel diseases. Echo EF 25% normal right systolic function  RSVP 44mmhg Moderate mr and TR moderate.  Seen by CT surgery and felt high risk for CABG

## 2024-02-20 NOTE — SWALLOW BEDSIDE ASSESSMENT ADULT - DIET PRIOR TO ADMI
as per pt's daughter "Alisia", regular/thin. Pt known to this service w/ two MBSV completed in September of 2022 w/ recommendation made for NPO X 2 (see report for details). Pt's daughter reports pt was not agreeable for PEG tube in 2022 and will not be now. Discussed above w/ MD Fallon.

## 2024-02-20 NOTE — PHYSICAL THERAPY INITIAL EVALUATION ADULT - PERTINENT HX OF CURRENT PROBLEM, REHAB EVAL
77 y/o M with PMHx  Dysphagia  (refused g tube in past) Anxiety, HTN, HLD, CKD, plaque psoriasis, DM2 who needed help with ADLS prior to hospital stay, who presents to [facility] ED with 1 weeks of chest pain at rest. In Cardiogenic shock on admit rxed with vasopressin and levophed.  Then course of dobutamine and Bumex drip (made Dnr/Dnr )who is now s/p cath with multivessel diseases. Echo EF 25% normal right systolic function  RSVP 44mmhg Moderate mr and TR moderate.  Seen by CT surgery and felt high risk for CABG

## 2024-02-20 NOTE — PROGRESS NOTE ADULT - PROBLEM SELECTOR PLAN 1
CAth with multivessel disease  C/w medical Thearpy with asa Plavix, Lipitor  ? Candidate for high risk intervention when stronger  creat is 2 which has improved  Family would agree to high risk intervention if offered

## 2024-02-20 NOTE — PROGRESS NOTE ADULT - NS ATTEND AMEND GEN_ALL_CORE FT
Patient was extubated over the weekend successfully  His dobutamine was weaned off  On exam he is warm and well perfused  Would start afterload reduction with hydralazine 10mg TID and will uptitrate as tolerated  Continue with TOprol 12.5mg BID  Discussed with patient regarding LHC and high risk PCI, patient stated he did not want any procedures but would be ok with oral medications

## 2024-02-20 NOTE — SWALLOW BEDSIDE ASSESSMENT ADULT - COMMENTS
As per MD note: "78 y/oM PMH of HTN, HLD, CKD, plaque psoriasis, DM-2 who came to the ED (02/12/24) complaining of chest pain radiating to left arm associated with multiple episodes of diarrhea.  On ED arrival, afebrile, hypotensive to SBP 80-90s despite 1L IVF requiring Levophed. EKG with nonspecific ST changes, troponin 2344->2477. Labs otherwise notable for K 5.6, BUN/Cr 43.5/2.31 (baseline Cr ~1 in 2022), BNP 70088, lactate 6.2. CXR with pulmonary edema. Placed on BiPAP ,given Lasix 40mg. TTE with EF<20%, multiple segmental abnormalities, mod MR, mod TR, mod pericardial effusion without tamponade; started on Heparin drip for NSTEMI, cardiology consulted. Admitted to MICU for further management of cardiogenic shock. On 02/14/24: patient became obtunded, febrile, intubated for airway protection. Interventional cardiology also on board; S/P LHC: Severe multi-vessel CAD including LM, CABG recommended. CT surgery deemed patient poor candidate for CABG. HF team also following; s/p RHC: shows low filling pressures with normal cardiac output. Nephrology following for WES. Patient extubated (02/18/24); NG tube in place. Palliative team following, patient DNR/DNI. Patient now downgraded to medical floor 2/19,"
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
## 2024-02-20 NOTE — PROGRESS NOTE ADULT - SUBJECTIVE AND OBJECTIVE BOX
[facility] Advanced Heart Failure - Progress Note  [address] [place] [address]  Office Phone: [phone]/Fax: [phone]  Service/On Call Phone [phone]    Subjective/Objective: Pt. extubated over weekend. Downgraded to floor early this am. Currently denies any overt symptoms. States he does not want any invasive procedures.    Medications:  acetaminophen     Tablet .. 650 milliGRAM(s) Oral every 6 hours PRN  ARIPiprazole 5 milliGRAM(s) Oral daily  aspirin  chewable 81 milliGRAM(s) Oral daily  atorvastatin 80 milliGRAM(s) Oral at bedtime  busPIRone 10 milliGRAM(s) Oral <User Schedule>  chlorhexidine 2% Cloths 1 Application(s) Topical <User Schedule>  clonazePAM  Tablet 1 milliGRAM(s) Oral daily PRN  clopidogrel Tablet 75 milliGRAM(s) Enteral Tube daily  dextrose 50% Injectable 25 Gram(s) IV Push once  dextrose 50% Injectable 25 Gram(s) IV Push once  dextrose 50% Injectable 12.5 Gram(s) IV Push once  heparin   Injectable 5000 Unit(s) SubCutaneous every 8 hours  influenza  Vaccine (HIGH DOSE) 0.7 milliLiter(s) IntraMuscular once  insulin glargine Injectable (LANTUS) 15 Unit(s) SubCutaneous every morning  insulin lispro (ADMELOG) corrective regimen sliding scale   SubCutaneous every 6 hours  metoprolol tartrate 12.5 milliGRAM(s) Enteral Tube every 12 hours  pantoprazole  Injectable 40 milliGRAM(s) IV Push daily  QUEtiapine 50 milliGRAM(s) Oral at bedtime  sertraline 100 milliGRAM(s) Oral daily  sodium chloride 0.9% lock flush 10 milliLiter(s) IV Push every 1 hour PRN  triamcinolone 0.1% Cream 1 Application(s) Topical two times a day    Vital Signs Last 24 Hours  T(C): 36.4 (02-20-24 @ 08:00), Max: 36.6 (02-19-24 @ 15:26)  HR: 60 (02-20-24 @ 08:00) (60 - 89)  BP: 125/71 (02-20-24 @ 08:00) (93/52 - 134/80)  RR: 16 (02-20-24 @ 08:00) (13 - 20)  SpO2: 100% (02-20-24 @ 08:00) (96% - 100%)    I&O's Summary  19 Feb 2024 07:01  -  20 Feb 2024 07:00  --------------------------------------------------------  IN: 650 mL / OUT: 850 mL / NET: -200 mL    Tele: SR    Physical Exam:  General: In no acute distress.   HEENT: EOMs intact.  Neck: Neck supple. JVP not elevated.   Chest: Anterior course.  CV: RRR. Normal S1 and S2. No murmurs, rub, or gallops. Lukewarm peripherally.  PV: No LE edema noted.   Abdomen: Soft, non-distended, non-tender.  Skin: warm, dry, no rash.  Neurology: Alert and oriented times three. Sensation intact.  Psych: Appropriate affect.    Labs:                        9.8    10.30 )-----------( 320      ( 20 Feb 2024 06:18 )             32.9     02-20    150<H>  |  104  |  101.8<H>  ----------------------------<  165<H>  4.4   |  29.0  |  2.02<H>    Ca    9.8      20 Feb 2024 06:18  Phos  3.5     02-20  Mg     2.6     02-20

## 2024-02-20 NOTE — PROGRESS NOTE ADULT - NS ATTEND AMEND GEN_ALL_CORE FT
-      78M hx  Dysphagia  (refused g tube in past) CKD, DM2 presents to [facility] ED 2/12 with Chest pain, NSTEMI. In Cardiogenic shock on admit rxed with vasopressin and levophed.  Then course of dobutamine and Bumex drip; TTE with LVEF <20%, moderate MR/TR, moderate pericardial effusion.   He is now s/p [facility] with multivessel disease.  Now off pressors. Extubated 2/18. Patient was seen by CTS and interventional cardiology; not a candidate for revascularization with CABG or high risk PCI.   	  NSTEMI  - s/p [facility] with multivessel disease  - He is now s/p [facility] with multivessel disease  - Patient was seen by CTS and interventional cardiology; not a candidate for revascularization with CABG or high risk PCI.Will readdress with interventional cardiology as patient is extubated and hemodynamically stable.   - Cont ASA, Clopidogrel, Statin, BB    Acute HFrEF  - s/p cath shows low filling pressures with normal cardiac output.   - TTE 2/12 with LVEF <20%, moderate MR/TR, moderate pericardial effusion.   - FU Limited TTE 2/13 LVEF 25 to 30 %. Normal RV size and Fx. PASP 44. Trace peric eff adjacent to the right ventricle.  - Euvolemic on exam. Was started on lopressor; Add further GDMT as tolerated. Avoid ACE/ARB/ARNI/MRA due to CKD.   - Monitor Cr, UO, I/Os. If renal Fx improves may add ACE/ARB/ARNI/MRA otherwise may add hydralazine/Isosorbide DN    WSE on CKD  - Renal following.  - Cr imporving    Dysphagia  - Since 2022 has refused Peg in past  - Daughter states he is going to refuse it again  - NG tube in place. Palliative team following, patient DNR/DNI.      CURRENT CARDIAC MEDICATIONS:  aspirin  chewable 81 milliGRAM(s) Oral daily  atorvastatin 80 milliGRAM(s) Oral at bedtime  clopidogrel Tablet 75 milliGRAM(s) Enteral Tube daily  metoprolol tartrate 12.5 milliGRAM(s) Enteral Tube every 12 hours

## 2024-02-20 NOTE — PROGRESS NOTE ADULT - PROBLEM SELECTOR PLAN 1
-Patient presented with chest pain and was found to have an NSTEMI and low EF. Cath revealed multivessel disease. He was empirically started on dobutamine for hypotension and respiratory distress.  -RHC  showed low filling pressures with normal cardiac output on  5.  -Bumex gtt stopped   -Dobutamine weaned off 2/15. Harpreet CI was stable at 2.4.  -Continue to monitor daily markers of perfusion (LFTs, sCr, lactate).  -GDMT: Start low dose hydralazine 10 mg TID for afterload reduction. Continue Toprol XL 12.5 mg BID.  -Diuretics: not currently requiring. He is hypernatremic and may benefit from free H20. Would be cautious w/ HFrEF.

## 2024-02-20 NOTE — PROGRESS NOTE ADULT - PROBLEM SELECTOR PLAN 2
-Noted to have triple vessel disease  -Not candidate for CABG per CTS  -May consider high risk PCI however GOC should be addressed with patient as he mentioned that he is not interested in invasive procedures and wants to let things occur naturally. Will start some GDMT as above.

## 2024-02-20 NOTE — PROGRESS NOTE ADULT - ASSESSMENT
78-year-old male with HTN, HLD, DM admitted with chest pain and diarrhea.  Hypotensive on arrival requiring Levophed.  TTE with a EF of less than 20%.  Intubated on 2/14/2024. s/p LHC showing MVD, poor candidate for CABG.  Patient extubated yesterday.  Nephrology following for acute kidney injury:    Acute kidney injury on CKD, NOS  NSTEMI  Acute systolic CHF  Anemia, unspecified  DM    ·	Baseline serum creatinine unclear was 1 mg/deciliter in September 22  ·	WES was secondary to hypoperfusion from cardiogenic shock  ·	Serum creatinine on admission 2.3 and had stayed stable around the same, 2.3 mg/deciliter today  ·	Patient euvolemic, diuretics on hold (initially was on dobutamine and Bumex drip)  ·	Maintain MAP more than 65 and avoid nephrotoxins, Will follow. 78-year-old male with HTN, HLD, DM admitted with chest pain and diarrhea.  Hypotensive on arrival requiring Levophed.  TTE with a EF of less than 20%.  Intubated on 2/14/2024. s/p LHC showing MVD, poor candidate for CABG.  Patient extubated yesterday.  Nephrology following for acute kidney injury:    Acute kidney injury on CKD, NOS  NSTEMI  Acute systolic CHF  Anemia, unspecified  DM    ·	Baseline serum creatinine unclear was 1 mg/deciliter in September 22  ·	WES was secondary to hypoperfusion from cardiogenic shock  ·	Serum creatinine on admission 2.3 and had stayed stable around the same,Slight improvement with 2.0 mg/deciliter today  ·	Patient euvolemic, diuretics on hold (initially was on dobutamine and Bumex drip)  ·	Maintain MAP more than 65 and avoid nephrotoxins, Will follow.

## 2024-02-20 NOTE — PROGRESS NOTE ADULT - ASSESSMENT
Initial HF Consult Dr. Dory Bender    79 y/o M with PMHx anxiety, HTN, HLD, CKD, plaque psoriasis, DM2 who presents to [facility] ED with 1 weeks of chest pain at rest, that woke him out of sleep today. Patient states he had been having chest pain but was mild and tolerable. The pain became sharp and he felt it midsternal with radiation to left side and he also had 6 diarrhea bowel movements. Hypotensive on presentation, given IV fluid bolus but had respiratory distress, placed on bipap and levophed initiated to maintain BP.   On 2/14 patient was complaining of chest pain again with uptrending troponins, unchanged EKG. Patient was still in respiratory distress despite BiPAP and CPAP with fever of 101.1 and cold extremities. Patient was started on levophed and dobutamine and intubated.   He underwent LHC 2/14 which revealed MVD. CTS consulted and he is not a surgical candidate. He was weaned off  on 2/15. Initially failed multiple SBT but was ultimately extubated.   Will start some afterload reduction/GDMT for management of his heart failure. Will need multidisciplinary team/GOC discussion regarding high risk PCI candidacy.     Bedside Hemodynamics:  2/16 (Levo 0.06): CVP 2, SVO2 59.3%, Harpreet CO/CI 4.0/2.4     2/15 ( 2.5, levo 0.03): CVP 4, SVO2 65.5%, Harpreet CO/CI 4.5/2.7    Cardiac Studies:  [facility] 2/14/24 which showed prox left main 60% stenosis, pros LAD with 70% stenosis, mid LAD with 80% stenosis, first diagonal 100% stenosis, prox circ 100% stenosis, distal RCA with 90% stenosis, ramus with 90% stenosis  [facility] 2/14/24: RA 6, PA 22/10/15, PCWP 8, CO/CI 5.5/3.1

## 2024-02-20 NOTE — CHART NOTE - NSCHARTNOTEFT_GEN_A_CORE
Patient states he does not want a intervention on heart  Unclear, If mental status is totally intact  Discussed with wife  After discussing the risks of procedure ie cva, renal failure, high mortality  she has decided to take a less aggressive approach  will treat medically for CAD  Will sign off Call for questions

## 2024-02-21 LAB
ANION GAP SERPL CALC-SCNC: 17 MMOL/L — SIGNIFICANT CHANGE UP (ref 5–17)
BUN SERPL-MCNC: 100.5 MG/DL — HIGH (ref 8–20)
CALCIUM SERPL-MCNC: 9.9 MG/DL — SIGNIFICANT CHANGE UP (ref 8.4–10.5)
CHLORIDE SERPL-SCNC: 103 MMOL/L — SIGNIFICANT CHANGE UP (ref 96–108)
CO2 SERPL-SCNC: 31 MMOL/L — HIGH (ref 22–29)
CREAT SERPL-MCNC: 1.96 MG/DL — HIGH (ref 0.5–1.3)
EGFR: 34 ML/MIN/1.73M2 — LOW
GLUCOSE BLDC GLUCOMTR-MCNC: 147 MG/DL — HIGH (ref 70–99)
GLUCOSE BLDC GLUCOMTR-MCNC: 157 MG/DL — HIGH (ref 70–99)
GLUCOSE BLDC GLUCOMTR-MCNC: 205 MG/DL — HIGH (ref 70–99)
GLUCOSE BLDC GLUCOMTR-MCNC: 221 MG/DL — HIGH (ref 70–99)
GLUCOSE BLDC GLUCOMTR-MCNC: 242 MG/DL — HIGH (ref 70–99)
GLUCOSE SERPL-MCNC: 248 MG/DL — HIGH (ref 70–99)
HCT VFR BLD CALC: 35.8 % — LOW (ref 39–50)
HGB BLD-MCNC: 10.7 G/DL — LOW (ref 13–17)
MAGNESIUM SERPL-MCNC: 2.5 MG/DL — SIGNIFICANT CHANGE UP (ref 1.6–2.6)
MCHC RBC-ENTMCNC: 26.6 PG — LOW (ref 27–34)
MCHC RBC-ENTMCNC: 29.9 GM/DL — LOW (ref 32–36)
MCV RBC AUTO: 88.8 FL — SIGNIFICANT CHANGE UP (ref 80–100)
PHOSPHATE SERPL-MCNC: 3 MG/DL — SIGNIFICANT CHANGE UP (ref 2.4–4.7)
PLATELET # BLD AUTO: 411 K/UL — HIGH (ref 150–400)
POTASSIUM SERPL-MCNC: 4.2 MMOL/L — SIGNIFICANT CHANGE UP (ref 3.5–5.3)
POTASSIUM SERPL-SCNC: 4.2 MMOL/L — SIGNIFICANT CHANGE UP (ref 3.5–5.3)
RBC # BLD: 4.03 M/UL — LOW (ref 4.2–5.8)
RBC # FLD: 15 % — HIGH (ref 10.3–14.5)
SODIUM SERPL-SCNC: 151 MMOL/L — HIGH (ref 135–145)
WBC # BLD: 10.47 K/UL — SIGNIFICANT CHANGE UP (ref 3.8–10.5)
WBC # FLD AUTO: 10.47 K/UL — SIGNIFICANT CHANGE UP (ref 3.8–10.5)

## 2024-02-21 PROCEDURE — 99232 SBSQ HOSP IP/OBS MODERATE 35: CPT

## 2024-02-21 PROCEDURE — 71045 X-RAY EXAM CHEST 1 VIEW: CPT | Mod: 26

## 2024-02-21 PROCEDURE — 99497 ADVNCD CARE PLAN 30 MIN: CPT | Mod: 25

## 2024-02-21 PROCEDURE — 99233 SBSQ HOSP IP/OBS HIGH 50: CPT

## 2024-02-21 RX ORDER — HYDRALAZINE HCL 50 MG
10 TABLET ORAL THREE TIMES A DAY
Refills: 0 | Status: DISCONTINUED | OUTPATIENT
Start: 2024-02-21 | End: 2024-03-06

## 2024-02-21 RX ADMIN — Medication 10 MILLIGRAM(S): at 12:13

## 2024-02-21 RX ADMIN — HEPARIN SODIUM 5000 UNIT(S): 5000 INJECTION INTRAVENOUS; SUBCUTANEOUS at 05:47

## 2024-02-21 RX ADMIN — CLOPIDOGREL BISULFATE 75 MILLIGRAM(S): 75 TABLET, FILM COATED ORAL at 12:10

## 2024-02-21 RX ADMIN — Medication 81 MILLIGRAM(S): at 12:10

## 2024-02-21 RX ADMIN — Medication 10 MILLIGRAM(S): at 12:09

## 2024-02-21 RX ADMIN — Medication 12.5 MILLIGRAM(S): at 17:22

## 2024-02-21 RX ADMIN — ARIPIPRAZOLE 5 MILLIGRAM(S): 15 TABLET ORAL at 12:09

## 2024-02-21 RX ADMIN — HEPARIN SODIUM 5000 UNIT(S): 5000 INJECTION INTRAVENOUS; SUBCUTANEOUS at 12:13

## 2024-02-21 RX ADMIN — Medication 2: at 12:29

## 2024-02-21 RX ADMIN — Medication 4: at 00:16

## 2024-02-21 RX ADMIN — SERTRALINE 100 MILLIGRAM(S): 25 TABLET, FILM COATED ORAL at 12:11

## 2024-02-21 RX ADMIN — HEPARIN SODIUM 5000 UNIT(S): 5000 INJECTION INTRAVENOUS; SUBCUTANEOUS at 21:40

## 2024-02-21 RX ADMIN — Medication 1 APPLICATION(S): at 17:22

## 2024-02-21 RX ADMIN — PANTOPRAZOLE SODIUM 40 MILLIGRAM(S): 20 TABLET, DELAYED RELEASE ORAL at 12:10

## 2024-02-21 RX ADMIN — INSULIN GLARGINE 15 UNIT(S): 100 INJECTION, SOLUTION SUBCUTANEOUS at 08:15

## 2024-02-21 RX ADMIN — ATORVASTATIN CALCIUM 80 MILLIGRAM(S): 80 TABLET, FILM COATED ORAL at 21:40

## 2024-02-21 RX ADMIN — Medication 4: at 05:57

## 2024-02-21 RX ADMIN — Medication 12.5 MILLIGRAM(S): at 05:47

## 2024-02-21 RX ADMIN — Medication 10 MILLIGRAM(S): at 21:40

## 2024-02-21 RX ADMIN — QUETIAPINE FUMARATE 50 MILLIGRAM(S): 200 TABLET, FILM COATED ORAL at 21:39

## 2024-02-21 RX ADMIN — CHLORHEXIDINE GLUCONATE 1 APPLICATION(S): 213 SOLUTION TOPICAL at 05:48

## 2024-02-21 RX ADMIN — Medication 1 APPLICATION(S): at 05:46

## 2024-02-21 NOTE — PROGRESS NOTE ADULT - ASSESSMENT
78 y/oM PMH of HTN, HLD, CKD, plaque psoriasis, DM-2 who came to the ED (02/12/24) complaining of chest pain radiating to left arm associated with multiple episodes of diarrhea.  On ED arrival, afebrile, hypotensive to SBP 80-90s despite 1L IVF requiring Levophed. EKG with nonspecific ST changes, troponin 2344->2477. Labs otherwise notable for K 5.6, BUN/Cr 43.5/2.31 (baseline Cr ~1 in 2022), BNP 17573, lactate 6.2. CXR with pulmonary edema. Placed on BiPAP ,given Lasix 40mg. TTE with EF<20%, multiple segmental abnormalities, mod MR, mod TR, mod pericardial effusion without tamponade; started on Heparin drip for NSTEMI, cardiology consulted. Admitted to MICU for further management of cardiogenic shock. On 02/14/24: patient became obtunded, febrile, intubated for airway protection. Interventional cardiology also on board; S/P LHC: Severe multi-vessel CAD including LM, CABG recommended. CT surgery deemed patient poor candidate for CABG. HF team also following; s/p RHC: shows low filling pressures with normal cardiac output. Nephrology following for WES. Patient extubated (02/18/24); NG tube in place. Palliative team following, patient DNR/DNI. Patient now downgraded to medical floor 2/19    NSTEMI   -s/p Heparin drip   -S/p LHC: severe multiple-vessel CAD, not a candidate for CABG   -Plavix 75mg   -Asprin 81mg   -Atorvastatin 80mg   -Cardiology following   -pt declining PCI    Acute Systolic CHF  -s/p Bumex and Lasix drip   -HF following   -Echo done   -S/p RHC     Cardiogenic shock   -s/p Levophed and Dobutamine   -Monitor BP     Acute metabolic encephalopathy , improved  hx dysphagia  Requiring intubation for airway protection   -Now s/p extubation 2/18  -Aspiration precautions  -had been recommended for PEG in the past, declined previously  -SLP rec NPO  -cont TF for now via NGT  with free water     WES on CKD  -Nephrology following   -Monitor renal function     DM-2  -Lantus 15 units AM   -Insulin sliding scale     Depression   -Buspirone 10mg   -Aripiprazole 5mg   -Seroquel 50mg HS   -Sertraline 100mg     DVT prophylaxis: Heparin sc    Palliative following ; d/w Dr. Fallon

## 2024-02-21 NOTE — PROGRESS NOTE ADULT - SUBJECTIVE AND OBJECTIVE BOX
CABRERA BADR    [id number]    78y      Male    CC:     INTERVAL HPI/OVERNIGHT EVENTS:    REVIEW OF SYSTEMS:    CONSTITUTIONAL: No fever, weight loss, or fatigue  RESPIRATORY: No cough, wheezing, hemoptysis; No shortness of breath  CARDIOVASCULAR: No chest pain, palpitations  GASTROINTESTINAL: No abdominal or epigastric pain. No nausea, vomiting  NEUROLOGICAL: No headaches, memory loss, loss of strength.    Vital Signs Last 24 Hrs  T(C): 36.3 (21 Feb 2024 08:00), Max: 36.6 (20 Feb 2024 16:26)  T(F): 97.3 (21 Feb 2024 08:00), Max: 97.9 (20 Feb 2024 16:26)  HR: 72 (21 Feb 2024 08:00) (72 - 79)  BP: 145/82 (21 Feb 2024 12:10) (123/71 - 145/82)  BP(mean): 96 (20 Feb 2024 16:26) (91 - 96)  RR: 18 (21 Feb 2024 08:00) (16 - 20)  SpO2: 94% (21 Feb 2024 08:00) (93% - 100%)    Parameters below as of 21 Feb 2024 08:00  Patient On (Oxygen Delivery Method): room air        PHYSICAL EXAM:    GENERAL: NAD  HEENT: PERRL, +EOMI  NECK: soft, supple  CHEST/LUNG: Clear to auscultation bilaterally; No wheezing  HEART: S1S2+, Regular rate and rhythm; No murmurs, rubs, or gallops  ABDOMEN: Soft, Nontender, Nondistended; Bowel sounds present  SKIN: No rashes or lesions  NEURO: AAOX3, no focal deficits, no motor or sensory loss  PSYCH: normal mood    LABS:                        10.7   10.47 )-----------( 411      ( 21 Feb 2024 05:00 )             35.8     02-21    151<H>  |  103  |  100.5<H>  ----------------------------<  248<H>  4.2   |  31.0<H>  |  1.96<H>    Ca    9.9      21 Feb 2024 05:00  Phos  3.0     02-21  Mg     2.5     02-21        Urinalysis Basic - ( 21 Feb 2024 05:00 )    Color: x / Appearance: x / SG: x / pH: x  Gluc: 248 mg/dL / Ketone: x  / Bili: x / Urobili: x   Blood: x / Protein: x / Nitrite: x   Leuk Esterase: x / RBC: x / WBC x   Sq Epi: x / Non Sq Epi: x / Bacteria: x          MEDICATIONS  (STANDING):  ARIPiprazole 5 milliGRAM(s) Oral daily  aspirin  chewable 81 milliGRAM(s) Oral daily  atorvastatin 80 milliGRAM(s) Oral at bedtime  busPIRone 10 milliGRAM(s) Oral <User Schedule>  chlorhexidine 2% Cloths 1 Application(s) Topical <User Schedule>  clopidogrel Tablet 75 milliGRAM(s) Enteral Tube daily  dextrose 50% Injectable 25 Gram(s) IV Push once  dextrose 50% Injectable 25 Gram(s) IV Push once  dextrose 50% Injectable 12.5 Gram(s) IV Push once  heparin   Injectable 5000 Unit(s) SubCutaneous every 8 hours  hydrALAZINE 10 milliGRAM(s) Oral three times a day  influenza  Vaccine (HIGH DOSE) 0.7 milliLiter(s) IntraMuscular once  insulin glargine Injectable (LANTUS) 15 Unit(s) SubCutaneous every morning  insulin lispro (ADMELOG) corrective regimen sliding scale   SubCutaneous every 6 hours  metoprolol tartrate 12.5 milliGRAM(s) Enteral Tube every 12 hours  pantoprazole  Injectable 40 milliGRAM(s) IV Push daily  QUEtiapine 50 milliGRAM(s) Oral at bedtime  sertraline 100 milliGRAM(s) Oral daily  triamcinolone 0.1% Cream 1 Application(s) Topical two times a day    MEDICATIONS  (PRN):  acetaminophen     Tablet .. 650 milliGRAM(s) Oral every 6 hours PRN Temp greater or equal to 38C (100.4F)  clonazePAM  Tablet 1 milliGRAM(s) Oral daily PRN for anxiety  sodium chloride 0.9% lock flush 10 milliLiter(s) IV Push every 1 hour PRN Pre/post blood products, medications, blood draw, and to maintain line patency      RADIOLOGY & ADDITIONAL TESTS:   CABRERA BADR    [id number]    78y      Male    CC: shock    INTERVAL HPI/OVERNIGHT EVENTS: pt seen and examined. asking for water, pt reports understanding of risk at this time and why he remains npo. NGT held in am for increased cough, course breath sounds     REVIEW OF SYSTEMS:    RESPIRATORY: No wheezing, hemoptysis; No shortness of breath  CARDIOVASCULAR: No chest pain, palpitations  GASTROINTESTINAL: No abdominal or epigastric pain. No nausea, vomiting    Vital Signs Last 24 Hrs  T(C): 36.3 (21 Feb 2024 08:00), Max: 36.6 (20 Feb 2024 16:26)  T(F): 97.3 (21 Feb 2024 08:00), Max: 97.9 (20 Feb 2024 16:26)  HR: 72 (21 Feb 2024 08:00) (72 - 79)  BP: 145/82 (21 Feb 2024 12:10) (123/71 - 145/82)  BP(mean): 96 (20 Feb 2024 16:26) (91 - 96)  RR: 18 (21 Feb 2024 08:00) (16 - 20)  SpO2: 94% (21 Feb 2024 08:00) (93% - 100%)    Parameters below as of 21 Feb 2024 08:00  Patient On (Oxygen Delivery Method): room air        PHYSICAL EXAM:    GENERAL: NAD  HEENT: +NGT  CHEST/LUNG: course sounds b/l; respirations unlabored   HEART: S1S2+, Regular rate and rhythm  ABDOMEN: Soft, Nontender, Nondistended; Bowel sounds present  SKIN: warm, dry   NEURO: Awake, alert, oriented x4  PSYCH: normal affect     LABS:                        10.7   10.47 )-----------( 411      ( 21 Feb 2024 05:00 )             35.8     02-21    151<H>  |  103  |  100.5<H>  ----------------------------<  248<H>  4.2   |  31.0<H>  |  1.96<H>    Ca    9.9      21 Feb 2024 05:00  Phos  3.0     02-21  Mg     2.5     02-21        Urinalysis Basic - ( 21 Feb 2024 05:00 )    Color: x / Appearance: x / SG: x / pH: x  Gluc: 248 mg/dL / Ketone: x  / Bili: x / Urobili: x   Blood: x / Protein: x / Nitrite: x   Leuk Esterase: x / RBC: x / WBC x   Sq Epi: x / Non Sq Epi: x / Bacteria: x          MEDICATIONS  (STANDING):  ARIPiprazole 5 milliGRAM(s) Oral daily  aspirin  chewable 81 milliGRAM(s) Oral daily  atorvastatin 80 milliGRAM(s) Oral at bedtime  busPIRone 10 milliGRAM(s) Oral <User Schedule>  chlorhexidine 2% Cloths 1 Application(s) Topical <User Schedule>  clopidogrel Tablet 75 milliGRAM(s) Enteral Tube daily  dextrose 50% Injectable 25 Gram(s) IV Push once  dextrose 50% Injectable 25 Gram(s) IV Push once  dextrose 50% Injectable 12.5 Gram(s) IV Push once  heparin   Injectable 5000 Unit(s) SubCutaneous every 8 hours  hydrALAZINE 10 milliGRAM(s) Oral three times a day  influenza  Vaccine (HIGH DOSE) 0.7 milliLiter(s) IntraMuscular once  insulin glargine Injectable (LANTUS) 15 Unit(s) SubCutaneous every morning  insulin lispro (ADMELOG) corrective regimen sliding scale   SubCutaneous every 6 hours  metoprolol tartrate 12.5 milliGRAM(s) Enteral Tube every 12 hours  pantoprazole  Injectable 40 milliGRAM(s) IV Push daily  QUEtiapine 50 milliGRAM(s) Oral at bedtime  sertraline 100 milliGRAM(s) Oral daily  triamcinolone 0.1% Cream 1 Application(s) Topical two times a day    MEDICATIONS  (PRN):  acetaminophen     Tablet .. 650 milliGRAM(s) Oral every 6 hours PRN Temp greater or equal to 38C (100.4F)  clonazePAM  Tablet 1 milliGRAM(s) Oral daily PRN for anxiety  sodium chloride 0.9% lock flush 10 milliLiter(s) IV Push every 1 hour PRN Pre/post blood products, medications, blood draw, and to maintain line patency      RADIOLOGY & ADDITIONAL TESTS:

## 2024-02-21 NOTE — CHART NOTE - NSCHARTNOTEFT_GEN_A_CORE
Source: Patient [ ]  Family [ ]   other [x ] staff and EMR    Current Diet: Diet, NPO with Tube Feed:   Tube Feeding Modality: Nasogastric  Glucerna 1.5 Thomas (GLUCERNA1.5)  Total Volume for 24 Hours (mL): 1200  Continuous  Starting Tube Feed Rate {mL per Hour}: 10  Increase Tube Feed Rate by (mL): 10     Every 6 hours  Until Goal Tube Feed Rate (mL per Hour): 50  Tube Feed Duration (in Hours): 24  Tube Feed Start Time: 22:00 (02-15-24 @ 21:30)    Source for PO intake [ ] Patient [ ] family [x ] chart [x ] staff [ ] other    Enteral /Parenteral Nutrition:  Glucerna 1.5 @ 50ml/hr (x24 hours) 1200ml/day; 1800kcal, 100gm protein  Meets 100% of energy needs, 116% protein needs of upper end of needs.    Current Weight:   2/17: 63.8 kg   2/15: 64.9 kg   2/12: 61.2 kg     Pertinent Medications: MEDICATIONS  (STANDING):  ARIPiprazole 5 milliGRAM(s) Oral daily  atorvastatin 80 milliGRAM(s) Oral at bedtime  busPIRone 10 milliGRAM(s) Oral <User Schedule>  clopidogrel Tablet 75 milliGRAM(s) Enteral Tube daily  heparin   Injectable 5000 Unit(s) SubCutaneous every 8 hours  insulin glargine Injectable (LANTUS) 15 Unit(s) SubCutaneous every morning  insulin lispro (ADMELOG) corrective regimen sliding scale   SubCutaneous every 6 hours  metoprolol tartrate 12.5 milliGRAM(s) Enteral Tube every 12 hours  pantoprazole  Injectable 40 milliGRAM(s) IV Push daily  sertraline 100 milliGRAM(s) Oral daily    Pertinent Labs: CBC Full  -  ( 21 Feb 2024 05:00 )  WBC Count : 10.47 K/uL  RBC Count : 4.03 M/uL  Hemoglobin : 10.7 g/dL  Hematocrit : 35.8 %  Platelet Count - Automated : 411 K/uL  Mean Cell Volume : 88.8 fl  Mean Cell Hemoglobin : 26.6 pg  Mean Cell Hemoglobin Concentration : 29.9 gm/dL  02-21 Na151 mmol/L<H> Glu 248 mg/dL<H> K+ 4.2 mmol/L Cr  1.96 mg/dL<H> .5 mg/dL<H> Phos 3.0 mg/dL Alb n/a   PAB n/a       Hospital Course:   Pt is a 77 y/o M with a h/o HTN, HLD, T2DM, CKD, plaque psoriasis, anxiety, depression, history of dysphagia who declined PEG placement, admitted on 2/12 with progressively worsening substernal chest pain that onset the night prior with multiple episodes of diarrhea as well as 1 episode of nausea and vomiting. Initial EKG on arrival without ST elevation though inferolateral Q waves. Labs remarkable for elevated troponin, WES, anion gap metabolic lactic acidosis. CXR with pulmonary edema and possible underlying pneumonia. ER course complicated by hypotension for which he received 1L IV fluid bolus subsequently developed significant hypoxia and respiratory distress requiring IV vasopressor and NIPPV. He developed worsening shock, respiratory distress, and fevers. He was intubated 2/14, started on Dobutamine and Bumex infusion with improvement in hypoxia, shock, and UOP. 2/14 underwent RHC with normal right sided pressures and normal CO/CI on Dobutamine. LHC revealed severe multivessel disease. Deemed not a candidate for CABG or high risk PCI.      Current Nutrition Diagnosis:  Pt remains at high nutrition risk secondary to severe (chronic) protein calorie malnutrition related to inability to meet sufficient protein energy needs in setting of history of dysphagia, now with Cardiogenic shock, Acute systolic heart failure, NSTEMI, Multivessel CAD, Acute hypoxemic respiratory failure, Acute pulmonary edema, Aspiration pneumonitis/pneumonia WES on CKD as evidenced by physical signs of severe muscle/fat loss, meeting < 75% estimated energy needs > 1 month. TF currently running at 20ml/hr. Per nurse TF was restarted 2/21 @1pm due to pt coughing. SLP following. SLP recommended 2/20 with recommendations for NPO w/ goals of care discussion regarding PEG vs. pleasure feeds. RD aware pallative following for GOC. Current TF regimen providing greater then 100% pt estimated needs. Labs reviewed pt noted to have hypernatremia and elevated BUN/creat, consider additional water flushes. RD to remain available. Recommendations below:    Recommendations:   1. Reduce TF goal rate to 45 ml/hr x 24 hrs to prevent overfeeding (1080ml provides 1620 kcal, 89g protein, 819ml free water).  2. Consider additional water flushes of 120 ml q4 hours or per MD discretion.  2. Check weight daily to monitor trends.  3. Rx: MVI daily per tolerated route.   4. Thiamine 300mg (x3 days).  5. Monitor labs for refeeding syndrome ( K, phos, mg).    Monitoring and Evaluation:   [ ] PO intake [x ] Tolerance to diet prescription [X] Weights  [X] Follow up per protocol [X] Labs: chem 8, mg, phos, H/H Source: Patient [ ]  Family [ ]   other [x ] staff and EMR    Current Diet: Diet, NPO with Tube Feed:   Tube Feeding Modality: Nasogastric  Glucerna 1.5 Thomas (GLUCERNA1.5)  Total Volume for 24 Hours (mL): 1200  Continuous  Starting Tube Feed Rate {mL per Hour}: 10  Increase Tube Feed Rate by (mL): 10     Every 6 hours  Until Goal Tube Feed Rate (mL per Hour): 50  Tube Feed Duration (in Hours): 24  Tube Feed Start Time: 22:00 (02-15-24 @ 21:30)    Source for PO intake [ ] Patient [ ] family [x ] chart [x ] staff [ ] other    Enteral /Parenteral Nutrition:  Glucerna 1.5 @ 50ml/hr (x24 hours) 1200ml/day; 1800kcal, 100gm protein  Meets 100% of energy needs, 116% protein needs of upper end of needs.    Current Weight:   2/17: 63.8 kg   2/15: 64.9 kg   2/12: 61.2 kg     Pertinent Medications: MEDICATIONS  (STANDING):  ARIPiprazole 5 milliGRAM(s) Oral daily  atorvastatin 80 milliGRAM(s) Oral at bedtime  busPIRone 10 milliGRAM(s) Oral <User Schedule>  clopidogrel Tablet 75 milliGRAM(s) Enteral Tube daily  heparin   Injectable 5000 Unit(s) SubCutaneous every 8 hours  insulin glargine Injectable (LANTUS) 15 Unit(s) SubCutaneous every morning  insulin lispro (ADMELOG) corrective regimen sliding scale   SubCutaneous every 6 hours  metoprolol tartrate 12.5 milliGRAM(s) Enteral Tube every 12 hours  pantoprazole  Injectable 40 milliGRAM(s) IV Push daily  sertraline 100 milliGRAM(s) Oral daily    Pertinent Labs:   02-21 Na151 mmol/L<H> Glu 248 mg/dL<H> K+ 4.2 mmol/L Cr  1.96 mg/dL<H> .5 mg/dL<H> Phos 3.0 mg/dL Alb n/a   PAB n/a       Hospital Course:   Pt is a 79 y/o M with a h/o HTN, HLD, T2DM, CKD, plaque psoriasis, anxiety, depression, history of dysphagia who declined PEG placement, admitted on 2/12 with progressively worsening substernal chest pain that onset the night prior with multiple episodes of diarrhea as well as 1 episode of nausea and vomiting. Initial EKG on arrival without ST elevation though inferolateral Q waves. Labs remarkable for elevated troponin, WES, anion gap metabolic lactic acidosis. CXR with pulmonary edema and possible underlying pneumonia. ER course complicated by hypotension for which he received 1L IV fluid bolus subsequently developed significant hypoxia and respiratory distress requiring IV vasopressor and NIPPV. He developed worsening shock, respiratory distress, and fevers. He was intubated 2/14, started on Dobutamine and Bumex infusion with improvement in hypoxia, shock, and UOP. 2/14 underwent RHC with normal right sided pressures and normal CO/CI on Dobutamine. LHC revealed severe multivessel disease. Deemed not a candidate for CABG or high risk PCI.      Current Nutrition Diagnosis:  Pt remains at high nutrition risk secondary to severe (chronic) protein calorie malnutrition related to inability to meet sufficient protein energy needs in setting of history of dysphagia, now with Cardiogenic shock, Acute systolic heart failure, NSTEMI, Multivessel CAD, Acute hypoxemic respiratory failure, Acute pulmonary edema, Aspiration pneumonitis/pneumonia WES on CKD as evidenced by physical signs of severe muscle/fat loss, meeting < 75% estimated energy needs > 1 month. TF currently running at 20ml/hr. Per nurse TF was restarted 2/21 @1pm due to pt coughing. SLP following. SLP recommended 2/20 with recommendations for NPO w/ goals of care discussion regarding PEG vs. pleasure feeds. RD aware pallative following for GOC. Current TF regimen providing greater then 100% pt estimated needs. Labs reviewed pt noted to have hypernatremia and elevated BUN/creat, consider additional water flushes. RD to remain available. Recommendations below:    Recommendations:   1. Reduce TF goal rate to 45 ml/hr x 24 hrs to prevent overfeeding (1080ml provides 1620 kcal, 89g protein, 819ml free water).  2. Consider additional water flushes of 120 ml q4 hours or per MD discretion.  2. Check weight daily to monitor trends.  3. Rx: MVI daily per tolerated route.   4. Monitor labs for refeeding syndrome ( K, phos, mg). Replete as needed.    Monitoring and Evaluation:   [ ] PO intake [x ] Tolerance to diet prescription [X] Weights  [X] Follow up per protocol [X] Labs: chem 8, mg, phos, H/H Source: Patient [ ]  Family [ ]   other [x ] staff and EMR    Current Diet: Diet, NPO with Tube Feed:   Tube Feeding Modality: Nasogastric  Glucerna 1.5 Thomas (GLUCERNA1.5)  Total Volume for 24 Hours (mL): 1200  Continuous  Starting Tube Feed Rate {mL per Hour}: 10  Increase Tube Feed Rate by (mL): 10     Every 6 hours  Until Goal Tube Feed Rate (mL per Hour): 50  Tube Feed Duration (in Hours): 24  Tube Feed Start Time: 22:00 (02-15-24 @ 21:30)    Source for PO intake [ ] Patient [ ] family [x ] chart [x ] staff [ ] other    Enteral /Parenteral Nutrition:  Glucerna 1.5 @ 50ml/hr (x24 hours) 1200ml/day; 1800kcal, 100gm protein  Meets 100% of energy needs, 116% protein needs of upper end of needs.    Current Weight:   2/17: 63.8 kg   2/15: 64.9 kg   2/12: 61.2 kg     Pertinent Medications: MEDICATIONS  (STANDING):  ARIPiprazole 5 milliGRAM(s) Oral daily  atorvastatin 80 milliGRAM(s) Oral at bedtime  busPIRone 10 milliGRAM(s) Oral <User Schedule>  clopidogrel Tablet 75 milliGRAM(s) Enteral Tube daily  heparin   Injectable 5000 Unit(s) SubCutaneous every 8 hours  insulin glargine Injectable (LANTUS) 15 Unit(s) SubCutaneous every morning  insulin lispro (ADMELOG) corrective regimen sliding scale   SubCutaneous every 6 hours  metoprolol tartrate 12.5 milliGRAM(s) Enteral Tube every 12 hours  pantoprazole  Injectable 40 milliGRAM(s) IV Push daily  sertraline 100 milliGRAM(s) Oral daily    Pertinent Labs:   02-21 Na151 mmol/L<H> Glu 248 mg/dL<H> K+ 4.2 mmol/L Cr  1.96 mg/dL<H> .5 mg/dL<H> Phos 3.0 mg/dL Alb n/a   PAB n/a       Hospital Course:   Pt is a 79 y/o M with a h/o HTN, HLD, T2DM, CKD, plaque psoriasis, anxiety, depression, history of dysphagia who declined PEG placement, admitted on 2/12 with progressively worsening substernal chest pain that onset the night prior with multiple episodes of diarrhea as well as 1 episode of nausea and vomiting. Initial EKG on arrival without ST elevation though inferolateral Q waves. Labs remarkable for elevated troponin, WES, anion gap metabolic lactic acidosis. CXR with pulmonary edema and possible underlying pneumonia. ER course complicated by hypotension for which he received 1L IV fluid bolus subsequently developed significant hypoxia and respiratory distress requiring IV vasopressor and NIPPV. He developed worsening shock, respiratory distress, and fevers. He was intubated 2/14, started on Dobutamine and Bumex infusion with improvement in hypoxia, shock, and UOP. 2/14 underwent RHC with normal right sided pressures and normal CO/CI on Dobutamine. LHC revealed severe multivessel disease. Deemed not a candidate for CABG or high risk PCI.      Current Nutrition Diagnosis:  Pt remains at high nutrition risk secondary to severe (chronic) protein calorie malnutrition related to inability to meet sufficient protein energy needs in setting of history of dysphagia, now with Cardiogenic shock, Acute systolic heart failure, NSTEMI, Multivessel CAD, Acute hypoxemic respiratory failure, Acute pulmonary edema, Aspiration pneumonitis/pneumonia WES on CKD as evidenced by physical signs of severe muscle/fat loss, meeting < 75% estimated energy needs > 1 month. TF currently running at 20ml/hr. Per nurse TF was restarted 2/21 @1pm due to pt coughing. SLP following. SLP recommended 2/20 with recommendations for NPO w/ goals of care discussion regarding PEG vs. pleasure feeds. RD aware pallative following for GOC. Continue with pt and family wishes for GOC. Current TF regimen providing greater then 100% pt estimated needs. Labs reviewed pt noted to have hypernatremia and elevated BUN/creat, consider additional water flushes. RD to remain available. Recommendations below:    Recommendations:   1. Reduce TF goal rate to 45 ml/hr x 24 hrs to prevent overfeeding (1080ml provides 1620 kcal, 89g protein, 819ml free water).  2. Consider additional water flushes of 120 ml q4 hours or per MD discretion.  3. Check weight daily to monitor trends.  4. Rx: MVI daily per tolerated route.   5. Monitor labs for refeeding syndrome ( K, phos, mg). Replete as needed.    Monitoring and Evaluation:   [ ] PO intake [x ] Tolerance to diet prescription [X] Weights  [X] Follow up per protocol [X] Labs: chem 8, mg, phos, H/H

## 2024-02-21 NOTE — PROGRESS NOTE ADULT - PROBLEM SELECTOR PLAN 1
-Patient presented with chest pain and was found to have an NSTEMI and low EF. Cath revealed multivessel disease. He was empirically started on dobutamine for hypotension and respiratory distress.  -RHC  showed low filling pressures with normal cardiac output on  5.  -Bumex gtt stopped   -Dobutamine weaned off 2/15. Harpreet CI was stable at 2.4.  -Continue to monitor daily markers of perfusion (LFTs, sCr, lactate).  -GDMT: Start low dose hydralazine 10 mg TID for afterload reduction. Continue Toprol XL 12.5 mg BID.  -Diuretics: not currently requiring. He is hypernatremic and may benefit from free H20. Would be cautious w/ HFrEF. -Patient presented with chest pain and was found to have an NSTEMI and low EF. Cath revealed multivessel disease. He was empirically started on dobutamine for hypotension and respiratory distress.  -RHC  showed low filling pressures with normal cardiac output on  5.  -Bumex gtt stopped   -Dobutamine weaned off 2/15. Harpreet CI was stable at 2.4.  -Continue to monitor daily markers of perfusion (LFTs, sCr, lactate).  -GDMT: Start low dose hydralazine 10 mg TID for afterload reduction. Continue Toprol XL 12.5 mg BID. Can consider losartan and MRA as outpt once renal function stabilizes.  -Diuretics: not currently requiring. He is hypernatremic and may benefit from free H20. Would be cautious w/ HFrEF.  -HF to sign off. Pt should f/u in our [place] office one week post discharge. We will call to schedule OV.

## 2024-02-21 NOTE — PROGRESS NOTE ADULT - PROBLEM SELECTOR PLAN 2
-Noted to have triple vessel disease  -Not candidate for CABG per CTS  -Pt. not interested in invasive procedure/high risk PCI. Plan for medical management of CAD.

## 2024-02-21 NOTE — PROGRESS NOTE ADULT - SUBJECTIVE AND OBJECTIVE BOX
CC:  Follow up GOC , Symptoms    OVERNIGHT EVENTS:  failed swallow eval - rec NPO    Present Symptoms:   Dyspnea:  No     Nausea/Vomiting:  No    Anxiety:  No    Depression:  No    Fatigue:  Yes     Loss of appetite:  No   Constipation: Not Reported   Yes    Pain: No              Location            Duration            Character            Severity            Factors            Effect    Pain AD Score:  [email or web address] (press ctrl + left click to view)    Review of Systems: Reviewed as above  All others negative      MEDICATIONS  (STANDING):  ARIPiprazole 5 milliGRAM(s) Oral daily  aspirin  chewable 81 milliGRAM(s) Oral daily  atorvastatin 80 milliGRAM(s) Oral at bedtime  busPIRone 10 milliGRAM(s) Oral <User Schedule>  chlorhexidine 2% Cloths 1 Application(s) Topical <User Schedule>  clopidogrel Tablet 75 milliGRAM(s) Enteral Tube daily  dextrose 50% Injectable 12.5 Gram(s) IV Push once  dextrose 50% Injectable 25 Gram(s) IV Push once  dextrose 50% Injectable 25 Gram(s) IV Push once  heparin   Injectable 5000 Unit(s) SubCutaneous every 8 hours  hydrALAZINE 10 milliGRAM(s) Oral three times a day  influenza  Vaccine (HIGH DOSE) 0.7 milliLiter(s) IntraMuscular once  insulin glargine Injectable (LANTUS) 15 Unit(s) SubCutaneous every morning  insulin lispro (ADMELOG) corrective regimen sliding scale   SubCutaneous every 6 hours  metoprolol tartrate 12.5 milliGRAM(s) Enteral Tube every 12 hours  pantoprazole  Injectable 40 milliGRAM(s) IV Push daily  QUEtiapine 50 milliGRAM(s) Oral at bedtime  sertraline 100 milliGRAM(s) Oral daily  triamcinolone 0.1% Cream 1 Application(s) Topical two times a day    MEDICATIONS  (PRN):  acetaminophen     Tablet .. 650 milliGRAM(s) Oral every 6 hours PRN Temp greater or equal to 38C (100.4F)  clonazePAM  Tablet 1 milliGRAM(s) Oral daily PRN for anxiety  sodium chloride 0.9% lock flush 10 milliLiter(s) IV Push every 1 hour PRN Pre/post blood products, medications, blood draw, and to maintain line patency      PHYSICAL EXAM:    Vital Signs Last 24 Hrs  T(C): 36.3 (21 Feb 2024 08:00), Max: 36.6 (20 Feb 2024 16:26)  T(F): 97.3 (21 Feb 2024 08:00), Max: 97.9 (20 Feb 2024 16:26)  HR: 72 (21 Feb 2024 08:00) (72 - 79)  BP: 130/74 (21 Feb 2024 08:00) (123/71 - 143/72)  BP(mean): 96 (20 Feb 2024 16:26) (91 - 96)  RR: 18 (21 Feb 2024 08:00) (16 - 20)  SpO2: 94% (21 Feb 2024 08:00) (93% - 100%)    Parameters below as of 21 Feb 2024 08:00  Patient On (Oxygen Delivery Method): room air      Karnofsky: 30 %  General:   M awake alert NAD      HEENT:  NCAT       Lungs: comfortable  non labored  CV:  RR  GI: soft NTND  MSK: weak bedbound  Skin:  warm/dry  Neuro  no focal deficits  Psych calm    LABS:                          10.7   10.47 )-----------( 411      ( 21 Feb 2024 05:00 )             35.8     02-21    151<H>  |  103  |  100.5<H>  ----------------------------<  248<H>  4.2   |  31.0<H>  |  1.96<H>    Ca    9.9      21 Feb 2024 05:00  Phos  3.0     02-21  Mg     2.5     02-21        Urinalysis Basic - ( 21 Feb 2024 05:00 )    Color: x / Appearance: x / SG: x / pH: x  Gluc: 248 mg/dL / Ketone: x  / Bili: x / Urobili: x   Blood: x / Protein: x / Nitrite: x   Leuk Esterase: x / RBC: x / WBC x   Sq Epi: x / Non Sq Epi: x / Bacteria: x      I&O's Summary    20 Feb 2024 07:01  -  21 Feb 2024 07:00  --------------------------------------------------------  IN: 1600 mL / OUT: 525 mL / NET: 1075 mL        RADIOLOGY & ADDITIONAL STUDIES:  Imaging Reviewed  (  x )     < from: CT Chest No Cont (02.17.24 @ 12:31) >    PROCEDURE:  CT of the Chest was performed.  Sagittal and coronal reformats were performed.    LIMITATION: Evaluation of the vascular structures and olivia is limited by   lack of IV contrast. Motion artifact. Streak artifact from patient's arms.    FINDINGS:    LUNGS AND AIRWAYS/PLEURA: Patent central airways. ET tube tip in the mid   trachea. Small to moderate bilateral pleural effusions with underlying   passive atelectasis. No pneumothorax.  MEDIASTINUM AND OLIVIA: No lymphadenopathy.  VESSELS: Left jugular catheter tip in the superior vena cava.   Atherosclerotic aorta without aneurysm. Coronary artery calcifications   and/or stents.  HEART: Heart size is normal. No pericardial effusion.  CHEST WALL AND LOWER NECK: Within normal limits.  VISUALIZED UPPER ABDOMEN: Enteric catheter tip terminating in the distal   stomach or duodenal bulb. Layering high density in the gallbladder either   sludge or vicarious excretion of contrast. 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  BONES: Upper thoracic degenerative changes.    IMPRESSION:  Small to moderate bilateral pleural effusions with underlying passive   atelectasis.    Enteric catheter tip in the distal stomach or duodenal bulb and should be   retracted.    ET tube and left central line in satisfactory position.    Sludge versus excreted contrast in the gallbladder.        --- End of Report ---    < end of copied text >          ADVANCE DIRECTIVE PREFERENCES    DNR/I  and continuing medical treatments  DNR with Trial of Intubation and continuing medical treatments   DNR/I  and comfort measures CC:  Follow up GOC , Symptoms    OVERNIGHT EVENTS:  failed swallow eval - rec NPO    Present Symptoms:   Dyspnea:  No     Nausea/Vomiting:  No    Anxiety:  No    Depression:  No    Fatigue:  Yes     Loss of appetite:  No   Constipation: Not Reported   Yes    Pain: No              Location            Duration            Character            Severity            Factors            Effect    Pain AD Score:  [email or web address] (press ctrl + left click to view)    Review of Systems: Reviewed as above  All others negative      MEDICATIONS  (STANDING):  ARIPiprazole 5 milliGRAM(s) Oral daily  aspirin  chewable 81 milliGRAM(s) Oral daily  atorvastatin 80 milliGRAM(s) Oral at bedtime  busPIRone 10 milliGRAM(s) Oral <User Schedule>  chlorhexidine 2% Cloths 1 Application(s) Topical <User Schedule>  clopidogrel Tablet 75 milliGRAM(s) Enteral Tube daily  dextrose 50% Injectable 12.5 Gram(s) IV Push once  dextrose 50% Injectable 25 Gram(s) IV Push once  dextrose 50% Injectable 25 Gram(s) IV Push once  heparin   Injectable 5000 Unit(s) SubCutaneous every 8 hours  hydrALAZINE 10 milliGRAM(s) Oral three times a day  influenza  Vaccine (HIGH DOSE) 0.7 milliLiter(s) IntraMuscular once  insulin glargine Injectable (LANTUS) 15 Unit(s) SubCutaneous every morning  insulin lispro (ADMELOG) corrective regimen sliding scale   SubCutaneous every 6 hours  metoprolol tartrate 12.5 milliGRAM(s) Enteral Tube every 12 hours  pantoprazole  Injectable 40 milliGRAM(s) IV Push daily  QUEtiapine 50 milliGRAM(s) Oral at bedtime  sertraline 100 milliGRAM(s) Oral daily  triamcinolone 0.1% Cream 1 Application(s) Topical two times a day    MEDICATIONS  (PRN):  acetaminophen     Tablet .. 650 milliGRAM(s) Oral every 6 hours PRN Temp greater or equal to 38C (100.4F)  clonazePAM  Tablet 1 milliGRAM(s) Oral daily PRN for anxiety  sodium chloride 0.9% lock flush 10 milliLiter(s) IV Push every 1 hour PRN Pre/post blood products, medications, blood draw, and to maintain line patency      PHYSICAL EXAM:    Vital Signs Last 24 Hrs  T(C): 36.3 (21 Feb 2024 08:00), Max: 36.6 (20 Feb 2024 16:26)  T(F): 97.3 (21 Feb 2024 08:00), Max: 97.9 (20 Feb 2024 16:26)  HR: 72 (21 Feb 2024 08:00) (72 - 79)  BP: 130/74 (21 Feb 2024 08:00) (123/71 - 143/72)  BP(mean): 96 (20 Feb 2024 16:26) (91 - 96)  RR: 18 (21 Feb 2024 08:00) (16 - 20)  SpO2: 94% (21 Feb 2024 08:00) (93% - 100%)    Parameters below as of 21 Feb 2024 08:00  Patient On (Oxygen Delivery Method): room air      Karnofsky: 30 %  General:   M awake alert NAD      HEENT:  NCAT       Lungs: comfortable  non labored  CV:  RR  GI: soft NTND  MSK: weak bedbound  Skin:  warm/dry  Neuro  no focal deficits  Psych calm    LABS:                          10.7   10.47 )-----------( 411      ( 21 Feb 2024 05:00 )             35.8     02-21    151<H>  |  103  |  100.5<H>  ----------------------------<  248<H>  4.2   |  31.0<H>  |  1.96<H>    Ca    9.9      21 Feb 2024 05:00  Phos  3.0     02-21  Mg     2.5     02-21        Urinalysis Basic - ( 21 Feb 2024 05:00 )    Color: x / Appearance: x / SG: x / pH: x  Gluc: 248 mg/dL / Ketone: x  / Bili: x / Urobili: x   Blood: x / Protein: x / Nitrite: x   Leuk Esterase: x / RBC: x / WBC x   Sq Epi: x / Non Sq Epi: x / Bacteria: x      I&O's Summary    20 Feb 2024 07:01  -  21 Feb 2024 07:00  --------------------------------------------------------  IN: 1600 mL / OUT: 525 mL / NET: 1075 mL        RADIOLOGY & ADDITIONAL STUDIES:  Imaging Reviewed  (  x )     < from: CT Chest No Cont (02.17.24 @ 12:31) >    PROCEDURE:  CT of the Chest was performed.  Sagittal and coronal reformats were performed.    LIMITATION: Evaluation of the vascular structures and olivia is limited by   lack of IV contrast. Motion artifact. Streak artifact from patient's arms.    FINDINGS:    LUNGS AND AIRWAYS/PLEURA: Patent central airways. ET tube tip in the mid   trachea. Small to moderate bilateral pleural effusions with underlying   passive atelectasis. No pneumothorax.  MEDIASTINUM AND OLIVIA: No lymphadenopathy.  VESSELS: Left jugular catheter tip in the superior vena cava.   Atherosclerotic aorta without aneurysm. Coronary artery calcifications   and/or stents.  HEART: Heart size is normal. No pericardial effusion.  CHEST WALL AND LOWER NECK: Within normal limits.  VISUALIZED UPPER ABDOMEN: Enteric catheter tip terminating in the distal   stomach or duodenal bulb. Layering high density in the gallbladder either   sludge or vicarious excretion of contrast. 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  BONES: Upper thoracic degenerative changes.    IMPRESSION:  Small to moderate bilateral pleural effusions with underlying passive   atelectasis.    Enteric catheter tip in the distal stomach or duodenal bulb and should be   retracted.    ET tube and left central line in satisfactory position.    Sludge versus excreted contrast in the gallbladder.        --- End of Report ---    < end of copied text >          ADVANCE DIRECTIVE PREFERENCES    DNR/I  and continuing medical treatments

## 2024-02-21 NOTE — PROGRESS NOTE ADULT - ASSESSMENT
78yr man  PMHx HTN, HLD, T2DM, CKD, plaque psoriasis, anxiety, depression, history of dysphagia admitted with cardiogenic shock 2/2 NSTEMI with Respiratory failure.    Cardiogenic Shock/NSTEMI  Per Cardio and CTS not  a candidate for CABG   Cardio following PCI?    Respiratory Failure  extubated  cont monitor O2 sats    Dysphagia  Patient with hx of dysphagia -reported offered a PEG last year but declined  Aspiration precautions  SLP eval? - likely to still be an issues    WES/CKD  avoid nephrotoxic agents    Functional Quadriplegia  Assist with care  Turn reposition    Encounter for Palliative Care  Patient verbalizing he wants to go home, asking to drink.  Discussed risks- patient seems to have limited capacity understanding.   Daughter informs me cardio told family patient would be risky to have PCI.  Daughter also states father would not want it anyway   IN PROGRESS   78yr man  PMHx HTN, HLD, T2DM, CKD, plaque psoriasis, anxiety, depression, history of dysphagia admitted with cardiogenic shock 2/2 NSTEMI with Respiratory failure.    Cardiogenic Shock/NSTEMI  Per Cardio and CTS not  a candidate for CABG   Cardio following PCI?    Respiratory Failure  extubated  cont monitor O2 sats    Dysphagia  Patient with hx of dysphagia -reported offered a PEG last year but declined  Aspiration precautions  SLP eval rec NPO  Discussed pleasure feeds with daughter    WES/CKD  avoid nephrotoxic agents    Functional Quadriplegia  Assist with care  Turn reposition    Encounter for Palliative Care  Patient verbalizing he wants to go home, asking to drink.  Discussed risks- patient seems to have limited capacity understanding.   Daughter informs me cardio told family patient would be risky to have PCI.  Daughter also states father would not want it anyway  See [facility] note above for details.  In summary  1. Discussed pleasure/comfort feeds with daughter - risks/benefits  2. Discussed hospice services - daughter receptive to information    Psychosocial support

## 2024-02-21 NOTE — PROGRESS NOTE ADULT - ASSESSMENT
Initial HF Consult Dr. Dory Bender    79 y/o M with PMHx anxiety, HTN, HLD, CKD, plaque psoriasis, DM2 who presents to [facility] ED with 1 weeks of chest pain at rest, that woke him out of sleep today. Patient states he had been having chest pain but was mild and tolerable. The pain became sharp and he felt it midsternal with radiation to left side and he also had 6 diarrhea bowel movements. Hypotensive on presentation, given IV fluid bolus but had respiratory distress, placed on bipap and levophed initiated to maintain BP.   On 2/14 patient was complaining of chest pain again with uptrending troponins, unchanged EKG. Patient was still in respiratory distress despite BiPAP and CPAP with fever of 101.1 and cold extremities. Patient was started on levophed and dobutamine and intubated.   He underwent LHC 2/14 which revealed MVD. CTS consulted and he is not a surgical candidate. He was weaned off  on 2/15. Initially failed multiple SBT but was ultimately extubated.   Pt. states he is not interested in any invasive procedures and plan is for medical management of his CAD. Will start  afterload reduction/GDMT for management of his heart failure.     Bedside Hemodynamics:  2/16 (Levo 0.06): CVP 2, SVO2 59.3%, Harpreet CO/CI 4.0/2.4     2/15 ( 2.5, levo 0.03): CVP 4, SVO2 65.5%, Harpreet CO/CI 4.5/2.7    Cardiac Studies:  [facility] 2/14/24 which showed prox left main 60% stenosis, pros LAD with 70% stenosis, mid LAD with 80% stenosis, first diagonal 100% stenosis, prox circ 100% stenosis, distal RCA with 90% stenosis, ramus with 90% stenosis  [facility] 2/14/24: RA 6, PA 22/10/15, PCWP 8, CO/CI 5.5/3.1

## 2024-02-21 NOTE — PROGRESS NOTE ADULT - NS ATTEND AMEND GEN_ALL_CORE FT
Patient appears euvolemic on exam  Would start hydralazine 10mg TID for afterload reduction  Switch metoprolol to Toprol 25mg daily  GOC conversation underway   CHF will sign off, please reconsult as needed

## 2024-02-21 NOTE — PROGRESS NOTE ADULT - SUBJECTIVE AND OBJECTIVE BOX
[facility] Advanced Heart Failure - Progress Note  [address] [place] [address]  Office Phone: [phone]/Fax: [phone]  Service/On Call Phone [phone]    Subjective/Objective: NAEO. Denies overt HF symptoms.     Medications:  acetaminophen     Tablet .. 650 milliGRAM(s) Oral every 6 hours PRN  ARIPiprazole 5 milliGRAM(s) Oral daily  aspirin  chewable 81 milliGRAM(s) Oral daily  atorvastatin 80 milliGRAM(s) Oral at bedtime  busPIRone 10 milliGRAM(s) Oral <User Schedule>  chlorhexidine 2% Cloths 1 Application(s) Topical <User Schedule>  clonazePAM  Tablet 1 milliGRAM(s) Oral daily PRN  clopidogrel Tablet 75 milliGRAM(s) Enteral Tube daily  dextrose 50% Injectable 12.5 Gram(s) IV Push once  dextrose 50% Injectable 25 Gram(s) IV Push once  dextrose 50% Injectable 25 Gram(s) IV Push once  heparin   Injectable 5000 Unit(s) SubCutaneous every 8 hours  hydrALAZINE 10 milliGRAM(s) Oral three times a day  influenza  Vaccine (HIGH DOSE) 0.7 milliLiter(s) IntraMuscular once  insulin glargine Injectable (LANTUS) 15 Unit(s) SubCutaneous every morning  insulin lispro (ADMELOG) corrective regimen sliding scale   SubCutaneous every 6 hours  metoprolol tartrate 12.5 milliGRAM(s) Enteral Tube every 12 hours  pantoprazole  Injectable 40 milliGRAM(s) IV Push daily  QUEtiapine 50 milliGRAM(s) Oral at bedtime  sertraline 100 milliGRAM(s) Oral daily  sodium chloride 0.9% lock flush 10 milliLiter(s) IV Push every 1 hour PRN  triamcinolone 0.1% Cream 1 Application(s) Topical two times a day    Vital Signs Last 24 Hours  T(C): 36.3 (02-21-24 @ 08:00), Max: 36.6 (02-20-24 @ 16:26)  HR: 72 (02-21-24 @ 08:00) (72 - 79)  BP: 130/74 (02-21-24 @ 08:00) (123/71 - 143/72)  RR: 18 (02-21-24 @ 08:00) (16 - 20)  SpO2: 94% (02-21-24 @ 08:00) (93% - 100%)    I&O's Summary  20 Feb 2024 07:01  -  21 Feb 2024 07:00  --------------------------------------------------------  IN: 1600 mL / OUT: 525 mL / NET: 1075 mL    Tele: SR, multifocal PVCs    Physical Exam:  General: In no distress.   HEENT: EOMs intact.  Neck: Neck supple. JVP not elevated.   Chest: Clear to auscultation bilaterally.  CV: RRR. Normal S1 and S2. No murmurs, rub, or gallops. Lukearm peripherally.  PV: No LE edema noted.   Abdomen: Soft, non-distended.  Skin: warm, dry.  Neurology: Alert and oriented times three. Sensation intact.  Psych: Appropriate affect.    Labs:                        10.7   10.47 )-----------( 411      ( 21 Feb 2024 05:00 )             35.8     02-21    151<H>  |  103  |  100.5<H>  ----------------------------<  248<H>  4.2   |  31.0<H>  |  1.96<H>    Ca    9.9      21 Feb 2024 05:00  Phos  3.0     02-21  Mg     2.5     02-21

## 2024-02-22 LAB
ANION GAP SERPL CALC-SCNC: 13 MMOL/L — SIGNIFICANT CHANGE UP (ref 5–17)
BUN SERPL-MCNC: 94.9 MG/DL — HIGH (ref 8–20)
CALCIUM SERPL-MCNC: 9.5 MG/DL — SIGNIFICANT CHANGE UP (ref 8.4–10.5)
CHLORIDE SERPL-SCNC: 107 MMOL/L — SIGNIFICANT CHANGE UP (ref 96–108)
CO2 SERPL-SCNC: 32 MMOL/L — HIGH (ref 22–29)
CREAT SERPL-MCNC: 1.87 MG/DL — HIGH (ref 0.5–1.3)
EGFR: 36 ML/MIN/1.73M2 — LOW
GLUCOSE BLDC GLUCOMTR-MCNC: 187 MG/DL — HIGH (ref 70–99)
GLUCOSE BLDC GLUCOMTR-MCNC: 192 MG/DL — HIGH (ref 70–99)
GLUCOSE BLDC GLUCOMTR-MCNC: 195 MG/DL — HIGH (ref 70–99)
GLUCOSE BLDC GLUCOMTR-MCNC: 202 MG/DL — HIGH (ref 70–99)
GLUCOSE BLDC GLUCOMTR-MCNC: 203 MG/DL — HIGH (ref 70–99)
GLUCOSE BLDC GLUCOMTR-MCNC: 255 MG/DL — HIGH (ref 70–99)
GLUCOSE SERPL-MCNC: 184 MG/DL — HIGH (ref 70–99)
HCT VFR BLD CALC: 34.1 % — LOW (ref 39–50)
HGB BLD-MCNC: 10 G/DL — LOW (ref 13–17)
MAGNESIUM SERPL-MCNC: 2.5 MG/DL — SIGNIFICANT CHANGE UP (ref 1.6–2.6)
MCHC RBC-ENTMCNC: 26 PG — LOW (ref 27–34)
MCHC RBC-ENTMCNC: 29.3 GM/DL — LOW (ref 32–36)
MCV RBC AUTO: 88.6 FL — SIGNIFICANT CHANGE UP (ref 80–100)
PHOSPHATE SERPL-MCNC: 3.9 MG/DL — SIGNIFICANT CHANGE UP (ref 2.4–4.7)
PLATELET # BLD AUTO: 396 K/UL — SIGNIFICANT CHANGE UP (ref 150–400)
POTASSIUM SERPL-MCNC: 4.1 MMOL/L — SIGNIFICANT CHANGE UP (ref 3.5–5.3)
POTASSIUM SERPL-SCNC: 4.1 MMOL/L — SIGNIFICANT CHANGE UP (ref 3.5–5.3)
RBC # BLD: 3.85 M/UL — LOW (ref 4.2–5.8)
RBC # FLD: 15 % — HIGH (ref 10.3–14.5)
SODIUM SERPL-SCNC: 152 MMOL/L — HIGH (ref 135–145)
WBC # BLD: 11.08 K/UL — HIGH (ref 3.8–10.5)
WBC # FLD AUTO: 11.08 K/UL — HIGH (ref 3.8–10.5)

## 2024-02-22 PROCEDURE — 99497 ADVNCD CARE PLAN 30 MIN: CPT | Mod: 25

## 2024-02-22 PROCEDURE — 99232 SBSQ HOSP IP/OBS MODERATE 35: CPT

## 2024-02-22 PROCEDURE — 71045 X-RAY EXAM CHEST 1 VIEW: CPT | Mod: 26

## 2024-02-22 PROCEDURE — 99233 SBSQ HOSP IP/OBS HIGH 50: CPT

## 2024-02-22 RX ORDER — MORPHINE SULFATE 50 MG/1
2 CAPSULE, EXTENDED RELEASE ORAL
Refills: 0 | Status: DISCONTINUED | OUTPATIENT
Start: 2024-02-22 | End: 2024-02-22

## 2024-02-22 RX ORDER — CLONAZEPAM 1 MG
1 TABLET ORAL DAILY
Refills: 0 | Status: DISCONTINUED | OUTPATIENT
Start: 2024-02-22 | End: 2024-02-29

## 2024-02-22 RX ORDER — ROBINUL 0.2 MG/ML
0.4 INJECTION INTRAMUSCULAR; INTRAVENOUS
Refills: 0 | Status: DISCONTINUED | OUTPATIENT
Start: 2024-02-22 | End: 2024-02-22

## 2024-02-22 RX ADMIN — Medication 6: at 12:01

## 2024-02-22 RX ADMIN — ATORVASTATIN CALCIUM 80 MILLIGRAM(S): 80 TABLET, FILM COATED ORAL at 21:41

## 2024-02-22 RX ADMIN — ARIPIPRAZOLE 5 MILLIGRAM(S): 15 TABLET ORAL at 13:00

## 2024-02-22 RX ADMIN — Medication 81 MILLIGRAM(S): at 13:00

## 2024-02-22 RX ADMIN — Medication 1 MILLIGRAM(S): at 05:25

## 2024-02-22 RX ADMIN — Medication 1 APPLICATION(S): at 05:26

## 2024-02-22 RX ADMIN — Medication 1 APPLICATION(S): at 17:14

## 2024-02-22 RX ADMIN — PANTOPRAZOLE SODIUM 40 MILLIGRAM(S): 20 TABLET, DELAYED RELEASE ORAL at 13:00

## 2024-02-22 RX ADMIN — Medication 10 MILLIGRAM(S): at 21:41

## 2024-02-22 RX ADMIN — Medication 2: at 06:59

## 2024-02-22 RX ADMIN — CHLORHEXIDINE GLUCONATE 1 APPLICATION(S): 213 SOLUTION TOPICAL at 05:26

## 2024-02-22 RX ADMIN — INSULIN GLARGINE 15 UNIT(S): 100 INJECTION, SOLUTION SUBCUTANEOUS at 08:51

## 2024-02-22 RX ADMIN — Medication 10 MILLIGRAM(S): at 05:26

## 2024-02-22 RX ADMIN — SERTRALINE 100 MILLIGRAM(S): 25 TABLET, FILM COATED ORAL at 13:00

## 2024-02-22 RX ADMIN — QUETIAPINE FUMARATE 50 MILLIGRAM(S): 200 TABLET, FILM COATED ORAL at 21:41

## 2024-02-22 RX ADMIN — CLOPIDOGREL BISULFATE 75 MILLIGRAM(S): 75 TABLET, FILM COATED ORAL at 12:59

## 2024-02-22 RX ADMIN — Medication 650 MILLIGRAM(S): at 06:30

## 2024-02-22 RX ADMIN — Medication 2: at 17:13

## 2024-02-22 RX ADMIN — HEPARIN SODIUM 5000 UNIT(S): 5000 INJECTION INTRAVENOUS; SUBCUTANEOUS at 13:01

## 2024-02-22 RX ADMIN — Medication 650 MILLIGRAM(S): at 05:58

## 2024-02-22 RX ADMIN — HEPARIN SODIUM 5000 UNIT(S): 5000 INJECTION INTRAVENOUS; SUBCUTANEOUS at 05:25

## 2024-02-22 RX ADMIN — Medication 10 MILLIGRAM(S): at 13:00

## 2024-02-22 RX ADMIN — HEPARIN SODIUM 5000 UNIT(S): 5000 INJECTION INTRAVENOUS; SUBCUTANEOUS at 21:42

## 2024-02-22 RX ADMIN — Medication 10 MILLIGRAM(S): at 11:00

## 2024-02-22 RX ADMIN — Medication 12.5 MILLIGRAM(S): at 17:14

## 2024-02-22 RX ADMIN — Medication 12.5 MILLIGRAM(S): at 05:25

## 2024-02-22 NOTE — PROGRESS NOTE ADULT - SUBJECTIVE AND OBJECTIVE BOX
CC:  Follow up GOC , Symptoms    OVERNIGHT EVENTS:  reported trying to get out of bed  NGT partially dislodged overnight    Present Symptoms:   Dyspnea:  No    Nausea/Vomiting:  No  Y  Anxiety:  No    Depression:  No   Fatigue:  Yes   Loss of appetite:   Yes    Constipation: No    Pain: No             Location            Duration            Character            Severity            Factors            Effect    Pain AD Score:  [email or web address] (press ctrl + left click to view)    Review of Systems: Reviewed as above  All others negative    MEDICATIONS  (STANDING):  ARIPiprazole 5 milliGRAM(s) Oral daily  aspirin  chewable 81 milliGRAM(s) Oral daily  atorvastatin 80 milliGRAM(s) Oral at bedtime  busPIRone 10 milliGRAM(s) Oral <User Schedule>  chlorhexidine 2% Cloths 1 Application(s) Topical <User Schedule>  clopidogrel Tablet 75 milliGRAM(s) Enteral Tube daily  dextrose 50% Injectable 25 Gram(s) IV Push once  dextrose 50% Injectable 12.5 Gram(s) IV Push once  dextrose 50% Injectable 25 Gram(s) IV Push once  heparin   Injectable 5000 Unit(s) SubCutaneous every 8 hours  hydrALAZINE 10 milliGRAM(s) Oral three times a day  influenza  Vaccine (HIGH DOSE) 0.7 milliLiter(s) IntraMuscular once  insulin glargine Injectable (LANTUS) 15 Unit(s) SubCutaneous every morning  insulin lispro (ADMELOG) corrective regimen sliding scale   SubCutaneous every 6 hours  metoprolol tartrate 12.5 milliGRAM(s) Enteral Tube every 12 hours  pantoprazole  Injectable 40 milliGRAM(s) IV Push daily  QUEtiapine 50 milliGRAM(s) Oral at bedtime  sertraline 100 milliGRAM(s) Oral daily  triamcinolone 0.1% Cream 1 Application(s) Topical two times a day    MEDICATIONS  (PRN):  acetaminophen     Tablet .. 650 milliGRAM(s) Oral every 6 hours PRN Temp greater or equal to 38C (100.4F)  clonazePAM  Tablet 1 milliGRAM(s) Oral daily PRN for anxiety  sodium chloride 0.9% lock flush 10 milliLiter(s) IV Push every 1 hour PRN Pre/post blood products, medications, blood draw, and to maintain line patency      PHYSICAL EXAM:    Vital Signs Last 24 Hrs  T(C): 36.6 (22 Feb 2024 12:00), Max: 36.8 (21 Feb 2024 21:51)  T(F): 97.8 (22 Feb 2024 12:00), Max: 98.2 (21 Feb 2024 21:51)  HR: 68 (22 Feb 2024 12:00) (68 - 92)  BP: 118/68 (22 Feb 2024 12:00) (108/67 - 136/72)  BP(mean): 93 (21 Feb 2024 16:26) (93 - 93)  RR: 16 (22 Feb 2024 12:00) (16 - 18)  SpO2: 100% (22 Feb 2024 12:00) (92% - 100%)    Parameters below as of 22 Feb 2024 12:00  Patient On (Oxygen Delivery Method): room air    Karnofsky: 40 %  General:  Elderly man awake alert NAD         HEENT:  NCAT    + NGT  Lungs: comfortable  non labored  CV:  RR  GI:   soft NTND  MSK: weakness, no contractures  Skin:  warm/dry  Neuro  awake alert no focal deficits  Psych calm    LABS:                          10.0   11.08 )-----------( 396      ( 22 Feb 2024 07:15 )             34.1     02-22    152<H>  |  107  |  94.9<H>  ----------------------------<  184<H>  4.1   |  32.0<H>  |  1.87<H>    Ca    9.5      22 Feb 2024 07:15  Phos  3.9     02-22  Mg     2.5     02-22        Urinalysis Basic - ( 22 Feb 2024 07:15 )    Color: x / Appearance: x / SG: x / pH: x  Gluc: 184 mg/dL / Ketone: x  / Bili: x / Urobili: x   Blood: x / Protein: x / Nitrite: x   Leuk Esterase: x / RBC: x / WBC x   Sq Epi: x / Non Sq Epi: x / Bacteria: x      I&O's Summary    21 Feb 2024 07:01  -  22 Feb 2024 07:00  --------------------------------------------------------  IN: 740 mL / OUT: 1400 mL / NET: -660 mL        RADIOLOGY & ADDITIONAL STUDIES:  Imaging Reviewed  ( x  )   < from: CT Chest No Cont (02.17.24 @ 12:31) >    ACC: [id number] EXAM:  CT CHEST   ORDERED BY: GAGE ZHANG     PROCEDURE DATE:  02/17/2024          INTERPRETATION:  CLINICAL INFORMATION: 78 years  Male with hypoxic   respiratory failure.    COMPARISON: Chest CT 9/13/2022    CONTRAST/COMPLICATIONS:  IV Contrast: NONE  Oral Contrast: NONE  Complications: None reported at time of study completion    PROCEDURE:  CT of the Chest was performed.  Sagittal and coronal reformats were performed.    LIMITATION: Evaluation of the vascular structures and olivia is limited by   lack of IV contrast. Motion artifact. Streak artifact from patient's arms.    FINDINGS:    LUNGS AND AIRWAYS/PLEURA: Patent central airways. ET tube tip in the mid   trachea. Small to moderate bilateral pleural effusions with underlying   passive atelectasis. No pneumothorax.  MEDIASTINUM AND OLIVIA: No lymphadenopathy.  VESSELS: Left jugular catheter tip in the superior vena cava.   Atherosclerotic aorta without aneurysm. Coronary artery calcifications   and/or stents.  HEART: Heart size is normal. No pericardial effusion.  CHEST WALL AND LOWER NECK: Within normal limits.  VISUALIZED UPPER ABDOMEN: Enteric catheter tip terminating in the distal   stomach or duodenal bulb. Layering high density in the gallbladder either   sludge or vicarious excretion of contrast. 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  BONES: Upper thoracic degenerative changes.    IMPRESSION:  Small to moderate bilateral pleural effusions with underlying passive   atelectasis.    Enteric catheter tip in the distal stomach or duodenal bulb and should be   retracted.    ET tube and left central line in satisfactory position.    Sludge versus excreted contrast in the gallbladder.        --- End of Report ---      < end of copied text >        < end of copied text >        ADVANCE DIRECTIVE PREFERENCES    DNR/I  and continuing medical treatments

## 2024-02-22 NOTE — PROGRESS NOTE ADULT - ASSESSMENT
78yr man  PMHx HTN, HLD, T2DM, CKD, plaque psoriasis, anxiety, depression, history of dysphagia admitted with cardiogenic shock 2/2 NSTEMI with Respiratory failure.    Cardiogenic Shock/NSTEMI  Per Cardio and CTS not  a candidate for CABG   Cardio following PCI?    Respiratory Failure  extubated  cont monitor O2 sats    Dysphagia  Patient with hx of dysphagia -reported offered a PEG last year but declined  Aspiration precautions  SLP eval rec NPO  Discussed pleasure feeds with daughter and son    WES/CKD  avoid nephrotoxic agents      Encounter for Palliative Care  Patient in restraints - reported trying to get out of bed, NGT partially dislodged  Discussed with Dr. Cloud - rec d/c restraints, 1:1 if needed - discussed with RN    Spoke to son today  about pleasure feeds, hospice vs [facility].  Recommended they speak to father about his wishes.  Patient has not wanted a PEG tube in the past and expressing to go home.  Spoke to daughter today and recommend for family to come together with a decision.

## 2024-02-22 NOTE — PROGRESS NOTE ADULT - ASSESSMENT
78 y/oM PMH of HTN, HLD, CKD, plaque psoriasis, DM-2 who came to the ED (02/12/24) complaining of chest pain radiating to left arm associated with multiple episodes of diarrhea.  On ED arrival, afebrile, hypotensive to SBP 80-90s despite 1L IVF requiring Levophed. EKG with nonspecific ST changes, troponin 2344->2477. Labs otherwise notable for K 5.6, BUN/Cr 43.5/2.31 (baseline Cr ~1 in 2022), BNP 02388, lactate 6.2. CXR with pulmonary edema. Placed on BiPAP ,given Lasix 40mg. TTE with EF<20%, multiple segmental abnormalities, mod MR, mod TR, mod pericardial effusion without tamponade; started on Heparin drip for NSTEMI, cardiology consulted. Admitted to MICU for further management of cardiogenic shock. On 02/14/24: patient became obtunded, febrile, intubated for airway protection. Interventional cardiology also on board; S/P LHC: Severe multi-vessel CAD including LM, CABG recommended. CT surgery deemed patient poor candidate for CABG. HF team also following; s/p RHC: shows low filling pressures with normal cardiac output. Nephrology following for WES. Patient extubated (02/18/24); NG tube in place. Palliative team following, patient DNR/DNI. Patient now downgraded to medical floor 2/19    NSTEMI   -s/p Heparin drip   -S/p LHC: severe multiple-vessel CAD, not a candidate for CABG   -Plavix 75mg   -Asprin 81mg   -Atorvastatin 80mg   -Cardiology following   -pt declining PCI, also reported as high risk by cardio     Acute Systolic CHF  -s/p Bumex and Lasix drip   -HF following   -Echo done   -S/p RHC     Cardiogenic shock   -s/p Levophed and Dobutamine   -Monitor BP     Acute metabolic encephalopathy , improved  hx dysphagia  Requiring intubation for airway protection   -Now s/p extubation 2/18  -Aspiration precautions  -had been recommended for PEG in the past, declined previously  -SLP rec NPO  -cont TF for now via NGT  with free water ; reported partial dislodging o/n; f/u repeat cxr 2/22     WES on CKD  -Nephrology following   -Monitor renal function     DM-2  -Lantus 15 units AM   -Insulin sliding scale     Depression   -Buspirone 10mg   -Aripiprazole 5mg   -Seroquel 50mg HS   -Sertraline 100mg     DVT prophylaxis: Heparin sc    Palliative following ; d/w Dr. Fallon    dispo: [place] vs home w/assistance vs home hospice pending pt and pt's family decision

## 2024-02-22 NOTE — PROGRESS NOTE ADULT - SUBJECTIVE AND OBJECTIVE BOX
CABRERA BADR    [id number]    78y      Male    CC: shock    INTERVAL HPI/OVERNIGHT EVENTS: Pt seen and examined. pt with increased irritability today/ o/n; ngt partially dislodged     REVIEW OF SYSTEMS:    CARDIOVASCULAR: No chest pain, palpitations  GASTROINTESTINAL: No abdominal or epigastric pain. No nausea, vomiting    Vital Signs Last 24 Hrs  T(C): 36.6 (22 Feb 2024 12:00), Max: 36.8 (21 Feb 2024 21:51)  T(F): 97.8 (22 Feb 2024 12:00), Max: 98.2 (21 Feb 2024 21:51)  HR: 68 (22 Feb 2024 12:00) (68 - 92)  BP: 118/68 (22 Feb 2024 12:00) (108/67 - 136/72)  BP(mean): 93 (21 Feb 2024 16:26) (93 - 93)  RR: 16 (22 Feb 2024 12:00) (16 - 18)  SpO2: 100% (22 Feb 2024 12:00) (92% - 100%)    Parameters below as of 22 Feb 2024 12:00  Patient On (Oxygen Delivery Method): room air        PHYSICAL EXAM:      GENERAL: NAD  HEENT: +NGT  CHEST/LUNG: course sounds b/l; respirations unlabored   HEART: S1S2+, Regular rate and rhythm  ABDOMEN: Soft, Nontender, Nondistended; Bowel sounds present  SKIN: warm, dry   NEURO: Awake, alert  PSYCH: normal affect     LABS:                        10.0   11.08 )-----------( 396      ( 22 Feb 2024 07:15 )             34.1     02-22    152<H>  |  107  |  94.9<H>  ----------------------------<  184<H>  4.1   |  32.0<H>  |  1.87<H>    Ca    9.5      22 Feb 2024 07:15  Phos  3.9     02-22  Mg     2.5     02-22        Urinalysis Basic - ( 22 Feb 2024 07:15 )    Color: x / Appearance: x / SG: x / pH: x  Gluc: 184 mg/dL / Ketone: x  / Bili: x / Urobili: x   Blood: x / Protein: x / Nitrite: x   Leuk Esterase: x / RBC: x / WBC x   Sq Epi: x / Non Sq Epi: x / Bacteria: x          MEDICATIONS  (STANDING):  ARIPiprazole 5 milliGRAM(s) Oral daily  aspirin  chewable 81 milliGRAM(s) Oral daily  atorvastatin 80 milliGRAM(s) Oral at bedtime  busPIRone 10 milliGRAM(s) Oral <User Schedule>  chlorhexidine 2% Cloths 1 Application(s) Topical <User Schedule>  clopidogrel Tablet 75 milliGRAM(s) Enteral Tube daily  dextrose 50% Injectable 25 Gram(s) IV Push once  dextrose 50% Injectable 12.5 Gram(s) IV Push once  dextrose 50% Injectable 25 Gram(s) IV Push once  heparin   Injectable 5000 Unit(s) SubCutaneous every 8 hours  hydrALAZINE 10 milliGRAM(s) Oral three times a day  influenza  Vaccine (HIGH DOSE) 0.7 milliLiter(s) IntraMuscular once  insulin glargine Injectable (LANTUS) 15 Unit(s) SubCutaneous every morning  insulin lispro (ADMELOG) corrective regimen sliding scale   SubCutaneous every 6 hours  metoprolol tartrate 12.5 milliGRAM(s) Enteral Tube every 12 hours  pantoprazole  Injectable 40 milliGRAM(s) IV Push daily  QUEtiapine 50 milliGRAM(s) Oral at bedtime  sertraline 100 milliGRAM(s) Oral daily  triamcinolone 0.1% Cream 1 Application(s) Topical two times a day    MEDICATIONS  (PRN):  acetaminophen     Tablet .. 650 milliGRAM(s) Oral every 6 hours PRN Temp greater or equal to 38C (100.4F)  clonazePAM  Tablet 1 milliGRAM(s) Oral daily PRN for anxiety  sodium chloride 0.9% lock flush 10 milliLiter(s) IV Push every 1 hour PRN Pre/post blood products, medications, blood draw, and to maintain line patency      RADIOLOGY & ADDITIONAL TESTS:

## 2024-02-23 DIAGNOSIS — I25.10 ATHEROSCLEROTIC HEART DISEASE OF NATIVE CORONARY ARTERY WITHOUT ANGINA PECTORIS: ICD-10-CM

## 2024-02-23 DIAGNOSIS — Z01.810 ENCOUNTER FOR PREPROCEDURAL CARDIOVASCULAR EXAMINATION: ICD-10-CM

## 2024-02-23 LAB
ANION GAP SERPL CALC-SCNC: 13 MMOL/L — SIGNIFICANT CHANGE UP (ref 5–17)
BUN SERPL-MCNC: 97.2 MG/DL — HIGH (ref 8–20)
CALCIUM SERPL-MCNC: 9.6 MG/DL — SIGNIFICANT CHANGE UP (ref 8.4–10.5)
CHLORIDE SERPL-SCNC: 106 MMOL/L — SIGNIFICANT CHANGE UP (ref 96–108)
CO2 SERPL-SCNC: 32 MMOL/L — HIGH (ref 22–29)
CREAT SERPL-MCNC: 1.99 MG/DL — HIGH (ref 0.5–1.3)
EGFR: 34 ML/MIN/1.73M2 — LOW
GLUCOSE BLDC GLUCOMTR-MCNC: 114 MG/DL — HIGH (ref 70–99)
GLUCOSE BLDC GLUCOMTR-MCNC: 163 MG/DL — HIGH (ref 70–99)
GLUCOSE BLDC GLUCOMTR-MCNC: 234 MG/DL — HIGH (ref 70–99)
GLUCOSE BLDC GLUCOMTR-MCNC: 274 MG/DL — HIGH (ref 70–99)
GLUCOSE SERPL-MCNC: 133 MG/DL — HIGH (ref 70–99)
HCT VFR BLD CALC: 35.1 % — LOW (ref 39–50)
HGB BLD-MCNC: 10.4 G/DL — LOW (ref 13–17)
MAGNESIUM SERPL-MCNC: 2.6 MG/DL — SIGNIFICANT CHANGE UP (ref 1.6–2.6)
MCHC RBC-ENTMCNC: 26.4 PG — LOW (ref 27–34)
MCHC RBC-ENTMCNC: 29.6 GM/DL — LOW (ref 32–36)
MCV RBC AUTO: 89.1 FL — SIGNIFICANT CHANGE UP (ref 80–100)
PLATELET # BLD AUTO: 408 K/UL — HIGH (ref 150–400)
POTASSIUM SERPL-MCNC: 4.2 MMOL/L — SIGNIFICANT CHANGE UP (ref 3.5–5.3)
POTASSIUM SERPL-SCNC: 4.2 MMOL/L — SIGNIFICANT CHANGE UP (ref 3.5–5.3)
RBC # BLD: 3.94 M/UL — LOW (ref 4.2–5.8)
RBC # FLD: 14.9 % — HIGH (ref 10.3–14.5)
SODIUM SERPL-SCNC: 151 MMOL/L — HIGH (ref 135–145)
WBC # BLD: 13.16 K/UL — HIGH (ref 3.8–10.5)
WBC # FLD AUTO: 13.16 K/UL — HIGH (ref 3.8–10.5)

## 2024-02-23 PROCEDURE — 99233 SBSQ HOSP IP/OBS HIGH 50: CPT

## 2024-02-23 PROCEDURE — 99232 SBSQ HOSP IP/OBS MODERATE 35: CPT

## 2024-02-23 PROCEDURE — 99223 1ST HOSP IP/OBS HIGH 75: CPT

## 2024-02-23 RX ADMIN — ATORVASTATIN CALCIUM 80 MILLIGRAM(S): 80 TABLET, FILM COATED ORAL at 21:35

## 2024-02-23 RX ADMIN — Medication 1 MILLIGRAM(S): at 21:36

## 2024-02-23 RX ADMIN — ARIPIPRAZOLE 5 MILLIGRAM(S): 15 TABLET ORAL at 13:10

## 2024-02-23 RX ADMIN — Medication 10 MILLIGRAM(S): at 05:54

## 2024-02-23 RX ADMIN — CHLORHEXIDINE GLUCONATE 1 APPLICATION(S): 213 SOLUTION TOPICAL at 05:53

## 2024-02-23 RX ADMIN — QUETIAPINE FUMARATE 50 MILLIGRAM(S): 200 TABLET, FILM COATED ORAL at 21:37

## 2024-02-23 RX ADMIN — HEPARIN SODIUM 5000 UNIT(S): 5000 INJECTION INTRAVENOUS; SUBCUTANEOUS at 05:55

## 2024-02-23 RX ADMIN — HEPARIN SODIUM 5000 UNIT(S): 5000 INJECTION INTRAVENOUS; SUBCUTANEOUS at 13:11

## 2024-02-23 RX ADMIN — Medication 81 MILLIGRAM(S): at 13:10

## 2024-02-23 RX ADMIN — Medication 10 MILLIGRAM(S): at 13:09

## 2024-02-23 RX ADMIN — Medication 1 APPLICATION(S): at 05:55

## 2024-02-23 RX ADMIN — Medication 10 MILLIGRAM(S): at 13:11

## 2024-02-23 RX ADMIN — Medication 12.5 MILLIGRAM(S): at 05:54

## 2024-02-23 RX ADMIN — Medication 4: at 17:31

## 2024-02-23 RX ADMIN — Medication 1 APPLICATION(S): at 16:55

## 2024-02-23 RX ADMIN — Medication 10 MILLIGRAM(S): at 21:36

## 2024-02-23 RX ADMIN — HEPARIN SODIUM 5000 UNIT(S): 5000 INJECTION INTRAVENOUS; SUBCUTANEOUS at 21:46

## 2024-02-23 RX ADMIN — Medication 12.5 MILLIGRAM(S): at 17:31

## 2024-02-23 RX ADMIN — SERTRALINE 100 MILLIGRAM(S): 25 TABLET, FILM COATED ORAL at 13:10

## 2024-02-23 RX ADMIN — PANTOPRAZOLE SODIUM 40 MILLIGRAM(S): 20 TABLET, DELAYED RELEASE ORAL at 13:10

## 2024-02-23 RX ADMIN — Medication 6: at 01:18

## 2024-02-23 NOTE — PROGRESS NOTE ADULT - SUBJECTIVE AND OBJECTIVE BOX
CC:  Follow up GOC , Symptoms    OVERNIGHT EVENTS:  Reported patient agreeing to PEG  RN reports patient tries to get out of bed    Present Symptoms:   Dyspnea:  No    Nausea/Vomiting:  No  Anxiety:   Yes  - requesting to drink  Depression:  No   Fatigue:  Yes     Loss of appetite:  No   Constipation: Not Reported     Pain: No               Location            Duration            Character            Severity            Factors            Effect    Pain AD Score:  http://[email or web address]/painad.pdf (press ctrl + left click to view)    Review of Systems: Reviewed as above  All others negative      MEDICATIONS  (STANDING):  ARIPiprazole 5 milliGRAM(s) Oral daily  aspirin  chewable 81 milliGRAM(s) Oral daily  atorvastatin 80 milliGRAM(s) Oral at bedtime  busPIRone 10 milliGRAM(s) Oral <User Schedule>  chlorhexidine 2% Cloths 1 Application(s) Topical <User Schedule>  clopidogrel Tablet 75 milliGRAM(s) Enteral Tube daily  dextrose 50% Injectable 25 Gram(s) IV Push once  dextrose 50% Injectable 25 Gram(s) IV Push once  dextrose 50% Injectable 12.5 Gram(s) IV Push once  heparin   Injectable 5000 Unit(s) SubCutaneous every 8 hours  hydrALAZINE 10 milliGRAM(s) Oral three times a day  influenza  Vaccine (HIGH DOSE) 0.7 milliLiter(s) IntraMuscular once  insulin glargine Injectable (LANTUS) 15 Unit(s) SubCutaneous every morning  insulin lispro (ADMELOG) corrective regimen sliding scale   SubCutaneous every 6 hours  metoprolol tartrate 12.5 milliGRAM(s) Enteral Tube every 12 hours  pantoprazole  Injectable 40 milliGRAM(s) IV Push daily  QUEtiapine 50 milliGRAM(s) Oral at bedtime  sertraline 100 milliGRAM(s) Oral daily  triamcinolone 0.1% Cream 1 Application(s) Topical two times a day    MEDICATIONS  (PRN):  acetaminophen     Tablet .. 650 milliGRAM(s) Oral every 6 hours PRN Temp greater or equal to 38C (100.4F)  clonazePAM  Tablet 1 milliGRAM(s) Oral daily PRN for anxiety  sodium chloride 0.9% lock flush 10 milliLiter(s) IV Push every 1 hour PRN Pre/post blood products, medications, blood draw, and to maintain line patency      PHYSICAL EXAM:    Vital Signs Last 24 Hrs  T(C): 36.8 (23 Feb 2024 08:07), Max: 36.8 (23 Feb 2024 08:07)  T(F): 98.3 (23 Feb 2024 08:07), Max: 98.3 (23 Feb 2024 08:07)  HR: 79 (23 Feb 2024 08:07) (71 - 86)  BP: 122/65 (23 Feb 2024 08:07) (121/71 - 128/62)  BP(mean): 88 (23 Feb 2024 00:58) (88 - 88)  RR: 18 (23 Feb 2024 08:07) (17 - 19)  SpO2: 94% (23 Feb 2024 08:07) (94% - 100%)    Parameters below as of 23 Feb 2024 08:07  Patient On (Oxygen Delivery Method): room air    Karnofsky:  30  %  General:    Elderly man awake NAD   HEENT:  NCAT    ( x )  NGT  Lungs: comfortable  non labored  CV:  RR  GI:  soft NTND  MSK: weakness  Skin:  warm/dry  Neuro  no focal deficits  Psych  calm   1:1 at bedside    LABS:                          10.4   13.16 )-----------( 408      ( 23 Feb 2024 09:14 )             35.1     02-23    151<H>  |  106  |  97.2<H>  ----------------------------<  133<H>  4.2   |  32.0<H>  |  1.99<H>    Ca    9.6      23 Feb 2024 09:14  Phos  3.9     02-22  Mg     2.6     02-23        Urinalysis Basic - ( 23 Feb 2024 09:14 )    Color: x / Appearance: x / SG: x / pH: x  Gluc: 133 mg/dL / Ketone: x  / Bili: x / Urobili: x   Blood: x / Protein: x / Nitrite: x   Leuk Esterase: x / RBC: x / WBC x   Sq Epi: x / Non Sq Epi: x / Bacteria: x      I&O's Summary    22 Feb 2024 07:01  -  23 Feb 2024 07:00  --------------------------------------------------------  IN: 350 mL / OUT: 850 mL / NET: -500 mL        RADIOLOGY & ADDITIONAL STUDIES:  Imaging Reviewed  ( x  )     < from: Xray Chest 1 View- PORTABLE-Urgent (Xray Chest 1 View- PORTABLE-Urgent .) (02.22.24 @ 17:05) >    ACC: [id number] EXAM:  XR CHEST PORTABLE URGENT 1V   ORDERED BY: NAHUN MCKEON     PROCEDURE DATE:  02/22/2024          INTERPRETATION:  AP semierect chest on February 22, 2024 at 4:28 PM.   Patient had NG tube insertion.    Heart size normal.    Lungs rather clear similar to February 21.    An NG tube is been inserted with tip in antrum.    IMPRESSION: NG tube inserted.    --- End of Report ---            MARTHA SHARPE MD; Attending Radiologist  This document has been electronically signed. Feb 22 2024  5:35PM    < end of copied text >          ADVANCE DIRECTIVE PREFERENCES    DNR/I  and continuing medical treatments

## 2024-02-23 NOTE — PROGRESS NOTE ADULT - ASSESSMENT
78 y/oM PMH of HTN, HLD, CKD, plaque psoriasis, DM-2 who came to the ED (02/12/24) complaining of chest pain radiating to left arm associated with multiple episodes of diarrhea.  On ED arrival, afebrile, hypotensive to SBP 80-90s despite 1L IVF requiring Levophed. EKG with nonspecific ST changes, troponin 2344->2477. Labs otherwise notable for K 5.6, BUN/Cr 43.5/2.31 (baseline Cr ~1 in 2022), BNP 93571, lactate 6.2. CXR with pulmonary edema. Placed on BiPAP ,given Lasix 40mg. TTE with EF<20%, multiple segmental abnormalities, mod MR, mod TR, mod pericardial effusion without tamponade; started on Heparin drip for NSTEMI, cardiology consulted. Admitted to MICU for further management of cardiogenic shock. On 02/14/24: patient became obtunded, febrile, intubated for airway protection. Interventional cardiology also on board; S/P LHC: Severe multi-vessel CAD including LM, CABG recommended. CT surgery deemed patient poor candidate for CABG. HF team also following; s/p RHC: shows low filling pressures with normal cardiac output. Nephrology following for WES. Patient extubated (02/18/24); NG tube in place. Palliative team following, patient DNR/DNI. Patient now downgraded to medical floor 2/19    Plan:     NSTEMI   -s/p Heparin drip   -S/p LHC: severe multiple-vessel CAD, not a candidate for CABG   -Plavix 75mg   -Asprin 81mg   -Atorvastatin 80mg   -Cardiology following   -pt declining PCI, also reported as high risk by cardio     Acute Systolic CHF  -s/p Bumex and Lasix drip   -HF following   -Echo done   -S/p RHC  -diuretics on hold as he looks dry     Cardiogenic shock   -s/p Levophed and Dobutamine   -Monitor BP, hold it well     Acute metabolic encephalopathy , improved  hx dysphagia  Requiring intubation for airway protection   -Now s/p extubation 2/18  -Aspiration precautions  -had been recommended for PEG in the past, declined previously  -SLP rec NPO  -cont TF for now via NGT  with free water ; reported partial dislodging o/n; f/u repeat cxr 2/22   -he was refusing peg, now wanted it  -Plavix to be held 5 days before proceeding with procedure  -cardio consulted to see if we could hold it.   -his white count is going up, will monitor CBC and fever.       WES on CKD:  -Nephrology following   -Monitor renal function   -creatinine 1.9    DM-2  -Lantus 15 units AM   -Insulin sliding scale     Depression:    -Buspirone 10mg   -Aripiprazole 5mg   -Seroquel 50mg HS   -Sertraline 100mg     DVT prophylaxis: Heparin sc    Palliative following ; d/w Dr. Fallon    [place]po: [place] vs home w/assistance vs home hospice pending pt and pt's family decision  78 y/oM PMH of HTN, HLD, CKD, plaque psoriasis, DM-2 who came to the ED (02/12/24) complaining of chest pain radiating to left arm associated with multiple episodes of diarrhea.  On ED arrival, afebrile, hypotensive to SBP 80-90s despite 1L IVF requiring Levophed. EKG with nonspecific ST changes, troponin 2344->2477. Labs otherwise notable for K 5.6, BUN/Cr 43.5/2.31 (baseline Cr ~1 in 2022), BNP 83110, lactate 6.2. CXR with pulmonary edema. Placed on BiPAP ,given Lasix 40mg. TTE with EF<20%, multiple segmental abnormalities, mod MR, mod TR, mod pericardial effusion without tamponade; started on Heparin drip for NSTEMI, cardiology consulted. Admitted to MICU for further management of cardiogenic shock. On 02/14/24: patient became obtunded, febrile, intubated for airway protection. Interventional cardiology also on board; S/P LHC: Severe multi-vessel CAD including LM, CABG recommended. CT surgery deemed patient poor candidate for CABG. HF team also following; s/p RHC: shows low filling pressures with normal cardiac output. Nephrology following for WES. Patient extubated (02/18/24); NG tube in place. Palliative team following, patient DNR/DNI. Patient now downgraded to medical floor 2/19    Plan:     NSTEMI   -s/p Heparin drip   -S/p LHC: severe multiple-vessel CAD, not a candidate for CABG   -Plavix 75mg   -Asprin 81mg   -Atorvastatin 80mg   -Cardiology following   -pt declining PCI, also reported as high risk by cardio     Acute Systolic CHF  -s/p Bumex and Lasix drip   -HF following   -Echo done   -S/p RHC  -diuretics on hold as he looks dry     Cardiogenic shock   -s/p Levophed and Dobutamine   -Monitor BP, hold it well     Acute metabolic encephalopathy , improved  hx dysphagia  Requiring intubation for airway protection   -Now s/p extubation 2/18  -Aspiration precautions  -had been recommended for PEG in the past, declined previously  -SLP rec NPO  -cont TF for now via NGT  with free water ; reported partial dislodging o/n; f/u repeat cxr 2/22   -he was refusing peg, now wanted it  -Plavix to be held 5 days before proceeding with procedure  -cardio consulted as per the ok to d/c plavix for procedure   -his white count is going up, will monitor CBC and fever.       WES on CKD:  -Nephrology following   -Monitor renal function   -creatinine 1.9    DM-2  -Lantus 15 units AM   -Insulin sliding scale     Depression:    -Buspirone 10mg   -Aripiprazole 5mg   -Seroquel 50mg HS   -Sertraline 100mg     DVT prophylaxis: Heparin sc    Palliative following ; d/w Dr. Fallon    dispo: [place] vs home w/assistance vs home hospice pending pt and pt's family decision

## 2024-02-23 NOTE — PROGRESS NOTE ADULT - ASSESSMENT
78yr man  PMHx HTN, HLD, T2DM, CKD, plaque psoriasis, anxiety, depression, history of dysphagia admitted with cardiogenic shock 2/2 NSTEMI with Respiratory failure.    Cardiogenic Shock/NSTEMI  Per Cardio and CTS not  a candidate for CABG   Cardio input noted about PCI    Respiratory Failure  extubated  cont monitor O2 sats      Dysphagia  Patient with hx of dysphagia -reported offered a PEG last year but declined  Aspiration precautions  SLP eval rec NPO  Discussed pleasure feeds with daughter and son  Patient now agreeing to PEG    WES/CKD  avoid nephrotoxic agents    Encounter for Palliative Care  Spoke with patient and discussed PEG tube for which he agrees.  He then stated he wanted to drink and I explained the risks of aspiration and the issues around it, and hence reason for PEG tube  Tried to have him  express his understanding and reiterate the risks.  The patient did not.    Though patient is consenting to PEG, it seems he still wants to eat orally. Family has been discussed risks, but do not feel patient has full understanding.  Patient had declined PEG tube in 2022.   Patient can have a PEG and take po but family needs to be prepared of sequale that can ensue.   Discussed with Dr. Rogers

## 2024-02-23 NOTE — CONSULT NOTE ADULT - NS ATTEND AMEND GEN_ALL_CORE FT
Patient seen and examined at bedside. GI consulted for PEG tube. Here with cardiogenic shock and NSTEMI. He is on plavix with multivessel disease. In order to perform the PEG endoscopically, the plavix will need to be held for at least 5 days. This may not be in his best interest. Ultimately, pleasure feeds would be in his best interest as the PEG tube placement will not address his underlying cardiac disease.   If the family wants to proceed with PEG tube placement, they must understand the need to interrupt plavix and the associated risks.   If they are agreeable, hold plavix and plan for PEG next week  Please continue to maintain aspiration precautions
79 y/o M with PMHx anxiety, HTN, HLD, CKD, plaque psoriasis, DM2 who presents to [facility] ED with 1 weeks of chest pain at rest, that woke him out of sleep today. Patient states he had been having chest pain but was mild and tolerable. Today the pain became sharp and he felt it midsternal with radiation to left side and he also had 6 diarrhea bowel movements. Hypotensive on presentation, given IV fluid bolus but had respiratory distress, placed on bipap and levophed initiated to maintain BP. He endorses having chest pain and diarrhea today and denies back pain, headache, dizziness, diaphoresis, syncope.  Plan for ICU evaluation, STAT TTE, and start heparin GTT.    hsTnT 2344->2477, lactate 6.2, BNP 80923      We evaluated this patient in critical holding in the ER. He is on BIPAP, vasopressor. He receivied IVF with flash edema, hypoxemia requiring IV lasix.    His daughter and wife are present bedside and we involved them in his decision making. The patient is alert and appropriate during our conversation. There is marked JVD+, scleroderma of the lower extremities. He appears anxious and despite bipap requests more Oxygen.    He appears to be a completed infarction with chest pain which was very typical 6 days prior. Now he is presenting with significant decline in clinical status. He tells us himself he only trusts and follows his PCP.    I performed a stat bedside pocus and noted an LV EF which is markedly reduced, with apical, mid-apical inferolateral, anterior, anterolateral wall motion abnormalities, dilated IVC, mild MR, TR and trace pericardial effusion.    a Stat formal 2D TTE has been requested.    recommend critical care consultation.   initiate on heparin infusion giving rising troponins    complicating the matter is acute GI distress today with 6 loose stools. He is intravascular depleted while also third spacing.    I'm concerned with sequale of probable anterior-anterolateral infarction including acute MR.     Will need serial 2D TTE, will order a repeat limited 2D TTE for 2/13/24.    We will try our best to optimize the patient with IV diuresis, BIPAP and prepare the patient for cardiac catheterization in a timely manner. His daughter (an RN) and wife understand fully. The patient at present does not complain of chest pain, palpitations.    We will have low threshold for heart team evaluation with IC + Advanced HF should need arise.  nephrology consultation requested.  tachycardia is in the setting of shock.    rest of workup for loose stools per primary team.

## 2024-02-23 NOTE — PROGRESS NOTE ADULT - PROBLEM SELECTOR PLAN 2
- [facility] with LM 60%, pLAD 70%, mLAD 80%, D1 100%, pLCx 100%, dRCA 90%, RI 90%.   - Evaluated by CT Surgery, and deemed not a surgical candidate.   - On aspirin and plavix, but since no PCI performed, can hold plavix pre-procedure.   - Continue lipitor.   - Patient was initially refusing high risk PCI, but is now wanting to move forward.   - Interventional Cardiology asked to re-evaluate timing and candidacy.

## 2024-02-23 NOTE — PROGRESS NOTE ADULT - SUBJECTIVE AND OBJECTIVE BOX
CABRERA BADR    [id number]    78y      Male    Patient is a 78y old  Male who presents with a chief complaint of Cardiogenic Shock (23 Feb 2024 13:35)      INTERVAL HPI/OVERNIGHT EVENTS:    Patient is feeling thirsty, denies fever, chills, chest pain, sob      Vital Signs Last 24 Hrs  T(C): 36.7 (23 Feb 2024 16:22), Max: 36.8 (23 Feb 2024 08:07)  T(F): 98 (23 Feb 2024 16:22), Max: 98.3 (23 Feb 2024 08:07)  HR: 88 (23 Feb 2024 16:22) (71 - 88)  BP: 112/67 (23 Feb 2024 16:22) (112/67 - 128/62)  BP(mean): 82 (23 Feb 2024 16:22) (82 - 88)  RR: 18 (23 Feb 2024 16:22) (17 - 19)  SpO2: 94% (23 Feb 2024 16:22) (94% - 100%)    Parameters below as of 23 Feb 2024 16:22  Patient On (Oxygen Delivery Method): room air        PHYSICAL EXAM:    GENERAL: Elderly male looking comfortable   HEENT: has NG tube   NECK: soft, Supple, No JVD  CHEST/LUNG: decrease air entry bilaterally; No wheezing  HEART: S1S2+, Regular rate and rhythm; No murmurs  ABDOMEN: Soft, Nontender, Nondistended; Bowel sounds present  EXTREMITIES:  1+ Peripheral Pulses, No edema  SKIN: No rashes or lesions  NEURO: OX3  PSYCH: normal mood      LABS:                        10.4   13.16 )-----------( 408      ( 23 Feb 2024 09:14 )             35.1     02-23    151<H>  |  106  |  97.2<H>  ----------------------------<  133<H>  4.2   |  32.0<H>  |  1.99<H>    Ca    9.6      23 Feb 2024 09:14  Phos  3.9     02-22  Mg     2.6     02-23          I&O's Summary    22 Feb 2024 07:01  -  23 Feb 2024 07:00  --------------------------------------------------------  IN: 350 mL / OUT: 850 mL / NET: -500 mL        MEDICATIONS  (STANDING):  ARIPiprazole 5 milliGRAM(s) Oral daily  aspirin  chewable 81 milliGRAM(s) Oral daily  atorvastatin 80 milliGRAM(s) Oral at bedtime  busPIRone 10 milliGRAM(s) Oral <User Schedule>  chlorhexidine 2% Cloths 1 Application(s) Topical <User Schedule>  clopidogrel Tablet 75 milliGRAM(s) Enteral Tube daily  dextrose 50% Injectable 25 Gram(s) IV Push once  dextrose 50% Injectable 25 Gram(s) IV Push once  dextrose 50% Injectable 12.5 Gram(s) IV Push once  heparin   Injectable 5000 Unit(s) SubCutaneous every 8 hours  hydrALAZINE 10 milliGRAM(s) Oral three times a day  influenza  Vaccine (HIGH DOSE) 0.7 milliLiter(s) IntraMuscular once  insulin glargine Injectable (LANTUS) 15 Unit(s) SubCutaneous every morning  insulin lispro (ADMELOG) corrective regimen sliding scale   SubCutaneous every 6 hours  metoprolol tartrate 12.5 milliGRAM(s) Enteral Tube every 12 hours  pantoprazole  Injectable 40 milliGRAM(s) IV Push daily  QUEtiapine 50 milliGRAM(s) Oral at bedtime  sertraline 100 milliGRAM(s) Oral daily  triamcinolone 0.1% Cream 1 Application(s) Topical two times a day    MEDICATIONS  (PRN):  acetaminophen     Tablet .. 650 milliGRAM(s) Oral every 6 hours PRN Temp greater or equal to 38C (100.4F)  clonazePAM  Tablet 1 milliGRAM(s) Oral daily PRN for anxiety  sodium chloride 0.9% lock flush 10 milliLiter(s) IV Push every 1 hour PRN Pre/post blood products, medications, blood draw, and to maintain line patency

## 2024-02-23 NOTE — PROGRESS NOTE ADULT - NS ATTEND AMEND GEN_ALL_CORE FT
-                  Reason for follow up: Matt-operative Cardiac Risk Stratification  Update: Patient is now amenable to PEG placement for which cardiac risk stratification is requested. Patient states he also wants to have PCI and stents placed now. Patient states he feels congested, but no chest pain or dyspnea.     Preoperative cardiovascular examination.   - RCRI 11% risk of major cardiac event.   - Patient with multivessel CAD as well as HFrEF that puts the patient at high risk for anesthesia.   - Benefits of the procedure may outweigh the risks, so no absolute cardiac contraindication to the proposed procedure. However would try to use    local anesthesia if at all possible.   - Continue on telemetry monitoring matt-procedurally.   - Patient did not undergo PCI, so can hold Plavix and continue aspirin for PEG placement.    CAD    - [facility] with LM 60%, pLAD 70%, mLAD 80%, D1 100%, pLCx 100%, dRCA 90%, RI 90%.   - Evaluated by CT Surgery, and deemed not a surgical candidate.   - On aspirin and plavix, but since no PCI performed, can hold plavix pre-procedure.   - Patient was initially refusing high risk PCI, but is now wanting to move forward.   - Interventional Cardiology asked to re-evaluate timing and candidacy.    Acute HFrEF (heart failure with reduced ejection fraction).   - Patient presented with chest pain and was found to have an NSTEMI and low EF. Cath revealed multivessel disease. He was empirically started on   dobutamine for hypotension and respiratory distress.  - RHC  showed low filling pressures with normal cardiac output on  5.  - Bumex gtt stopped   - Dobutamine weaned off 2/15. Harpreet CI was stable at 2.4.  - GDMT: Continue hydralazine 10 mg TID for afterload reduction. Transition to Toprol XL 12.5 mg BID. Can consider losartan and MRA as outpt once   renal function stabilizes.  - Diuretics: not currently requiring. -    Reason for follow up: Matt-operative Cardiac Risk Stratification  Update: Patient is now amenable to PEG placement for which cardiac risk stratification is requested. Patient states he also wants to have PCI and stents placed now. Patient states he feels congested, but no chest pain or dyspnea.     Preoperative cardiovascular examination.   - RCRI 11% risk of major cardiac event.   - Patient with multivessel CAD as well as HFrEF that puts the patient at high risk for anesthesia.   - Benefits of the procedure may outweigh the risks, so no absolute cardiac contraindication to the proposed procedure. However would try to use      local anesthesia if at all possible.   - Continue on telemetry monitoring matt-procedurally.   - Patient did not undergo PCI, so can hold Plavix and continue aspirin for PEG placement.    CAD    - [facility] with LM 60%, pLAD 70%, mLAD 80%, D1 100%, pLCx 100%, dRCA 90%, RI 90%.   - Evaluated by CT Surgery, and deemed not a surgical candidate.   - On aspirin and plavix, but since no PCI performed, can hold plavix pre-procedure.   - Patient was initially refusing high risk PCI, but is now wanting to move forward.   - Interventional Cardiology asked to re-evaluate timing and candidacy.    Acute HFrEF (heart failure with reduced ejection fraction).   - Patient presented with chest pain and was found to have an NSTEMI and low EF. Cath revealed multivessel disease. He was empirically started on     dobutamine for hypotension and respiratory distress.  - RHC  showed low filling pressures with normal cardiac output on  5.  - Bumex gtt stopped   - Dobutamine weaned off 2/15. Harpreet CI was stable at 2.4.  - GDMT: Continue hydralazine 10 mg TID for afterload reduction. Transition to Toprol XL 12.5 mg BID. Can consider losartan and MRA as outpt once     renal function stabilizes.  - Diuretics: not currently requiring.

## 2024-02-23 NOTE — CONSULT NOTE ADULT - SUBJECTIVE AND OBJECTIVE BOX
{\rtf1\mwynoy87408\ansi\bjzerdo5665\ftnbj\uc1\deff0  {\fonttbl{\f0 \fnil Segoe UI;}{\f1 \fnil \fcharset0 Segoe UI;}{\f2 \fnil Times New Toy;}}  {\colortbl ;\bqd284\stfjz164\qpoi650 ;\red0\green0\blue0 ;\red0\green0\hnpt648 ;\red0\green0\blue0 ;}  {\stylesheet{\f0\fs20 Normal;}{\cs1 Default Paragraph Font;}{\cs2\f0\fs16 Line Number;}{\cs3\f2\fs24\ul\cf3 Hyperlink;}}  {\*\revtbl{Unknown;}}  \xkpgxp18231\pmzaol22892\rqylr3680\bdmcc8597\ndatm1681\dflzz2927\fpvjeum215\fbqvamk706\nogrowautofit\gdvlyc900\formshade\nofeaturethrottle1\dntblnsbdb\fet4\aendnotes\aftnnrlc\pgbrdrhead\pgbrdrfoot  \sectd\txmaqx74042\qccatw22591\guttersxn0\ppoblhgg3429\dgzxfdvn8151\eillguko3787\ighmtpel0702\avtgdru477\qvorljr202\sbkpage\pgncont\pgndec  \plain\plain\f0\fs24\pard\plain\f0\fs24\plain\f0\fs20\esxr9241\hich\f0\dbch\f0\loch\f0\fs20 Chief Complaint:  Patient is a 78y old  Male who presents with a chief complaint of Cardiogenic Shock (22 Feb 2024 14:15)\par  \par  HPI:\par  This is a 78M with anxiety, HTN, HLD, CKD, plaque psoriasis, DM2, progressive dysphagia who presents with burning substernal chest pain at rest that started 1wk ago but acutely progressed this morning to sharp pain with radiation to L arm with associated   multiple bouts of diarrhea. On ED arrival, afebrile, hypotensive to SBP 80-90s despite 1L IVF requiring levophed. EKG with nonspecific ST changes, troponin 2344->2477. Labs otherwise notable for K 5.6, BUN/Cr 43.5/2.31 (baseline Cr ~1 in 2022), BNP 85639,   lactate 6.2. CXR with pulmonary edema. Placed on BiPAP for SOB and hypoxia. STAT TTE with EF<20%, multiple segmental abnormalities, mod MR, mod TR, mod pericardial effusion without tamponade. S/P LHC: Severe multi-vessel CAD including LM, not a candidate   for CABG. GI consulted for PEG placement. Patient A&OX2-3. Admitted in September 2023 after a failed MBS performed for progressive dysphagia with reports of 50lb weight loss in the last 1-2 years. CT Neck in 9/23 with no acute findings. ENT consult 9/22   s/p laryngoscopy WNL, no obstructing anatomy. Patient failed swallow eval on 2/20 with recommendation of PEG vs pleasure feeds. Patient and wife previously refused PEG placement back in Sep 2023. At bedside patient requesting to be fed. NGT in place.   Patient on Plavix for recent STEMI. Denies history of smoking, drinking, or illicit drug use. [facility]. Denies chest pain, shortness of breath, palpitations, nausea, vomiting, abdominal pain, hematemesis,   hematochezia, melena. \par  \par  \par  PAST MEDICAL & SURGICAL HISTORY:\par  Diabetes\par  \par  \par  Hyperlipidemia\par  \par  \par  Anxiety with depression\par  \par  \par  Insomnia\par  \par  \par  No significant past surgical history\par  \par  \par  \par  REVIEW OF SYSTEMS: \par  General: Negative\par  HEENT: Negative\par  CV: Negative\par  Respiratory: Negative\par  GI: See HPI\par  : Negative\par  MSK: Negative\par  Hematologic: Negative\par  Skin: Negative\par  \par  MEDICATIONS: \par  MEDICATIONS  (STANDING):\par  ARIPiprazole 5 milliGRAM(s) Oral daily\par  aspirin  chewable 81 milliGRAM(s) Oral daily\par  atorvastatin 80 milliGRAM(s) Oral at bedtime\par  busPIRone 10 milliGRAM(s) Oral <User Schedule>\par  chlorhexidine 2% Cloths 1 Application(s) Topical <User Schedule>\par  \plain\f1\fs20\qzpo9103\hich\f1\dbch\f1\loch\f1\cf2\fs20\b clopidogrel Tablet 75 milliGRAM(s) Enteral Tube daily\par  \plain\f0\fs20\qavn1923\hich\f0\dbch\f0\loch\f0\fs20 dextrose 50% Injectable 25 Gram(s) IV Push once\par  dextrose 50% Injectable 25 Gram(s) IV Push once\par  dextrose 50% Injectable 12.5 Gram(s) IV Push once\par  heparin   Injectable 5000 Unit(s) SubCutaneous every 8 hours\par  hydrALAZINE 10 milliGRAM(s) Oral three times a day\par  influenza  Vaccine (HIGH DOSE) 0.7 milliLiter(s) IntraMuscular once\par  insulin glargine Injectable (LANTUS) 15 Unit(s) SubCutaneous every morning\par  insulin lispro (ADMELOG) corrective regimen sliding scale   SubCutaneous every 6 hours\par  metoprolol tartrate 12.5 milliGRAM(s) Enteral Tube every 12 hours\par  pantoprazole  Injectable 40 milliGRAM(s) IV Push daily\par  QUEtiapine 50 milliGRAM(s) Oral at bedtime\par  sertraline 100 milliGRAM(s) Oral daily\par  triamcinolone 0.1% Cream 1 Application(s) Topical two times a day\par  \par  MEDICATIONS  (PRN):\par  acetaminophen     Tablet .. 650 milliGRAM(s) Oral every 6 hours PRN Temp greater or equal to 38C (100.4F)\par  clonazePAM  Tablet 1 milliGRAM(s) Oral daily PRN for anxiety\par  sodium chloride 0.9% lock flush 10 milliLiter(s) IV Push every 1 hour PRN Pre/post blood products, medications, blood draw, and to maintain line patency\par  \par  \par  \par  DIET:\par  Diet, NPO after Midnight: \par     NPO Start Date: 22-Feb-2024,   NPO Start Time: 23:59 (02-22-24 @ 18:13) [Active]\par  Diet, NPO with Tube Feed: \par  Tube Feeding Modality: Nasogastric\par  Glucerna 1.5 Thomas (GLUCERNA1.5)\par  Total Volume for 24 Hours (mL): 1200\par  Continuous  Starting Tube Feed Rate mL per Hour: 10\par  Increase Tube Feed Rate by (mL): 10     Every 6 hours\par  Until Goal Tube Feed Rate (mL per Hour): 50\par  Tube Feed Duration (in Hours): 24\par  Tube Feed Start Time: 22:00 (02-22-24 @ 18:04) [Active]\par  \par  \par  ALLERGIES: \par  Allergies\par  \par  No Known Allergies\par  \par  Intolerances\par  \par  \par  \par  Substance Use:   (  ) never used  (  ) other:\par  Tobacco Usage:  (   ) never smoked   (   ) former smoker   (   ) current smoker  (     ) pack year  (        ) last cigarette date\par  Alcohol Usage:\par  \par  Family History \par  IBD (  ) Yes   (  ) No\par  GI Malignancy (  )  Yes    (  ) No\par  \par  Health Management\par  Last Colonoscopy:\par  Last Endoscopy: \par  \par  VITAL SIGNS: \par  Vital Signs Last 24 Hrs\par  T(C): 36.8 (23 Feb 2024 08:07), Max: 36.8 (23 Feb 2024 08:07)\par  T(F): 98.3 (23 Feb 2024 08:07), Max: 98.3 (23 Feb 2024 08:07)\par  HR: 79 (23 Feb 2024 08:07) (68 - 86)\par  BP: 122/65 (23 Feb 2024 08:07) (118/68 - 128/62)\par  BP(mean): 88 (23 Feb 2024 00:58) (88 - 88)\par  RR: 18 (23 Feb 2024 08:07) (16 - 19)\par  SpO2: 94% (23 Feb 2024 08:07) (94% - 100%)\par  \par  Parameters below as of 23 Feb 2024 08:07\par  Patient On (Oxygen Delivery Method): room air\par  \par  \par  I&O's Summary\par  \par  22 Feb 2024 07:01  -  23 Feb 2024 07:00\par  --------------------------------------------------------\par  IN: 350 mL / OUT: 850 mL / NET: -500 mL\par  \par  \par  \par  PHYSICAL EXAM: \par  GENERAL:  No acute distress\par  HEENT:  NC/AT, conjunctiva clear, sclera anicteric, \plain\f1\fs20\xhtc4708\hich\f1\dbch\f1\loch\f1\cf2\fs20\b NGT to right nare\par  \plain\f0\fs20\sdjl4491\hich\f0\dbch\f0\loch\f0\fs20 CHEST:  No increased effort\par  HEART:  Regular rate\par  ABDOMEN:  Soft, non-tender, non-distended, normoactive bowel sounds, no rebound or guarding\par  EXTREMITIES: No edema\par  SKIN:  Warm, dry, \par  NEURO:  Calm, cooperative\par  \par  LABS:\par           \par             10.4 \par  13.16 )-----------( 408      ( 23 Feb 2024 09:14 )\par             35.1 \par  \par  Hemoglobin: 10.4 g/dL (02-23-24 @ 09:14)\par  Hemoglobin: 10.0 g/dL (02-22-24 @ 07:15)\par  Hemoglobin: 10.7 g/dL (02-21-24 @ 05:00)\par  \par  02-23\par  \par  151<H>  |  106  |  97.2<H>\par  ----------------------------<  133<H>\par  4.2   |  32.0<H>  |  1.99<H>\par  \par  Ca    9.6      23 Feb 2024 09:14\par  Phos  3.9     02-22\par  Mg     2.6     02-23\par  \par  RADIOLOGY & ADDITIONAL STUDIES:\par  \par  < from: CT Neck Soft Tissue w/ IV Cont (09.12.22 @ 19:30) >\par  \ql\plain\f0\fs24\plain\f1\fs16\owkr5101\hich\f1\dbch\f1\loch\f1\cf2\fs16 ACC: [id number] EXAM:  CT NECK SOFT TISSUE IC                      \par  \par  PROCEDURE DATE:  09/12/2022  \par  \par  \par  \par  INTERPRETATION:  [facility] Exam: CT Neck With Contrast\par  Exam date and time: 9/12/2022 7:16 PM\par  Age: 77 years old Clinical indication: Neck pain andother: Dysphagia\par  \par  TECHNIQUE:\par  Imaging protocol: Computed tomography of the neck with contrast.\par  \par  Contrast material: OMNI 350; Contrast volume: 84 ml; Contrast route: \par  INTRAVENOUS (IV);\par  \par  COMPARISON: RF XR BARIUM SWALLOW WITH CINE 9/12/2022 2:11 PM\par  \par  FINDINGS:\par  Pharynx: \plain\f1\fs16\plig0985\hich\f1\dbch\f1\loch\f1\cf2\fs16\b Unremarkable. No significant tonsillar enlargement.\plain\f1\fs16\gppm4790\hich\f1\dbch\f1\loch\f1\cf2\fs16\par  Larynx: \plain\f1\fs16\mlyw5227\hich\f1\dbch\f1\loch\f1\cf2\fs16\b Unremarkable. Epiglottis is normal.\plain\f1\fs16\xaet8746\hich\f1\dbch\f1\loch\f1\cf2\fs16\par  Prevertebral and retropharyngeal spaces: \plain\f1\fs16\fazs3014\hich\f1\dbch\f1\loch\f1\cf2\fs16\b Unremarkable.\par  \plain\f1\fs16\xynw1461\hich\f1\dbch\f1\loch\f1\cf2\fs16 Salivary glands: \plain\f1\fs16\lcol4712\hich\f1\dbch\f1\loch\f1\cf2\fs16\b Unremarkable. Glands are normal in size.\par  \plain\f1\fs16\tfco8175\hich\f1\dbch\f1\loch\f1\cf2\fs16 Thyroid:\plain\f1\fs16\imwl7357\hich\f1\dbch\f1\loch\f1\cf2\fs16\b  Unremarkable. No enlarged or calcified nodules.\plain\f1\fs16\ojsf7935\hich\f1\dbch\f1\loch\f1\cf2\fs16\par  Lymph nodes:\plain\f1\fs16\qvff9327\hich\f1\dbch\f1\loch\f1\cf2\fs16\b  Unremarkable. No lymphadenopathy.\par  \plain\f1\fs16\pgoh1994\hich\f1\dbch\f1\loch\f1\cf2\fs16\par  Trachea: \plain\f1\fs16\gqam2054\hich\f1\dbch\f1\loch\f1\cf2\fs16\b Visualized trachea is unremarkable.\plain\f1\fs16\axcb9156\hich\f1\dbch\f1\loch\f1\cf2\fs16\par  Lungs: Unremarkable as visualized.\par  Bones/joints: Unremarkable. No acute fracture.\par  Soft tissues: Unremarkable. No significant soft tissue swelling.\par  \par  IMPRESSION:\par  No acute findings. Preliminary report provided by [facility] PARRISH.\par  \par  --- End of Report ---\par  \par  \par  \par  \par  \par  YASSER MESFIN MD; Attending Radiologist\par  \pard\plain\f0\fs24\plain\f1\fs16\xxhs1797\hich\f1\dbch\f1\loch\f1\cf2\fs16 This document has been electronically signed. Sep 13 2022  9:30AM\par  \par  < end of copied text >\par  \ql\plain\f0\fs24\plain\f0\fs20\gezc7434\hich\f0\dbch\f0\loch\f0\fs20\par  {\*\bkmkstart [id number]}{\*\bkmkend [id number]}\plain\f1\fs20\xsyz3594\hich\f1\dbch\f1\loch\f1\cf2\fs20\b\ul Swallow VFSS/MBS Assessment Adult:\plain\f0\fs20\jkmi1941\hich\f0\dbch\f0\loch\f0\fs20  \par  \plain\f1\fs20\pbho5745\hich\f1\dbch\f1\loch\f1\cf2\fs20\b\ul{\field{\*\fldinst HYPERLINK [id number] }{\fldrslt General Information:}}\plain\f0\fs20\luqc4065\hich\f0\dbch\f0\loch\f0\fs20\ql\par  \trowd\jsukag23\hirnqgk49\trpaddfl3\znxhqai27\trpaddfr3\trpaddt0\trpaddft3\trpaddb0\trpaddfb3\trleft0  \clvertalt\lismuc04\clpadft3\ikudvl35\clpadfr3\clpadl0\clpadfl3\clpadb0\clpadfb3\agysk7850  \clvertalt\nrnwqt13\clpadft3\adwuep60\clpadfr3\clpadl0\clpadfl3\clpadb0\clpadfb3\dhhei8304  \pard\intbl\ssparaaux0\s0\fi-120\li120\ql\plain\f0\fs24{\*\bkmkstart [id number]}{\*\bkmkend [id number]}\plain\f0\fs20\scbt2374\hich\f0\dbch\f0\loch\f0\fs20 \'b7 \plain\f1\fs20\intz9057\hich\f1\dbch\f1\loch\f1\cf2\fs20\b Patient Profile Review\plain\f0\fs20\vyap2068\hich\f0\dbch\f0\loch\f0\fs20\cell  \pard\intbl\ssparaaux0\s0\ql\plain\f0\fs24\plain\f0\fs20\wmun9368\hich\f0\dbch\f0\loch\f0\fs20 yes\cell  \intbl\row  \pard\intbl\ssparaaux0\s0\fi-120\li120\ql\plain\f0\fs24{\*\bkmkstart [id number]}{\*\bkmkend on31854962922}\plain\f0\fs20\mciv7305\hich\f0\dbch\f0\loch\f0\fs20 \'b7 \plain\f1\fs20\bbvo0303\hich\f1\dbch\f1\loch\f1\cf2\fs20\b H & P Review\plain\f0\fs20\nixd4178\hich\f0\dbch\f0\loch\f0\fs20\cell  \pard\intbl\ssparaaux0\s0\ql\plain\f0\fs24\plain\f0\fs20\rnrb9279\hich\f0\dbch\f0\loch\f0\fs20 yes\cell  \intbl\row  \pard\intbl\ssparaaux0\s0\fi-120\li120\ql\plain\f0\fs24{\*\bkmkstart [id number]}{\*\bkmkend [id number]}\plain\f0\fs20\txmf1649\hich\f0\dbch\f0\loch\f0\fs20 \'b7 \plain\f1\fs20\bvdp1868\hich\f1\dbch\f1\loch\f1\cf2\fs20\b Specify reason(s)\plain\f0\fs20\xplp6284\hich\f0\dbch\f0\loch\f0\fs20\cell  \pard\intbl\ssparaaux0\s0\ql\plain\f0\fs24\plain\f0\fs20\mjpq8167\hich\f0\dbch\f0\loch\f0\fs20 To objectively re-assess swallow\cell  \intbl\row  \pard\intbl\ssparaaux0\s0\fi-120\li120\ql\plain\f0\fs24{\*\bkmkstart [id number]}{\*\bkmkend [id number]}\plain\f0\fs20\moqg7746\hich\f0\dbch\f0\loch\f0\fs20 \'b7 \plain\f1\fs20\ptko4945\hich\f1\dbch\f1\loch\f1\cf2\fs20\b Referring Physician\plain\f0\fs20\gxfm1125\hich\f0\dbch\f0\loch\f0\fs20\cell  \pard\intbl\ssparaaux0\s0\ql\plain\f0\fs24\plain\f0\fs20\rjsj2366\hich\f0\dbch\f0\loch\f0\fs20 Nemchenko, Nena\cell  \intbl\row  \pard\intbl\ssparaaux0\s0\fi-120\li120\ql\plain\f0\fs24{\*\bkmkstart [id number]}{\*\bkmkend [id number]}\plain\f0\fs20\dkpz5024\hich\f0\dbch\f0\loch\f0\fs20 \'b7 \plain\f1\fs20\cweg0314\hich\f1\dbch\f1\loch\f1\cf2\fs20\b General Observations\plain\f0\fs20\tlbk2022\hich\f0\dbch\f0\loch\f0\fs20\cell  \pard\intbl\ssparaaux0\s0\ql\plain\f0\fs24\plain\f0\fs20\vexj5345\hich\f0\dbch\f0\loch\f0\fs20 Pt received & seen seated upright via stretcher in radiology, awake/alert, anxious: support provided as appropriate, 0/10 pain\cell  \intbl\row  \pard\intbl\ssparaaux0\s0\fi-120\li120\ql\plain\f0\fs24{\*\bkmkstart [id number]}{\*\bkmkend [id number]}\plain\f0\fs20\gqed0848\hich\f0\dbch\f0\loch\f0\fs20 \'b7 \plain\f1\fs20\ofgs2885\hich\f1\dbch\f1\loch\f1\cf2\fs20\b Pertinent History of Current   Problem\plain\f0\fs20\tnab9934\hich\f0\dbch\f0\loch\f0\fs20\cell  \pard\intbl\ssparaaux0\s0\ql\plain\f0\fs24\plain\f0\fs20\csjz5967\hich\f0\dbch\f0\loch\f0\fs20 Pt s/p MBS 9/12 as an outpatient at this facility: see report for details. NPO status was RX: pt since admitted to this facility with work-up thus far for dysphagia   etiology unremarkable (seen by ENT/GI, CT soft tissue & head CT completed). Repeat MBS warranted by team to re-assess PO candidacy\cell  \intbl\row  \trowd\ixefsu69\lastrow\stuxamf44\trpaddfl3\nthgszo57\trpaddfr3\trpaddt0\trpaddft3\trpaddb0\trpaddfb3\trleft0  \clvertalt\pvzuzb12\clpadft3\agiugj03\clpadfr3\clpadl0\clpadfl3\clpadb0\clpadfb3\vgooc5855  \clvertalt\dbroqu16\clpadft3\getlyf18\clpadfr3\clpadl0\clpadfl3\clpadb0\clpadfb3\zguha2396  \pard\intbl\ssparaaux0\s0\fi-120\li120\ql\plain\f0\fs24{\*\bkmkstart [id number]}{\*\bkmkend sh73824528992}\plain\f0\fs20\wjls6033\hich\f0\dbch\f0\loch\f0\fs20 \'b7 \plain\f1\fs20\xhgz7033\hich\f1\dbch\f1\loch\f1\cf2\fs20\b Comments\plain\f0\fs20\exyo9521\hich\f0\dbch\f0\loch\f0\fs20\cell  \pard\intbl\ssparaaux0\s0\ql\plain\f0\fs24\plain\f0\fs20\uqdu8052\hich\f0\dbch\f0\loch\f0\fs20 As per MD note: 77yoM hx DM, HLD, CKD presenting with several months hx of progressive dysphagia initially to solid foods, now to liquids who was advised to   seek hospital admission after failed Modified barium swallow test 09/12/22 as out pt , pt was seen by GI as out pt and had work up done was negative per pt..Pt states he is following with gi out pt,has egd /colonoscopy 3 months ago was stable.CT neck/chest/abd   pelvis stable. Per GI no further work rec, rec ENT eval, which was unremarkable" .\cell  \intbl\row  \pard\ssparaaux0\s0\ql\plain\f0\fs24\plain\f0\fs20\emtq6034\hich\f0\dbch\f0\loch\f0\fs20\par  {\*\bkmkstart [id number]}{\*\bkmkend pl54192187564}\plain\f1\fs20\rozw7470\hich\f1\dbch\f1\loch\f1\cf2\fs20\b\ul Past Medical History:\plain\f0\fs20\yqjf0216\hich\f0\dbch\f0\loch\f0\fs20  \par  \fi-360\li720\plain\f0\fs24{\*\bkmkstart [id number]}{\*\bkmkend [id number]}\plain\f0\fs20\hlqr3004\hich\f0\dbch\f0\loch\f0\fs20 \'b7 \tab\plain\f1\fs20\vfad0512\hich\f1\dbch\f1\loch\f1\cf2\fs20\b Insomnia\plain\f0\fs20\vtpl4743\hich\f0\dbch\f0\loch\f0\fs20   : Entered Date: 13-Sep-2022 07:37, Entered By: Vanda Quinn, Last Modified By: Vanda Quinn\par  {\*\bkmkstart [id number]}{\*\bkmkend [id number]}\'b7 \tab\plain\f1\fs20\exwf9922\hich\f1\dbch\f1\loch\f1\cf2\fs20\b Anxiety with depression\plain\f0\fs20\styc5113\hich\f0\dbch\f0\loch\f0\fs20 : Entered Date: 13-Sep-2022   07:37, Entered By: Vanda Quinn, Last Modified By: Vanda Quinn\par  {\*\bkmkstart [id number]}{\*\bkmkend [id number]}\'b7 \tab\plain\f1\fs20\tabd5290\hich\f1\dbch\f1\loch\f1\cf2\fs20\b Hyperlipidemia\plain\f0\fs20\oplr2130\hich\f0\dbch\f0\loch\f0\fs20 : Entered Date: 13-Sep-2022 07:37,   Entered By: Vanda Quinn, Last Modified By: Vanda Quinn\par  {\*\bkmkstart [id number]}{\*\bkmkend [id number]}\'b7 \tab\plain\f1\fs20\tnfg3528\hich\f1\dbch\f1\loch\f1\cf2\fs20\b Diabetes\plain\f0\fs20\akxn8917\hich\f0\dbch\f0\loch\f0\fs20 : Entered Date: 17-Aug-2017 06:57, Entered   By: Guillermina Blair, Last Modified By: Guillermina Blair\par  \fi0\li0\plain\f0\fs24\plain\f0\fs20\czpd8115\hich\f0\dbch\f0\loch\f0\fs20\par  {\*\bkmkstart qs00117530203}{\*\bkmkend gc52464128177}\plain\f1\fs20\npyp1416\hich\f1\dbch\f1\loch\f1\cf2\fs20\b\ul Past Surgical History:\plain\f0\fs20\ydqd7008\hich\f0\dbch\f0\loch\f0\fs20  \par  \fi-360\li720\plain\f0\fs24{\*\bkmkstart [id number]}{\*\bkmkend [id number]}\plain\f0\fs20\jfta4064\hich\f0\dbch\f0\loch\f0\fs20 \'b7 \tab\plain\f1\fs20\fhsa2790\hich\f1\dbch\f1\loch\f1\cf2\fs20\b No significant past   surgical history\plain\f0\fs20\mhuf2789\hich\f0\dbch\f0\loch\f0\fs20 : Entered By: Vanda Quinn, Entered Date: 13-Sep-2022 07:37, Last Modified By: Vanda Quinn\par  \fi0\li0\plain\f0\fs24\plain\f0\fs20\htel9870\hich\f0\dbch\f0\loch\f0\fs20\par  \plain\f1\fs20\gpnm5428\hich\f1\dbch\f1\loch\f1\cf2\fs20\b\ul{\field{\*\fldinst HYPERLINK [id number] }{\fldrslt Preliminary Fluoroscopy:}}\plain\f0\fs20\xidg8600\hich\f0\dbch\f0\loch\f0\fs20\ql\par  \trowd\hvsjfs97\lastrow\zysxkmo70\trpaddfl3\tfzghur90\trpaddfr3\trpaddt0\trpaddft3\trpaddb0\trpaddfb3\trleft0  \clvertalt\hijpil22\clpadft3\rqytok86\clpadfr3\clpadl0\clpadfl3\clpadb0\clpadfb3\pyfmj8847  \clvertalt\dkegcy85\clpadft3\nwjhyr83\clpadfr3\clpadl0\clpadfl3\clpadb0\clpadfb3\fxaei6033  \pard\intbl\ssparaaux0\s0\fi-120\li120\ql\plain\f0\fs24{\*\bkmkstart ox5358593452}{\*\bkmkend [id number]}\plain\f0\fs20\lygd4586\hich\f0\dbch\f0\loch\f0\fs20 \'b7 \plain\f1\fs20\cyze9927\hich\f1\dbch\f1\loch\f1\cf2\fs20\b Additional Information\plain\f0\fs20\wves6791\hich\f0\dbch\f0\loch\f0\fs20\cell  \pard\intbl\ssparaaux0\s0\ql\plain\f0\fs24\plain\f0\fs20\mkqu6558\hich\f0\dbch\f0\loch\f0\fs20 Degenerative changes noted with ? osteophytes noted at ~C2-C4\cell  \intbl\row  \pard\ssparaaux0\s0\ql\plain\f0\fs24\plain\f0\fs20\qqij9305\hich\f0\dbch\f0\loch\f0\fs20\par  \plain\f1\fs20\uhpb3001\hich\f1\dbch\f1\loch\f1\cf2\fs20\b\ul{\field{\*\fldinst HYPERLINK [id number] }{\fldrslt VFSS/MBS Eval: Trial 1}}\plain\f0\fs20\pztc0566\hich\f0\dbch\f0\loch\f0\fs20\ql\par  \trowd\augjfc48\yoflpef14\trpaddfl3\ymyhkbt28\trpaddfr3\trpaddt0\trpaddft3\trpaddb0\trpaddfb3\trleft0  \clvertalt\evvvax26\clpadft3\afwfhf54\clpadfr3\clpadl0\clpadfl3\clpadb0\clpadfb3\lrdgf6479  \clvertalt\pohfqj77\clpadft3\jwrjat28\clpadfr3\clpadl0\clpadfl3\clpadb0\clpadfb3\hnzmh5624  \pard\intbl\ssparaaux0\s0\fi-120\li120\ql\plain\f0\fs24{\*\bkmkstart yz1097532221}{\*\bkmkend pg8230222697}\plain\f0\fs20\clsa8582\hich\f0\dbch\f0\loch\f0\fs20 \'b7 \plain\f1\fs20\vujb6849\hich\f1\dbch\f1\loch\f1\cf2\fs20\b Mode of Presentation\plain\f0\fs20\htyj3834\hich\f0\dbch\f0\loch\f0\fs20\cell  \pard\intbl\ssparaaux0\s0\ql\plain\f0\fs24\plain\f0\fs20\apcx7411\hich\f0\dbch\f0\loch\f0\fs20 fed by clinician; spoon\cell  \intbl\row  \pard\intbl\ssparaaux0\s0\fi-120\li120\ql\plain\f0\fs24{\*\bkmkstart pq6218739654}{\*\bkmkend gn7167936063}\plain\f0\fs20\htko8306\hich\f0\dbch\f0\loch\f0\fs20 \'b7 \plain\f1\fs20\sobc4979\hich\f1\dbch\f1\loch\f1\cf2\fs20\b Consistencies administered\plain\f0\fs20\kcmy9722\hich\f0\dbch\f0\loch\f0\fs20\cell  \pard\intbl\ssparaaux0\s0\ql\plain\f0\fs24\plain\f0\fs20\kczy4017\hich\f0\dbch\f0\loch\f0\fs20 thin liquid\cell  \intbl\row  \trowd\soqoia90\lastrow\cyjcfng27\trpaddfl3\djghhon85\trpaddfr3\trpaddt0\trpaddft3\trpaddb0\trpaddfb3\trleft0  \clvertalt\qkyeww70\clpadft3\iypyku61\clpadfr3\clpadl0\clpadfl3\clpadb0\clpadfb3\izdjj4046  \clvertalt\bjrinp40\clpadft3\hlyuxn86\clpadfr3\clpadl0\clpadfl3\clpadb0\clpadfb3\vejhb2861  \pard\intbl\ssparaaux0\s0\fi-120\li120\ql\plain\f0\fs24{\*\bkmkstart hd5814070166}{\*\bkmkend [id number]}\plain\f0\fs20\tfgv1811\hich\f0\dbch\f0\loch\f0\fs20 \'b7 \plain\f1\fs20\hgbs4732\hich\f1\dbch\f1\loch\f1\cf2\fs20\b Positioning\plain\f0\fs20\slsl4821\hich\f0\dbch\f0\loch\f0\fs20\cell  \pard\intbl\ssparaaux0\s0\ql\plain\f0\fs24\plain\f0\fs20\kafh6655\hich\f0\dbch\f0\loch\f0\fs20 Lateral\cell  \intbl\row  \pard\ssparaaux0\s0\ql\plain\f0\fs24\plain\f0\fs20\hkkq1909\hich\f0\dbch\f0\loch\f0\fs20\par  \plain\f1\fs20\maqu3945\hich\f1\dbch\f1\loch\f1\cf2\fs20\b\ul{\field{\*\fldinst HYPERLINK [id number] }{\fldrslt Oral Prep Phase:}}\plain\f0\fs20\sfzu8311\hich\f0\dbch\f0\loch\f0\fs20\ql\par  \trowd\yniupo60\lastrow\tdprjrc88\trpaddfl3\shlwbee57\trpaddfr3\trpaddt0\trpaddft3\trpaddb0\trpaddfb3\trleft0  \clvertalt\jptdkd69\clpadft3\ieqizm06\clpadfr3\clpadl0\clpadfl3\clpadb0\clpadfb3\qhtbx3207  \clvertalt\xhpiff23\clpadft3\eakmtx31\clpadfr3\clpadl0\clpadfl3\clpadb0\clpadfb3\ahrfu6409  \pard\intbl\ssparaaux0\s0\fi-120\li120\ql\plain\f0\fs24{\*\bkmkstart oo0986225092}{\*\bkmkend ry7612706198}\plain\f0\fs20\uupm8661\hich\f0\dbch\f0\loch\f0\fs20 \'b7 \plain\f1\fs20\tdjm9528\hich\f1\dbch\f1\loch\f1\cf2\fs20\b Oral Preparatory Phase\plain\f0\fs20\gjzl1259\hich\f0\dbch\f0\loch\f0\fs20\cell  \pard\intbl\ssparaaux0\s0\ql\plain\f0\fs24\plain\f0\fs20\ikqp9123\hich\f0\dbch\f0\loch\f0\fs20 Functional\cell  \intbl\row  \pard\ssparaaux0\s0\ql\plain\f0\fs24\plain\f0\fs20\swmy0758\hich\f0\dbch\f0\loch\f0\fs20\par  \plain\f1\fs20\ppyn1985\hich\f1\dbch\f1\loch\f1\cf2\fs20\b\ul{\field{\*\fldinst HYPERLINK [id number] }{\fldrslt Oral Phase:}}\plain\f0\fs20\ipny3204\hich\f0\dbch\f0\loch\f0\fs20\ql\par  \trowd\rtkhgj83\lastrow\rawdrwx83\trpaddfl3\zdpmdwe42\trpaddfr3\trpaddt0\trpaddft3\trpaddb0\trpaddfb3\trleft0  \clvertalt\jqnuqo81\clpadft3\wdurtf93\clpadfr3\clpadl0\clpadfl3\clpadb0\clpadfb3\rxexr0588  \clvertalt\rgrrkz42\clpadft3\utldhl94\clpadfr3\clpadl0\clpadfl3\clpadb0\clpadfb3\isggn1707  \pard\intbl\ssparaaux0\s0\fi-120\li120\ql\plain\f0\fs24{\*\bkmkstart mw4485778408}{\*\bkmkend fv3486916933}\plain\f0\fs20\bzei3210\hich\f0\dbch\f0\loch\f0\fs20 \'b7 \plain\f1\fs20\wdou4072\hich\f1\dbch\f1\loch\f1\cf2\fs20\b Oral Phase\plain\f0\fs20\nomo7613\hich\f0\dbch\f0\loch\f0\fs20\cell  \pard\intbl\ssparaaux0\s0\ql\plain\f0\fs24\plain\f0\fs20\tvcv4051\hich\f0\dbch\f0\loch\f0\fs20 within functional limits\cell  \intbl\row  \pard\ssparaaux0\s0\ql\plain\f0\fs24\plain\f0\fs20\euex7481\hich\f0\dbch\f0\loch\f0\fs20\par  \plain\f1\fs20\znuz0655\hich\f1\dbch\f1\loch\f1\cf2\fs20\b\ul{\field{\*\fldinst HYPERLINK [id number] }{\fldrslt Pharyngeal Phase:}}\plain\f0\fs20\sect4688\hich\f0\dbch\f0\loch\f0\fs20\ql\par  \trowd\tdghen12\ehhbjlz65\trpaddfl3\rkrplyo51\trpaddfr3\trpaddt0\trpaddft3\trpaddb0\trpaddfb3\trleft0  \clvertalt\ccfnav84\clpadft3\yrddao98\clpadfr3\clpadl0\clpadfl3\clpadb0\clpadfb3\msoax2027  \clvertalt\nnmewt82\clpadft3\axdrcm62\clpadfr3\clpadl0\clpadfl3\clpadb0\clpadfb3\nramk2283  \pard\intbl\ssparaaux0\s0\fi-120\li120\ql\plain\f0\fs24{\*\bkmkstart yw87826052215}{\*\bkmkend yg60686475441}\plain\f0\fs20\woio0364\hich\f0\dbch\f0\loch\f0\fs20 \'b7 \plain\f1\fs20\lvba7256\hich\f1\dbch\f1\loch\f1\cf2\fs20\b Intact\plain\f0\fs20\rqis1816\hich\f0\dbch\f0\loch\f0\fs20\cell  \pard\intbl\ssparaaux0\s0\ql\plain\f0\fs24\plain\f0\fs20\eqfv2918\hich\f0\dbch\f0\loch\f0\fs20 No\cell  \intbl\row  \pard\intbl\ssparaaux0\s0\fi-120\li120\ql\plain\f0\fs24{\*\bkmkstart [id number]}{\*\bkmkend di8107427455}\plain\f0\fs20\aklg5807\hich\f0\dbch\f0\loch\f0\fs20 \'b7 \plain\f1\fs20\ruju7433\hich\f1\dbch\f1\loch\f1\cf2\fs20\b Absent epiglottic deflection\plain\f0\fs20\ybbd5225\hich\f0\dbch\f0\loch\f0\fs20\cell  \pard\intbl\ssparaaux0\s0\ql\plain\f0\fs24\plain\f0\fs20\bxqe3070\hich\f0\dbch\f0\loch\f0\fs20 observed\cell  \intbl\row  \pard\intbl\ssparaaux0\s0\fi-120\li120\ql\plain\f0\fs24{\*\bkmkstart [id number]}{\*\bkmkend ee1221010060}\plain\f0\fs20\vdam1458\hich\f0\dbch\f0\loch\f0\fs20 \'b7 \plain\f1\fs20\rged6067\hich\f1\dbch\f1\loch\f1\cf2\fs20\b Laryngeal penetration during   the swallow - silent\plain\f0\fs20\fpja5205\hich\f0\dbch\f0\loch\f0\fs20\cell  \pard\intbl\ssparaaux0\s0\ql\plain\f0\fs24\plain\f0\fs20\bbkl1398\hich\f0\dbch\f0\loch\f0\fs20 observed\cell  \intbl\row  \pard\intbl\ssparaaux0\s0\fi-120\li120\ql\plain\f0\fs24{\*\bkmkstart [id number]}{\*\bkmkend ya0732620726}\plain\f0\fs20\wftc6710\hich\f0\dbch\f0\loch\f0\fs20 \'b7 \plain\f1\fs20\qzkb2298\hich\f1\dbch\f1\loch\f1\cf2\fs20\b Residue along the base of the   tongue\plain\f0\fs20\tmgk4505\hich\f0\dbch\f0\loch\f0\fs20\cell  \pard\intbl\ssparaaux0\s0\ql\plain\f0\fs24\plain\f0\fs20\axss4305\hich\f0\dbch\f0\loch\f0\fs20 Trace\cell  \intbl\row  \pard\intbl\ssparaaux0\s0\fi-120\li120\ql\plain\f0\fs24{\*\bkmkstart ca6510115150}{\*\bkmkend rq4499932343}\plain\f0\fs20\kxxj6616\hich\f0\dbch\f0\loch\f0\fs20 \'b7 \plain\f1\fs20\aawo2995\hich\f1\dbch\f1\loch\f1\cf2\fs20\b Residue in valleculae\plain\f0\fs20\ivbh7622\hich\f0\dbch\f0\loch\f0\fs20\cell  \pard\intbl\ssparaaux0\s0\ql\plain\f0\fs24\plain\f0\fs20\dmsz9313\hich\f0\dbch\f0\loch\f0\fs20 Moderate; Severe; reduced with successive swallows\cell  \intbl\row  \pard\intbl\ssparaaux0\s0\ql\plain\f0\fs24\plain\f0\fs20\gvse1304\hich\f0\dbch\f0\loch\f0\fs20\cell  \pard\intbl\ssparaaux0\s0\ql\plain\f0\fs24\plain\f1\fs20\hzbd1161\hich\f1\dbch\f1\loch\f1\cf2\fs20\strike\plain\f0\fs20\xkju8285\hich\f0\dbch\f0\loch\f0\fs20\cell  \intbl\row  \pard\intbl\ssparaaux0\s0\fi-120\li120\ql\plain\f0\fs24{\*\bkmkstart kz5880805187}{\*\bkmkend db7939976179}\plain\f0\fs20\vgng6050\hich\f0\dbch\f0\loch\f0\fs20 \'b7 \plain\f1\fs20\exmu8395\hich\f1\dbch\f1\loch\f1\cf2\fs20\b Residue of posterior pharyngeal   wall\plain\f0\fs20\jkls1379\hich\f0\dbch\f0\loch\f0\fs20\cell  \pard\intbl\ssparaaux0\s0\ql\plain\f0\fs24\plain\f0\fs20\ftrz1396\hich\f0\dbch\f0\loch\f0\fs20 Mild\cell  \intbl\row  \pard\intbl\ssparaaux0\s0\fi-120\li120\ql\plain\f0\fs24{\*\bkmkstart pa5899911663}{\*\bkmkend hl9897397114}\plain\f0\fs20\vxmu6411\hich\f0\dbch\f0\loch\f0\fs20 \'b7 \plain\f1\fs20\vmyy1031\hich\f1\dbch\f1\loch\f1\cf2\fs20\b Residue in pyriform sinuses\plain\f0\fs20\iioi0804\hich\f0\dbch\f0\loch\f0\fs20\cell  \pard\intbl\ssparaaux0\s0\ql\plain\f0\fs24\plain\f0\fs20\kezq5136\hich\f0\dbch\f0\loch\f0\fs20 Severe; reduced with successive swallows\cell  \intbl\row  \pard\intbl\ssparaaux0\s0\ql\plain\f0\fs24\plain\f0\fs20\nmqq5718\hich\f0\dbch\f0\loch\f0\fs20\cell  \pard\intbl\ssparaaux0\s0\ql\plain\f0\fs24\plain\f1\fs20\qbjm1208\hich\f1\dbch\f1\loch\f1\cf2\fs20\strike\plain\f0\fs20\wqkn9556\hich\f0\dbch\f0\loch\f0\fs20\cell  \intbl\row  \pard\intbl\ssparaaux0\s0\fi-120\li120\ql\plain\f0\fs24{\*\bkmkstart km9647068729}{\*\bkmkend pq4962101728}\plain\f0\fs20\flyk5309\hich\f0\dbch\f0\loch\f0\fs20 \'b7 \plain\f1\fs20\nxsm4354\hich\f1\dbch\f1\loch\f1\cf2\fs20\b Laryngeal penetration after   the swallow - silent\plain\f0\fs20\xgfc0316\hich\f0\dbch\f0\loch\f0\fs20\cell  \pard\intbl\ssparaaux0\s0\ql\plain\f0\fs24\plain\f0\fs20\hfuj0759\hich\f0\dbch\f0\loch\f0\fs20 observed\cell  \intbl\row  \trowd\shskmm87\lastrow\nwjzqty51\trpaddfl3\vbaviyf22\trpaddfr3\trpaddt0\trpaddft3\trpaddb0\trpaddfb3\trleft0  \clvertalt\hnfldl19\clpadft3\wqhmeq90\clpadfr3\clpadl0\clpadfl3\clpadb0\clpadfb3\vyxjn3292  \clvertalt\nujnos94\clpadft3\wqcdyr79\clpadfr3\clpadl0\clpadfl3\clpadb0\clpadfb3\ohkxp1233  \pard\intbl\ssparaaux0\s0\fi-120\li120\ql\plain\f0\fs24{\*\bkmkstart yd24218459128}{\*\bkmkend yq68249165849}\plain\f0\fs20\pctf3584\hich\f0\dbch\f0\loch\f0\fs20 \'b7 \plain\f1\fs20\ywgn7441\hich\f1\dbch\f1\loch\f1\cf2\fs20\b Pharyngeal Phase Comments\plain\f0\fs20\esen1822\hich\f0\dbch\f0\loch\f0\fs20\cell  \pard\intbl\ssparaaux0\s0\ql\plain\f0\fs24\plain\f0\fs20\chvr2027\hich\f0\dbch\f0\loch\f0\fs20 Attempts to progress bolus beyond pyriform sinus resulted in periodic oral regurgitation\cell  \intbl\row  \pard\ssparaaux0\s0\ql\plain\f0\fs24\plain\f0\fs20\irno5190\hich\f0\dbch\f0\loch\f0\fs20\par  \plain\f1\fs20\derl3681\hich\f1\dbch\f1\loch\f1\cf2\fs20\b\ul{\field{\*\fldinst HYPERLINK [id number] }{\fldrslt Esophageal Phase:}}\plain\f0\fs20\glou1391\hich\f0\dbch\f0\loch\f0\fs20\ql\par  \trowd\mmdjkp99\oqlzsoa11\trpaddfl3\sfbmswf48\trpaddfr3\trpaddt0\trpaddft3\trpaddb0\trpaddfb3\trleft0  \clvertalt\mhldkf75\clpadft3\dhauje79\clpadfr3\clpadl0\clpadfl3\clpadb0\clpadfb3\wlwsv4164  \clvertalt\pavjhw49\clpadft3\oimkeo89\clpadfr3\clpadl0\clpadfl3\clpadb0\clpadfb3\jhwau0695  \pard\intbl\ssparaaux0\s0\fi-120\li120\ql\plain\f0\fs24{\*\bkmkstart vu5445265962}{\*\bkmkend by5685727557}\plain\f0\fs20\ucjv2126\hich\f0\dbch\f0\loch\f0\fs20 \'b7 \plain\f1\fs20\zlkp6753\hich\f1\dbch\f1\loch\f1\cf2\fs20\b Esophageal Stage\plain\f0\fs20\mhcx1962\hich\f0\dbch\f0\loch\f0\fs20\cell  \pard\intbl\ssparaaux0\s0\ql\plain\f0\fs24\plain\f0\fs20\ftnd8728\hich\f0\dbch\f0\loch\f0\fs20 Retrograde movement of PO to pyriform sinus & below level of UES noted\par  \par  Minimal progression of contrast beyond ~T-1, as per radiologist\cell  \intbl\row  \trowd\yftpqx52\lastrow\snealni26\trpaddfl3\awkcuwd29\trpaddfr3\trpaddt0\trpaddft3\trpaddb0\trpaddfb3\trleft0  \clvertalt\rgprfz47\clpadft3\ugysun85\clpadfr3\clpadl0\clpadfl3\clpadb0\clpadfb3\grtov1144  \clvertalt\kbmsup91\clpadft3\jhvmjs47\clpadfr3\clpadl0\clpadfl3\clpadb0\clpadfb3\awmwp1610  \pard\intbl\ssparaaux0\s0\ql\plain\f0\fs24\plain\f0\fs20\gduz4832\hich\f0\dbch\f0\loch\f0\fs20\cell  \pard\intbl\ssparaaux0\s0\ql\plain\f0\fs24\plain\f1\fs20\inik8859\hich\f1\dbch\f1\loch\f1\cf2\fs20\strike\plain\f0\fs20\jbta6448\hich\f0\dbch\f0\loch\f0\fs20\cell  \intbl\row  \pard\ssparaaux0\s0\ql\plain\f0\fs24\plain\f0\fs20\wbow1882\hich\f0\dbch\f0\loch\f0\fs20\par  \plain\f1\fs20\savf5453\hich\f1\dbch\f1\loch\f1\cf2\fs20\b\ul{\field{\*\fldinst HYPERLINK [id number] }{\fldrslt Rosenbek's Penetration Aspiration Scale:}}\plain\f0\fs20\xiow8099\hich\f0\dbch\f0\loch\f0\fs20\ql\par  \trowd\xbzytt02\lastrow\zdtqsfo77\trpaddfl3\yvievkp48\trpaddfr3\trpaddt0\trpaddft3\trpaddb0\trpaddfb3\trleft0  \clvertalt\bjlzne38\clpadft3\vopwpw50\clpadfr3\clpadl0\clpadfl3\clpadb0\clpadfb3\sfuvk9112  \clvertalt\ylbgry65\clpadft3\djyglx18\clpadfr3\clpadl0\clpadfl3\clpadb0\clpadfb3\mwvbh1160  \pard\intbl\ssparaaux0\s0\fi-120\li120\ql\plain\f0\fs24{\*\bkmkstart yl1302378549}{\*\bkmkend hn9732526438}\plain\f0\fs20\tvdw9840\hich\f0\dbch\f0\loch\f0\fs20 \'b7 \plain\f1\fs20\naru6296\hich\f1\dbch\f1\loch\f1\cf2\fs20\b Rosenbek's Penetration Aspiration   Scale\plain\f0\fs20\npyj6164\hich\f0\dbch\f0\loch\f0\fs20\cell  \pard\intbl\ssparaaux0\s0\ql\plain\f0\fs24\plain\f0\fs20\mbdn2890\hich\f0\dbch\f0\loch\f0\fs20 (5) contrast contacts vocal cords, visible residue remains (penetration)\cell  \intbl\row  \pard\ssparaaux0\s0\ql\plain\f0\fs24\plain\f0\fs20\dxbd1610\hich\f0\dbch\f0\loch\f0\fs20\par  \plain\f1\fs20\teof9322\hich\f1\dbch\f1\loch\f1\cf2\fs20\b\ul{\field{\*\fldinst HYPERLINK [id number] }{\fldrslt Strategies Tried During Procedure:}}\plain\f0\fs20\lmlb9848\hich\f0\dbch\f0\loch\f0\fs20\ql\par  \trowd\pwylta96\lastrow\axsybfk84\trpaddfl3\hotwaby21\trpaddfr3\trpaddt0\trpaddft3\trpaddb0\trpaddfb3\trleft0  \clvertalt\wbdrut93\clpadft3\wpjkgj28\clpadfr3\clpadl0\clpadfl3\clpadb0\clpadfb3\fyinh2025  \clvertalt\mlknis23\clpadft3\napqlu79\clpadfr3\clpadl0\clpadfl3\clpadb0\clpadfb3\dlhuf5907  \pard\intbl\ssparaaux0\s0\fi-120\li120\ql\plain\f0\fs24{\*\bkmkstart fm7811872119}{\*\bkmkend kr6244158718}\plain\f0\fs20\fuhp7945\hich\f0\dbch\f0\loch\f0\fs20 \'b7 \plain\f1\fs20\eamy0595\hich\f1\dbch\f1\loch\f1\cf2\fs20\b Unsuccessful Strategies Trialed   During Procedure\plain\f0\fs20\dszy8014\hich\f0\dbch\f0\loch\f0\fs20\cell  \pard\intbl\ssparaaux0\s0\ql\plain\f0\fs24\plain\f0\fs20\vonb4582\hich\f0\dbch\f0\loch\f0\fs20 hard swallow; head turn to the right; head turn to the left\cell  \intbl\row  \pard\ssparaaux0\s0\ql\plain\f0\fs24\plain\f0\fs20\lghc4893\hich\f0\dbch\f0\loch\f0\fs20\par  \plain\f1\fs20\vgfr8924\hich\f1\dbch\f1\loch\f1\cf2\fs20\b\ul{\field{\*\fldinst HYPERLINK [id number] }{\fldrslt VFSS/MBS Eval: Trial 2}}\plain\f0\fs20\qriw9361\hich\f0\dbch\f0\loch\f0\fs20\ql\par  \trowd\vnhkur25\hclxwsu46\trpaddfl3\cjhkxls59\trpaddfr3\trpaddt0\trpaddft3\trpaddb0\trpaddfb3\trleft0  \clvertalt\chsyeb56\clpadft3\wedbcv13\clpadfr3\clpadl0\clpadfl3\clpadb0\clpadfb3\mdxxm6831  \clvertalt\huwqqn99\clpadft3\ucfjeo95\clpadfr3\clpadl0\clpadfl3\clpadb0\clpadfb3\hbjkh3929  \pard\intbl\ssparaaux0\s0\fi-120\li120\ql\plain\f0\fs24{\*\bkmkstart nx0498350174}{\*\bkmkend dp5133212758}\plain\f0\fs20\dece3274\hich\f0\dbch\f0\loch\f0\fs20 \'b7 \plain\f1\fs20\igqb7494\hich\f1\dbch\f1\loch\f1\cf2\fs20\b Mode of Presentation\plain\f0\fs20\avur9789\hich\f0\dbch\f0\loch\f0\fs20\cell  \pard\intbl\ssparaaux0\s0\ql\plain\f0\fs24\plain\f0\fs20\qthw7531\hich\f0\dbch\f0\loch\f0\fs20 self fed; spoon\cell  \intbl\row  \pard\intbl\ssparaaux0\s0\fi-120\li120\ql\plain\f0\fs24{\*\bkmkstart pi4103450863}{\*\bkmkend td8590258663}\plain\f0\fs20\xjdm9744\hich\f0\dbch\f0\loch\f0\fs20 \'b7 \plain\f1\fs20\gzim3599\hich\f1\dbch\f1\loch\f1\cf2\fs20\b Consistencies administered\plain\f0\fs20\gxkz9171\hich\f0\dbch\f0\loch\f0\fs20\cell  \pard\intbl\ssparaaux0\s0\ql\plain\f0\fs24\plain\f0\fs20\eyqm8746\hich\f0\dbch\f0\loch\f0\fs20 pureed\cell  \intbl\row  \trowd\xuultt11\lastrow\rfiglpw63\trpaddfl3\gmpeuzw21\trpaddfr3\trpaddt0\trpaddft3\trpaddb0\trpaddfb3\trleft0  \clvertalt\thjyxl93\clpadft3\gccohn39\clpadfr3\clpadl0\clpadfl3\clpadb0\clpadfb3\fqpkz9419  \clvertalt\vnkmhu32\clpadft3\tktlsz32\clpadfr3\clpadl0\clpadfl3\clpadb0\clpadfb3\edgba8025  \pard\intbl\ssparaaux0\s0\fi-120\li120\ql\plain\f0\fs24{\*\bkmkstart af3158935640}{\*\bkmkend wv6883020644}\plain\f0\fs20\ggxj4045\hich\f0\dbch\f0\loch\f0\fs20 \'b7 \plain\f1\fs20\bhhv2924\hich\f1\dbch\f1\loch\f1\cf2\fs20\b Positioning\plain\f0\fs20\mpwe5981\hich\f0\dbch\f0\loch\f0\fs20\cell  \pard\intbl\ssparaaux0\s0\ql\plain\f0\fs24\plain\f0\fs20\gzff6009\hich\f0\dbch\f0\loch\f0\fs20 Lateral\cell  \intbl\row  \pard\ssparaaux0\s0\ql\plain\f0\fs24\plain\f0\fs20\hvgt8024\hich\f0\dbch\f0\loch\f0\fs20\par  \plain\f1\fs20\acin4010\hich\f1\dbch\f1\loch\f1\cf2\fs20\b\ul{\field{\*\fldinst HYPERLINK [id number],[id number] }{\fldrslt Oral Prep Phase:}}\plain\f0\fs20\vokq1558\hich\f0\dbch\f0\loch\f0\fs20\ql\par  \trowd\kspdze32\lastrow\sbxjktw72\trpaddfl3\lupssmt03\trpaddfr3\trpaddt0\trpaddft3\trpaddb0\trpaddfb3\trleft0  \clvertalt\htcccw49\clpadft3\ddzayk69\clpadfr3\clpadl0\clpadfl3\clpadb0\clpadfb3\xaprr6791  \clvertalt\nflzcz79\clpadft3\akirlo44\clpadfr3\clpadl0\clpadfl3\clpadb0\clpadfb3\hfoxx7007  \pard\intbl\ssparaaux0\s0\fi-120\li120\ql\plain\f0\fs24{\*\bkmkstart bj7419691842}{\*\bkmkend jj0763895691}\plain\f0\fs20\jfgg0535\hich\f0\dbch\f0\loch\f0\fs20 \'b7 \plain\f1\fs20\kqvf7595\hich\f1\dbch\f1\loch\f1\cf2\fs20\b Oral Preparatory Phase\plain\f0\fs20\fwgv2171\hich\f0\dbch\f0\loch\f0\fs20\cell  \pard\intbl\ssparaaux0\s0\ql\plain\f0\fs24\plain\f0\fs20\pyvt1325\hich\f0\dbch\f0\loch\f0\fs20 Functional\cell  \intbl\row  \pard\ssparaaux0\s0\ql\plain\f0\fs24\plain\f0\fs20\evfo2686\hich\f0\dbch\f0\loch\f0\fs20\par  \plain\f1\fs20\axwk5278\hich\f1\dbch\f1\loch\f1\cf2\fs20\b\ul{\field{\*\fldinst HYPERLINK [id number] }{\fldrslt Oral Phase:}}\plain\f0\fs20\ssoz5243\hich\f0\dbch\f0\loch\f0\fs20\ql\par  \trowd\ygcifv28\lastrow\wiweqrg75\trpaddfl3\faoczuk93\trpaddfr3\trpaddt0\trpaddft3\trpaddb0\trpaddfb3\trleft0  \clvertalt\ylyupd94\clpadft3\gsjarf19\clpadfr3\clpadl0\clpadfl3\clpadb0\clpadfb3\nuwwd5470  \clvertalt\xwtdqc36\clpadft3\nwfzkr71\clpadfr3\clpadl0\clpadfl3\clpadb0\clpadfb3\kvuxa7033  \pard\intbl\ssparaaux0\s0\fi-120\li120\ql\plain\f0\fs24{\*\bkmkstart vd8331887952}{\*\bkmkend ko1932359890}\plain\f0\fs20\avfl5990\hich\f0\dbch\f0\loch\f0\fs20 \'b7 \plain\f1\fs20\khbp8189\hich\f1\dbch\f1\loch\f1\cf2\fs20\b Oral Phase\plain\f0\fs20\nvlu0762\hich\f0\dbch\f0\loch\f0\fs20\cell  \pard\intbl\ssparaaux0\s0\ql\plain\f0\fs24\plain\f0\fs20\kmgn6014\hich\f0\dbch\f0\loch\f0\fs20 within functional limits\cell  \intbl\row  \pard\ssparaaux0\s0\ql\plain\f0\fs24\plain\f0\fs20\ddfe6207\hich\f0\dbch\f0\loch\f0\fs20\par  \plain\f1\fs20\ivmq6414\hich\f1\dbch\f1\loch\f1\cf2\fs20\b\ul{\field{\*\fldinst HYPERLINK [id number] }{\fldrslt Pharyngeal Phase:}}\plain\f0\fs20\tywb0792\hich\f0\dbch\f0\loch\f0\fs20\ql\par  \trowd\mvadho19\esxzqqo16\trpaddfl3\bazqytq35\trpaddfr3\trpaddt0\trpaddft3\trpaddb0\trpaddfb3\trleft0  \clvertalt\qhbgta50\clpadft3\wyvwtg14\clpadfr3\clpadl0\clpadfl3\clpadb0\clpadfb3\jcsfe2043  \clvertalt\tmiiqt67\clpadft3\vlnffs07\clpadfr3\clpadl0\clpadfl3\clpadb0\clpadfb3\zirli0001  \pard\intbl\ssparaaux0\s0\fi-120\li120\ql\plain\f0\fs24{\*\bkmkstart ux64627733121}{\*\bkmkend eh85726666158}\plain\f0\fs20\igti2218\hich\f0\dbch\f0\loch\f0\fs20 \'b7 \plain\f1\fs20\fseh5530\hich\f1\dbch\f1\loch\f1\cf2\fs20\b Intact\plain\f0\fs20\mrvb3399\hich\f0\dbch\f0\loch\f0\fs20\cell  \pard\intbl\ssparaaux0\s0\ql\plain\f0\fs24\plain\f0\fs20\hdxd4005\hich\f0\dbch\f0\loch\f0\fs20 No\cell  \intbl\row  \pard\intbl\ssparaaux0\s0\fi-120\li120\ql\plain\f0\fs24{\*\bkmkstart hc5973696467}{\*\bkmkend od8285225405}\plain\f0\fs20\mwpj4338\hich\f0\dbch\f0\loch\f0\fs20 \'b7 \plain\f1\fs20\lmhc6856\hich\f1\dbch\f1\loch\f1\cf2\fs20\b Absent epiglottic deflection\plain\f0\fs20\odoj4507\hich\f0\dbch\f0\loch\f0\fs20\cell  \pard\intbl\ssparaaux0\s0\ql\plain\f0\fs24\plain\f0\fs20\lmbb7310\hich\f0\dbch\f0\loch\f0\fs20 observed\cell  \intbl\row  \pard\intbl\ssparaaux0\s0\fi-120\li120\ql\plain\f0\fs24{\*\bkmkstart ok4632957485}{\*\bkmkend ae0211524394}\plain\f0\fs20\pwpb7586\hich\f0\dbch\f0\loch\f0\fs20 \'b7 \plain\f1\fs20\vkil8926\hich\f1\dbch\f1\loch\f1\cf2\fs20\b Laryngeal penetration during   the swallow - silent\plain\f0\fs20\gwbc1462\hich\f0\dbch\f0\loch\f0\fs20\cell  \pard\intbl\ssparaaux0\s0\ql\plain\f0\fs24\plain\f0\fs20\ejsd8133\hich\f0\dbch\f0\loch\f0\fs20 observed\cell  \intbl\row  \pard\intbl\ssparaaux0\s0\fi-120\li120\ql\plain\f0\fs24{\*\bkmkstart [id number]}{\*\bkmkend bw5105930105}\plain\f0\fs20\nqrb1280\hich\f0\dbch\f0\loch\f0\fs20 \'b7 \plain\f1\fs20\udtk3169\hich\f1\dbch\f1\loch\f1\cf2\fs20\b Residue along the base of the   tongue\plain\f0\fs20\mkrw1858\hich\f0\dbch\f0\loch\f0\fs20\cell  \pard\intbl\ssparaaux0\s0\ql\plain\f0\fs24\plain\f0\fs20\gumr8159\hich\f0\dbch\f0\loch\f0\fs20 Trace\cell  \intbl\row  \pard\intbl\ssparaaux0\s0\fi-120\li120\ql\plain\f0\fs24{\*\bkmkstart pt3788825302}{\*\bkmkend [id number]}\plain\f0\fs20\pcmk6871\hich\f0\dbch\f0\loch\f0\fs20 \'b7 \plain\f1\fs20\mhgo6563\hich\f1\dbch\f1\loch\f1\cf2\fs20\b Residue in valleculae\plain\f0\fs20\nkwn7698\hich\f0\dbch\f0\loch\f0\fs20\cell  \pard\intbl\ssparaaux0\s0\ql\plain\f0\fs24\plain\f0\fs20\ysbs4224\hich\f0\dbch\f0\loch\f0\fs20 Moderate; Severe; reduced with successive swallows\cell  \intbl\row  \pard\intbl\ssparaaux0\s0\ql\plain\f0\fs24\plain\f0\fs20\obmz1229\hich\f0\dbch\f0\loch\f0\fs20\cell  \pard\intbl\ssparaaux0\s0\ql\plain\f0\fs24\plain\f1\fs20\hfop1908\hich\f1\dbch\f1\loch\f1\cf2\fs20\strike\plain\f0\fs20\obnz1970\hich\f0\dbch\f0\loch\f0\fs20\cell  \intbl\row  \pard\intbl\ssparaaux0\s0\fi-120\li120\ql\plain\f0\fs24{\*\bkmkstart [id number]}{\*\bkmkend [id number]}\plain\f0\fs20\kion6171\hich\f0\dbch\f0\loch\f0\fs20 \'b7 \plain\f1\fs20\wnlw4470\hich\f1\dbch\f1\loch\f1\cf2\fs20\b Residue of posterior pharyngeal   wall\plain\f0\fs20\hgbh8161\hich\f0\dbch\f0\loch\f0\fs20\cell  \pard\intbl\ssparaaux0\s0\ql\plain\f0\fs24\plain\f0\fs20\agbh4810\hich\f0\dbch\f0\loch\f0\fs20 Mild\cell  \intbl\row  \pard\intbl\ssparaaux0\s0\fi-120\li120\ql\plain\f0\fs24{\*\bkmkstart lj9618935437}{\*\bkmkend po9174406656}\plain\f0\fs20\bivq3014\hich\f0\dbch\f0\loch\f0\fs20 \'b7 \plain\f1\fs20\bomb8014\hich\f1\dbch\f1\loch\f1\cf2\fs20\b Residue in pyriform sinuses\plain\f0\fs20\hdhd9910\hich\f0\dbch\f0\loch\f0\fs20\cell  \pard\intbl\ssparaaux0\s0\ql\plain\f0\fs24\plain\f0\fs20\jprl5174\hich\f0\dbch\f0\loch\f0\fs20 Severe; reduced with successive swallows\cell  \intbl\row  \pard\intbl\ssparaaux0\s0\ql\plain\f0\fs24\plain\f0\fs20\ekqn8424\hich\f0\dbch\f0\loch\f0\fs20\cell  \pard\intbl\ssparaaux0\s0\ql\plain\f0\fs24\plain\f1\fs20\kctv3615\hich\f1\dbch\f1\loch\f1\cf2\fs20\strike\plain\f0\fs20\kiro5633\hich\f0\dbch\f0\loch\f0\fs20\cell  \intbl\row  \trowd\zbchve22\lastrow\aodoqyb39\trpaddfl3\kkqddvb87\trpaddfr3\trpaddt0\trpaddft3\trpaddb0\trpaddfb3\trleft0  \clvertalt\ivxzvm87\clpadft3\oxryki52\clpadfr3\clpadl0\clpadfl3\clpadb0\clpadfb3\fvmgv9758  \clvertalt\wllqyy33\clpadft3\vgtggw62\clpadfr3\clpadl0\clpadfl3\clpadb0\clpadfb3\gjrfv6680  \pard\intbl\ssparaaux0\s0\fi-120\li120\ql\plain\f0\fs24{\*\bkmkstart [id number]}{\*\bkmkend mt5407099400}\plain\f0\fs20\uewg1829\hich\f0\dbch\f0\loch\f0\fs20 \'b7 \plain\f1\fs20\aanp8822\hich\f1\dbch\f1\loch\f1\cf2\fs20\b Laryngeal penetration after   the swallow - silent\plain\f0\fs20\gbrq3299\hich\f0\dbch\f0\loch\f0\fs20\cell  \pard\intbl\ssparaaux0\s0\ql\plain\f0\fs24\plain\f0\fs20\auro9230\hich\f0\dbch\f0\loch\f0\fs20 observed\cell  \intbl\row  \pard\ssparaaux0\s0\ql\plain\f0\fs24\plain\f0\fs20\dmth4128\hich\f0\dbch\f0\loch\f0\fs20\par  \plain\f1\fs20\lryb6438\hich\f1\dbch\f1\loch\f1\cf2\fs20\b\ul{\field{\*\fldinst HYPERLINK [id number] }{\fldrslt Esophageal Phase:}}\plain\f0\fs20\fhsg7461\hich\f0\dbch\f0\loch\f0\fs20\ql\par  \trowd\xuxfob81\myuatzy54\trpaddfl3\ztptwwq67\trpaddfr3\trpaddt0\trpaddft3\trpaddb0\trpaddfb3\trleft0  \clvertalt\resewz34\clpadft3\qctrry11\clpadfr3\clpadl0\clpadfl3\clpadb0\clpadfb3\ojcog0191  \clvertalt\ramokg55\clpadft3\alxakz62\clpadfr3\clpadl0\clpadfl3\clpadb0\clpadfb3\wkvgz4536  \pard\intbl\ssparaaux0\s0\fi-120\li120\ql\plain\f0\fs24{\*\bkmkstart jv5581981196}{\*\bkmkend mk2765811198}\plain\f0\fs20\ieiv0543\hich\f0\dbch\f0\loch\f0\fs20 \'b7 \plain\f1\fs20\qzrf8933\hich\f1\dbch\f1\loch\f1\cf2\fs20\b Esophageal Stage\plain\f0\fs20\pdlj8654\hich\f0\dbch\f0\loch\f0\fs20\cell  \pard\intbl\ssparaaux0\s0\ql\plain\f0\fs24\plain\f0\fs20\cyld3744\hich\f0\dbch\f0\loch\f0\fs20 Fingerlike projection/? criscopharyngeal bar visualized below pyriform sinus\par  etrograde movement of PO to pyriform sinus & below level of UES noted\par  \par  Minimal progression of contrast beyond ~T-1\cell  \intbl\row  \trowd\endnyu62\lastrow\wwtpwpl09\trpaddfl3\gnvwgxt60\trpaddfr3\trpaddt0\trpaddft3\trpaddb0\trpaddfb3\trleft0  \clvertalt\zrhwxh98\clpadft3\ycmdeb21\clpadfr3\clpadl0\clpadfl3\clpadb0\clpadfb3\meelt7172  \clvertalt\puhazx33\clpadft3\hcuaau46\clpadfr3\clpadl0\clpadfl3\clpadb0\clpadfb3\jbfgp8049  \pard\intbl\ssparaaux0\s0\ql\plain\f0\fs24\plain\f0\fs20\rixz9714\hich\f0\dbch\f0\loch\f0\fs20\cell  \pard\intbl\ssparaaux0\s0\ql\plain\f0\fs24\plain\f1\fs20\fnmw4142\hich\f1\dbch\f1\loch\f1\cf2\fs20\strike\plain\f0\fs20\snam7948\hich\f0\dbch\f0\loch\f0\fs20\cell  \intbl\row  \pard\ssparaaux0\s0\ql\plain\f0\fs24\plain\f0\fs20\kkmc5914\hich\f0\dbch\f0\loch\f0\fs20\par  \plain\f1\fs20\sshb7820\hich\f1\dbch\f1\loch\f1\cf2\fs20\b\ul{\field{\*\fldinst HYPERLINK [id number] }{\fldrslt Rosenbek's Penetration Aspiration Scale:}}\plain\f0\fs20\keet4208\hich\f0\dbch\f0\loch\f0\fs20\ql\par  \trowd\isuuvh10\lastrow\eokdoov43\trpaddfl3\oggvuxx86\trpaddfr3\trpaddt0\trpaddft3\trpaddb0\trpaddfb3\trleft0  \clvertalt\seomey16\clpadft3\umyrjg01\clpadfr3\clpadl0\clpadfl3\clpadb0\clpadfb3\gzeoy5256  \clvertalt\srkqub31\clpadft3\nkbsib32\clpadfr3\clpadl0\clpadfl3\clpadb0\clpadfb3\ixbmv3664  \pard\intbl\ssparaaux0\s0\fi-120\li120\ql\plain\f0\fs24{\*\bkmkstart bh5664460588}{\*\bkmkend wj5163094346}\plain\f0\fs20\sydz2977\hich\f0\dbch\f0\loch\f0\fs20 \'b7 \plain\f1\fs20\sjay4746\hich\f1\dbch\f1\loch\f1\cf2\fs20\b Rosenbek's Penetration Aspiration   Scale\plain\f0\fs20\uctv1279\hich\f0\dbch\f0\loch\f0\fs20\cell  \pard\intbl\ssparaaux0\s0\ql\plain\f0\fs24\plain\f0\fs20\wiqx4236\hich\f0\dbch\f0\loch\f0\fs20 (5) contrast contacts vocal cords, visible residue remains (penetration)\cell  \intbl\row  \pard\ssparaaux0\s0\ql\plain\f0\fs24\plain\f0\fs20\xklb7743\hich\f0\dbch\f0\loch\f0\fs20\par  {\*\bkmkstart la6687432636}{\*\bkmkend uz7592211352}\plain\f1\fs20\psmi1386\hich\f1\dbch\f1\loch\f1\cf2\fs20\b\ul Recommendations:\plain\f0\fs20\lriz9115\hich\f0\dbch\f0\loch\f0\fs20  \par  \trowd\xjsvcq57\wqwdsyo25\trpaddfl3\eyfxxgw01\trpaddfr3\trpaddt0\trpaddft3\trpaddb0\trpaddfb3\trleft0  \clvertalt\zfibqc27\clpadft3\ugvhsl85\clpadfr3\clpadl0\clpadfl3\clpadb0\clpadfb3\mgekg5259  \clvertalt\rhapik70\clpadft3\xkseqh75\clpadfr3\clpadl0\clpadfl3\clpadb0\clpadfb3\lxnlk9105  \pard\intbl\ssparaaux0\s0\fi-120\li120\ql\plain\f0\fs24{\*\bkmkstart jt2485047624}{\*\bkmkend [id number]}\plain\f0\fs20\qytz8013\hich\f0\dbch\f0\loch\f0\fs20 \'b7 \plain\f1\fs20\thkc1480\hich\f1\dbch\f1\loch\f1\cf2\fs20\b Diagnostic Impressions\plain\f0\fs20\upvw1124\hich\f0\dbch\f0\loch\f0\fs20\cell  \pard\intbl\ssparaaux0\s0\ql\plain\f0\fs24\plain\f0\fs20\mhuh7715\hich\f0\dbch\f0\loch\f0\fs20 Oral stage of swallow grossly WFL for assessed PO trials of thin fluids & puree \par  Severe pharyngeal dysphagia for presented trials with absent epiglottic deflection, ? integrity of cricopharyngeus muscle, resulting in reduced UES opening. Pharyngeal stasis noted as outlined above, with resultant silent penetration to level of vocal   folds during & after the swallow. Incorporation of trialed postures did not improve swallow profile, however stasis did reduce with successive swallows. Minimal progression of material was observed beyond UES. \par  Esophageal phase noted for ongoing retention/retrograde progression of presented trials to level of pyriform sinus & below level of UES. \par  A ? cricopharyngeal bar/fingerlike projection was visualized below level of pyriform sinus.\cell  \intbl\row  \trowd\jzjajl65\oemogeg09\trpaddfl3\zzuiyaj12\trpaddfr3\trpaddt0\trpaddft3\trpaddb0\trpaddfb3\trleft0  \clvertalt\etwpqc68\clpadft3\shavah17\clpadfr3\clpadl0\clpadfl3\clpadb0\clpadfb3\rxilz6550  \clvertalt\fpthzh57\clpadft3\eojbzt83\clpadfr3\clpadl0\clpadfl3\clpadb0\clpadfb3\kobaw5293  \pard\intbl\ssparaaux0\s0\ql\plain\f0\fs24\plain\f0\fs20\rect0138\hich\f0\dbch\f0\loch\f0\fs20\cell  \pard\intbl\ssparaaux0\s0\ql\plain\f0\fs24\plain\f1\fs20\twmi1205\hich\f1\dbch\f1\loch\f1\cf2\fs20\strike\plain\f0\fs20\kxuh5194\hich\f0\dbch\f0\loch\f0\fs20\cell  \intbl\row  \pard\intbl\ssparaaux0\s0\fi-120\li120\ql\plain\f0\fs24{\*\bkmkstart [id number]}{\*\bkmkend nz8417328790}\plain\f0\fs20\llau4868\hich\f0\dbch\f0\loch\f0\fs20 \'b7 \plain\f1\fs20\brcb6151\hich\f1\dbch\f1\loch\f1\cf2\fs20\b Recommended Consistencies\plain\f0\fs20\xius1046\hich\f0\dbch\f0\loch\f0\fs20\cell  \pard\intbl\ssparaaux0\s0\ql\plain\f0\fs24\plain\f0\fs20\xcbq4406\hich\f0\dbch\f0\loch\f0\fs20 NPO: short-term non-oral means of nutrition/hydration, as per pt/family wishes\cell  \intbl\row  \pard\intbl\ssparaaux0\s0\fi-120\li120\ql\plain\f0\fs24{\*\bkmkstart mp8944199060}{\*\bkmkend ht1090033144}\plain\f0\fs20\bnak1245\hich\f0\dbch\f0\loch\f0\fs20 \'b7 \plain\f1\fs20\bitq1746\hich\f1\dbch\f1\loch\f1\cf2\fs20\b Aspiration Precautions\plain\f0\fs20\vljm0626\hich\f0\dbch\f0\loch\f0\fs20\cell  \pard\intbl\ssparaaux0\s0\ql\plain\f0\fs24\plain\f0\fs20\qtjk7590\hich\f0\dbch\f0\loch\f0\fs20 yes\cell  \intbl\row  \pard\intbl\ssparaaux0\s0\fi-120\li120\ql\plain\f0\fs24{\*\bkmkstart [id number]}{\*\bkmkend [id number]}\plain\f0\fs20\ujcn3871\hich\f0\dbch\f0\loch\f0\fs20 \'b7 \plain\f1\fs20\jjhh2829\hich\f1\dbch\f1\loch\f1\cf2\fs20\b Monitor for Signs of Aspiration\plain\f0\fs20\ejdd8981\hich\f0\dbch\f0\loch\f0\fs20\cell  \pard\intbl\ssparaaux0\s0\ql\plain\f0\fs24\plain\f0\fs20\tlbv5338\hich\f0\dbch\f0\loch\f0\fs20 change of breathing pattern; oral hygiene; position upright (90Y); cough; gurgly voice; fever; pneumonia; throat clearing; upper respiratory infection\cell  \intbl\row  \pard\intbl\ssparaaux0\s0\fi-120\li120\ql\plain\f0\fs24{\*\bkmkstart [id number]}{\*\bkmkend [id number]}\plain\f0\fs20\caxb9393\hich\f0\dbch\f0\loch\f0\fs20 \'b7 \plain\f1\fs20\agaj4831\hich\f1\dbch\f1\loch\f1\cf2\fs20\b Demonstrates Need for Referral   to Another Service\plain\f0\fs20\hcem0191\hich\f0\dbch\f0\loch\f0\fs20\cell  \pard\intbl\ssparaaux0\s0\ql\plain\f0\fs24\plain\f0\fs20\tuoe0727\hich\f0\dbch\f0\loch\f0\fs20 GI; To further assess esophageal integrity, psych consult\cell  \intbl\row  \pard\intbl\ssparaaux0\s0\fi-120\li120\ql\plain\f0\fs24{\*\bkmkstart [id number]}{\*\bkmkend [id number]}\plain\f0\fs20\zgmw5687\hich\f0\dbch\f0\loch\f0\fs20 \'b7 \plain\f1\fs20\nanm0609\hich\f1\dbch\f1\loch\f1\cf2\fs20\b Additional Recommendations\plain\f0\fs20\pees6991\hich\f0\dbch\f0\loch\f0\fs20\cell  \pard\intbl\ssparaaux0\s0\ql\plain\f0\fs24\plain\f0\fs20\uums1004\hich\f0\dbch\f0\loch\f0\fs20 Follow-up with team with regards to etiology of dysphagia (CT soft tissue completed & unremarkable)\par  Consider further neuro work-up (Head CT completed)\cell  \intbl\row  \trowd\mgwacg84\lastrow\dhircsx04\trpaddfl3\vgvxybh84\trpaddfr3\trpaddt0\trpaddft3\trpaddb0\trpaddfb3\trleft0  \clvertalt\seqcnk47\clpadft3\mujlib83\clpadfr3\clpadl0\clpadfl3\clpadb0\clpadfb3\pzdbn1918  \clvertalt\adneqd94\clpadft3\raarvq65\clpadfr3\clpadl0\clpadfl3\clpadb0\clpadfb3\dqjrs7270  \pard\intbl\ssparaaux0\s0\fi-120\li120\ql\plain\f0\fs24{\*\bkmkstart [id number]}{\*\bkmkend [id number]}\plain\f0\fs20\imxk4295\hich\f0\dbch\f0\loch\f0\fs20 \'b7 \plain\f1\fs20\xrsg0662\hich\f1\dbch\f1\loch\f1\cf2\fs20\b The above findings were discussed   with\plain\f0\fs20\pdkj4313\hich\f0\dbch\f0\loch\f0\fs20\cell  \pard\intbl\ssparaaux0\s0\ql\plain\f0\fs24\plain\f0\fs20\rjiw1907\hich\f0\dbch\f0\loch\f0\fs20 Physician; Nursing; Patient; Family\cell  \intbl\row  \pard\ssparaaux0\s0\ql\plain\f0\fs24\plain\f0\fs20\zzoz2012\hich\f0\dbch\f0\loch\f0\fs20\par  \par  {\*\bkmkstart bkClinDocSignatures}{\*\bkmkend bkClinDocSignatures}{\*\bkmkstart bkcommentSBK}{\*\bkmkend bkcommentSBK}\plain\f1\fs20\mlbx1948\hich\f1\dbch\f1\loch\f1\cf2\fs20\b Electronic Signatures:\plain\f0\fs20\ccgi1270\hich\f0\dbch\f0\loch\f0\fs20\par  \plain\f1\fs20\eftj2902\hich\f1\dbch\f1\loch\f1\cf2\fs20\b\ul Caren Dean (Speech Pathologist)\plain\f0\fs20\ddfp5066\hich\f0\dbch\f0\loch\f0\fs20   \plain\f1\fs18\vqru2096\hich\f1\dbch\f1\loch\f1\cf2\fs18 (Signed 16-Sep-2022 13:08)\par  \fi-360\li720\plain\f0\fs24\plain\f1\fs18\qhhv4542\hich\f1\dbch\f1\loch\f1\cf2\fs18\tab\plain\f1\fs20\bery5249\hich\f1\dbch\f1\loch\f1\cf2\fs20\b\i Authored: \plain\f1\fs20\jzfj0717\hich\f1\dbch\f1\loch\f1\cf2\fs20\i Swallow VFSS/MBS Assessment Adult,   Recommendations\plain\f0\fs20\afda3153\hich\f0\dbch\f0\loch\f0\fs20\par  \fi0\li0\plain\f0\fs24\plain\f0\fs20\ioah6765\hich\f0\dbch\f0\loch\f0\fs20\par  \par  \plain\f1\fs20\ruis5495\hich\f1\dbch\f1\loch\f1\cf2\fs20\b\i Last Updated: \plain\f1\fs20\vkaw5265\hich\f1\dbch\f1\loch\f1\cf2\fs20\i 16-Sep-2022 13:08 by Caren Dean (Speech Pathologist)\plain\f0\fs20\yhuu8077\hich\f0\dbch\f0\loch\f0\fs20\par  \par  }

## 2024-02-23 NOTE — CONSULT NOTE ADULT - ASSESSMENT
This is a 78M with anxiety, HTN, HLD, CKD, plaque psoriasis, DM2, progressive dysphagia who presents with burning substernal chest pain at rest that started 1wk ago but acutely progressed this morning to sharp pain with radiation to L arm with associated multiple bouts of diarrhea. NSTEMI. GI consulted for PEG placement .    EGD 4/2022 report was provided by the patient's family and reviewed. EGD performed by Dr. Cheikh John Paul Ba with a normal esophagus and stomach Gastric biopsies were obtained and per patient found to be positive for a bacteria (presumably h pylori) for which he was treated with antibiotics and stool testing upon completion confirmed eradications. Colonoscopy also performed at that time notable for small polyps.     CT Neck in 9/23 with no acute findings.   ENT consult 9/22 s/p laryngoscopy WNL, no obstructing anatomy    # Oropharyngeal Dysphagia  - 2/20/24 Patient failed swallow evalwith recommendation of PEG vs pleasure feeds.   - Patient will needs a cardiac risk assessment for PEG placement  - Patient will need Plavix to be held for 5 days for PEG placement; okay for heparin gtt  - Will need to reach out to family to discuss if they are in agreement with PEG placement  - Will tentatively plan for PEG placement next Wednesday/Thursday  - Maintain strict aspiration precautions   - Active T&S, INR < 1.5, platelet > 50 pre-procedure  _________________________________________________________________  Assessment and recommendations are final when note is signed by the attending physician.  This is a 78M with anxiety, HTN, HLD, CKD, plaque psoriasis, DM2, progressive dysphagia who presents with burning substernal chest pain at rest that started 1wk ago but acutely progressed this morning to sharp pain with radiation to L arm with associated multiple bouts of diarrhea. NSTEMI. GI consulted for PEG placement .    EGD 4/2022 report was provided by the patient's family and reviewed. EGD performed by Dr. Cheikh John Paul Ba with a normal esophagus and stomach Gastric biopsies were obtained and per patient found to be positive for a bacteria (presumably h pylori) for which he was treated with antibiotics and stool testing upon completion confirmed eradications. Colonoscopy also performed at that time notable for small polyps.     CT Neck in 9/23 with no acute findings.   ENT consult 9/22 s/p laryngoscopy WNL, no obstructing anatomy    # Oropharyngeal Dysphagia  - 2/20/24 Patient failed swallow eval with recommendation of PEG vs pleasure feeds.   - WBC 13.16, remains afebrile, monitor for fever  - Patient will needs a cardiac risk assessment for PEG placement  - Patient will need Plavix to be held for 5 days for PEG placement; okay for heparin gtt  - Will need to reach out to family to discuss if they are in agreement with PEG placement  - Will tentatively plan for PEG placement next Wednesday/Thursday  - Maintain strict aspiration precautions   - Active T&S, INR < 1.5, platelet > 50 pre-procedure  _________________________________________________________________  Assessment and recommendations are final when note is signed by the attending physician.  This is a 78M with anxiety, HTN, HLD, CKD, plaque psoriasis, DM2, progressive dysphagia who presents with burning substernal chest pain at rest that started 1wk ago but acutely progressed this morning to sharp pain with radiation to L arm with associated multiple bouts of diarrhea. NSTEMI. GI consulted for PEG placement .    EGD 4/2022 report was provided by the patient's family and reviewed. EGD performed by Dr. Cheikh John Paul Ba with a normal esophagus and stomach Gastric biopsies were obtained and per patient found to be positive for a bacteria (presumably h pylori) for which he was treated with antibiotics and stool testing upon completion confirmed eradications. Colonoscopy also performed at that time notable for small polyps.     CT Neck in 9/23 with no acute findings.   ENT consult 9/22 s/p laryngoscopy WNL, no obstructing anatomy    # Oropharyngeal Dysphagia  - 2/20/24 Patient failed swallow eval with recommendation of PEG vs pleasure feeds.   - WBC 13.16, remains afebrile, monitor for fever  - Patient will needs a cardiac risk assessment for PEG placement  - Patient will need Plavix to be held for 5 days for PEG placement; okay for heparin gtt  - Will tentatively plan for PEG placement next Wednesday/Thursday  - Maintain strict aspiration precautions   - Active T&S, INR < 1.5, platelet > 50 pre-procedure  _________________________________________________________________  Assessment and recommendations are final when note is signed by the attending physician.

## 2024-02-23 NOTE — PROGRESS NOTE ADULT - SUBJECTIVE AND OBJECTIVE BOX
[facility]                                                         CARDIOLOGY AT [address], [place] [address]                                                         Telephone: [phone]. Fax:[phone]                                                                             PROGRESS NOTE    Reason for follow up: Frances-operative Cardiac Risk Stratification  Update: Patient is now amenable to PEG placement for which cardiac risk stratification is requested. Patient states he also wants to have PCI and stents placed now. Patient states he feels congested, but no chest pain or dyspnea.       Review of symptoms:   Cardiac:  No chest pain. No dyspnea. No palpitations.  Respiratory: no cough. No dyspnea  Gastrointestinal: No diarrhea. No abdominal pain. No bleeding.   Neuro: No focal neuro complaints.    Vitals:  T(C): 36.8 (02-23-24 @ 08:07), Max: 36.8 (02-23-24 @ 08:07)  HR: 79 (02-23-24 @ 08:07) (71 - 86)  BP: 122/65 (02-23-24 @ 08:07) (121/71 - 128/62)  RR: 18 (02-23-24 @ 08:07) (17 - 19)  SpO2: 94% (02-23-24 @ 08:07) (94% - 100%)  Wt(kg): --  I&O's Summary    22 Feb 2024 07:01  -  23 Feb 2024 07:00  --------------------------------------------------------  IN: 350 mL / OUT: 850 mL / NET: -500 mL      PHYSICAL EXAM:  Appearance: Comfortable. No acute distress  HEENT:  Atraumatic. Normocephalic.  Normal oral mucosa  Neurologic: A & O x 3, no gross focal deficits.  Cardiovascular: RRR S1 S2, No murmur, no rubs/gallops. No JVD  Respiratory: Lungs clear to auscultation, unlabored   Gastrointestinal:  Soft, Non-tender, + BS  Lower Extremities: 2+ Peripheral Pulses, No clubbing, cyanosis, or edema  Psychiatry: Patient is calm. No agitation.   Skin: warm and dry.    CURRENT CARDIAC MEDICATIONS:  hydrALAZINE 10 milliGRAM(s) Oral three times a day  metoprolol tartrate 12.5 milliGRAM(s) Enteral Tube every 12 hours      CURRENT OTHER MEDICATIONS:  acetaminophen     Tablet .. 650 milliGRAM(s) Oral every 6 hours PRN Temp greater or equal to 38C (100.4F)  ARIPiprazole 5 milliGRAM(s) Oral daily  busPIRone 10 milliGRAM(s) Oral <User Schedule>  clonazePAM  Tablet 1 milliGRAM(s) Oral daily PRN for anxiety  QUEtiapine 50 milliGRAM(s) Oral at bedtime  sertraline 100 milliGRAM(s) Oral daily  pantoprazole  Injectable 40 milliGRAM(s) IV Push daily  atorvastatin 80 milliGRAM(s) Oral at bedtime  dextrose 50% Injectable 25 Gram(s) IV Push once, Stop order after: 1 Doses  dextrose 50% Injectable 25 Gram(s) IV Push once, Stop order after: 1 Doses  dextrose 50% Injectable 12.5 Gram(s) IV Push once, Stop order after: 1 Doses  insulin glargine Injectable (LANTUS) 15 Unit(s) SubCutaneous every morning  insulin lispro (ADMELOG) corrective regimen sliding scale   SubCutaneous every 6 hours  aspirin  chewable 81 milliGRAM(s) Oral daily  chlorhexidine 2% Cloths 1 Application(s) Topical <User Schedule>  clopidogrel Tablet 75 milliGRAM(s) Enteral Tube daily  heparin   Injectable 5000 Unit(s) SubCutaneous every 8 hours  influenza  Vaccine (HIGH DOSE) 0.7 milliLiter(s) IntraMuscular once  sodium chloride 0.9% lock flush 10 milliLiter(s) IV Push every 1 hour PRN Pre/post blood products, medications, blood draw, and to maintain line patency  triamcinolone 0.1% Cream 1 Application(s) Topical two times a day      LABS:	 	                            10.4   13.16 )-----------( 408      ( 23 Feb 2024 09:14 )             35.1     02-23    151<H>  |  106  |  97.2<H>  ----------------------------<  133<H>  4.2   |  32.0<H>  |  1.99<H>    Ca    9.6      23 Feb 2024 09:14  Phos  3.9     02-22  Mg     2.6     02-23      PT/INR/PTT ( 16 Feb 2024 04:35 )                       :                       :      12.2         :       25.5                  .        .                   .              .           .       1.10        .                                       Lipid Profile: Date: 02-13 @ 11:15  Total cholesterol 179; Direct LDL: --; HDL: 49; Triglycerides:94  Date: 02-13 @ 03:08  Total cholesterol 183; Direct LDL: --; HDL: 48; Triglycerides:76    HgA1c:   TSH:     TELEMETRY:   ECG:    DIAGNOSTIC TESTING:  [ ] Echocardiogram:   < from: TTE Limited W or WO Ultrasound Enhancing Agent (02.13.24 @ 21:29) >  CONCLUSIONS:      1. Left ventricular systolic function is severely decreased with an ejection fraction visually estimated at 25 to 30 %.   2. Normal right ventricular cavity size and normal systolic function.   3. Estimated pulmonary artery systolic pressure is 44 mmHg.   4. Trace pericardial effusion noted adjacent to the right ventricle.      < end of copied text >    [ ]  Catheterization:    < from: Cardiac Catheterization (02.14.24 @ 13:28) >  Diagnostic Findings:     Coronary Angiography   The coronary circulation is right dominant. Cardiac catheterization  was performed electively.    LM   Left main artery: The segment is normal sized and severely calcified.  Proximal left main: There is a 60 % stenosis.    LAD   Left anterior descending artery: The segment is normal sized and  moderately calcified. Proximal left anterior descending: There  is a 70 % stenosis. Mid left anterior descending: There is an 80 %  stenosis. First diagonal: There is a 100 % stenosis.    CX   Circumflex: The segment is normal sized. Proximal circumflex: There is  a 100 % stenosis.    RCA   Right coronary artery: The segment is large and severely calcified.  Distal right coronary artery: There is a 90 % stenosis. Right  Posterolateral Segment: There is a 90 % stenosis. Right posterior  descending artery: There is a 90 % stenosis.    Ramus   Ramus intermedius: both upper and lower poles. There is a 90 %  stenosis.    < end of copied text >  [ ] Stress Test:    OTHER:

## 2024-02-23 NOTE — PROGRESS NOTE ADULT - PROBLEM SELECTOR PLAN 1
- RCRI 11% risk of major cardiac event.   - Patient with multivessel CAD as well as HFrEF that puts the patient at high risk for anesthesia.   - Benefits of the procedure may outweigh the risks, so no absolute cardiac contraindication to the proposed procedure. However would try to use local anesthesia if at all possible.    - Continue on telemetry monitoring matt-procedurally.   - Patient did not undergo PCI, so can hold Plavix and continue aspirin for PEG placement.

## 2024-02-24 LAB
ALBUMIN SERPL ELPH-MCNC: 3.6 G/DL — SIGNIFICANT CHANGE UP (ref 3.3–5.2)
ALP SERPL-CCNC: 124 U/L — HIGH (ref 40–120)
ALT FLD-CCNC: 12 U/L — SIGNIFICANT CHANGE UP
ANION GAP SERPL CALC-SCNC: 15 MMOL/L — SIGNIFICANT CHANGE UP (ref 5–17)
AST SERPL-CCNC: 18 U/L — SIGNIFICANT CHANGE UP
BILIRUB SERPL-MCNC: 0.4 MG/DL — SIGNIFICANT CHANGE UP (ref 0.4–2)
BUN SERPL-MCNC: 92.7 MG/DL — HIGH (ref 8–20)
CALCIUM SERPL-MCNC: 9.4 MG/DL — SIGNIFICANT CHANGE UP (ref 8.4–10.5)
CHLORIDE SERPL-SCNC: 104 MMOL/L — SIGNIFICANT CHANGE UP (ref 96–108)
CO2 SERPL-SCNC: 29 MMOL/L — SIGNIFICANT CHANGE UP (ref 22–29)
CREAT SERPL-MCNC: 1.91 MG/DL — HIGH (ref 0.5–1.3)
EGFR: 35 ML/MIN/1.73M2 — LOW
GLUCOSE BLDC GLUCOMTR-MCNC: 167 MG/DL — HIGH (ref 70–99)
GLUCOSE BLDC GLUCOMTR-MCNC: 198 MG/DL — HIGH (ref 70–99)
GLUCOSE BLDC GLUCOMTR-MCNC: 199 MG/DL — HIGH (ref 70–99)
GLUCOSE BLDC GLUCOMTR-MCNC: 226 MG/DL — HIGH (ref 70–99)
GLUCOSE BLDC GLUCOMTR-MCNC: 233 MG/DL — HIGH (ref 70–99)
GLUCOSE SERPL-MCNC: 199 MG/DL — HIGH (ref 70–99)
HCT VFR BLD CALC: 34.5 % — LOW (ref 39–50)
HGB BLD-MCNC: 10.2 G/DL — LOW (ref 13–17)
MCHC RBC-ENTMCNC: 26.4 PG — LOW (ref 27–34)
MCHC RBC-ENTMCNC: 29.6 GM/DL — LOW (ref 32–36)
MCV RBC AUTO: 89.1 FL — SIGNIFICANT CHANGE UP (ref 80–100)
PLATELET # BLD AUTO: 435 K/UL — HIGH (ref 150–400)
POTASSIUM SERPL-MCNC: 4.7 MMOL/L — SIGNIFICANT CHANGE UP (ref 3.5–5.3)
POTASSIUM SERPL-SCNC: 4.7 MMOL/L — SIGNIFICANT CHANGE UP (ref 3.5–5.3)
PROT SERPL-MCNC: 7.1 G/DL — SIGNIFICANT CHANGE UP (ref 6.6–8.7)
RBC # BLD: 3.87 M/UL — LOW (ref 4.2–5.8)
RBC # FLD: 15 % — HIGH (ref 10.3–14.5)
SODIUM SERPL-SCNC: 148 MMOL/L — HIGH (ref 135–145)
WBC # BLD: 14.89 K/UL — HIGH (ref 3.8–10.5)
WBC # FLD AUTO: 14.89 K/UL — HIGH (ref 3.8–10.5)

## 2024-02-24 PROCEDURE — 99232 SBSQ HOSP IP/OBS MODERATE 35: CPT

## 2024-02-24 PROCEDURE — 71045 X-RAY EXAM CHEST 1 VIEW: CPT | Mod: 26

## 2024-02-24 RX ORDER — LANOLIN ALCOHOL/MO/W.PET/CERES
3 CREAM (GRAM) TOPICAL AT BEDTIME
Refills: 0 | Status: DISCONTINUED | OUTPATIENT
Start: 2024-02-24 | End: 2024-03-06

## 2024-02-24 RX ORDER — METOPROLOL TARTRATE 50 MG
12.5 TABLET ORAL
Refills: 0 | Status: DISCONTINUED | OUTPATIENT
Start: 2024-02-24 | End: 2024-03-06

## 2024-02-24 RX ADMIN — ATORVASTATIN CALCIUM 80 MILLIGRAM(S): 80 TABLET, FILM COATED ORAL at 21:44

## 2024-02-24 RX ADMIN — CHLORHEXIDINE GLUCONATE 1 APPLICATION(S): 213 SOLUTION TOPICAL at 05:25

## 2024-02-24 RX ADMIN — Medication 4: at 17:51

## 2024-02-24 RX ADMIN — Medication 10 MILLIGRAM(S): at 12:31

## 2024-02-24 RX ADMIN — Medication 12.5 MILLIGRAM(S): at 05:23

## 2024-02-24 RX ADMIN — INSULIN GLARGINE 15 UNIT(S): 100 INJECTION, SOLUTION SUBCUTANEOUS at 08:46

## 2024-02-24 RX ADMIN — Medication 1 APPLICATION(S): at 05:24

## 2024-02-24 RX ADMIN — HEPARIN SODIUM 5000 UNIT(S): 5000 INJECTION INTRAVENOUS; SUBCUTANEOUS at 21:44

## 2024-02-24 RX ADMIN — PANTOPRAZOLE SODIUM 40 MILLIGRAM(S): 20 TABLET, DELAYED RELEASE ORAL at 08:45

## 2024-02-24 RX ADMIN — Medication 4: at 00:15

## 2024-02-24 RX ADMIN — Medication 81 MILLIGRAM(S): at 08:46

## 2024-02-24 RX ADMIN — QUETIAPINE FUMARATE 50 MILLIGRAM(S): 200 TABLET, FILM COATED ORAL at 21:44

## 2024-02-24 RX ADMIN — Medication 3 MILLIGRAM(S): at 21:44

## 2024-02-24 RX ADMIN — ARIPIPRAZOLE 5 MILLIGRAM(S): 15 TABLET ORAL at 08:46

## 2024-02-24 RX ADMIN — Medication 10 MILLIGRAM(S): at 08:46

## 2024-02-24 RX ADMIN — Medication 2: at 05:34

## 2024-02-24 RX ADMIN — Medication 12.5 MILLIGRAM(S): at 17:52

## 2024-02-24 RX ADMIN — HEPARIN SODIUM 5000 UNIT(S): 5000 INJECTION INTRAVENOUS; SUBCUTANEOUS at 12:31

## 2024-02-24 RX ADMIN — Medication 10 MILLIGRAM(S): at 21:44

## 2024-02-24 RX ADMIN — Medication 1 APPLICATION(S): at 17:52

## 2024-02-24 RX ADMIN — Medication 2: at 12:31

## 2024-02-24 RX ADMIN — Medication 10 MILLIGRAM(S): at 05:23

## 2024-02-24 RX ADMIN — HEPARIN SODIUM 5000 UNIT(S): 5000 INJECTION INTRAVENOUS; SUBCUTANEOUS at 05:23

## 2024-02-24 RX ADMIN — SERTRALINE 100 MILLIGRAM(S): 25 TABLET, FILM COATED ORAL at 08:46

## 2024-02-24 NOTE — PROGRESS NOTE ADULT - SUBJECTIVE AND OBJECTIVE BOX
[facility]                                                         CARDIOLOGY AT [facility]                                                                  [address] [facility], [place] [place] [address]                                                         Telephone: [phone]. Fax:[phone]                                                                             PROGRESS NOTE    Reason for follow up: Follow up on CAD  Update: Patient is agreeing to feeding tube and stents now  Lungs sound congested but CXR napd  full report pending  On 1:1 observation because he is trying to pull out NGT    Review of symptoms:   Cardiac:  No chest pain. No dyspnea. No palpitations.  Respiratory: ++ cough. No dyspnea  Gastrointestinal: No diarrhea. No abdominal pain. No bleeding.   Neuro: No focal neuro complaints.      Vitals:  T(C): 36.7 (02-24-24 @ 08:04), Max: 36.7 (02-23-24 @ 16:22)  HR: 77 (02-24-24 @ 08:04) (76 - 88)  BP: 114/70 (02-24-24 @ 08:04) (102/64 - 123/67)  RR: 18 (02-24-24 @ 08:04) (18 - 18)  SpO2: 100% (02-24-24 @ 08:04) (93% - 100%)  Wt(kg): --  I&O's Summary    23 Feb 2024 07:01  -  24 Feb 2024 07:00  --------------------------------------------------------  IN: 1100 mL / OUT: 1100 mL / NET: 0 mL    PHYSICAL EXAM:  Appearance: Thin chronically ill appearing  HEENT:  Atraumatic. Normocephalic.  Dry oral mucosa  Neurologic: A & O x 3, difficulty understanding patients needs  Cardiovascular: RRR S1 S2, No murmur, no rubs/gallops. No JVD  Respiratory: Lungs rhonchi bilaterally  Gastrointestinal:  Soft, Non-tender, + BS  Lower Extremities: No edema  Psychiatry: Patient is calm. No agitation.   Skin: warm and dry.      CURRENT MEDICATIONS:  MEDICATIONS  (STANDING):  ARIPiprazole 5 milliGRAM(s) Oral daily  aspirin  chewable 81 milliGRAM(s) Oral daily  atorvastatin 80 milliGRAM(s) Oral at bedtime  busPIRone 10 milliGRAM(s) Oral <User Schedule>  heparin   Injectable 5000 Unit(s) SubCutaneous every 8 hours  hydrALAZINE 10 milliGRAM(s) Oral three times a day  influenza  Vaccine (HIGH DOSE) 0.7 milliLiter(s) IntraMuscular once  insulin glargine Injectable (LANTUS) 15 Unit(s) SubCutaneous every morning  insulin lispro (ADMELOG) corrective regimen sliding scale   SubCutaneous every 6 hours  metoprolol succinate ER 12.5 milliGRAM(s) Oral two times a day  pantoprazole  Injectable 40 milliGRAM(s) IV Push daily  QUEtiapine 50 milliGRAM(s) Oral at bedtime  sertraline 100 milliGRAM(s) Oral daily  triamcinolone 0.1% Cream 1 Application(s) Topical two times a day      LABS:	 	              10.2   14.89 )-----------( 435      ( 24 Feb 2024 05:46 )             34.5     02-24    148<H>  |  104  |  92.7<H>  ----------------------------<  199<H>  4.7   |  29.0  |  1.91<H>    Ca    9.4      24 Feb 2024 05:46  Mg     2.6     02-23    TPro  7.1  /  Alb  3.6  /  TBili  0.4  /  DBili  x   /  AST  18  /  ALT  12  /  AlkPhos  124<H>  02-24    TELEMETRY: NSR in 70s  DIAGNOSTIC TESTING:  [ ] Echocardiogram: < from: TTE Limited W or WO Ultrasound Enhancing Agent (02.13.24 @ 21:29) >      1. Left ventricular systolic function is severely decreased with an ejection fraction visually estimated at 25 to 30 %.   2. Normal right ventricular cavity size and normal systolic function.   3. Estimated pulmonary artery systolic pressure is 44 mmHg.   4. Trace pericardial effusion noted adjacent to the right ventricle.    Catheterization:  < from: Cardiac Catheterization (02.14.24 @ 13:28) >  Coronary Angiography   The coronary circulation is right dominant. Cardiac catheterization  was performed electively.  LM   Left main artery: The segment is normal sized and severely calcified.  Proximal left main: There is a 60 % stenosis.  LAD   Left anterior descending artery: The segment is normal sized and  moderately calcified. Proximal left anterior descending: There  is a 70 % stenosis. Mid left anterior descending: There is an 80 %  stenosis. First diagonal: There is a 100 % stenosis.  CX   Circumflex: The segment is normal sized. Proximal circumflex: There is  a 100 % stenosis.  RCA   Right coronary artery: The segment is large and severely calcified.  Distal right coronary artery: There is a 90 % stenosis. Right  Posterolateral Segment: There is a 90 % stenosis. Right posterior  descending artery: There is a 90 % stenosis.  Ramus   Ramus intermedius: both upper and lower poles. There is a 90 %  stenosis.

## 2024-02-24 NOTE — PROGRESS NOTE ADULT - ASSESSMENT
78 y/oM PMH of HTN, HLD, CKD, plaque psoriasis, DM-2 who came to the ED (02/12/24) complaining of chest pain radiating to left arm associated with multiple episodes of diarrhea.  On ED arrival, afebrile, hypotensive to SBP 80-90s despite 1L IVF requiring Levophed. EKG with nonspecific ST changes, troponin 2344->2477. Labs otherwise notable for K 5.6, BUN/Cr 43.5/2.31 (baseline Cr ~1 in 2022), BNP 27654, lactate 6.2. CXR with pulmonary edema. Placed on BiPAP ,given Lasix 40mg. TTE with EF<20%, multiple segmental abnormalities, mod MR, mod TR, mod pericardial effusion without tamponade; started on Heparin drip for NSTEMI, cardiology consulted. Admitted to MICU for further management of cardiogenic shock. On 02/14/24: patient became obtunded, febrile, intubated for airway protection. Interventional cardiology also on board; S/P LHC: Severe multi-vessel CAD including LM, CABG recommended. CT surgery deemed patient poor candidate for CABG. HF team also following; s/p RHC: shows low filling pressures with normal cardiac output. Nephrology following for WES. Patient extubated (02/18/24); NG tube in place. Palliative team following, patient DNR/DNI. Patient now downgraded to medical floor 2/19    #NSTEMI   -s/p Heparin drip   -S/p LHC: severe multiple-vessel CAD, not a candidate for CABG   -Plavix 75mg   -Asprin 81mg   -Atorvastatin 80mg   -Cardiology following   -Pt declining PCI, also reported as high risk by cardio     #Acute Systolic CHF  -s/p Bumex and Lasix drip   -HF following   -Echo done   -S/p RHC  -diuretics on hold as he looks dry     #Cardiogenic shock   -s/p Levophed and Dobutamine   -Monitor BP, hold it well     #Acute metabolic encephalopathy , improved  hx dysphagia  Requiring intubation for airway protection   -Now s/p extubation 2/18  -Aspiration precautions  -had been recommended for PEG in the past, declined previously  -SLP rec NPO  -cont TF for now via NGT  with free water ; reported partial dislodging o/n; f/u repeat cxr 2/22   -he was refusing peg, now wanted it  -Plavix to be held 5 days before proceeding with procedure  -cardio consulted as per the ok to d/c plavix for procedure   -his white count is going up, will monitor CBC and fever.       #WES on CKD:  -Nephrology following   -Monitor renal function   -creatinine 1.9    #DM-2  -Lantus 15 units AM   -Insulin sliding scale     #Depression:    -Buspirone 10mg   -Aripiprazole 5mg   -Seroquel 50mg HS   -Sertraline 100mg     DVT prophylaxis: Heparin sc    Palliative following     dispo: [place] vs home w/assistance vs home hospice pending pt and pt's family decision

## 2024-02-24 NOTE — PROGRESS NOTE ADULT - SUBJECTIVE AND OBJECTIVE BOX
[facility] Division of Hospital Medicine     SUBJECTIVE / OVERNIGHT EVENTS:    Patient c/o NP cough. Denies chest pain, SOB, abd pain, N/V, fever, chills, dysuria or any other complaints. All remainder ROS negative.     MEDICATIONS  (STANDING):  ARIPiprazole 5 milliGRAM(s) Oral daily  aspirin  chewable 81 milliGRAM(s) Oral daily  atorvastatin 80 milliGRAM(s) Oral at bedtime  busPIRone 10 milliGRAM(s) Oral <User Schedule>  chlorhexidine 2% Cloths 1 Application(s) Topical <User Schedule>  dextrose 50% Injectable 25 Gram(s) IV Push once  dextrose 50% Injectable 25 Gram(s) IV Push once  dextrose 50% Injectable 12.5 Gram(s) IV Push once  heparin   Injectable 5000 Unit(s) SubCutaneous every 8 hours  hydrALAZINE 10 milliGRAM(s) Oral three times a day  influenza  Vaccine (HIGH DOSE) 0.7 milliLiter(s) IntraMuscular once  insulin glargine Injectable (LANTUS) 15 Unit(s) SubCutaneous every morning  insulin lispro (ADMELOG) corrective regimen sliding scale   SubCutaneous every 6 hours  metoprolol succinate ER 12.5 milliGRAM(s) Oral two times a day  pantoprazole  Injectable 40 milliGRAM(s) IV Push daily  QUEtiapine 50 milliGRAM(s) Oral at bedtime  sertraline 100 milliGRAM(s) Oral daily  triamcinolone 0.1% Cream 1 Application(s) Topical two times a day    MEDICATIONS  (PRN):  acetaminophen     Tablet .. 650 milliGRAM(s) Oral every 6 hours PRN Temp greater or equal to 38C (100.4F)  clonazePAM  Tablet 1 milliGRAM(s) Oral daily PRN for anxiety  sodium chloride 0.9% lock flush 10 milliLiter(s) IV Push every 1 hour PRN Pre/post blood products, medications, blood draw, and to maintain line patency        I&O's Summary    23 Feb 2024 07:01  -  24 Feb 2024 07:00  --------------------------------------------------------  IN: 1100 mL / OUT: 1100 mL / NET: 0 mL        PHYSICAL EXAM:  Vital Signs Last 24 Hrs  T(C): 36.7 (24 Feb 2024 08:04), Max: 36.7 (23 Feb 2024 16:22)  T(F): 98 (24 Feb 2024 08:04), Max: 98.1 (24 Feb 2024 00:03)  HR: 89 (24 Feb 2024 12:30) (76 - 89)  BP: 119/66 (24 Feb 2024 12:30) (102/64 - 123/67)  BP(mean): 82 (23 Feb 2024 16:22) (82 - 82)  RR: 18 (24 Feb 2024 08:04) (18 - 18)  SpO2: 100% (24 Feb 2024 08:04) (93% - 100%)    Parameters below as of 24 Feb 2024 08:04  Patient On (Oxygen Delivery Method): room air            GENERAL: Elderly male looking comfortable   HEENT: has NG tube   NECK: soft, Supple, No JVD  CHEST/LUNG: decrease air entry bilaterally; No wheezing  HEART: S1S2+, Regular rate and rhythm; No murmurs  ABDOMEN: Soft, Nontender, Nondistended; Bowel sounds present  EXTREMITIES:  1+ Peripheral Pulses, No edema  SKIN: No rashes or lesions  NEURO: OX3  PSYCH: normal mood    LABS:                        10.2   14.89 )-----------( 435      ( 24 Feb 2024 05:46 )             34.5     02-24    148<H>  |  104  |  92.7<H>  ----------------------------<  199<H>  4.7   |  29.0  |  1.91<H>    Ca    9.4      24 Feb 2024 05:46  Mg     2.6     02-23    TPro  7.1  /  Alb  3.6  /  TBili  0.4  /  DBili  x   /  AST  18  /  ALT  12  /  AlkPhos  124<H>  02-24          Urinalysis Basic - ( 24 Feb 2024 05:46 )    Color: x / Appearance: x / SG: x / pH: x  Gluc: 199 mg/dL / Ketone: x  / Bili: x / Urobili: x   Blood: x / Protein: x / Nitrite: x   Leuk Esterase: x / RBC: x / WBC x   Sq Epi: x / Non Sq Epi: x / Bacteria: x        CAPILLARY BLOOD GLUCOSE      POCT Blood Glucose.: 198 mg/dL (24 Feb 2024 12:19)  POCT Blood Glucose.: 167 mg/dL (24 Feb 2024 08:06)  POCT Blood Glucose.: 199 mg/dL (24 Feb 2024 05:32)  POCT Blood Glucose.: 226 mg/dL (24 Feb 2024 00:12)  POCT Blood Glucose.: 234 mg/dL (23 Feb 2024 17:24)        RADIOLOGY & ADDITIONAL TESTS:  Results Reviewed:   Imaging Personally Reviewed:  Electrocardiogram Personally Reviewed:

## 2024-02-24 NOTE — PROGRESS NOTE ADULT - PROBLEM SELECTOR PLAN 1
s/p [facility] with multivessel disease   He is now s/p [facility] with multivessel disease  Patient was seen by CTS and interventional cardiology; not a candidate for revascularization with CABG or high risk PCI.  Will readdress with interventional cardiology as patient is extubated and hemodynamically stable.   Cont ASA, Clopidogrel, Statin,   Represent to interventional for possible stents

## 2024-02-24 NOTE — PROGRESS NOTE ADULT - ASSESSMENT
77 y/o M with PMHx  Dysphagia  (refused g tube in past) Anxiety, HTN, HLD, CKD, plaque psoriasis, DM2 who needed help with ADLS prior to hospital stay, who presents to [facility] ED with 1 weeks of chest pain at rest. In Cardiogenic shock on admit rxed with vasopressin and levophed.  Then course of dobutamine and Bumex drip (made Dnr/Dnr ) who is now s/p cath with multivessel diseases. Echo EF 25% normal right systolic function  RSVP 44mmhg Moderate mr and TR moderate.  Seen by CT surgery and felt high risk for CABG  Initally patient and family refused peg and Stent.  Now they want to pursue it if offered  Bedside Hemodynamics:  2/16 (Levo 0.06): CVP 2, SVO2 59.3%, Harpreet CO/CI 4.0/2.4   2/15 ( 2.5, levo 0.03): CVP 4, SVO2 65.5%, Harpreet CO/CI 4.5/2.7  Cardiac Studies:  [facility] 2/14/24 which showed prox left main 60% stenosis, pros LAD with 70% stenosis, mid LAD with 80% stenosis, first diagonal 100% stenosis, prox circ 100% stenosis, distal RCA with 90% stenosis, ramus with 90% stenosis  [facility] 2/14/24: RA 6, PA 22/10/15, PCWP 8, CO/CI 5.5/3.1

## 2024-02-24 NOTE — PROGRESS NOTE ADULT - NS ATTEND AMEND GEN_ALL_CORE FT
-      Reason for follow up: Matt-operative Cardiac Risk Stratification  Update: Patient is now amenable to PEG placement for which cardiac risk stratification is requested. Patient states he also wants to have PCI and stents placed now. Patient states he feels congested, but no chest pain or dyspnea.     Preoperative cardiovascular examination.   - RCRI 11% risk of major cardiac event.   - Patient with multivessel CAD as well as HFrEF that puts the patient at high risk for anesthesia.   - Benefits of the procedure may outweigh the risks, so no absolute cardiac contraindication to the proposed procedure. However would try to use        local anesthesia if at all possible.   - Continue on telemetry monitoring matt-procedurally.   - Patient did not undergo PCI, so can hold Plavix and continue aspirin for PEG placement.    CAD, NSTEMI    - s/p LHC with LM 60%, pLAD 70%, mLAD 80%, D1 100%, pLCx 100%, dRCA 90%, RI 90%.   - Evaluated by CT Surgery, and deemed not a surgical candidate.   - On aspirin and plavix, but since no PCI performed, can hold plavix pre-procedure.  - Patient was initially refusing high risk PCI, but is now wanting to move forward.   - Interventional Cardiology asked to re-evaluate timing and candidacy.    Acute HFrEF (heart failure with reduced ejection fraction).   - Patient presented with chest pain and was found to have an NSTEMI and low EF. Cath revealed multivessel disease. He was empirically started on       dobutamine for hypotension and respiratory distress.  - RHC  showed low filling pressures with normal cardiac output on  5.  - Bumex gtt stopped   - Dobutamine weaned off 2/15. Harpreet CI was stable at 2.4.  - GDMT: Continue hydralazine 10 mg TID for afterload reduction. Transition to Toprol XL 12.5 mg BID. Can consider losartan and MRA as outpt once     renal function stabilizes.  - Diuretics: not currently requiring.    WES on CKD  - Creat improved 1.9    Dysphagia  - Awaiting PEG tube

## 2024-02-25 LAB
GLUCOSE BLDC GLUCOMTR-MCNC: 138 MG/DL — HIGH (ref 70–99)
GLUCOSE BLDC GLUCOMTR-MCNC: 176 MG/DL — HIGH (ref 70–99)
GLUCOSE BLDC GLUCOMTR-MCNC: 177 MG/DL — HIGH (ref 70–99)
GLUCOSE BLDC GLUCOMTR-MCNC: 184 MG/DL — HIGH (ref 70–99)
GLUCOSE BLDC GLUCOMTR-MCNC: 201 MG/DL — HIGH (ref 70–99)

## 2024-02-25 PROCEDURE — 99232 SBSQ HOSP IP/OBS MODERATE 35: CPT

## 2024-02-25 RX ORDER — ISOSORBIDE DINITRATE 5 MG/1
10 TABLET ORAL THREE TIMES A DAY
Refills: 0 | Status: DISCONTINUED | OUTPATIENT
Start: 2024-02-25 | End: 2024-03-06

## 2024-02-25 RX ADMIN — CHLORHEXIDINE GLUCONATE 1 APPLICATION(S): 213 SOLUTION TOPICAL at 04:54

## 2024-02-25 RX ADMIN — PANTOPRAZOLE SODIUM 40 MILLIGRAM(S): 20 TABLET, DELAYED RELEASE ORAL at 08:42

## 2024-02-25 RX ADMIN — Medication 1 APPLICATION(S): at 17:36

## 2024-02-25 RX ADMIN — Medication 10 MILLIGRAM(S): at 05:04

## 2024-02-25 RX ADMIN — HEPARIN SODIUM 5000 UNIT(S): 5000 INJECTION INTRAVENOUS; SUBCUTANEOUS at 05:05

## 2024-02-25 RX ADMIN — HEPARIN SODIUM 5000 UNIT(S): 5000 INJECTION INTRAVENOUS; SUBCUTANEOUS at 21:38

## 2024-02-25 RX ADMIN — Medication 1 MILLIGRAM(S): at 12:59

## 2024-02-25 RX ADMIN — ARIPIPRAZOLE 5 MILLIGRAM(S): 15 TABLET ORAL at 08:42

## 2024-02-25 RX ADMIN — Medication 81 MILLIGRAM(S): at 08:42

## 2024-02-25 RX ADMIN — Medication 12.5 MILLIGRAM(S): at 05:04

## 2024-02-25 RX ADMIN — QUETIAPINE FUMARATE 50 MILLIGRAM(S): 200 TABLET, FILM COATED ORAL at 21:38

## 2024-02-25 RX ADMIN — Medication 2: at 05:05

## 2024-02-25 RX ADMIN — HEPARIN SODIUM 5000 UNIT(S): 5000 INJECTION INTRAVENOUS; SUBCUTANEOUS at 13:21

## 2024-02-25 RX ADMIN — Medication 1 APPLICATION(S): at 05:20

## 2024-02-25 RX ADMIN — Medication 10 MILLIGRAM(S): at 13:00

## 2024-02-25 RX ADMIN — SERTRALINE 100 MILLIGRAM(S): 25 TABLET, FILM COATED ORAL at 08:42

## 2024-02-25 RX ADMIN — Medication 10 MILLIGRAM(S): at 08:44

## 2024-02-25 RX ADMIN — Medication 3 MILLIGRAM(S): at 21:37

## 2024-02-25 RX ADMIN — Medication 4: at 17:41

## 2024-02-25 RX ADMIN — Medication 10 MILLIGRAM(S): at 21:36

## 2024-02-25 RX ADMIN — ATORVASTATIN CALCIUM 80 MILLIGRAM(S): 80 TABLET, FILM COATED ORAL at 21:37

## 2024-02-25 RX ADMIN — Medication 12.5 MILLIGRAM(S): at 17:34

## 2024-02-25 RX ADMIN — ISOSORBIDE DINITRATE 10 MILLIGRAM(S): 5 TABLET ORAL at 15:10

## 2024-02-25 RX ADMIN — Medication 2: at 00:07

## 2024-02-25 RX ADMIN — INSULIN GLARGINE 15 UNIT(S): 100 INJECTION, SOLUTION SUBCUTANEOUS at 08:43

## 2024-02-25 NOTE — PROGRESS NOTE ADULT - PROBLEM SELECTOR PLAN 3
nephrology following  Creatinine 2.30
WES is resolving.  Making urine  Creatinine 2.24 - stable
Worsening at this time.   WES likely 2/2 hypoperfusion from cardiogenic shock  Bumex drip d.c .   Nephrology following
Awaiting PEG tube    RCRI 11% risk of major cardiac event.   Patient with multivessel CAD as well as HFrEF that puts the patient at high risk for anesthesia.   Benefits of the procedure may outweigh the risks, so no absolute cardiac contraindication to the proposed procedure. However would try to use local anesthesia if at all possible.  Continue on telemetry monitoring matt-procedurally.
Nephrology following
- WES is resolving.
as above
- Worsening at this time.   - On bumex gtt.   - Nephrology consult.
.  - EF <20%  - GDMT deferred due to recent vasopressor use and elevated creatinine  - trend markers of perfusion q12  - pending ischemic evaluation
-Patient presented with chest pain and was found to have an NSTEMI and low EF. Cath revealed multivessel disease. He was empirically started on dobutamine for hypotension and respiratory distress.  -RHC  showed low filling pressures with normal cardiac output on  5.  -Bumex gtt stopped   -Dobutamine weaned off 2/15. Harpreet CI was stable at 2.4.  -GDMT: Continue hydralazine 10 mg TID for afterload reduction. Transition to Toprol XL 12.5 mg BID. Can consider losartan and MRA as outpt once renal function stabilizes.  -Diuretics: not currently requiring.
Agreeable to PEG for tomorrow    RCRI 11% risk of major cardiac event.   Patient with multivessel CAD as well as HFrEF that puts the patient at high risk for anesthesia.   Benefits of the procedure may outweigh the risks, so no absolute cardiac contraindication to the proposed procedure. However would try to use local anesthesia if at all possible.  Continue on telemetry monitoring matt-procedurally.
Since 2022 has refused Peg in past  Daughter states he is going to refuse it again  Dnr/Dni

## 2024-02-25 NOTE — PROGRESS NOTE ADULT - NS ATTEND AMEND GEN_ALL_CORE FT
-      Reason for follow up: Matt-operative Cardiac Risk Stratification  Update: Patient is now amenable to PEG placement for which cardiac risk stratification is requested. Patient states he also wants to have PCI and stents placed now. Patient states he feels congested, but no chest pain or dyspnea.     Preoperative cardiovascular examination.   - RCRI 11% risk of major cardiac event.   - Patient with multivessel CAD as well as HFrEF that puts the patient at high risk for anesthesia.   - Benefits of the procedure may outweigh the risks, so no absolute cardiac contraindication to the proposed procedure. However would try to use    local anesthesia if at all possible.   - Continue on telemetry monitoring matt-procedurally.   - Patient did not undergo PCI, so can hold Plavix and continue aspirin for PEG placement.    CAD, NSTEMI    - s/p LHC with LM 60%, pLAD 70%, mLAD 80%, D1 100%, pLCx 100%, dRCA 90%, RI 90%.   - Evaluated by CT Surgery, and deemed not a surgical candidate.   - On aspirin and plavix, but since no PCI performed, can hold plavix pre-procedure.  - Patient was initially refusing high risk PCI, but is now wanting to move forward.   - Interventional Cardiology asked to re-evaluate timing and candidacy.    Acute HFrEF    - Patient presented with chest pain and was found to have an NSTEMI and low EF. Cath revealed multivessel disease. He was empirically started on   dobutamine for hypotension and respiratory distress.  - RHC  showed low filling pressures with normal cardiac output on  5.  - Bumex gtt stopped   - Dobutamine weaned off 2/15. Harpreet CI was stable at 2.4.  - GDMT:   - c/w hydralazine 10 mg TID for afterload reduction.   - b/p ok add isordil 10 tid  - c/w Toprol XL 12.5 mg BID. Can consider losartan and MRA as outpt once renal function stabilizes.  - Diuretics: not currently requiring.    WES on CKD  - Creat improved 1.9    Dysphagia  - Awaiting PEG tube.  - Agreeable to PEG for tomorrow

## 2024-02-25 NOTE — PROGRESS NOTE ADULT - ASSESSMENT
77 y/o M with PMHx anxiety, HTN, HLD, CKD, plaque psoriasis, DM2 who presents to [facility] ED with 1 weeks of chest pain at rest, that woke him out of sleep today. Patient states he had been having chest pain but was mild and tolerable. The pain became sharp and he felt it midsternal with radiation to left side and he also had 6 diarrhea bowel movements. Hypotensive on presentation, given IV fluid bolus but had respiratory distress, placed on bipap and levophed initiated to maintain BP.   On 2/14 patient was complaining of chest pain again with uptrending troponins, unchanged EKG. Patient was still in respiratory distress despite BiPAP and CPAP with fever of 101.1 and cold extremities. Patient was started on levophed and dobutamine and intubated.   He underwent C 2/14 which revealed MVD. CTS consulted and he is not a surgical candidate. He was weaned off  on 2/15. Initially failed multiple SBT but was ultimately extubated.   Pt. states he is not interested in any invasive procedures and plan is for medical management of his CAD. Will start  afterload reduction/GDMT for management of his heart failure.   Bedside Hemodynamics:  2/16 (Levo 0.06): CVP 2, SVO2 59.3%, Harpreet CO/CI 4.0/2.4   2/15 ( 2.5, levo 0.03): CVP 4, SVO2 65.5%, Harpreet CO/CI 4.5/2.7  Cardiac Studies:  [facility] 2/14/24 which showed prox left main 60% stenosis, pros LAD with 70% stenosis, mid LAD with 80% stenosis, first diagonal 100% stenosis, prox circ 100% stenosis, distal RCA with 90% stenosis, ramus with 90% stenosis  [facility] 2/14/24: RA 6, PA 22/10/15, PCWP 8, CO/CI 5.5/3.1

## 2024-02-25 NOTE — PROGRESS NOTE ADULT - PROBLEM SELECTOR PLAN 1
He is now s/p [facility] with multivessel disease  Patient was seen by CTS and interventional cardiology; not a candidate for revascularization with CABG or high risk PCI.  Will readdress with interventional cardiology as patient is extubated and hemodynamically stable.   Cont ASA, Clopidogrel, Statin,   Represent to interventional for possible stents.

## 2024-02-25 NOTE — PROGRESS NOTE ADULT - ASSESSMENT
78 y/oM PMH of HTN, HLD, CKD, plaque psoriasis, DM-2 who came to the ED (02/12/24) complaining of chest pain radiating to left arm associated with multiple episodes of diarrhea.  On ED arrival, afebrile, hypotensive to SBP 80-90s despite 1L IVF requiring Levophed. EKG with nonspecific ST changes, troponin 2344->2477. Labs otherwise notable for K 5.6, BUN/Cr 43.5/2.31 (baseline Cr ~1 in 2022), BNP 55551, lactate 6.2. CXR with pulmonary edema. Placed on BiPAP ,given Lasix 40mg. TTE with EF<20%, multiple segmental abnormalities, mod MR, mod TR, mod pericardial effusion without tamponade; started on Heparin drip for NSTEMI, cardiology consulted. Admitted to MICU for further management of cardiogenic shock. On 02/14/24: patient became obtunded, febrile, intubated for airway protection. Interventional cardiology also on board; S/P LHC: Severe multi-vessel CAD including LM, CABG recommended. CT surgery deemed patient poor candidate for CABG. HF team also following; s/p RHC: shows low filling pressures with normal cardiac output. Nephrology following for WES. Patient extubated (02/18/24); NG tube in place. Palliative team following, patient DNR/DNI. Patient now downgraded to medical floor 2/19    #NSTEMI   -s/p Heparin drip   -S/p LHC: severe multiple-vessel CAD, not a candidate for CABG   -Plavix 75mg   -Asprin 81mg   -Atorvastatin 80mg   -Cardiology following   -Pt declining PCI, also reported as high risk by cardio     #Acute Systolic CHF  -s/p Bumex and Lasix drip   -HF following   -Echo done   -S/p RHC  -diuretics on hold as he looks dry     #Cardiogenic shock   -s/p Levophed and Dobutamine   -Monitor BP, hold it well     #Acute metabolic encephalopathy , improved  hx dysphagia  Requiring intubation for airway protection   -Now s/p extubation 2/18  -Aspiration precautions  -SLP rec NPO  -cont TF for now via NGT  with free water ; reported partial dislodging o/n; f/u repeat cxr 2/22   -Previously refusing PEG, now agreeable  -GI consulted for PEG placement, likely plan for Weds/Thurs (2/28 or 2/29)  -Plavix to be held 5 days before proceeding with procedure  -cardio consulted as per the ok to d/c plavix for procedure     #Leukocytosis  likely reactive, no signs of infection.   -Monitor WBC, temp.  -Abx prn    #WES on CKD:  -Nephrology following   -Monitor renal function     #DM-2  -Lantus 15 units AM   -Insulin sliding scale     #Depression:    -Buspirone 10mg   -Aripiprazole 5mg   -Seroquel 50mg HS   -Sertraline 100mg     DVT prophylaxis: Heparin sc    Palliative following     dispo: [place] vs home w/assistance vs home hospice pending pt and pt's family decision

## 2024-02-25 NOTE — PROGRESS NOTE ADULT - PROBLEM SELECTOR PROBLEM 1
Acute combined systolic and diastolic congestive heart failure
NSTEMI (non-ST elevation myocardial infarction)
Acute combined systolic and diastolic congestive heart failure
Preoperative cardiovascular examination
Acute combined systolic and diastolic congestive heart failure
NSTEMI (non-ST elevation myocardial infarction)
Acute combined systolic and diastolic congestive heart failure
NSTEMI (non-ST elevation myocardial infarction)

## 2024-02-25 NOTE — PROGRESS NOTE ADULT - PROBLEM SELECTOR PROBLEM 3
Acute HFrEF (heart failure with reduced ejection fraction)
Chronic kidney disease (CKD)
Dysphagia
Chronic kidney disease (CKD)
Dysphagia
Acute combined systolic and diastolic congestive heart failure
Chronic kidney disease (CKD)
Chronic kidney disease (CKD)
Dysphagia
Chronic kidney disease (CKD)
Chronic kidney disease (CKD)
Acute combined systolic and diastolic congestive heart failure

## 2024-02-25 NOTE — PROGRESS NOTE ADULT - SUBJECTIVE AND OBJECTIVE BOX
[facility]                                                         CARDIOLOGY AT [facility]                                                                  [address] [facility], [place] [place] [address]                                                         Telephone: [phone]. Fax:[phone]                                                                             PROGRESS NOTE    Reason for follow up: CAD  Update: Patient is anxious to go for Peg tomorrow  Denies any cheat pain    Review of symptoms:   Cardiac:  No chest pain. No dyspnea. No palpitations.  Respiratory: no cough. No dyspnea  Gastrointestinal: No diarrhea. No abdominal pain. No bleeding.   Neuro: No focal neuro complaints.    Vitals:  T(C): 36.7 (02-25-24 @ 04:34), Max: 36.8 (02-24-24 @ 21:24)  HR: 77 (02-25-24 @ 04:34) (72 - 89)  BP: 130/76 (02-25-24 @ 04:34) (114/66 - 130/76)  RR: 18 (02-25-24 @ 04:34) (18 - 18)  SpO2: 99% (02-25-24 @ 04:34) (93% - 99%)  Wt(kg): --  I&O's Summary    24 Feb 2024 07:01  -  25 Feb 2024 07:00  --------------------------------------------------------  IN: 650 mL / OUT: 900 mL / NET: -250 mL    PHYSICAL EXAM:  Remains on 1:1 observation  Appearance: Lying in bed in nad    Neurologic: Alert phonation is low   Cardiovascular: RRR S1 S2, No murmur, no rubs/gallops. No JVD  Respiratory: Lungs clear to auscultation, unlabored   Gastrointestinal:  Soft, Non-tender, + BS  Lower Extremities: No edema  Psychiatry: Patient is calm. No agitation.   Skin: warm and dry.      CURRENT MEDICATIONS:  MEDICATIONS  (STANDING):  ARIPiprazole 5 milliGRAM(s) Oral daily  aspirin  chewable 81 milliGRAM(s) Oral daily  atorvastatin 80 milliGRAM(s) Oral at bedtime  busPIRone 10 milliGRAM(s) Oral <User Schedule>  heparin   Injectable 5000 Unit(s) SubCutaneous every 8 hours  hydrALAZINE 10 milliGRAM(s) Oral three times a day  influenza  Vaccine (HIGH DOSE) 0.7 milliLiter(s) IntraMuscular once  insulin glargine Injectable (LANTUS) 15 Unit(s) SubCutaneous every morning  insulin lispro (ADMELOG) corrective regimen sliding scale   SubCutaneous every 6 hours  metoprolol succinate ER 12.5 milliGRAM(s) Oral two times a day  pantoprazole  Injectable 40 milliGRAM(s) IV Push daily  QUEtiapine 50 milliGRAM(s) Oral at bedtime  sertraline 100 milliGRAM(s) Oral daily  triamcinolone 0.1% Cream 1 Application(s) Topical two times a day    LABS:	 	                        10.2   14.89 )-----------( 435      ( 24 Feb 2024 05:46 )             34.5     02-24    148<H>  |  104  |  92.7<H>  ----------------------------<  199<H>  4.7   |  29.0  |  1.91<H>    Ca    9.4      24 Feb 2024 05:46    TPro  7.1  /  Alb  3.6  /  TBili  0.4  /  DBili  x   /  AST  18  /  ALT  12  /  Alk Phos  124<H>  02-24  TELEMETRY: NSR with PVCS    DIAGNOSTIC TESTING:  [ ] Echocardiogram: < from: TTE Limited W or WO Ultrasound Enhancing Agent (02.13.24 @ 21:29) >   1. Left ventricular systolic function is severely decreased with an ejection fraction visually estimated at 25 to 30 %.   2. Normal right ventricular cavity size and normal systolic function.   3. Estimated pulmonary artery systolic pressure is 44 mmHg.   4. Trace pericardial effusion noted adjacent to the right ventricle.    [ ]  Catheterization:  < from: Cardiac Catheterization (02.14.24 @ 13:28) >  The coronary circulation is right dominant. Cardiac catheterization  was performed electively.  LM   Left main artery: The segment is normal sized and severely calcified.  Proximal left main: There is a 60 % stenosis.  LAD   Left anterior descending artery: The segment is normal sized and  moderately calcified. Proximal left anterior descending: There  is a 70 % stenosis. Mid left anterior descending: There is an 80 %  stenosis. First diagonal: There is a 100 % stenosis.  CX   Circumflex: The segment is normal sized. Proximal circumflex: There is  a 100 % stenosis.  RCA   Right coronary artery: The segment is large and severely calcified.  Distal right coronary artery: There is a 90 % stenosis. Right  Posterolateral Segment: There is a 90 % stenosis. Right posterior  descending artery: There is a 90 % stenosis.  Ramus   Ramus intermedius: both upper and lower poles. There is a 90 %  stenosis.

## 2024-02-25 NOTE — PROGRESS NOTE ADULT - PROBLEM SELECTOR PROBLEM 2
Acute HFrEF (heart failure with reduced ejection fraction)
NSTEMI (non-ST elevation myocardial infarction)
NSTEMI (non-ST elevation myocardial infarction)
Renal failure (ARF), acute on chronic
NSTEMI (non-ST elevation myocardial infarction)
Chronic kidney disease (CKD)
Renal failure (ARF), acute on chronic
CAD (coronary artery disease)
Acute HFrEF (heart failure with reduced ejection fraction)
NSTEMI (non-ST elevation myocardial infarction)
Chronic kidney disease (CKD)
Acute HFrEF (heart failure with reduced ejection fraction)
Acute HFrEF (heart failure with reduced ejection fraction)
Renal failure (ARF), acute on chronic

## 2024-02-25 NOTE — PROGRESS NOTE ADULT - PROBLEM SELECTOR PLAN 4
[facility] 2/14/24: RA 6, PA 22/10/15, PCWP 8, CO/CI 5.5/3.1 while on dobutamine  Off diuretic  Na high normal  c/w hydralazine, metoprolol & atorvastatin     GDMT  Diuretic on hold   ACR/ARb.MRA not given due to renal insuff  c/w hydralazine for afterload reductions b/p ok add isordil 10 tid    CARDIAC MEDS  ASA chewable 81 milliGRAM(s) Oral daily  atorvastatin 80 milliGRAM(s) Oral at bedtime  hydrALAZINE 10 milliGRAM(s) Oral three times a day  metoprolol succinate ER 12.5 milliGRAM(s) Oral two times a day.
[facility] 2/14/24: RA 6, PA 22/10/15, PCWP 8, CO/CI 5.5/3.1 while on dobutamine  Off diuretic  Na high normal  c/w hydralazine  c.w metoprolol & atorvastatin     CARDIAC MEDS  ASA chewable 81 milliGRAM(s) Oral daily  atorvastatin 80 milliGRAM(s) Oral at bedtime  hydrALAZINE 10 milliGRAM(s) Oral three times a day  metoprolol succinate ER 12.5 milliGRAM(s) Oral two times a day

## 2024-02-25 NOTE — PROGRESS NOTE ADULT - SUBJECTIVE AND OBJECTIVE BOX
[facility] Division of Hospital Medicine     SUBJECTIVE / OVERNIGHT EVENTS:    Patient denies chest pain, SOB, abd pain, N/V, fever, chills, dysuria or any other complaints. All remainder ROS negative.     MEDICATIONS  (STANDING):  ARIPiprazole 5 milliGRAM(s) Oral daily  aspirin  chewable 81 milliGRAM(s) Oral daily  atorvastatin 80 milliGRAM(s) Oral at bedtime  busPIRone 10 milliGRAM(s) Oral <User Schedule>  chlorhexidine 2% Cloths 1 Application(s) Topical <User Schedule>  dextrose 50% Injectable 25 Gram(s) IV Push once  dextrose 50% Injectable 25 Gram(s) IV Push once  dextrose 50% Injectable 12.5 Gram(s) IV Push once  heparin   Injectable 5000 Unit(s) SubCutaneous every 8 hours  hydrALAZINE 10 milliGRAM(s) Oral three times a day  influenza  Vaccine (HIGH DOSE) 0.7 milliLiter(s) IntraMuscular once  insulin glargine Injectable (LANTUS) 15 Unit(s) SubCutaneous every morning  insulin lispro (ADMELOG) corrective regimen sliding scale   SubCutaneous every 6 hours  metoprolol succinate ER 12.5 milliGRAM(s) Oral two times a day  pantoprazole  Injectable 40 milliGRAM(s) IV Push daily  QUEtiapine 50 milliGRAM(s) Oral at bedtime  sertraline 100 milliGRAM(s) Oral daily  triamcinolone 0.1% Cream 1 Application(s) Topical two times a day    MEDICATIONS  (PRN):  acetaminophen     Tablet .. 650 milliGRAM(s) Oral every 6 hours PRN Temp greater or equal to 38C (100.4F)  clonazePAM  Tablet 1 milliGRAM(s) Oral daily PRN for anxiety  sodium chloride 0.9% lock flush 10 milliLiter(s) IV Push every 1 hour PRN Pre/post blood products, medications, blood draw, and to maintain line patency        I&O's Summary    23 Feb 2024 07:01  -  24 Feb 2024 07:00  --------------------------------------------------------  IN: 1100 mL / OUT: 1100 mL / NET: 0 mL        PHYSICAL EXAM:  Vital Signs Last 24 Hrs  T(C): 36.7 (24 Feb 2024 08:04), Max: 36.7 (23 Feb 2024 16:22)  T(F): 98 (24 Feb 2024 08:04), Max: 98.1 (24 Feb 2024 00:03)  HR: 89 (24 Feb 2024 12:30) (76 - 89)  BP: 119/66 (24 Feb 2024 12:30) (102/64 - 123/67)  BP(mean): 82 (23 Feb 2024 16:22) (82 - 82)  RR: 18 (24 Feb 2024 08:04) (18 - 18)  SpO2: 100% (24 Feb 2024 08:04) (93% - 100%)    Parameters below as of 24 Feb 2024 08:04  Patient On (Oxygen Delivery Method): room air            GENERAL: Elderly male looking comfortable   HEENT: has NG tube   NECK: soft, Supple, No JVD  CHEST/LUNG: decrease air entry bilaterally; No wheezing  HEART: S1S2+, Regular rate and rhythm; No murmurs  ABDOMEN: Soft, Nontender, Nondistended; Bowel sounds present  EXTREMITIES:  1+ Peripheral Pulses, No edema  SKIN: No rashes or lesions  NEURO: OX3  PSYCH: normal mood    LABS:                        10.2   14.89 )-----------( 435      ( 24 Feb 2024 05:46 )             34.5     02-24    148<H>  |  104  |  92.7<H>  ----------------------------<  199<H>  4.7   |  29.0  |  1.91<H>    Ca    9.4      24 Feb 2024 05:46  Mg     2.6     02-23    TPro  7.1  /  Alb  3.6  /  TBili  0.4  /  DBili  x   /  AST  18  /  ALT  12  /  AlkPhos  124<H>  02-24          Urinalysis Basic - ( 24 Feb 2024 05:46 )    Color: x / Appearance: x / SG: x / pH: x  Gluc: 199 mg/dL / Ketone: x  / Bili: x / Urobili: x   Blood: x / Protein: x / Nitrite: x   Leuk Esterase: x / RBC: x / WBC x   Sq Epi: x / Non Sq Epi: x / Bacteria: x        CAPILLARY BLOOD GLUCOSE      POCT Blood Glucose.: 198 mg/dL (24 Feb 2024 12:19)  POCT Blood Glucose.: 167 mg/dL (24 Feb 2024 08:06)  POCT Blood Glucose.: 199 mg/dL (24 Feb 2024 05:32)  POCT Blood Glucose.: 226 mg/dL (24 Feb 2024 00:12)  POCT Blood Glucose.: 234 mg/dL (23 Feb 2024 17:24)        RADIOLOGY & ADDITIONAL TESTS:  Results Reviewed:   Imaging Personally Reviewed:  Electrocardiogram Personally Reviewed:

## 2024-02-25 NOTE — PROGRESS NOTE ADULT - ASSESSMENT
78-year-old male with HTN, HLD, DM admitted with chest pain and diarrhea.  Hypotensive on arrival requiring Levophed.  TTE with a EF of less than 20%.  Intubated on 2/14/2024. s/p LHC showing MVD, poor candidate for CABG.  Patient extubated yesterday.  Nephrology following for acute kidney injury:    Acute kidney injury on CKD, NOS  NSTEMI  Acute systolic CHF  Anemia, unspecified  DM    ·	Baseline serum creatinine unclear was 1 mg/deciliter in September 22  ·	WES was secondary to hypoperfusion from cardiogenic shock  ·	Serum creatinine on admission 2.3 and had stayed stable around 2.0 mg/deciliter now, ? new baseline for now post cardiogenic shock related to stunning and low EF  ·	Patient euvolemic, diuretics on hold (initially was on dobutamine and Bumex drip)  ·	Maintain MAP more than 65 and avoid nephrotoxins, Will follow.

## 2024-02-25 NOTE — PROGRESS NOTE ADULT - SUBJECTIVE AND OBJECTIVE BOX
Reason for visit: WES    Subjective: No acute overnight event. Patient denied any urinary complains. No fever/chills.     ROS: All systems were reviewed in detail pertinent positive and negative mentioned above, rest are negative.    Physical Exam:  Gen: no acute distress  MS: alert, conversing normally  Eyes: EOMI, no icterus  HENT: NCAT, MMM  CV: rhythm reg reg, rate normal, no m/g/r  Chest: CTAB, no w/r/r,  Abd: soft, NT, ND  Extremities: No edema    Vital Signs Last 24 Hrs  T(C): 36.7 (25 Feb 2024 20:29), Max: 36.8 (24 Feb 2024 21:24)  T(F): 98 (25 Feb 2024 20:29), Max: 98.3 (24 Feb 2024 21:24)  HR: 82 (25 Feb 2024 20:29) (72 - 82)  BP: 111/67 (25 Feb 2024 20:29) (111/67 - 131/68)  BP(mean): --  RR: 18 (25 Feb 2024 20:29) (16 - 18)  SpO2: 99% (25 Feb 2024 20:29) (93% - 100%)    Parameters below as of 25 Feb 2024 20:29  Patient On (Oxygen Delivery Method): room air      I&O's Summary    24 Feb 2024 07:01  -  25 Feb 2024 07:00  --------------------------------------------------------  IN: 650 mL / OUT: 900 mL / NET: -250 mL    25 Feb 2024 07:01  -  25 Feb 2024 20:31  --------------------------------------------------------  IN: 0 mL / OUT: 800 mL / NET: -800 mL      Current Antibiotics:    Other medications:  ARIPiprazole 5 milliGRAM(s) Oral daily  aspirin  chewable 81 milliGRAM(s) Oral daily  atorvastatin 80 milliGRAM(s) Oral at bedtime  busPIRone 10 milliGRAM(s) Oral <User Schedule>  chlorhexidine 2% Cloths 1 Application(s) Topical <User Schedule>  dextrose 50% Injectable 25 Gram(s) IV Push once  dextrose 50% Injectable 25 Gram(s) IV Push once  dextrose 50% Injectable 12.5 Gram(s) IV Push once  heparin   Injectable 5000 Unit(s) SubCutaneous every 8 hours  hydrALAZINE 10 milliGRAM(s) Oral three times a day  influenza  Vaccine (HIGH DOSE) 0.7 milliLiter(s) IntraMuscular once  insulin glargine Injectable (LANTUS) 15 Unit(s) SubCutaneous every morning  insulin lispro (ADMELOG) corrective regimen sliding scale   SubCutaneous every 6 hours  isosorbide   dinitrate Tablet (ISORDIL) 10 milliGRAM(s) Oral three times a day  metoprolol succinate ER 12.5 milliGRAM(s) Oral two times a day  pantoprazole  Injectable 40 milliGRAM(s) IV Push daily  QUEtiapine 50 milliGRAM(s) Oral at bedtime  sertraline 100 milliGRAM(s) Oral daily  triamcinolone 0.1% Cream 1 Application(s) Topical two times a day    02-24    148<H>  |  104  |  92.7<H>  ----------------------------<  199<H>  4.7   |  29.0  |  1.91<H>    Ca    9.4      24 Feb 2024 05:46    TPro  7.1  /  Alb  3.6  /  TBili  0.4  /  DBili  x   /  AST  18  /  ALT  12  /  AlkPhos  124<H>  02-24    Creatinine: 1.91 mg/dL (02-24-24 @ 05:46)  Creatinine: 1.99 mg/dL (02-23-24 @ 09:14)  Creatinine: 1.87 mg/dL (02-22-24 @ 07:15)  Creatinine: 1.96 mg/dL (02-21-24 @ 05:00)

## 2024-02-25 NOTE — PROGRESS NOTE ADULT - PROBLEM SELECTOR PROBLEM 4
Acute combined systolic and diastolic congestive heart failure
Acute combined systolic and diastolic congestive heart failure

## 2024-02-26 LAB
ANION GAP SERPL CALC-SCNC: 13 MMOL/L — SIGNIFICANT CHANGE UP (ref 5–17)
BUN SERPL-MCNC: 80.5 MG/DL — HIGH (ref 8–20)
CALCIUM SERPL-MCNC: 9.1 MG/DL — SIGNIFICANT CHANGE UP (ref 8.4–10.5)
CHLORIDE SERPL-SCNC: 103 MMOL/L — SIGNIFICANT CHANGE UP (ref 96–108)
CO2 SERPL-SCNC: 30 MMOL/L — HIGH (ref 22–29)
CREAT SERPL-MCNC: 1.83 MG/DL — HIGH (ref 0.5–1.3)
EGFR: 37 ML/MIN/1.73M2 — LOW
GLUCOSE BLDC GLUCOMTR-MCNC: 155 MG/DL — HIGH (ref 70–99)
GLUCOSE BLDC GLUCOMTR-MCNC: 162 MG/DL — HIGH (ref 70–99)
GLUCOSE BLDC GLUCOMTR-MCNC: 182 MG/DL — HIGH (ref 70–99)
GLUCOSE BLDC GLUCOMTR-MCNC: 184 MG/DL — HIGH (ref 70–99)
GLUCOSE BLDC GLUCOMTR-MCNC: 193 MG/DL — HIGH (ref 70–99)
GLUCOSE SERPL-MCNC: 144 MG/DL — HIGH (ref 70–99)
HCT VFR BLD CALC: 29.3 % — LOW (ref 39–50)
HGB BLD-MCNC: 8.9 G/DL — LOW (ref 13–17)
MCHC RBC-ENTMCNC: 26.6 PG — LOW (ref 27–34)
MCHC RBC-ENTMCNC: 30.4 GM/DL — LOW (ref 32–36)
MCV RBC AUTO: 87.7 FL — SIGNIFICANT CHANGE UP (ref 80–100)
PLATELET # BLD AUTO: 412 K/UL — HIGH (ref 150–400)
POTASSIUM SERPL-MCNC: 4.5 MMOL/L — SIGNIFICANT CHANGE UP (ref 3.5–5.3)
POTASSIUM SERPL-SCNC: 4.5 MMOL/L — SIGNIFICANT CHANGE UP (ref 3.5–5.3)
RBC # BLD: 3.34 M/UL — LOW (ref 4.2–5.8)
RBC # FLD: 15 % — HIGH (ref 10.3–14.5)
SODIUM SERPL-SCNC: 146 MMOL/L — HIGH (ref 135–145)
WBC # BLD: 14.32 K/UL — HIGH (ref 3.8–10.5)
WBC # FLD AUTO: 14.32 K/UL — HIGH (ref 3.8–10.5)

## 2024-02-26 PROCEDURE — 99233 SBSQ HOSP IP/OBS HIGH 50: CPT

## 2024-02-26 PROCEDURE — 99232 SBSQ HOSP IP/OBS MODERATE 35: CPT

## 2024-02-26 RX ORDER — ACETAMINOPHEN 500 MG
650 TABLET ORAL EVERY 6 HOURS
Refills: 0 | Status: DISCONTINUED | OUTPATIENT
Start: 2024-02-26 | End: 2024-03-06

## 2024-02-26 RX ADMIN — Medication 1 APPLICATION(S): at 17:06

## 2024-02-26 RX ADMIN — HEPARIN SODIUM 5000 UNIT(S): 5000 INJECTION INTRAVENOUS; SUBCUTANEOUS at 13:22

## 2024-02-26 RX ADMIN — ISOSORBIDE DINITRATE 10 MILLIGRAM(S): 5 TABLET ORAL at 17:06

## 2024-02-26 RX ADMIN — Medication 12.5 MILLIGRAM(S): at 05:03

## 2024-02-26 RX ADMIN — Medication 10 MILLIGRAM(S): at 05:04

## 2024-02-26 RX ADMIN — Medication 650 MILLIGRAM(S): at 03:49

## 2024-02-26 RX ADMIN — Medication 10 MILLIGRAM(S): at 13:22

## 2024-02-26 RX ADMIN — CHLORHEXIDINE GLUCONATE 1 APPLICATION(S): 213 SOLUTION TOPICAL at 04:59

## 2024-02-26 RX ADMIN — Medication 1 APPLICATION(S): at 05:06

## 2024-02-26 RX ADMIN — PANTOPRAZOLE SODIUM 40 MILLIGRAM(S): 20 TABLET, DELAYED RELEASE ORAL at 09:36

## 2024-02-26 RX ADMIN — QUETIAPINE FUMARATE 50 MILLIGRAM(S): 200 TABLET, FILM COATED ORAL at 21:43

## 2024-02-26 RX ADMIN — ATORVASTATIN CALCIUM 80 MILLIGRAM(S): 80 TABLET, FILM COATED ORAL at 21:42

## 2024-02-26 RX ADMIN — HEPARIN SODIUM 5000 UNIT(S): 5000 INJECTION INTRAVENOUS; SUBCUTANEOUS at 21:44

## 2024-02-26 RX ADMIN — HEPARIN SODIUM 5000 UNIT(S): 5000 INJECTION INTRAVENOUS; SUBCUTANEOUS at 05:05

## 2024-02-26 RX ADMIN — Medication 81 MILLIGRAM(S): at 09:34

## 2024-02-26 RX ADMIN — Medication 10 MILLIGRAM(S): at 21:43

## 2024-02-26 RX ADMIN — ISOSORBIDE DINITRATE 10 MILLIGRAM(S): 5 TABLET ORAL at 05:04

## 2024-02-26 RX ADMIN — Medication 2: at 00:27

## 2024-02-26 RX ADMIN — Medication 12.5 MILLIGRAM(S): at 17:06

## 2024-02-26 RX ADMIN — ISOSORBIDE DINITRATE 10 MILLIGRAM(S): 5 TABLET ORAL at 12:19

## 2024-02-26 RX ADMIN — Medication 3 MILLIGRAM(S): at 21:42

## 2024-02-26 RX ADMIN — Medication 10 MILLIGRAM(S): at 09:34

## 2024-02-26 RX ADMIN — ARIPIPRAZOLE 5 MILLIGRAM(S): 15 TABLET ORAL at 12:19

## 2024-02-26 RX ADMIN — Medication 2: at 12:22

## 2024-02-26 RX ADMIN — INSULIN GLARGINE 15 UNIT(S): 100 INJECTION, SOLUTION SUBCUTANEOUS at 09:34

## 2024-02-26 RX ADMIN — Medication 2: at 17:30

## 2024-02-26 RX ADMIN — Medication 2: at 05:11

## 2024-02-26 RX ADMIN — SERTRALINE 100 MILLIGRAM(S): 25 TABLET, FILM COATED ORAL at 09:34

## 2024-02-26 NOTE — CHART NOTE - NSCHARTNOTEFT_GEN_A_CORE
Source: Patient [ ]  Family [ ]   other [x ] staff and EMR    Current Diet: Diet, NPO with Tube Feed:   Tube Feeding Modality: Nasogastric  Glucerna 1.5 Thomas (GLUCERNA1.5)  Total Volume for 24 Hours (mL): 1200  Continuous  Starting Tube Feed Rate {mL per Hour}: 10  Increase Tube Feed Rate by (mL): 10     Every 6 hours  Until Goal Tube Feed Rate (mL per Hour): 50  Tube Feed Duration (in Hours): 24  Tube Feed Start Time: 22:00 (02-22-24 @ 18:04)      Enteral /Parenteral Nutrition: Glucerna 1.5 @ 50ml/hr (x24 hours) 1200ml/day; 1800kcal, 100gm protein  Meets 100% of energy needs, 116% protein needs of upper end of needs.    Current Weight:   2/17: 63.8 kg   2/15: 64.9 kg   2/12: 61.2 kg     Pertinent Medications: MEDICATIONS  (STANDING):  atorvastatin 80 milliGRAM(s) Oral at bedtime  chlorhexidine 2% Cloths 1 Application(s) Topical <User Schedule>  heparin   Injectable 5000 Unit(s) SubCutaneous every 8 hours  insulin glargine Injectable (LANTUS) 15 Unit(s) SubCutaneous every morning  insulin lispro (ADMELOG) corrective regimen sliding scale   SubCutaneous every 6 hours  isosorbide   dinitrate Tablet (ISORDIL) 10 milliGRAM(s) Oral three times a day  metoprolol succinate ER 12.5 milliGRAM(s) Oral two times a day  pantoprazole  Injectable 40 milliGRAM(s) IV Push daily  sertraline 100 milliGRAM(s) Oral daily  triamcinolone 0.1% Cream 1 Application(s) Topical two times a day    Pertinent Labs:02-26 Na146 mmol/L<H> Glu 144 mg/dL<H> K+ 4.5 mmol/L Cr  1.83 mg/dL<H> BUN 80.5 mg/dL<H> Phos n/a   Alb n/a   PAB n/a       Hospital Course:   Pt is a 79 y/o M with a h/o HTN, HLD, T2DM, CKD, plaque psoriasis, anxiety, depression, history of dysphagia who declined PEG placement, admitted on 2/12 with progressively worsening substernal chest pain that onset the night prior with multiple episodes of diarrhea as well as 1 episode of nausea and vomiting. Initial EKG on arrival without ST elevation though inferolateral Q waves. Labs remarkable for elevated troponin, WES, anion gap metabolic lactic acidosis. CXR with pulmonary edema and possible underlying pneumonia. ER course complicated by hypotension for which he received 1L IV fluid bolus subsequently developed significant hypoxia and respiratory distress requiring IV vasopressor and NIPPV. He developed worsening shock, respiratory distress, and fevers. He was intubated 2/14, started on Dobutamine and Bumex infusion with improvement in hypoxia, shock, and UOP. 2/14 underwent RHC with normal right sided pressures and normal CO/CI on Dobutamine. LHC revealed severe multivessel disease. Deemed not a candidate for CABG or high risk PCI.      Current Nutrition Diagnosis:  Pt remains at high nutrition risk secondary to severe (chronic) protein calorie malnutrition related to inability to meet sufficient protein energy needs in setting of history of dysphagia, now with Cardiogenic shock, Acute systolic heart failure, NSTEMI, Multivessel CAD, Acute hypoxemic respiratory failure, Acute pulmonary edema, Aspiration pneumonitis/pneumonia WES on CKD as evidenced by physical signs of severe muscle/fat loss, meeting < 75% estimated energy needs > 1 month. TF currently running at 50ml/hr. Per 1:1 pt is tolerating TF well with no GI related issues. SLP following. SLP recommended 2/20 with recommendations for NPO w/ goals of care discussion regarding PEG vs. pleasure feeds. Pt agreeable to PEG and plan is to go for PEG today. RD aware pallative following for GOC. Continue with pt and family wishes for GOC. Current TF regimen providing greater then 100% pt estimated needs. Labs reviewed pt noted to have hypernatremia and elevated BUN/creat, consider additional water flushes. Last BM documented 2/24. RD to remain available. Recommendations below:    Recommendations:   1. Reduce TF goal rate to 45 ml/hr x 24 hrs to prevent overfeeding (1080ml provides 1620 kcal, 89g protein, 819ml free water).  2. Consider additional water flushes of 120 ml q4 hours or per MD discretion.  3. Check weight daily to monitor trends.  4. Rx: MVI daily per tolerated route.   5. Monitor labs for refeeding syndrome ( K, phos, mg). Replete as needed.    Monitoring and Evaluation:   [ ] PO intake [x ] Tolerance to diet prescription [X] Weights  [X] Follow up per protocol [X] Labs: chem 8, mg, phos, H/H.

## 2024-02-26 NOTE — PROGRESS NOTE ADULT - ASSESSMENT
78yr man  PMHx HTN, HLD, T2DM, CKD, plaque psoriasis, anxiety, depression, history of dysphagia admitted with cardiogenic shock 2/2 NSTEMI with Respiratory failure.    Cardiogenic Shock/NSTEMI  Per Cardio and CTS not  a candidate for CABG   Interventional cardiology was requested to reevaluate    Dysphagia  Patient with hx of dysphagia -reported offered a PEG last year but declined  Aspiration precautions  SLP eval rec NPO  Discussed risks of pleasure feeds with daughter and son  Patient  agreeing to PEG    Respiratory Failure  extubated  on room air  cont monitor O2 sats    WES/CKD  avoid nephrotoxic agents    Encounter for Palliative Care  Wife at bedside.  Patient states he is agreeable to PEG. Informed of NPO recommendation.  Discussed again risks of oral intake.  When asked if he understands, he states it is because he has an "irritation"  in his throat.  Reminded patient and wife  dysphagia has been a long time issue as reported by both their children.  He was offered a PEG in 2022 but refused- wife acknowledged this information.    Do not feel patient has good insight about his dysphagia condition.  Noted - Interventional cardio to reevaluate PCI

## 2024-02-26 NOTE — PROGRESS NOTE ADULT - ASSESSMENT
78 y/oM PMH of HTN, HLD, CKD, plaque psoriasis, DM-2 who came to the ED (02/12/24) complaining of chest pain radiating to left arm associated with multiple episodes of diarrhea.  On ED arrival, afebrile, hypotensive to SBP 80-90s despite 1L IVF requiring Levophed. EKG with nonspecific ST changes, troponin 2344->2477. Labs otherwise notable for K 5.6, BUN/Cr 43.5/2.31 (baseline Cr ~1 in 2022), BNP 06859, lactate 6.2. CXR with pulmonary edema. Placed on BiPAP ,given Lasix 40mg. TTE with EF<20%, multiple segmental abnormalities, mod MR, mod TR, mod pericardial effusion without tamponade; started on Heparin drip for NSTEMI, cardiology consulted. Admitted to MICU for further management of cardiogenic shock. On 02/14/24: patient became obtunded, febrile, intubated for airway protection. Interventional cardiology also on board; S/P LHC: Severe multi-vessel CAD including LM, CABG recommended. CT surgery deemed patient poor candidate for CABG. HF team also following; s/p RHC: shows low filling pressures with normal cardiac output. Nephrology following for WES. Patient extubated (02/18/24); NG tube in place. Palliative team following, patient DNR/DNI. Patient now downgraded to medical floor 2/19    #NSTEMI   -s/p Heparin drip   -S/p LHC: severe multiple-vessel CAD, not a candidate for CABG   -Plavix 75mg , but now on hold for planned peg / needs to hold x 5 days   -c/w Asprin 81mg , Atorvastatin 80mg   -Cardiology following, no plan for cabg at this time , likely for elective cabg / ongoign discussions with family     #Acute Systolic CHF  -s/p Bumex and Lasix drip   - c/w hydralazine , imdur   -HF following   -Echo done   -diuretics on hold for now     #Cardiogenic shock   -s/p Levophed and Dobutamine   -Monitor BP    #Acute metabolic encephalopathy , improved  - hx of dysphagia  -Now s/p extubation 2/18  -Aspiration precautions  -SLP rec NPO  -cont TF for now via NGT  with free water  -Previously refusing PEG, now agreeable  -GI consulted for PEG placement, likely plan for Weds/Thurs (2/28 or 2/29)  -Plavix to be held 5 days before proceeding with procedure  -cardio consulted as per the ok to d/c plavix for procedure     #Leukocytosis  likely reactive, no signs of infection.   -Monitor WBC, temp.  - hold off abxs for now   - monitor     #WES on CKD:  -Nephrology following   -Monitor renal function     #DM-2  -Lantus 15 units AM   -Insulin sliding scale     #Depression:    -Buspirone 10mg   -Aripiprazole 5mg   -Seroquel 50mg HS   -Sertraline 100mg     #DVT prophylaxis: Heparin sc    #Palliative following   #code: dnr/ dni / NIV     #dispo: [place] vs home w/assistance vs home hospice pending peg

## 2024-02-26 NOTE — PROGRESS NOTE ADULT - SUBJECTIVE AND OBJECTIVE BOX
CC:  Follow up GOC , Symptoms    OVERNIGHT EVENTS:  weekend events reviewed    Present Symptoms:   Dyspnea:  No    Nausea/Vomiting:  No   Anxiety:  No     Depression:  No    Fatigue:  Yes   Loss of appetite:    NA   Constipation: No    Pain: No               Location            Duration            Character            Severity            Factors            Effect    Pain AD Score:  [email or web address] (press ctrl + left click to view)    Review of Systems: Reviewed as above  All others negative      MEDICATIONS  (STANDING):  ARIPiprazole 5 milliGRAM(s) Oral daily  aspirin  chewable 81 milliGRAM(s) Oral daily  atorvastatin 80 milliGRAM(s) Oral at bedtime  busPIRone 10 milliGRAM(s) Oral <User Schedule>  chlorhexidine 2% Cloths 1 Application(s) Topical <User Schedule>  dextrose 50% Injectable 25 Gram(s) IV Push once  dextrose 50% Injectable 25 Gram(s) IV Push once  dextrose 50% Injectable 12.5 Gram(s) IV Push once  heparin   Injectable 5000 Unit(s) SubCutaneous every 8 hours  hydrALAZINE 10 milliGRAM(s) Oral three times a day  influenza  Vaccine (HIGH DOSE) 0.7 milliLiter(s) IntraMuscular once  insulin glargine Injectable (LANTUS) 15 Unit(s) SubCutaneous every morning  insulin lispro (ADMELOG) corrective regimen sliding scale   SubCutaneous every 6 hours  isosorbide   dinitrate Tablet (ISORDIL) 10 milliGRAM(s) Oral three times a day  metoprolol succinate ER 12.5 milliGRAM(s) Oral two times a day  pantoprazole  Injectable 40 milliGRAM(s) IV Push daily  QUEtiapine 50 milliGRAM(s) Oral at bedtime  sertraline 100 milliGRAM(s) Oral daily  triamcinolone 0.1% Cream 1 Application(s) Topical two times a day    MEDICATIONS  (PRN):  acetaminophen     Tablet .. 650 milliGRAM(s) Oral every 6 hours PRN Temp greater or equal to 38C (100.4F), Mild Pain (1 - 3), Moderate Pain (4 - 6)  clonazePAM  Tablet 1 milliGRAM(s) Oral daily PRN for anxiety  melatonin 3 milliGRAM(s) Oral at bedtime PRN Insomnia  sodium chloride 0.9% lock flush 10 milliLiter(s) IV Push every 1 hour PRN Pre/post blood products, medications, blood draw, and to maintain line patency      PHYSICAL EXAM:    Vital Signs Last 24 Hrs  T(C): 37.1 (26 Feb 2024 07:25), Max: 37.1 (26 Feb 2024 07:25)  T(F): 98.7 (26 Feb 2024 07:25), Max: 98.7 (26 Feb 2024 07:25)  HR: 77 (26 Feb 2024 07:25) (75 - 82)  BP: 112/79 (26 Feb 2024 12:00) (111/67 - 138/86)  BP(mean): --  RR: 18 (26 Feb 2024 07:25) (16 - 18)  SpO2: 100% (26 Feb 2024 07:25) (95% - 100%)    Parameters below as of 26 Feb 2024 07:25  Patient On (Oxygen Delivery Method): room air      Karnofsky: 30 %  General:  Elderly man awake alert NAD      HEENT:  NCAT     (x  )  NGT  Lungs: comfortable  non labored  CV:  RR  GI:  soft NTND  MSK: weakness  Skin:  warm/dry  Neuro  awake alert No focal deficits  Psych flat    LABS:                          8.9    14.32 )-----------( 412      ( 26 Feb 2024 06:58 )             29.3     02-26    146<H>  |  103  |  80.5<H>  ----------------------------<  144<H>  4.5   |  30.0<H>  |  1.83<H>    Ca    9.1      26 Feb 2024 06:58        Urinalysis Basic - ( 26 Feb 2024 06:58 )    Color: x / Appearance: x / SG: x / pH: x  Gluc: 144 mg/dL / Ketone: x  / Bili: x / Urobili: x   Blood: x / Protein: x / Nitrite: x   Leuk Esterase: x / RBC: x / WBC x   Sq Epi: x / Non Sq Epi: x / Bacteria: x      I&O's Summary    25 Feb 2024 07:01  -  26 Feb 2024 07:00  --------------------------------------------------------  IN: 650 mL / OUT: 1550 mL / NET: -900 mL        RADIOLOGY & ADDITIONAL STUDIES:  Imaging Reviewed  (  x )       < from: Xray Chest 1 View- PORTABLE-Urgent (Xray Chest 1 View- PORTABLE-Urgent .) (02.24.24 @ 09:59) >    ACC: [id number] EXAM:  XR CHEST PORTABLE URGENT 1V   ORDERED BY: MASTER GUTIERREZ     PROCEDURE DATE:  02/24/2024          INTERPRETATION:  AP chest on February 24, 2024 at 9:45 AM. Clinically   patient has congestion.    Heart size normal. NG tuberemains.    Lungs remain clear.    Chest is similar to February 22 this year.    IMPRESSION: Lungs remain clear. NG tube remains.    --- End of Report ---            MARTHA SHARPE MD; Attending Radiologist  This document has been electronically signed.Feb 24 2024 12:43PM    < end of copied text >        ADVANCE DIRECTIVE PREFERENCES    DNR/I  and continuing medical treatments

## 2024-02-26 NOTE — PROGRESS NOTE ADULT - ASSESSMENT
78-year-old male with HTN, HLD, DM admitted with chest pain and diarrhea.  Hypotensive on arrival requiring Levophed.  TTE with a EF of less than 20%.  Intubated on 2/14/2024. s/p LHC showing MVD, poor candidate for CABG.  Patient extubated yesterday.  Nephrology following for acute kidney injury:    Acute kidney injury on CKD, NOS  NSTEMI  Acute systolic CHF  Anemia, unspecified  DM    ·	Baseline serum creatinine unclear was 1 mg/deciliter in September 22  ·	WES was secondary to hypoperfusion from cardiogenic shock  ·	Serum creatinine on admission 2.3 and had stayed stable around 1.8-2.0 mg/deciliter now, ? new baseline for now post cardiogenic shock related to stunning and low EF  ·	Patient euvolemic, diuretics on hold (initially was on dobutamine and Bumex drip)  ·	Will follow

## 2024-02-26 NOTE — PROGRESS NOTE ADULT - SUBJECTIVE AND OBJECTIVE BOX
[facility] DIVISION OF KIDNEY DISEASES AND HYPERTENSION -- FOLLOW UP NOTE  --------------------------------------------------------------------------------  Chief Complaint: Rajendra on CKD    24 hour events/subjective:  Pt seen and examined; feels well        PAST HISTORY  --------------------------------------------------------------------------------  No significant changes to PMH, PSH, FHx, SHx, unless otherwise noted    ALLERGIES & MEDICATIONS  --------------------------------------------------------------------------------  Allergies    No Known Allergies      Standing Inpatient Medications  ARIPiprazole 5 milliGRAM(s) Oral daily  aspirin  chewable 81 milliGRAM(s) Oral daily  atorvastatin 80 milliGRAM(s) Oral at bedtime  busPIRone 10 milliGRAM(s) Oral <User Schedule>  chlorhexidine 2% Cloths 1 Application(s) Topical <User Schedule>  dextrose 50% Injectable 25 Gram(s) IV Push once  dextrose 50% Injectable 25 Gram(s) IV Push once  dextrose 50% Injectable 12.5 Gram(s) IV Push once  heparin   Injectable 5000 Unit(s) SubCutaneous every 8 hours  hydrALAZINE 10 milliGRAM(s) Oral three times a day  influenza  Vaccine (HIGH DOSE) 0.7 milliLiter(s) IntraMuscular once  insulin glargine Injectable (LANTUS) 15 Unit(s) SubCutaneous every morning  insulin lispro (ADMELOG) corrective regimen sliding scale   SubCutaneous every 6 hours  isosorbide   dinitrate Tablet (ISORDIL) 10 milliGRAM(s) Oral three times a day  metoprolol succinate ER 12.5 milliGRAM(s) Oral two times a day  pantoprazole  Injectable 40 milliGRAM(s) IV Push daily  QUEtiapine 50 milliGRAM(s) Oral at bedtime  sertraline 100 milliGRAM(s) Oral daily  triamcinolone 0.1% Cream 1 Application(s) Topical two times a day    PRN Inpatient Medications  acetaminophen     Tablet .. 650 milliGRAM(s) Oral every 6 hours PRN  clonazePAM  Tablet 1 milliGRAM(s) Oral daily PRN  melatonin 3 milliGRAM(s) Oral at bedtime PRN  sodium chloride 0.9% lock flush 10 milliLiter(s) IV Push every 1 hour PRN      REVIEW OF SYSTEMS  --------------------------------------------------------------------------------  Gen: No weight changes, fatigue, fevers/chills, weakness  Skin: No rashes  Head/Eyes/Ears/Mouth: No headache; Normal hearing; Normal vision w/o blurriness; No sinus pain/discomfort, sore throat  Respiratory: No dyspnea, cough, wheezing, hemoptysis  CV: No chest pain, PND, orthopnea  GI: No abdominal pain, diarrhea, constipation, nausea, vomiting, melena, hematochezia  : No increased frequency, dysuria, hematuria, nocturia  MSK: No joint pain/swelling; no back pain; no edema  Neuro: No dizziness/lightheadedness, weakness, seizures, numbness, tingling  Heme: No easy bruising or bleeding  Endo: No heat/cold intolerance  Psych: No significant nervousness, anxiety, stress, depression    All other systems were reviewed and are negative, except as noted.    VITALS/PHYSICAL EXAM  --------------------------------------------------------------------------------  T(C): 37.1 (02-26-24 @ 07:25), Max: 37.1 (02-26-24 @ 07:25)  HR: 77 (02-26-24 @ 07:25) (75 - 82)  BP: 112/79 (02-26-24 @ 12:00) (111/67 - 138/86)  RR: 18 (02-26-24 @ 07:25) (16 - 18)  SpO2: 100% (02-26-24 @ 07:25) (95% - 100%)  Wt(kg): --        02-25-24 @ 07:01  -  02-26-24 @ 07:00  --------------------------------------------------------  IN: 650 mL / OUT: 1550 mL / NET: -900 mL      Physical Exam:  	Gen: NAD,   	HEENT: supple neck  	Pulm: CTA B/L  	CV: RRR, S1S2; no rub  	Back: No spinal or CVA tenderness; no sacral edema  	Abd: +BS, soft, nontender/nondistended  	: No suprapubic tenderness  	UE: Warm,  no edema  	LE: Warm, no edema  	Neuro: No focal deficit  	Psych: Normal affect and mood  	Skin: Warm,   LABS/STUDIES  --------------------------------------------------------------------------------              8.9    14.32 >-----------<  412      [02-26-24 @ 06:58]              29.3     146  |  103  |  80.5  ----------------------------<  144      [02-26-24 @ 06:58]  4.5   |  30.0  |  1.83        Ca     9.1     [02-26-24 @ 06:58]      Creatinine Trend:  SCr 1.83 [02-26 @ 06:58]  SCr 1.91 [02-24 @ 05:46]  SCr 1.99 [02-23 @ 09:14]  SCr 1.87 [02-22 @ 07:15]  SCr 1.96 [02-21 @ 05:00]    Urinalysis - [02-26-24 @ 06:58]      Color  / Appearance  / SG  / pH       Gluc 144 / Ketone   / Bili  / Urobili        Blood  / Protein  / Leuk Est  / Nitrite       RBC  / WBC  / Hyaline  / Gran  / Sq Epi  / Non Sq Epi  / Bacteria       TSH 2.49      [02-13-24 @ 03:08]  Lipid: chol 179, TG 94, HDL 49, LDL --      [02-13-24 @ 11:15]

## 2024-02-26 NOTE — PROGRESS NOTE ADULT - SUBJECTIVE AND OBJECTIVE BOX
[facility] Division of Hospital Medicine     SUBJECTIVE / OVERNIGHT EVENTS: patient seen and eval. awake, comfortable. alert x 3 but forgetful. patient wishes to proceed with peg tube placement but states may still want to try po feeds     Vital Signs Last 24 Hrs  T(C): 36.7 (26 Feb 2024 15:56), Max: 37.1 (26 Feb 2024 07:25)  T(F): 98.1 (26 Feb 2024 15:56), Max: 98.7 (26 Feb 2024 07:25)  HR: 80 (26 Feb 2024 15:56) (77 - 82)  BP: 117/65 (26 Feb 2024 15:56) (111/67 - 138/86)  BP(mean): --  RR: 18 (26 Feb 2024 15:56) (18 - 18)  SpO2: 100% (26 Feb 2024 15:56) (97% - 100%)    Parameters below as of 26 Feb 2024 15:56  Patient On (Oxygen Delivery Method): room air      GENERAL: Elderly male looking comfortable   HEENT: NG tube in place   NECK: soft, Supple, No JVD  CHEST/LUNG: decrease air entry bilaterally; No wheezing  HEART: S1S2+, Regular rate and rhythm; No murmurs  ABDOMEN: Soft, Nontender, Nondistended; Bowel sounds present  EXTREMITIES:  1+ Peripheral Pulses, No edema  SKIN: No rashes or lesions  NEURO: A OX3, forgetful ,   PSYCH: normal mood        Complete Blood Count in AM (02.26.24 @ 06:58)    WBC Count: 14.32 K/uL   RBC Count: 3.34 M/uL   Hemoglobin: 8.9 g/dL   Hematocrit: 29.3 %   Mean Cell Volume: 87.7 fl   Mean Cell Hemoglobin: 26.6 pg   Mean Cell Hemoglobin Conc: 30.4 gm/dL   Red Cell Distrib Width: 15.0 %   Platelet Count - Automated: 412 K/uL      RADIOLOGY & ADDITIONAL TESTS:  Results Reviewed:   Imaging Personally Reviewed:  Electrocardiogram Personally Reviewed:    MEDICATIONS  (STANDING):  ARIPiprazole 5 milliGRAM(s) Oral daily  aspirin  chewable 81 milliGRAM(s) Oral daily  atorvastatin 80 milliGRAM(s) Oral at bedtime  busPIRone 10 milliGRAM(s) Oral <User Schedule>  chlorhexidine 2% Cloths 1 Application(s) Topical <User Schedule>  dextrose 50% Injectable 25 Gram(s) IV Push once  dextrose 50% Injectable 25 Gram(s) IV Push once  dextrose 50% Injectable 12.5 Gram(s) IV Push once  heparin   Injectable 5000 Unit(s) SubCutaneous every 8 hours  hydrALAZINE 10 milliGRAM(s) Oral three times a day  influenza  Vaccine (HIGH DOSE) 0.7 milliLiter(s) IntraMuscular once  insulin glargine Injectable (LANTUS) 15 Unit(s) SubCutaneous every morning  insulin lispro (ADMELOG) corrective regimen sliding scale   SubCutaneous every 6 hours  isosorbide   dinitrate Tablet (ISORDIL) 10 milliGRAM(s) Oral three times a day  metoprolol succinate ER 12.5 milliGRAM(s) Oral two times a day  pantoprazole  Injectable 40 milliGRAM(s) IV Push daily  QUEtiapine 50 milliGRAM(s) Oral at bedtime  sertraline 100 milliGRAM(s) Oral daily  triamcinolone 0.1% Cream 1 Application(s) Topical two times a day    MEDICATIONS  (PRN):  acetaminophen     Tablet .. 650 milliGRAM(s) Oral every 6 hours PRN Temp greater or equal to 38C (100.4F), Mild Pain (1 - 3), Moderate Pain (4 - 6)  clonazePAM  Tablet 1 milliGRAM(s) Oral daily PRN for anxiety  melatonin 3 milliGRAM(s) Oral at bedtime PRN Insomnia  sodium chloride 0.9% lock flush 10 milliLiter(s) IV Push every 1 hour PRN Pre/post blood products, medications, blood draw, and to maintain line patency

## 2024-02-27 LAB
ALBUMIN SERPL ELPH-MCNC: 3.1 G/DL — LOW (ref 3.3–5.2)
ALP SERPL-CCNC: 112 U/L — SIGNIFICANT CHANGE UP (ref 40–120)
ALT FLD-CCNC: 15 U/L — SIGNIFICANT CHANGE UP
ANION GAP SERPL CALC-SCNC: 16 MMOL/L — SIGNIFICANT CHANGE UP (ref 5–17)
AST SERPL-CCNC: 26 U/L — SIGNIFICANT CHANGE UP
BILIRUB SERPL-MCNC: 0.3 MG/DL — LOW (ref 0.4–2)
BUN SERPL-MCNC: 79.5 MG/DL — HIGH (ref 8–20)
CALCIUM SERPL-MCNC: 9 MG/DL — SIGNIFICANT CHANGE UP (ref 8.4–10.5)
CHLORIDE SERPL-SCNC: 101 MMOL/L — SIGNIFICANT CHANGE UP (ref 96–108)
CO2 SERPL-SCNC: 25 MMOL/L — SIGNIFICANT CHANGE UP (ref 22–29)
CREAT SERPL-MCNC: 1.85 MG/DL — HIGH (ref 0.5–1.3)
EGFR: 37 ML/MIN/1.73M2 — LOW
GLUCOSE BLDC GLUCOMTR-MCNC: 145 MG/DL — HIGH (ref 70–99)
GLUCOSE BLDC GLUCOMTR-MCNC: 148 MG/DL — HIGH (ref 70–99)
GLUCOSE BLDC GLUCOMTR-MCNC: 194 MG/DL — HIGH (ref 70–99)
GLUCOSE BLDC GLUCOMTR-MCNC: 209 MG/DL — HIGH (ref 70–99)
GLUCOSE BLDC GLUCOMTR-MCNC: 216 MG/DL — HIGH (ref 70–99)
GLUCOSE SERPL-MCNC: 150 MG/DL — HIGH (ref 70–99)
HCT VFR BLD CALC: 29.3 % — LOW (ref 39–50)
HGB BLD-MCNC: 9.2 G/DL — LOW (ref 13–17)
MAGNESIUM SERPL-MCNC: 2.2 MG/DL — SIGNIFICANT CHANGE UP (ref 1.6–2.6)
MCHC RBC-ENTMCNC: 27.2 PG — SIGNIFICANT CHANGE UP (ref 27–34)
MCHC RBC-ENTMCNC: 31.4 GM/DL — LOW (ref 32–36)
MCV RBC AUTO: 86.7 FL — SIGNIFICANT CHANGE UP (ref 80–100)
PLATELET # BLD AUTO: 362 K/UL — SIGNIFICANT CHANGE UP (ref 150–400)
POTASSIUM SERPL-MCNC: 5.2 MMOL/L — SIGNIFICANT CHANGE UP (ref 3.5–5.3)
POTASSIUM SERPL-SCNC: 5.2 MMOL/L — SIGNIFICANT CHANGE UP (ref 3.5–5.3)
PROT SERPL-MCNC: 6.6 G/DL — SIGNIFICANT CHANGE UP (ref 6.6–8.7)
RBC # BLD: 3.38 M/UL — LOW (ref 4.2–5.8)
RBC # FLD: 15.4 % — HIGH (ref 10.3–14.5)
SODIUM SERPL-SCNC: 142 MMOL/L — SIGNIFICANT CHANGE UP (ref 135–145)
WBC # BLD: 10.72 K/UL — HIGH (ref 3.8–10.5)
WBC # FLD AUTO: 10.72 K/UL — HIGH (ref 3.8–10.5)

## 2024-02-27 PROCEDURE — 99232 SBSQ HOSP IP/OBS MODERATE 35: CPT

## 2024-02-27 RX ORDER — CEFAZOLIN SODIUM 1 G
1000 VIAL (EA) INJECTION ONCE
Refills: 0 | Status: DISCONTINUED | OUTPATIENT
Start: 2024-02-27 | End: 2024-02-27

## 2024-02-27 RX ORDER — CEFAZOLIN SODIUM 1 G
1000 VIAL (EA) INJECTION ONCE
Refills: 0 | Status: DISCONTINUED | OUTPATIENT
Start: 2024-02-28 | End: 2024-03-06

## 2024-02-27 RX ADMIN — Medication 1 APPLICATION(S): at 05:16

## 2024-02-27 RX ADMIN — ARIPIPRAZOLE 5 MILLIGRAM(S): 15 TABLET ORAL at 12:17

## 2024-02-27 RX ADMIN — QUETIAPINE FUMARATE 50 MILLIGRAM(S): 200 TABLET, FILM COATED ORAL at 22:02

## 2024-02-27 RX ADMIN — Medication 10 MILLIGRAM(S): at 05:15

## 2024-02-27 RX ADMIN — ISOSORBIDE DINITRATE 10 MILLIGRAM(S): 5 TABLET ORAL at 16:49

## 2024-02-27 RX ADMIN — Medication 650 MILLIGRAM(S): at 06:00

## 2024-02-27 RX ADMIN — HEPARIN SODIUM 5000 UNIT(S): 5000 INJECTION INTRAVENOUS; SUBCUTANEOUS at 05:15

## 2024-02-27 RX ADMIN — Medication 1 APPLICATION(S): at 18:04

## 2024-02-27 RX ADMIN — Medication 12.5 MILLIGRAM(S): at 05:13

## 2024-02-27 RX ADMIN — Medication 2: at 00:38

## 2024-02-27 RX ADMIN — Medication 3 MILLIGRAM(S): at 22:00

## 2024-02-27 RX ADMIN — PANTOPRAZOLE SODIUM 40 MILLIGRAM(S): 20 TABLET, DELAYED RELEASE ORAL at 12:17

## 2024-02-27 RX ADMIN — Medication 650 MILLIGRAM(S): at 13:23

## 2024-02-27 RX ADMIN — Medication 10 MILLIGRAM(S): at 22:02

## 2024-02-27 RX ADMIN — HEPARIN SODIUM 5000 UNIT(S): 5000 INJECTION INTRAVENOUS; SUBCUTANEOUS at 15:25

## 2024-02-27 RX ADMIN — ISOSORBIDE DINITRATE 10 MILLIGRAM(S): 5 TABLET ORAL at 12:17

## 2024-02-27 RX ADMIN — ISOSORBIDE DINITRATE 10 MILLIGRAM(S): 5 TABLET ORAL at 05:15

## 2024-02-27 RX ADMIN — Medication 4: at 12:34

## 2024-02-27 RX ADMIN — Medication 650 MILLIGRAM(S): at 05:13

## 2024-02-27 RX ADMIN — SERTRALINE 100 MILLIGRAM(S): 25 TABLET, FILM COATED ORAL at 12:18

## 2024-02-27 RX ADMIN — INSULIN GLARGINE 15 UNIT(S): 100 INJECTION, SOLUTION SUBCUTANEOUS at 09:01

## 2024-02-27 RX ADMIN — Medication 650 MILLIGRAM(S): at 12:23

## 2024-02-27 RX ADMIN — Medication 12.5 MILLIGRAM(S): at 16:50

## 2024-02-27 RX ADMIN — Medication 10 MILLIGRAM(S): at 15:25

## 2024-02-27 RX ADMIN — Medication 81 MILLIGRAM(S): at 12:17

## 2024-02-27 RX ADMIN — HEPARIN SODIUM 5000 UNIT(S): 5000 INJECTION INTRAVENOUS; SUBCUTANEOUS at 22:01

## 2024-02-27 RX ADMIN — CHLORHEXIDINE GLUCONATE 1 APPLICATION(S): 213 SOLUTION TOPICAL at 05:15

## 2024-02-27 RX ADMIN — ATORVASTATIN CALCIUM 80 MILLIGRAM(S): 80 TABLET, FILM COATED ORAL at 22:00

## 2024-02-27 RX ADMIN — Medication 10 MILLIGRAM(S): at 09:50

## 2024-02-27 NOTE — PROGRESS NOTE ADULT - SUBJECTIVE AND OBJECTIVE BOX
CABRERA DENSON Patient is a 78y old  Male who presents with a chief complaint of Cardiogenic Shock (27 Feb 2024 09:51)     HPI:  78M with anxiety, HTN, HLD, CKD, plaque psoriasis, DM2 who presents with burning substernal chest pain at rest that started 1wk ago but acutely progressed this morning to sharp pain with radiation to L arm with associated multiple bouts of diarrhea. On ED arrival, afebrile, hypotensive to SBP 80-90s despite 1L IVF requiring levophed. EKG with nonspecific ST changes, troponin 2344->2477. Labs otherwise notable for K 5.6, BUN/Cr 43.5/2.31 (baseline Cr ~1 in 2022), BNP 63363, lactate 6.2. CXR with pulmonary edema. Placed on BiPAP for SOB and hypoxia. STAT TTE with EF<20%, multiple segmental abnormalities, mod MR, mod TR, mod pericardial effusion without tamponade. Received total lasix 40mg IV in ED. ICU consulted for further management of cardiogenic shock.      (13 Feb 2024 01:56)    The patient was seen and evaluated   The patient is in no acute distress.      I&O's Summary    26 Feb 2024 07:01  -  27 Feb 2024 07:00  --------------------------------------------------------  IN: 650 mL / OUT: 0 mL / NET: 650 mL    27 Feb 2024 07:01  -  27 Feb 2024 18:55  --------------------------------------------------------  IN: 670 mL / OUT: 300 mL / NET: 370 mL      Allergies    No Known Allergies    Intolerances      HEALTH ISSUES - PROBLEM Dx:  NSTEMI (non-ST elevation myocardial infarction)    Chronic kidney disease (CKD)    Acute combined systolic and diastolic congestive heart failure    Acute HFrEF (heart failure with reduced ejection fraction)    Renal failure (ARF), acute on chronic    Dysphagia    CAD (coronary artery disease)    Preoperative cardiovascular examination          PAST MEDICAL & SURGICAL HISTORY:  Diabetes      Hyperlipidemia      Anxiety with depression      Insomnia      No significant past surgical history              Vital Signs Last 24 Hrs  T(C): 37.1 (27 Feb 2024 15:16), Max: 37.1 (27 Feb 2024 15:16)  T(F): 98.8 (27 Feb 2024 15:16), Max: 98.8 (27 Feb 2024 15:16)  HR: 82 (27 Feb 2024 15:16) (77 - 82)  BP: 108/64 (27 Feb 2024 15:16) (108/64 - 116/61)  BP(mean): --  RR: 18 (27 Feb 2024 15:16) (18 - 18)  SpO2: 100% (27 Feb 2024 15:16) (96% - 100%)    Parameters below as of 27 Feb 2024 15:16  Patient On (Oxygen Delivery Method): room air    T(C): 37.1 (02-27-24 @ 15:16), Max: 37.1 (02-27-24 @ 15:16)  HR: 82 (02-27-24 @ 15:16) (77 - 82)  BP: 108/64 (02-27-24 @ 15:16) (108/64 - 116/61)  RR: 18 (02-27-24 @ 15:16) (18 - 18)  SpO2: 100% (02-27-24 @ 15:16) (96% - 100%)  Wt(kg): --    PHYSICAL EXAM:    GENERAL: NAD  HEAD:  Atraumatic, Normocephalic  EYES: EOMI, PERRL, conjunctiva and sclera clear  ENMT:  Moist mucous membranes,  No lesions  NECK: Supple, No JVD, Normal thyroid  NERVOUS SYSTEM:  Alert & Oriented X3,  Moves upper and lower extremities; CNS-II-XII  CHEST/LUNG: Clear to auscultation bilaterally; No rales, rhonchi, wheezing,   HEART: Regular rate and rhythm; No murmurs,   ABDOMEN: Soft, Nontender, Nondistended; Bowel sounds present  EXTREMITIES:  Peripheral Pulses, No  cyanosis, or edema  SKIN: No rashes or lesions  psychiatry- mood and affect appropriate, Insight and judgement intact     acetaminophen     Tablet .. 650 milliGRAM(s) Oral every 6 hours PRN  ARIPiprazole 5 milliGRAM(s) Oral daily  aspirin  chewable 81 milliGRAM(s) Oral daily  atorvastatin 80 milliGRAM(s) Oral at bedtime  busPIRone 10 milliGRAM(s) Oral <User Schedule>  chlorhexidine 2% Cloths 1 Application(s) Topical <User Schedule>  clonazePAM  Tablet 1 milliGRAM(s) Oral daily PRN  dextrose 50% Injectable 12.5 Gram(s) IV Push once  dextrose 50% Injectable 25 Gram(s) IV Push once  dextrose 50% Injectable 25 Gram(s) IV Push once  heparin   Injectable 5000 Unit(s) SubCutaneous every 8 hours  hydrALAZINE 10 milliGRAM(s) Oral three times a day  influenza  Vaccine (HIGH DOSE) 0.7 milliLiter(s) IntraMuscular once  insulin glargine Injectable (LANTUS) 15 Unit(s) SubCutaneous every morning  insulin lispro (ADMELOG) corrective regimen sliding scale   SubCutaneous every 6 hours  isosorbide   dinitrate Tablet (ISORDIL) 10 milliGRAM(s) Oral three times a day  melatonin 3 milliGRAM(s) Oral at bedtime PRN  metoprolol succinate ER 12.5 milliGRAM(s) Oral two times a day  pantoprazole  Injectable 40 milliGRAM(s) IV Push daily  QUEtiapine 50 milliGRAM(s) Oral at bedtime  sertraline 100 milliGRAM(s) Oral daily  sodium chloride 0.9% lock flush 10 milliLiter(s) IV Push every 1 hour PRN  triamcinolone 0.1% Cream 1 Application(s) Topical two times a day      LABS:                          9.2    10.72 )-----------( 362      ( 27 Feb 2024 06:00 )             29.3     02-27    142  |  101  |  79.5<H>  ----------------------------<  150<H>  5.2   |  25.0  |  1.85<H>    Ca    9.0      27 Feb 2024 06:00  Mg     2.2     02-27    TPro  6.6  /  Alb  3.1<L>  /  TBili  0.3<L>  /  DBili  x   /  AST  26  /  ALT  15  /  AlkPhos  112  02-27    LIVER FUNCTIONS - ( 27 Feb 2024 06:00 )  Alb: 3.1 g/dL / Pro: 6.6 g/dL / ALK PHOS: 112 U/L / ALT: 15 U/L / AST: 26 U/L / GGT: x                 Urinalysis Basic - ( 27 Feb 2024 06:00 )    Color: x / Appearance: x / SG: x / pH: x  Gluc: 150 mg/dL / Ketone: x  / Bili: x / Urobili: x   Blood: x / Protein: x / Nitrite: x   Leuk Esterase: x / RBC: x / WBC x   Sq Epi: x / Non Sq Epi: x / Bacteria: x      CAPILLARY BLOOD GLUCOSE      POCT Blood Glucose.: 145 mg/dL (27 Feb 2024 17:02)  POCT Blood Glucose.: 216 mg/dL (27 Feb 2024 12:19)  POCT Blood Glucose.: 209 mg/dL (27 Feb 2024 08:59)  POCT Blood Glucose.: 148 mg/dL (27 Feb 2024 05:09)  POCT Blood Glucose.: 194 mg/dL (27 Feb 2024 00:26)      RADIOLOGY & ADDITIONAL TESTS:      Consultant notes reviewed    Case discussed with consultant/provider/ family /patient  CABRERA DENSON Patient is a 78y old  Male who presents with a chief complaint of Cardiogenic Shock (27 Feb 2024 09:51)     HPI:  78M with anxiety, HTN, HLD, CKD, plaque psoriasis, DM2 who presents with burning substernal chest pain at rest that started 1wk ago but acutely progressed this morning to sharp pain with radiation to L arm with associated multiple bouts of diarrhea. On ED arrival, afebrile, hypotensive to SBP 80-90s despite 1L IVF requiring levophed. EKG with nonspecific ST changes, troponin 2344->2477. Labs otherwise notable for K 5.6, BUN/Cr 43.5/2.31 (baseline Cr ~1 in 2022), BNP 47840, lactate 6.2. CXR with pulmonary edema. Placed on BiPAP for SOB and hypoxia. STAT TTE with EF<20%, multiple segmental abnormalities, mod MR, mod TR, mod pericardial effusion without tamponade. Received total lasix 40mg IV in ED. ICU consulted for further management of cardiogenic shock.      (13 Feb 2024 01:56)    The patient was seen and evaluated   The patient is in no acute distress.      I&O's Summary    26 Feb 2024 07:01  -  27 Feb 2024 07:00  --------------------------------------------------------  IN: 650 mL / OUT: 0 mL / NET: 650 mL    27 Feb 2024 07:01  -  27 Feb 2024 18:55  --------------------------------------------------------  IN: 670 mL / OUT: 300 mL / NET: 370 mL      Allergies    No Known Allergies    Intolerances      HEALTH ISSUES - PROBLEM Dx:  NSTEMI (non-ST elevation myocardial infarction)    Chronic kidney disease (CKD)    Acute combined systolic and diastolic congestive heart failure    Acute HFrEF (heart failure with reduced ejection fraction)    Renal failure (ARF), acute on chronic    Dysphagia    CAD (coronary artery disease)    Preoperative cardiovascular examination          PAST MEDICAL & SURGICAL HISTORY:  Diabetes      Hyperlipidemia      Anxiety with depression      Insomnia      No significant past surgical history              Vital Signs Last 24 Hrs  T(C): 37.1 (27 Feb 2024 15:16), Max: 37.1 (27 Feb 2024 15:16)  T(F): 98.8 (27 Feb 2024 15:16), Max: 98.8 (27 Feb 2024 15:16)  HR: 82 (27 Feb 2024 15:16) (77 - 82)  BP: 108/64 (27 Feb 2024 15:16) (108/64 - 116/61)  BP(mean): --  RR: 18 (27 Feb 2024 15:16) (18 - 18)  SpO2: 100% (27 Feb 2024 15:16) (96% - 100%)    Parameters below as of 27 Feb 2024 15:16  Patient On (Oxygen Delivery Method): room air    T(C): 37.1 (02-27-24 @ 15:16), Max: 37.1 (02-27-24 @ 15:16)  HR: 82 (02-27-24 @ 15:16) (77 - 82)  BP: 108/64 (02-27-24 @ 15:16) (108/64 - 116/61)  RR: 18 (02-27-24 @ 15:16) (18 - 18)  SpO2: 100% (02-27-24 @ 15:16) (96% - 100%)  Wt(kg): --    PHYSICAL EXAM:    GENERAL: NAD  HEAD:  Atraumatic, Normocephalic  EYES: EOMI,, conjunctiva and sclera clear  ENMT:  Moist mucous membranes,    NERVOUS SYSTEM:  Alert & Oriented X3,  Moves upper and lower extremities; CNS-II-XII  CHEST/LUNG: Clear to auscultation bilaterally;   HEART: Regular rate and rhythm; No murmurs,   ABDOMEN: Soft, Nontender, Nondistended; Bowel sounds present  EXTREMITIES:  Peripheral Pulses, No  cyanosis, or edema  psychiatry- mood and affect anxious    acetaminophen     Tablet .. 650 milliGRAM(s) Oral every 6 hours PRN  ARIPiprazole 5 milliGRAM(s) Oral daily  aspirin  chewable 81 milliGRAM(s) Oral daily  atorvastatin 80 milliGRAM(s) Oral at bedtime  busPIRone 10 milliGRAM(s) Oral <User Schedule>  chlorhexidine 2% Cloths 1 Application(s) Topical <User Schedule>  clonazePAM  Tablet 1 milliGRAM(s) Oral daily PRN  dextrose 50% Injectable 12.5 Gram(s) IV Push once  dextrose 50% Injectable 25 Gram(s) IV Push once  dextrose 50% Injectable 25 Gram(s) IV Push once  heparin   Injectable 5000 Unit(s) SubCutaneous every 8 hours  hydrALAZINE 10 milliGRAM(s) Oral three times a day  influenza  Vaccine (HIGH DOSE) 0.7 milliLiter(s) IntraMuscular once  insulin glargine Injectable (LANTUS) 15 Unit(s) SubCutaneous every morning  insulin lispro (ADMELOG) corrective regimen sliding scale   SubCutaneous every 6 hours  isosorbide   dinitrate Tablet (ISORDIL) 10 milliGRAM(s) Oral three times a day  melatonin 3 milliGRAM(s) Oral at bedtime PRN  metoprolol succinate ER 12.5 milliGRAM(s) Oral two times a day  pantoprazole  Injectable 40 milliGRAM(s) IV Push daily  QUEtiapine 50 milliGRAM(s) Oral at bedtime  sertraline 100 milliGRAM(s) Oral daily  sodium chloride 0.9% lock flush 10 milliLiter(s) IV Push every 1 hour PRN  triamcinolone 0.1% Cream 1 Application(s) Topical two times a day      LABS:                          9.2    10.72 )-----------( 362      ( 27 Feb 2024 06:00 )             29.3     02-27    142  |  101  |  79.5<H>  ----------------------------<  150<H>  5.2   |  25.0  |  1.85<H>    Ca    9.0      27 Feb 2024 06:00  Mg     2.2     02-27    TPro  6.6  /  Alb  3.1<L>  /  TBili  0.3<L>  /  DBili  x   /  AST  26  /  ALT  15  /  AlkPhos  112  02-27    LIVER FUNCTIONS - ( 27 Feb 2024 06:00 )  Alb: 3.1 g/dL / Pro: 6.6 g/dL / ALK PHOS: 112 U/L / ALT: 15 U/L / AST: 26 U/L / GGT: x                 Urinalysis Basic - ( 27 Feb 2024 06:00 )    Color: x / Appearance: x / SG: x / pH: x  Gluc: 150 mg/dL / Ketone: x  / Bili: x / Urobili: x   Blood: x / Protein: x / Nitrite: x   Leuk Esterase: x / RBC: x / WBC x   Sq Epi: x / Non Sq Epi: x / Bacteria: x      CAPILLARY BLOOD GLUCOSE      POCT Blood Glucose.: 145 mg/dL (27 Feb 2024 17:02)  POCT Blood Glucose.: 216 mg/dL (27 Feb 2024 12:19)  POCT Blood Glucose.: 209 mg/dL (27 Feb 2024 08:59)  POCT Blood Glucose.: 148 mg/dL (27 Feb 2024 05:09)  POCT Blood Glucose.: 194 mg/dL (27 Feb 2024 00:26)      RADIOLOGY & ADDITIONAL TESTS:      Consultant notes reviewed    Case discussed with consultant/provider/ family /patient

## 2024-02-27 NOTE — PROGRESS NOTE ADULT - NS ATTEND OPT1 GEN_ALL_CORE

## 2024-02-27 NOTE — PROGRESS NOTE ADULT - ASSESSMENT
77 y/o M with PMHx anxiety, HTN, HLD, CKD, plaque psoriasis, DM2, progressive dysphagia who presents with chest pain found to have NSTEMI. GI consulted for PEG placement due to progressive dysphagia.    #Dysphagia  SLP 2/20/24- NPO w/ GOC discussion regarding PEG vs pleasure feeds.    - Patient currently c/o chest pain. Please obtain cardiology re-evaluation and updated pre-procedure cardiology clearance  - Will tentatively plan for PEG placement Thursday depending upon clearances   - Maintain strict aspiration precautions   - Maintain current T&S, INR <1.5, Hgb >7.0, Plt >50 prior to procedure  _________________________________________________________________  Assessment and recommendations are final when note is signed by the attending physician.  77 y/o M with PMHx anxiety, HTN, HLD, CKD, plaque psoriasis, DM2, progressive dysphagia who presents with chest pain found to have NSTEMI. GI consulted for PEG placement due to progressive dysphagia.    #Dysphagia  SLP 2/20/24- NPO w/ GOC discussion regarding PEG vs pleasure feeds.    - Patient currently c/o chest pain. Please obtain cardiology re-evaluation and updated pre-procedure cardiology clearance  - Will tentatively plan for PEG placement Thursday depending upon clearances   - Maintain strict aspiration precautions   - Maintain current T&S, INR <1.5, Hgb >7.0, Plt >50 prior to procedure    Addendum:   Spoke to cardiology [facility], no further workup is needed at this time, he is cleared for procedure per their prior note  Will plan tentatively plan for EGD w/ PEG placement tomorrow  Hold tube feeds after midnight and hold heparin ppx after midnight   D/w hospitalist   _________________________________________________________________  Assessment and recommendations are final when note is signed by the attending physician.

## 2024-02-27 NOTE — PROGRESS NOTE ADULT - SUBJECTIVE AND OBJECTIVE BOX
Chief Complaint:  Patient is a 78y old  Male who presents with a chief complaint of Cardiogenic Shock (26 Feb 2024 16:56)      HPI/ 24 hr events: Patient seen and examined at bedside. 1:1 aide is present. He is complaining of chest pain. Vitals are overall stable, afebrile. Plavix has been held. Tube feeds are infusing.       REVIEW OF SYSTEMS:   General: Negative  HEENT: Negative  CV: Negative  Respiratory: Negative  GI: See HPI  : Negative  MSK: Negative  Hematologic: Negative  Skin: Negative    MEDICATIONS:   MEDICATIONS  (STANDING):  ARIPiprazole 5 milliGRAM(s) Oral daily  aspirin  chewable 81 milliGRAM(s) Oral daily  atorvastatin 80 milliGRAM(s) Oral at bedtime  busPIRone 10 milliGRAM(s) Oral <User Schedule>  chlorhexidine 2% Cloths 1 Application(s) Topical <User Schedule>  dextrose 50% Injectable 12.5 Gram(s) IV Push once  dextrose 50% Injectable 25 Gram(s) IV Push once  dextrose 50% Injectable 25 Gram(s) IV Push once  heparin   Injectable 5000 Unit(s) SubCutaneous every 8 hours  hydrALAZINE 10 milliGRAM(s) Oral three times a day  influenza  Vaccine (HIGH DOSE) 0.7 milliLiter(s) IntraMuscular once  insulin glargine Injectable (LANTUS) 15 Unit(s) SubCutaneous every morning  insulin lispro (ADMELOG) corrective regimen sliding scale   SubCutaneous every 6 hours  isosorbide   dinitrate Tablet (ISORDIL) 10 milliGRAM(s) Oral three times a day  metoprolol succinate ER 12.5 milliGRAM(s) Oral two times a day  pantoprazole  Injectable 40 milliGRAM(s) IV Push daily  QUEtiapine 50 milliGRAM(s) Oral at bedtime  sertraline 100 milliGRAM(s) Oral daily  triamcinolone 0.1% Cream 1 Application(s) Topical two times a day    MEDICATIONS  (PRN):  acetaminophen     Tablet .. 650 milliGRAM(s) Oral every 6 hours PRN Temp greater or equal to 38C (100.4F), Mild Pain (1 - 3), Moderate Pain (4 - 6)  clonazePAM  Tablet 1 milliGRAM(s) Oral daily PRN for anxiety  melatonin 3 milliGRAM(s) Oral at bedtime PRN Insomnia  sodium chloride 0.9% lock flush 10 milliLiter(s) IV Push every 1 hour PRN Pre/post blood products, medications, blood draw, and to maintain line patency            DIET:  Diet, NPO with Tube Feed:   Tube Feeding Modality: Nasogastric  Glucerna 1.5 Thomas (GLUCERNA1.5)  Total Volume for 24 Hours (mL): 1200  Continuous  Starting Tube Feed Rate mL per Hour: 10  Increase Tube Feed Rate by (mL): 10     Every 6 hours  Until Goal Tube Feed Rate (mL per Hour): 50  Tube Feed Duration (in Hours): 24  Tube Feed Start Time: 22:00 (02-22-24 @ 18:04) [Active]          ALLERGIES:   Allergies    No Known Allergies    Intolerances        VITAL SIGNS:   Vital Signs Last 24 Hrs  T(C): 36.8 (27 Feb 2024 09:22), Max: 36.9 (26 Feb 2024 20:59)  T(F): 98.2 (27 Feb 2024 09:22), Max: 98.5 (26 Feb 2024 20:59)  HR: 77 (27 Feb 2024 09:22) (77 - 80)  BP: 110/60 (27 Feb 2024 09:22) (110/60 - 117/65)  BP(mean): --  RR: 18 (27 Feb 2024 09:22) (18 - 18)  SpO2: 96% (27 Feb 2024 09:22) (96% - 100%)    Parameters below as of 27 Feb 2024 09:22  Patient On (Oxygen Delivery Method): room air      I&O's Summary    26 Feb 2024 07:01  -  27 Feb 2024 07:00  --------------------------------------------------------  IN: 650 mL / OUT: 0 mL / NET: 650 mL        PHYSICAL EXAM:   GENERAL:  No acute distress  HEENT:  NC/AT, conjunctiva clear, sclera anicteric, NGT present   CHEST:  No increased effort  HEART:  Regular rate  ABDOMEN:  Soft, non-tender, non-distended, normoactive bowel sounds, no rebound or guarding  SKIN:  Warm, dry  NEURO:  Calm, cooperative    LABS:                        9.2    10.72 )-----------( 362      ( 27 Feb 2024 06:00 )             29.3     Hemoglobin: 9.2 g/dL (02-27-24 @ 06:00)  Hemoglobin: 8.9 g/dL (02-26-24 @ 06:58)    02-27    142  |  101  |  79.5<H>  ----------------------------<  150<H>  5.2   |  25.0  |  1.85<H>    Ca    9.0      27 Feb 2024 06:00  Mg     2.2     02-27    TPro  6.6  /  Alb  3.1<L>  /  TBili  0.3<L>  /  DBili  x   /  AST  26  /  ALT  15  /  AlkPhos  112  02-27    LIVER FUNCTIONS - ( 27 Feb 2024 06:00 )  Alb: 3.1 g/dL / Pro: 6.6 g/dL / ALK PHOS: 112 U/L / ALT: 15 U/L / AST: 26 U/L / GGT: x                                                     RADIOLOGY & ADDITIONAL STUDIES:

## 2024-02-27 NOTE — PROGRESS NOTE ADULT - NS ATTEND AMEND GEN_ALL_CORE FT
I agree with assessment and plan as above. 79 yo M who presented with NSTEMI and worsening dysphagia, pending PEG placement. At time of GI evaluation, pt is complaining of substernal chest pain. Cardiology follow up needed. If cleared, will plan for PEG tomorrow vs Thursday. Hold tube feeds at midnight.

## 2024-02-27 NOTE — PROGRESS NOTE ADULT - NS ATTEND BILL GEN_ALL_CORE
Attending to bill
PA/NP to bill
Attending to bill

## 2024-02-27 NOTE — PROGRESS NOTE ADULT - ASSESSMENT
77 y/o M with PMHx anxiety, HTN, HLD, CKD, plaque psoriasis, DM2, progressive dysphagia who presents with chest pain found to have NSTEMI. GI consulted for PEG placement due to progressive dysphagia.    #Dysphagia  SLP 2/20/24- NPO w/ GOC discussion regarding PEG vs pleasure feeds.  78 y/oM PMH of HTN, HLD, CKD, plaque psoriasis, DM-2 who came to the ED (02/12/24) complaining of chest pain radiating to left arm associated with multiple episodes of diarrhea.  On ED arrival, afebrile, hypotensive to SBP 80-90s despite 1L IVF requiring Levophed. EKG with nonspecific ST changes, troponin 2344->2477. Labs otherwise notable for K 5.6, BUN/Cr 43.5/2.31 (baseline Cr ~1 in 2022), BNP 07193, lactate 6.2. CXR with pulmonary edema. Placed on BiPAP ,given Lasix 40mg. TTE with EF<20%, multiple segmental abnormalities, mod MR, mod TR, mod pericardial effusion without tamponade; started on Heparin drip for NSTEMI, cardiology consulted. Admitted to MICU for further management of cardiogenic shock. On 02/14/24: patient became obtunded, febrile, intubated for airway protection. Interventional cardiology also on board; S/P LHC: Severe multi-vessel CAD including LM, CABG recommended. CT surgery deemed patient poor candidate for CABG. HF team also following; s/p RHC: shows low filling pressures with normal cardiac output. Nephrology following for WES. Patient extubated (02/18/24); NG tube in place. Palliative team following, patient DNR/DNI. Patient now downgraded to medical floor 2/19    #NSTEMI   -s/p Heparin drip   -S/p LHC: severe multiple-vessel CAD, not a candidate for CABG   -Plavix 75mg , but now on hold for planned peg / needs to hold x 5 days   -c/w Asprin 81mg , Atorvastatin 80mg   -Cardiology following, no plan for cabg at this time , likely for elective cabg / ongoign discussions with family     #Acute Systolic CHF  -s/p Bumex and Lasix drip   - c/w hydralazine , imdur   -HF following   -Echo done   -diuretics on hold for now     #Cardiogenic shock   -s/p Levophed and Dobutamine   -Monitor BP    #Acute metabolic encephalopathy , improved  - hx of dysphagia  -Now s/p extubation 2/18  -Aspiration precautions  -SLP rec NPO  -cont TF for now via NGT  with free water   PEG, now agreeable  -GI consulted for PEG placement, likely plan for Weds/Thurs (2/28 or 2/29)  -Plavix to be held 5 days before proceeding with procedure  -cardio consult noted     #Leukocytosis  likely reactive, no signs of infection.   -Monitor WBC, temp.  - hold off abxs for now   - monitor     #WES on CKD:  -Nephrology following   -Monitor renal function     #DM-2  -Lantus 15 units AM   -Insulin sliding scale     #Depression:    -Buspirone 10mg   -Aripiprazole 5mg   -Seroquel 50mg HS   -Sertraline 100mg     #DVT prophylaxis: Heparin sc    #Palliative following   #code: dnr/ dni / NIV     #dispo: [place] vs home w/assistance vs home hospice pending peg      79 y/o M with PMHx anxiety, HTN, HLD, CKD, plaque psoriasis, DM2, progressive dysphagia who presents with chest pain found to have NSTEMI. GI consulted for PEG placement due to progressive dysphagia.    #Dysphagia  SLP 2/20/24- NPO w/ GOC discussion regarding PEG vs pleasure feeds.  78 y/oM PMH of HTN, HLD, CKD, plaque psoriasis, DM-2 who came to the ED (02/12/24) complaining of chest pain radiating to left arm associated with multiple episodes of diarrhea.  On ED arrival, afebrile, hypotensive to SBP 80-90s despite 1L IVF requiring Levophed. EKG with nonspecific ST changes, troponin 2344->2477. Labs otherwise notable for K 5.6, BUN/Cr 43.5/2.31 (baseline Cr ~1 in 2022), BNP 80612, lactate 6.2. CXR with pulmonary edema. Placed on BiPAP ,given Lasix 40mg. TTE with EF<20%, multiple segmental abnormalities, mod MR, mod TR, mod pericardial effusion without tamponade; started on Heparin drip for NSTEMI, cardiology consulted. Admitted to MICU for further management of cardiogenic shock. On 02/14/24: patient became obtunded, febrile, intubated for airway protection. Interventional cardiology also on board; S/P LHC: Severe multi-vessel CAD including LM, CABG recommended. CT surgery deemed patient poor candidate for CABG. HF team also following; s/p RHC: shows low filling pressures with normal cardiac output. Nephrology following for WES. Patient extubated (02/18/24); NG tube in place.   patient DNR/DNI.     #NSTEMI   -s/p Heparin drip   -S/p LHC: severe multiple-vessel CAD, not a candidate for CABG   -Plavix 75mg , but now on hold for planned peg / needs to hold x 5 days   -c/w Asprin 81mg , Atorvastatin 80mg   -Cardiology following, no plan for cabg at this time , likely for elective cabg / ongoign discussions with family     #Acute Systolic CHF  -s/p Bumex and Lasix drip   - c/w hydralazine , imdur   -HF following   -Echo done   -diuretics on hold for now     #Cardiogenic shock   -s/p Levophed and Dobutamine   -Monitor BP    #Acute metabolic encephalopathy , improved  - hx of dysphagia  -Now s/p extubation 2/18  -Aspiration precautions  -SLP rec NPO  -cont TF for now via NGT  with free water   PEG, now agreeable  -GI consulted for PEG placement, likely plan for Weds/Thurs (2/28 or 2/29)  -Plavix to be held 5 days before proceeding with procedure  -cardio consult noted     #Leukocytosis  likely reactive, no signs of infection.   -Monitor WBC, temp.  - hold off abxs for now   - monitor     #WES on CKD:  -Nephrology following   -Monitor renal function     #DM-2  -Lantus 15 units AM   -Insulin sliding scale     #Depression:    -Buspirone 10mg   -Aripiprazole 5mg   -Seroquel 50mg HS   -Sertraline 100mg     #DVT prophylaxis: Heparin sc    #Palliative following   #code: dnr/ dni / NIV     #dispo: [place] vs home w/assistance vs home hospice pending peg

## 2024-02-28 LAB
ALBUMIN SERPL ELPH-MCNC: 3.5 G/DL — SIGNIFICANT CHANGE UP (ref 3.3–5.2)
ALP SERPL-CCNC: 122 U/L — HIGH (ref 40–120)
ALT FLD-CCNC: 17 U/L — SIGNIFICANT CHANGE UP
ANION GAP SERPL CALC-SCNC: 11 MMOL/L — SIGNIFICANT CHANGE UP (ref 5–17)
AST SERPL-CCNC: 28 U/L — SIGNIFICANT CHANGE UP
BILIRUB SERPL-MCNC: 0.3 MG/DL — LOW (ref 0.4–2)
BLD GP AB SCN SERPL QL: SIGNIFICANT CHANGE UP
BUN SERPL-MCNC: 71.7 MG/DL — HIGH (ref 8–20)
CALCIUM SERPL-MCNC: 9.3 MG/DL — SIGNIFICANT CHANGE UP (ref 8.4–10.5)
CHLORIDE SERPL-SCNC: 102 MMOL/L — SIGNIFICANT CHANGE UP (ref 96–108)
CO2 SERPL-SCNC: 29 MMOL/L — SIGNIFICANT CHANGE UP (ref 22–29)
CREAT SERPL-MCNC: 1.71 MG/DL — HIGH (ref 0.5–1.3)
EGFR: 40 ML/MIN/1.73M2 — LOW
GLUCOSE BLDC GLUCOMTR-MCNC: 109 MG/DL — HIGH (ref 70–99)
GLUCOSE BLDC GLUCOMTR-MCNC: 187 MG/DL — HIGH (ref 70–99)
GLUCOSE BLDC GLUCOMTR-MCNC: 75 MG/DL — SIGNIFICANT CHANGE UP (ref 70–99)
GLUCOSE BLDC GLUCOMTR-MCNC: 84 MG/DL — SIGNIFICANT CHANGE UP (ref 70–99)
GLUCOSE BLDC GLUCOMTR-MCNC: 97 MG/DL — SIGNIFICANT CHANGE UP (ref 70–99)
GLUCOSE SERPL-MCNC: 89 MG/DL — SIGNIFICANT CHANGE UP (ref 70–99)
HCT VFR BLD CALC: 29.7 % — LOW (ref 39–50)
HGB BLD-MCNC: 9.2 G/DL — LOW (ref 13–17)
INR BLD: 1.11 RATIO — SIGNIFICANT CHANGE UP (ref 0.85–1.18)
MCHC RBC-ENTMCNC: 26.5 PG — LOW (ref 27–34)
MCHC RBC-ENTMCNC: 31 GM/DL — LOW (ref 32–36)
MCV RBC AUTO: 85.6 FL — SIGNIFICANT CHANGE UP (ref 80–100)
PLATELET # BLD AUTO: 374 K/UL — SIGNIFICANT CHANGE UP (ref 150–400)
POTASSIUM SERPL-MCNC: 4.6 MMOL/L — SIGNIFICANT CHANGE UP (ref 3.5–5.3)
POTASSIUM SERPL-SCNC: 4.6 MMOL/L — SIGNIFICANT CHANGE UP (ref 3.5–5.3)
PROT SERPL-MCNC: 7 G/DL — SIGNIFICANT CHANGE UP (ref 6.6–8.7)
PROTHROM AB SERPL-ACNC: 12.3 SEC — SIGNIFICANT CHANGE UP (ref 9.5–13)
RBC # BLD: 3.47 M/UL — LOW (ref 4.2–5.8)
RBC # FLD: 15.3 % — HIGH (ref 10.3–14.5)
SODIUM SERPL-SCNC: 142 MMOL/L — SIGNIFICANT CHANGE UP (ref 135–145)
WBC # BLD: 11.96 K/UL — HIGH (ref 3.8–10.5)
WBC # FLD AUTO: 11.96 K/UL — HIGH (ref 3.8–10.5)

## 2024-02-28 PROCEDURE — 99233 SBSQ HOSP IP/OBS HIGH 50: CPT

## 2024-02-28 PROCEDURE — 99232 SBSQ HOSP IP/OBS MODERATE 35: CPT

## 2024-02-28 RX ADMIN — HEPARIN SODIUM 5000 UNIT(S): 5000 INJECTION INTRAVENOUS; SUBCUTANEOUS at 21:37

## 2024-02-28 RX ADMIN — ISOSORBIDE DINITRATE 10 MILLIGRAM(S): 5 TABLET ORAL at 07:05

## 2024-02-28 RX ADMIN — HEPARIN SODIUM 5000 UNIT(S): 5000 INJECTION INTRAVENOUS; SUBCUTANEOUS at 13:43

## 2024-02-28 RX ADMIN — ARIPIPRAZOLE 5 MILLIGRAM(S): 15 TABLET ORAL at 09:52

## 2024-02-28 RX ADMIN — PANTOPRAZOLE SODIUM 40 MILLIGRAM(S): 20 TABLET, DELAYED RELEASE ORAL at 09:52

## 2024-02-28 RX ADMIN — Medication 12.5 MILLIGRAM(S): at 07:04

## 2024-02-28 RX ADMIN — Medication 650 MILLIGRAM(S): at 13:50

## 2024-02-28 RX ADMIN — SERTRALINE 100 MILLIGRAM(S): 25 TABLET, FILM COATED ORAL at 09:53

## 2024-02-28 RX ADMIN — Medication 1 MILLIGRAM(S): at 21:37

## 2024-02-28 RX ADMIN — Medication 1 APPLICATION(S): at 17:20

## 2024-02-28 RX ADMIN — ISOSORBIDE DINITRATE 10 MILLIGRAM(S): 5 TABLET ORAL at 12:35

## 2024-02-28 RX ADMIN — INSULIN GLARGINE 15 UNIT(S): 100 INJECTION, SOLUTION SUBCUTANEOUS at 08:47

## 2024-02-28 RX ADMIN — Medication 10 MILLIGRAM(S): at 07:05

## 2024-02-28 RX ADMIN — Medication 1 APPLICATION(S): at 07:04

## 2024-02-28 RX ADMIN — Medication 10 MILLIGRAM(S): at 13:43

## 2024-02-28 RX ADMIN — Medication 81 MILLIGRAM(S): at 09:53

## 2024-02-28 RX ADMIN — Medication 650 MILLIGRAM(S): at 23:55

## 2024-02-28 RX ADMIN — Medication 10 MILLIGRAM(S): at 21:37

## 2024-02-28 RX ADMIN — Medication 2: at 00:24

## 2024-02-28 RX ADMIN — Medication 12.5 MILLIGRAM(S): at 17:20

## 2024-02-28 RX ADMIN — CHLORHEXIDINE GLUCONATE 1 APPLICATION(S): 213 SOLUTION TOPICAL at 07:04

## 2024-02-28 RX ADMIN — Medication 10 MILLIGRAM(S): at 09:53

## 2024-02-28 RX ADMIN — Medication 650 MILLIGRAM(S): at 12:35

## 2024-02-28 RX ADMIN — QUETIAPINE FUMARATE 50 MILLIGRAM(S): 200 TABLET, FILM COATED ORAL at 21:37

## 2024-02-28 RX ADMIN — ATORVASTATIN CALCIUM 80 MILLIGRAM(S): 80 TABLET, FILM COATED ORAL at 21:37

## 2024-02-28 NOTE — PROGRESS NOTE ADULT - SUBJECTIVE AND OBJECTIVE BOX
CABRERA DENSON Patient is a 78y old  Male who presents with a chief complaint of Cardiogenic Shock (28 Feb 2024 12:54)     HPI:  78M with anxiety, HTN, HLD, CKD, plaque psoriasis, DM2 who presents with burning substernal chest pain at rest that started 1wk ago but acutely progressed this morning to sharp pain with radiation to L arm with associated multiple bouts of diarrhea. On ED arrival, afebrile, hypotensive to SBP 80-90s despite 1L IVF requiring levophed. EKG with nonspecific ST changes, troponin 2344->2477. Labs otherwise notable for K 5.6, BUN/Cr 43.5/2.31 (baseline Cr ~1 in 2022), BNP 15691, lactate 6.2. CXR with pulmonary edema. Placed on BiPAP for SOB and hypoxia. STAT TTE with EF<20%, multiple segmental abnormalities, mod MR, mod TR, mod pericardial effusion without tamponade. Received total lasix 40mg IV in ED. ICU consulted for further management of cardiogenic shock.      (13 Feb 2024 01:56)    The patient was seen and evaluated lying in bed elderly cachectic with NGtube   The patient is in no acute distress.      I&O's Summary    27 Feb 2024 07:01  -  28 Feb 2024 07:00  --------------------------------------------------------  IN: 670 mL / OUT: 1300 mL / NET: -630 mL    28 Feb 2024 07:01  -  28 Feb 2024 17:06  --------------------------------------------------------  IN: 0 mL / OUT: 500 mL / NET: -500 mL      Allergies    No Known Allergies    Intolerances      HEALTH ISSUES - PROBLEM Dx:  NSTEMI (non-ST elevation myocardial infarction)    Chronic kidney disease (CKD)    Acute combined systolic and diastolic congestive heart failure    Acute HFrEF (heart failure with reduced ejection fraction)    Renal failure (ARF), acute on chronic    Dysphagia    CAD (coronary artery disease)    Preoperative cardiovascular examination          PAST MEDICAL & SURGICAL HISTORY:  Diabetes      Hyperlipidemia      Anxiety with depression      Insomnia      No significant past surgical history              Vital Signs Last 24 Hrs  T(C): 36.7 (28 Feb 2024 15:34), Max: 37.1 (28 Feb 2024 07:38)  T(F): 98.1 (28 Feb 2024 15:34), Max: 98.8 (28 Feb 2024 07:38)  HR: 78 (28 Feb 2024 15:34) (73 - 89)  BP: 108/54 (28 Feb 2024 15:34) (104/54 - 113/63)  BP(mean): 72 (27 Feb 2024 21:40) (72 - 72)  RR: 20 (28 Feb 2024 15:34) (18 - 20)  SpO2: 100% (28 Feb 2024 15:34) (94% - 100%)    Parameters below as of 28 Feb 2024 15:34  Patient On (Oxygen Delivery Method): room air    T(C): 36.7 (02-28-24 @ 15:34), Max: 37.1 (02-28-24 @ 07:38)  HR: 78 (02-28-24 @ 15:34) (73 - 89)  BP: 108/54 (02-28-24 @ 15:34) (104/54 - 113/63)  RR: 20 (02-28-24 @ 15:34) (18 - 20)  SpO2: 100% (02-28-24 @ 15:34) (94% - 100%)  Wt(kg): --    PHYSICAL EXAM:    GENERAL: NAD, frail elderly cachectic   HEAD:  Atraumatic, Normocephalic  EYES:  conjunctiva and sclera clear  ENMT:  Moist mucous membranes,    NERVOUS SYSTEM:  Alert & Oriented X3,  Moves upper and lower extremities; CNS-II-XII  CHEST/LUNG: Clear to auscultation bilaterally; No rales, rhonchi, wheezing,   HEART: Regular rate and rhythm;   ABDOMEN: Soft, Nontender, Nondistended; Bowel sounds present  EXTREMITIES:  Peripheral Pulses,   psychiatry- cant assess Insight and judgement intact     acetaminophen     Tablet .. 650 milliGRAM(s) Oral every 6 hours PRN  ARIPiprazole 5 milliGRAM(s) Oral daily  aspirin  chewable 81 milliGRAM(s) Oral daily  atorvastatin 80 milliGRAM(s) Oral at bedtime  busPIRone 10 milliGRAM(s) Oral <User Schedule>  ceFAZolin  Injectable. 1000 milliGRAM(s) IV Push once  chlorhexidine 2% Cloths 1 Application(s) Topical <User Schedule>  clonazePAM  Tablet 1 milliGRAM(s) Oral daily PRN  dextrose 50% Injectable 12.5 Gram(s) IV Push once  dextrose 50% Injectable 25 Gram(s) IV Push once  dextrose 50% Injectable 25 Gram(s) IV Push once  heparin   Injectable 5000 Unit(s) SubCutaneous every 8 hours  hydrALAZINE 10 milliGRAM(s) Oral three times a day  influenza  Vaccine (HIGH DOSE) 0.7 milliLiter(s) IntraMuscular once  insulin glargine Injectable (LANTUS) 15 Unit(s) SubCutaneous every morning  insulin lispro (ADMELOG) corrective regimen sliding scale   SubCutaneous every 6 hours  isosorbide   dinitrate Tablet (ISORDIL) 10 milliGRAM(s) Oral three times a day  melatonin 3 milliGRAM(s) Oral at bedtime PRN  metoprolol succinate ER 12.5 milliGRAM(s) Oral two times a day  pantoprazole  Injectable 40 milliGRAM(s) IV Push daily  QUEtiapine 50 milliGRAM(s) Oral at bedtime  sertraline 100 milliGRAM(s) Oral daily  sodium chloride 0.9% lock flush 10 milliLiter(s) IV Push every 1 hour PRN  triamcinolone 0.1% Cream 1 Application(s) Topical two times a day      LABS:                          9.2    11.96 )-----------( 374      ( 28 Feb 2024 06:35 )             29.7     02-28    142  |  102  |  71.7<H>  ----------------------------<  89  4.6   |  29.0  |  1.71<H>    Ca    9.3      28 Feb 2024 06:35  Mg     2.2     02-27    TPro  7.0  /  Alb  3.5  /  TBili  0.3<L>  /  DBili  x   /  AST  28  /  ALT  17  /  AlkPhos  122<H>  02-28    LIVER FUNCTIONS - ( 28 Feb 2024 06:35 )  Alb: 3.5 g/dL / Pro: 7.0 g/dL / ALK PHOS: 122 U/L / ALT: 17 U/L / AST: 28 U/L / GGT: x           PT/INR - ( 28 Feb 2024 06:35 )   PT: 12.3 sec;   INR: 1.11 ratio               Urinalysis Basic - ( 28 Feb 2024 06:35 )    Color: x / Appearance: x / SG: x / pH: x  Gluc: 89 mg/dL / Ketone: x  / Bili: x / Urobili: x   Blood: x / Protein: x / Nitrite: x   Leuk Esterase: x / RBC: x / WBC x   Sq Epi: x / Non Sq Epi: x / Bacteria: x      CAPILLARY BLOOD GLUCOSE      POCT Blood Glucose.: 75 mg/dL (28 Feb 2024 17:02)  POCT Blood Glucose.: 84 mg/dL (28 Feb 2024 12:21)  POCT Blood Glucose.: 109 mg/dL (28 Feb 2024 08:05)  POCT Blood Glucose.: 97 mg/dL (28 Feb 2024 06:39)  POCT Blood Glucose.: 187 mg/dL (28 Feb 2024 00:21)      RADIOLOGY & ADDITIONAL TESTS:      Consultant notes reviewed    Case discussed with consultant/provider/ family /patient

## 2024-02-28 NOTE — PROGRESS NOTE ADULT - ASSESSMENT
78-year-old male with HTN, HLD, DM admitted with chest pain and diarrhea.  Hypotensive on arrival requiring Levophed.  TTE with a EF of less than 20%.  Intubated on 2/14/2024. s/p LHC showing MVD, poor candidate for CABG.  Patient extubated yesterday.  Nephrology following for acute kidney injury:    Acute kidney injury on CKD, NOS  NSTEMI  Acute systolic CHF  Anemia, unspecified  DM    ·	Baseline serum creatinine unclear was 1 mg/deciliter in September 22  ·	Pt now with WES  secondary to hypoperfusion from cardiogenic shock  ·	Serum creatinine on admission 2.3 and had stayed stable around 1.8-2.0 mg/deciliter now, SCr now improving to 1.7  ·	Patient euvolemic, diuretics on hold (initially was on dobutamine and Bumex drip)  ·	Bp stable- continue current regimen  ·	Nephrology follow up prn

## 2024-02-28 NOTE — PROGRESS NOTE ADULT - CONVERSATION/DISCUSSION
Diagnosis/Prognosis/Treatment Options
Diagnosis/Prognosis/Palliative Care Referral
Diagnosis/Prognosis
Diagnosis/Prognosis/Treatment Options
Diagnosis/Prognosis/Treatment Options/Hospice Referral

## 2024-02-28 NOTE — PROGRESS NOTE ADULT - ASSESSMENT
78yr man  PMHx HTN, HLD, T2DM, CKD, plaque psoriasis, anxiety, depression, history of dysphagia admitted with cardiogenic shock 2/2 NSTEMI with Respiratory failure.    Cardiogenic Shock/NSTEMI  Per Cardio and CTS not  a candidate for CABG   Informed Interventional cardiology  requested to reevaluate    Dysphagia  Patient with hx of dysphagia -reported offered a PEG last year but declined  Aspiration precautions  SLP eval rec NPO  Discussed risks of pleasure feeds with daughter and son  Patient  agreeing to PEG    Respiratory Failure  extubated  on room air  cont monitor O2 sats    WES/CKD  avoid nephrotoxic agents    Encounter for Palliative Care  PEG cancelled- reported family does not want to proceed  Per family -PEG was recommended in 2022 but patient refused. Patient reported to continue to eat with associated coughing  Son and daughter were discussed risk-benefit of pleasure /comfort feeds.   Hospice services was also discussed. ( see GOC 2/21 and 2/22)   Hospice services was discussed with daughter Alisia on 2/21 by hospice social worker JUDITH Canales  Patient is debilitated, with advanced cardiac disease.  Hospice services would be appropriate if family wishes to focus on comfort

## 2024-02-28 NOTE — CHART NOTE - NSCHARTNOTEFT_GEN_A_CORE
Received call from hospitalist Dr. Rosas that family does not want PEG tube at this time  Procedure will be cancelled today  GI remains available if needed for procedure  GI to sign-off, please reconsult as needed, call for any questions or concerns.   Discussed with GI attending

## 2024-02-28 NOTE — PROGRESS NOTE ADULT - SUBJECTIVE AND OBJECTIVE BOX
[facility] DIVISION OF KIDNEY DISEASES AND HYPERTENSION -- FOLLOW UP NOTE  --------------------------------------------------------------------------------  Chief Complaint:  Mikey on CKD  24 hour events/subjective:  No acute event noted  pt seen and examined; NAD      PAST HISTORY  --------------------------------------------------------------------------------  No significant changes to PMH, PSH, FHx, SHx, unless otherwise noted    ALLERGIES & MEDICATIONS  --------------------------------------------------------------------------------  Allergies    No Known Allergies    Intolerances      Standing Inpatient Medications  ARIPiprazole 5 milliGRAM(s) Oral daily  aspirin  chewable 81 milliGRAM(s) Oral daily  atorvastatin 80 milliGRAM(s) Oral at bedtime  busPIRone 10 milliGRAM(s) Oral <User Schedule>  ceFAZolin  Injectable. 1000 milliGRAM(s) IV Push once  chlorhexidine 2% Cloths 1 Application(s) Topical <User Schedule>  dextrose 50% Injectable 25 Gram(s) IV Push once  dextrose 50% Injectable 25 Gram(s) IV Push once  dextrose 50% Injectable 12.5 Gram(s) IV Push once  heparin   Injectable 5000 Unit(s) SubCutaneous every 8 hours  hydrALAZINE 10 milliGRAM(s) Oral three times a day  influenza  Vaccine (HIGH DOSE) 0.7 milliLiter(s) IntraMuscular once  insulin glargine Injectable (LANTUS) 15 Unit(s) SubCutaneous every morning  insulin lispro (ADMELOG) corrective regimen sliding scale   SubCutaneous every 6 hours  isosorbide   dinitrate Tablet (ISORDIL) 10 milliGRAM(s) Oral three times a day  metoprolol succinate ER 12.5 milliGRAM(s) Oral two times a day  pantoprazole  Injectable 40 milliGRAM(s) IV Push daily  QUEtiapine 50 milliGRAM(s) Oral at bedtime  sertraline 100 milliGRAM(s) Oral daily  triamcinolone 0.1% Cream 1 Application(s) Topical two times a day    PRN Inpatient Medications  acetaminophen     Tablet .. 650 milliGRAM(s) Oral every 6 hours PRN  clonazePAM  Tablet 1 milliGRAM(s) Oral daily PRN  melatonin 3 milliGRAM(s) Oral at bedtime PRN  sodium chloride 0.9% lock flush 10 milliLiter(s) IV Push every 1 hour PRN      REVIEW OF SYSTEMS  --------------------------------------------------------------------------------  Gen: No weight changes, fatigue, fevers/chills, weakness  Skin: No rashes  Head/Eyes/Ears/Mouth: No headache; Normal hearing; Normal vision w/o blurriness; No sinus pain/discomfort, sore throat  Respiratory: No dyspnea, cough, wheezing, hemoptysis  CV: No chest pain, PND, orthopnea  GI: No abdominal pain, diarrhea, constipation, nausea, vomiting, melena, hematochezia  : No increased frequency, dysuria, hematuria, nocturia  MSK: No joint pain/swelling; no back pain; no edema  Neuro: No dizziness/lightheadedness, weakness, seizures, numbness, tingling  Heme: No easy bruising or bleeding  Endo: No heat/cold intolerance  Psych: No significant nervousness, anxiety, stress, depression    All other systems were reviewed and are negative, except as noted.    VITALS/PHYSICAL EXAM  --------------------------------------------------------------------------------  T(C): 37.1 (02-28-24 @ 07:38), Max: 37.1 (02-27-24 @ 15:16)  HR: 84 (02-28-24 @ 12:30) (73 - 89)  BP: 111/61 (02-28-24 @ 12:30) (104/54 - 113/63)  RR: 20 (02-28-24 @ 07:38) (18 - 20)  SpO2: 100% (02-28-24 @ 07:38) (94% - 100%)  Wt(kg): --        02-27-24 @ 07:01  -  02-28-24 @ 07:00  --------------------------------------------------------  IN: 670 mL / OUT: 1300 mL / NET: -630 mL      Physical Exam:  	Gen: NAD  	HEENT:  supple neck, clear oropharynx  	Pulm: CTA B/L  	CV: RRR, S1S2; no rub  	Abd: +BS, soft, nontender/nondistended  	: No suprapubic tenderness  	UE: Warm no edema  	LE: Warm,  no edema  	Neuro: No focal deficits  	Psych: Normal affect and mood  	Skin: Warm,   LABS/STUDIES  --------------------------------------------------------------------------------              9.2    11.96 >-----------<  374      [02-28-24 @ 06:35]              29.7     142  |  102  |  71.7  ----------------------------<  89      [02-28-24 @ 06:35]  4.6   |  29.0  |  1.71        Ca     9.3     [02-28-24 @ 06:35]      Mg     2.2     [02-27-24 @ 06:00]    TPro  7.0  /  Alb  3.5  /  TBili  0.3  /  DBili  x   /  AST  28  /  ALT  17  /  AlkPhos  122  [02-28-24 @ 06:35]    PT/INR: PT 12.3 , INR 1.11       [02-28-24 @ 06:35]      Creatinine Trend:  SCr 1.71 [02-28 @ 06:35]  SCr 1.85 [02-27 @ 06:00]  SCr 1.83 [02-26 @ 06:58]  SCr 1.91 [02-24 @ 05:46]  SCr 1.99 [02-23 @ 09:14]    Urinalysis - [02-28-24 @ 06:35]      Color  / Appearance  / SG  / pH       Gluc 89 / Ketone   / Bili  / Urobili        Blood  / Protein  / Leuk Est  / Nitrite       RBC  / WBC  / Hyaline  / Gran  / Sq Epi  / Non Sq Epi  / Bacteria       TSH 2.49      [02-13-24 @ 03:08]  Lipid: chol 179, TG 94, HDL 49, LDL --      [02-13-24 @ 11:15]

## 2024-02-28 NOTE — PROGRESS NOTE ADULT - WHAT MATTERS MOST
patient's comfort and to not "shorten his life"- the family unerstands that his wishes are not to have a PEG but they are also not able to come to terms with his decreased oral intake and are worried that he will"pass quickly "if he doesn't eat and get weak
Quality of life

## 2024-02-28 NOTE — PROVIDER CONTACT NOTE (OTHER) - SITUATION
Pt daughter at bedside, stating family does not want to proceed with the scheduled peg tube placement that was scheduled for today. Medicine NP aware.

## 2024-02-28 NOTE — PROGRESS NOTE ADULT - ASSESSMENT
78 y/oM PMH of HTN, HLD, CKD, plaque psoriasis, DM-2 who came to the ED (02/12/24) complaining of chest pain radiating to left arm associated with multiple episodes of diarrhea.  On ED arrival, afebrile, hypotensive to SBP 80-90s despite 1L IVF requiring Levophed. EKG with nonspecific ST changes, troponin 2344->2477. Labs otherwise notable for K 5.6, BUN/Cr 43.5/2.31 (baseline Cr ~1 in 2022), BNP 34247, lactate 6.2. CXR with pulmonary edema. Placed on BiPAP ,given Lasix 40mg. TTE with EF<20%, multiple segmental abnormalities, mod MR, mod TR, mod pericardial effusion without tamponade; started on Heparin drip for NSTEMI, cardiology consulted. Admitted to MICU for further management of cardiogenic shock. On 02/14/24: patient became obtunded, febrile, intubated for airway protection. Interventional cardiology also on board; S/P LHC: Severe multi-vessel CAD including LM, CABG recommended. CT surgery deemed patient poor candidate for CABG. HF team also following; s/p RHC: shows low filling pressures with normal cardiac output. Nephrology following for WES. Patient extubated (02/18/24); NG tube in place.     patient DNR/DNI.   SLP 2/20/24- NPO w/ GOC discussion regarding PEG vs pleasure feeds.    #NSTEMI --s/p Heparin drip   -S/p LHC: severe multiple-vessel CAD, not a candidate for CABG   -Plavix 75mg , but now on hold for planned peg- can restart if family doesnt want PEG- but meds may be difficult incase patietn cant swallow - family is not clear on h  -c/w Asprin 81mg , Atorvastatin 80mg   -Cardiology following, no plan for cabg at this time , likely for elective cabg / ongoign discussions with family     #Acute Systolic CHF  -s/p Bumex and Lasix drip   - c/w hydralazine , imdur   -HF following   -Echo done   -diuretics on hold for now     #Cardiogenic shock   -s/p Levophed and Dobutamine   -Monitor BP    #Acute metabolic encephalopathy - hx of dysphagia- s/p extubation 2/18  -Aspiration precautions  -SLP rec NPO--cont TF for now via NGT  with free water- PEG, now decision for no PEG and comfort feeds but later in the day family again wanted to discuss steps incase he aspirates on comfort feeds   -GI informed of family decision to cancel PEG- (daughter directly called Obed-ANJEL on the floor and asked for cancel PEG  -Plavix  held 5 days before proceeding with procedure- can probably be restarted but family has been goign back and forth with the PEG decision  -cardio consult noted     #Leukocytosis  likely reactive, no signs of infection.   -Monitor WBC, temp.  - hold off abxs for now   - monitor     #WES on CKD:  -Nephrology following   -Monitor renal function     #DM-2  -Lantus 15 units AM   -Insulin sliding scale     #Depression:    -Buspirone 10mg   -Aripiprazole 5mg   -Seroquel 50mg HS   -Sertraline 100mg     #DVT prophylaxis: Heparin sc    #Palliative following   #code: dnr/ dni / NIV

## 2024-02-28 NOTE — PROGRESS NOTE ADULT - SUBJECTIVE AND OBJECTIVE BOX
CC:  Follow up GOC , Symptoms    OVERNIGHT EVENTS:  noted events  PEG cancelled - Reported family does not want PEG    Present Symptoms:   Dyspnea:  No    Nausea/Vomiting:  No  Anxiety:  No     Depression:  No   Fatigue:  Yes     Loss of appetite: Yes    Constipation: Not Reported     Pain: No             Location            Duration            Character            Severity            Factors            Effect    Pain AD Score:  [email or web address] (press ctrl + left click to view)    Review of Systems: Reviewed as above  All others negative    MEDICATIONS  (STANDING):  ARIPiprazole 5 milliGRAM(s) Oral daily  aspirin  chewable 81 milliGRAM(s) Oral daily  atorvastatin 80 milliGRAM(s) Oral at bedtime  busPIRone 10 milliGRAM(s) Oral <User Schedule>  ceFAZolin  Injectable. 1000 milliGRAM(s) IV Push once  chlorhexidine 2% Cloths 1 Application(s) Topical <User Schedule>  dextrose 50% Injectable 25 Gram(s) IV Push once  dextrose 50% Injectable 25 Gram(s) IV Push once  dextrose 50% Injectable 12.5 Gram(s) IV Push once  heparin   Injectable 5000 Unit(s) SubCutaneous every 8 hours  hydrALAZINE 10 milliGRAM(s) Oral three times a day  influenza  Vaccine (HIGH DOSE) 0.7 milliLiter(s) IntraMuscular once  insulin glargine Injectable (LANTUS) 15 Unit(s) SubCutaneous every morning  insulin lispro (ADMELOG) corrective regimen sliding scale   SubCutaneous every 6 hours  isosorbide   dinitrate Tablet (ISORDIL) 10 milliGRAM(s) Oral three times a day  metoprolol succinate ER 12.5 milliGRAM(s) Oral two times a day  pantoprazole  Injectable 40 milliGRAM(s) IV Push daily  QUEtiapine 50 milliGRAM(s) Oral at bedtime  sertraline 100 milliGRAM(s) Oral daily  triamcinolone 0.1% Cream 1 Application(s) Topical two times a day    MEDICATIONS  (PRN):  acetaminophen     Tablet .. 650 milliGRAM(s) Oral every 6 hours PRN Temp greater or equal to 38C (100.4F), Mild Pain (1 - 3), Moderate Pain (4 - 6)  clonazePAM  Tablet 1 milliGRAM(s) Oral daily PRN for anxiety  melatonin 3 milliGRAM(s) Oral at bedtime PRN Insomnia  sodium chloride 0.9% lock flush 10 milliLiter(s) IV Push every 1 hour PRN Pre/post blood products, medications, blood draw, and to maintain line patency      PHYSICAL EXAM:    Vital Signs Last 24 Hrs  T(C): 37.1 (28 Feb 2024 07:38), Max: 37.1 (27 Feb 2024 15:16)  T(F): 98.8 (28 Feb 2024 07:38), Max: 98.8 (27 Feb 2024 15:16)  HR: 89 (28 Feb 2024 07:38) (73 - 89)  BP: 113/63 (28 Feb 2024 07:38) (104/54 - 113/63)  BP(mean): 72 (27 Feb 2024 21:40) (72 - 72)  RR: 20 (28 Feb 2024 07:38) (18 - 20)  SpO2: 100% (28 Feb 2024 07:38) (94% - 100%)    Parameters below as of 28 Feb 2024 07:38  Patient On (Oxygen Delivery Method): room air      Karnofsky: 30 %  General:   Elderly man awake NAD    HEENT:  NCAT    + NGT  Lungs: comfortable  non labored  CV:  RR  GI:  soft NTND  MSK: weakness  Skin:  warm/dry  Neuro  no focal deficits  Psych  calm     LABS:                          9.2    11.96 )-----------( 374      ( 28 Feb 2024 06:35 )             29.7     02-28    142  |  102  |  71.7<H>  ----------------------------<  89  4.6   |  29.0  |  1.71<H>    Ca    9.3      28 Feb 2024 06:35  Mg     2.2     02-27    TPro  7.0  /  Alb  3.5  /  TBili  0.3<L>  /  DBili  x   /  AST  28  /  ALT  17  /  AlkPhos  122<H>  02-28    PT/INR - ( 28 Feb 2024 06:35 )   PT: 12.3 sec;   INR: 1.11 ratio           Urinalysis Basic - ( 28 Feb 2024 06:35 )    Color: x / Appearance: x / SG: x / pH: x  Gluc: 89 mg/dL / Ketone: x  / Bili: x / Urobili: x   Blood: x / Protein: x / Nitrite: x   Leuk Esterase: x / RBC: x / WBC x   Sq Epi: x / Non Sq Epi: x / Bacteria: x      I&O's Summary    27 Feb 2024 07:01  -  28 Feb 2024 07:00  --------------------------------------------------------  IN: 670 mL / OUT: 1300 mL / NET: -630 mL    ADVANCE DIRECTIVE PREFERENCES    DNR/I  and continuing medical treatments

## 2024-02-29 LAB
GLUCOSE BLDC GLUCOMTR-MCNC: 146 MG/DL — HIGH (ref 70–99)
GLUCOSE BLDC GLUCOMTR-MCNC: 149 MG/DL — HIGH (ref 70–99)
GLUCOSE BLDC GLUCOMTR-MCNC: 178 MG/DL — HIGH (ref 70–99)
GLUCOSE BLDC GLUCOMTR-MCNC: 183 MG/DL — HIGH (ref 70–99)
GLUCOSE BLDC GLUCOMTR-MCNC: 204 MG/DL — HIGH (ref 70–99)

## 2024-02-29 PROCEDURE — 99233 SBSQ HOSP IP/OBS HIGH 50: CPT

## 2024-02-29 PROCEDURE — 99223 1ST HOSP IP/OBS HIGH 75: CPT

## 2024-02-29 PROCEDURE — G0316 PROLONG INPT EVAL ADD15 M: CPT

## 2024-02-29 PROCEDURE — 99232 SBSQ HOSP IP/OBS MODERATE 35: CPT

## 2024-02-29 RX ADMIN — Medication 10 MILLIGRAM(S): at 13:27

## 2024-02-29 RX ADMIN — Medication 10 MILLIGRAM(S): at 21:20

## 2024-02-29 RX ADMIN — ISOSORBIDE DINITRATE 10 MILLIGRAM(S): 5 TABLET ORAL at 15:57

## 2024-02-29 RX ADMIN — PANTOPRAZOLE SODIUM 40 MILLIGRAM(S): 20 TABLET, DELAYED RELEASE ORAL at 08:44

## 2024-02-29 RX ADMIN — ATORVASTATIN CALCIUM 80 MILLIGRAM(S): 80 TABLET, FILM COATED ORAL at 21:21

## 2024-02-29 RX ADMIN — HEPARIN SODIUM 5000 UNIT(S): 5000 INJECTION INTRAVENOUS; SUBCUTANEOUS at 13:27

## 2024-02-29 RX ADMIN — HEPARIN SODIUM 5000 UNIT(S): 5000 INJECTION INTRAVENOUS; SUBCUTANEOUS at 21:21

## 2024-02-29 RX ADMIN — Medication 2: at 23:59

## 2024-02-29 RX ADMIN — Medication 4: at 12:35

## 2024-02-29 RX ADMIN — Medication 1 MILLIGRAM(S): at 21:20

## 2024-02-29 RX ADMIN — Medication 650 MILLIGRAM(S): at 00:25

## 2024-02-29 RX ADMIN — SERTRALINE 100 MILLIGRAM(S): 25 TABLET, FILM COATED ORAL at 08:43

## 2024-02-29 RX ADMIN — Medication 10 MILLIGRAM(S): at 05:50

## 2024-02-29 RX ADMIN — QUETIAPINE FUMARATE 50 MILLIGRAM(S): 200 TABLET, FILM COATED ORAL at 21:21

## 2024-02-29 RX ADMIN — Medication 3 MILLIGRAM(S): at 21:21

## 2024-02-29 RX ADMIN — Medication 12.5 MILLIGRAM(S): at 05:50

## 2024-02-29 RX ADMIN — Medication 1 APPLICATION(S): at 05:55

## 2024-02-29 RX ADMIN — INSULIN GLARGINE 15 UNIT(S): 100 INJECTION, SOLUTION SUBCUTANEOUS at 07:53

## 2024-02-29 RX ADMIN — ARIPIPRAZOLE 5 MILLIGRAM(S): 15 TABLET ORAL at 08:44

## 2024-02-29 RX ADMIN — Medication 10 MILLIGRAM(S): at 08:44

## 2024-02-29 RX ADMIN — HEPARIN SODIUM 5000 UNIT(S): 5000 INJECTION INTRAVENOUS; SUBCUTANEOUS at 05:49

## 2024-02-29 RX ADMIN — ISOSORBIDE DINITRATE 10 MILLIGRAM(S): 5 TABLET ORAL at 05:50

## 2024-02-29 RX ADMIN — Medication 650 MILLIGRAM(S): at 06:49

## 2024-02-29 RX ADMIN — Medication 12.5 MILLIGRAM(S): at 17:33

## 2024-02-29 RX ADMIN — CHLORHEXIDINE GLUCONATE 1 APPLICATION(S): 213 SOLUTION TOPICAL at 05:54

## 2024-02-29 RX ADMIN — Medication 650 MILLIGRAM(S): at 05:49

## 2024-02-29 RX ADMIN — Medication 81 MILLIGRAM(S): at 08:44

## 2024-02-29 RX ADMIN — ISOSORBIDE DINITRATE 10 MILLIGRAM(S): 5 TABLET ORAL at 10:49

## 2024-02-29 NOTE — PROGRESS NOTE ADULT - SUBJECTIVE AND OBJECTIVE BOX
CC:  Follow up GOC , Symptoms    OVERNIGHT EVENTS:  daughter contacted me and informs me brother confused as to what to do about father's situation  No reported issues per 1:1    Present Symptoms:   Dyspnea:  No    Nausea/Vomiting:  No   Anxiety:  No     Depression:  No    Fatigue:  Yes   Loss of appetite: Yes     Constipation: Not Reported      Pain: No            Location            Duration            Character            Severity            Factors            Effect    Pain AD Score:  [email or web address] (press ctrl + left click to view)    Review of Systems: Reviewed as above  All others negative      MEDICATIONS  (STANDING):  ARIPiprazole 5 milliGRAM(s) Oral daily  aspirin  chewable 81 milliGRAM(s) Oral daily  atorvastatin 80 milliGRAM(s) Oral at bedtime  busPIRone 10 milliGRAM(s) Oral <User Schedule>  ceFAZolin  Injectable. 1000 milliGRAM(s) IV Push once  chlorhexidine 2% Cloths 1 Application(s) Topical <User Schedule>  dextrose 50% Injectable 25 Gram(s) IV Push once  dextrose 50% Injectable 25 Gram(s) IV Push once  dextrose 50% Injectable 12.5 Gram(s) IV Push once  heparin   Injectable 5000 Unit(s) SubCutaneous every 8 hours  hydrALAZINE 10 milliGRAM(s) Oral three times a day  influenza  Vaccine (HIGH DOSE) 0.7 milliLiter(s) IntraMuscular once  insulin glargine Injectable (LANTUS) 15 Unit(s) SubCutaneous every morning  insulin lispro (ADMELOG) corrective regimen sliding scale   SubCutaneous every 6 hours  isosorbide   dinitrate Tablet (ISORDIL) 10 milliGRAM(s) Oral three times a day  metoprolol succinate ER 12.5 milliGRAM(s) Oral two times a day  pantoprazole  Injectable 40 milliGRAM(s) IV Push daily  QUEtiapine 50 milliGRAM(s) Oral at bedtime  sertraline 100 milliGRAM(s) Oral daily  triamcinolone 0.1% Cream 1 Application(s) Topical two times a day    MEDICATIONS  (PRN):  acetaminophen     Tablet .. 650 milliGRAM(s) Oral every 6 hours PRN Temp greater or equal to 38C (100.4F), Mild Pain (1 - 3), Moderate Pain (4 - 6)  clonazePAM  Tablet 1 milliGRAM(s) Oral daily PRN for anxiety  melatonin 3 milliGRAM(s) Oral at bedtime PRN Insomnia  sodium chloride 0.9% lock flush 10 milliLiter(s) IV Push every 1 hour PRN Pre/post blood products, medications, blood draw, and to maintain line patency      PHYSICAL EXAM:    Vital Signs Last 24 Hrs  T(C): 36.7 (29 Feb 2024 07:29), Max: 37.3 (28 Feb 2024 23:56)  T(F): 98 (29 Feb 2024 07:29), Max: 99.2 (28 Feb 2024 23:56)  HR: 86 (29 Feb 2024 07:29) (78 - 87)  BP: 111/62 (29 Feb 2024 07:29) (108/54 - 125/72)  BP(mean): --  RR: 17 (29 Feb 2024 07:29) (17 - 20)  SpO2: 92% (29 Feb 2024 07:29) (92% - 100%)    Parameters below as of 29 Feb 2024 07:29  Patient On (Oxygen Delivery Method): room air      Karnofsky: 40 %  General:  Elderly man awake alert NAD        HEENT:  NCAT  ( x )  NGT  Lungs: comfortable  non labored  CV: RR   GI:  soft NTND  MSK: weakness  Skin:  warm/dry  Neuro  awake alert NAD no focal deficits  Psych calm    LABS:                          9.2    11.96 )-----------( 374      ( 28 Feb 2024 06:35 )             29.7     02-28    142  |  102  |  71.7<H>  ----------------------------<  89  4.6   |  29.0  |  1.71<H>    Ca    9.3      28 Feb 2024 06:35    TPro  7.0  /  Alb  3.5  /  TBili  0.3<L>  /  DBili  x   /  AST  28  /  ALT  17  /  AlkPhos  122<H>  02-28    PT/INR - ( 28 Feb 2024 06:35 )   PT: 12.3 sec;   INR: 1.11 ratio           Urinalysis Basic - ( 28 Feb 2024 06:35 )    Color: x / Appearance: x / SG: x / pH: x  Gluc: 89 mg/dL / Ketone: x  / Bili: x / Urobili: x   Blood: x / Protein: x / Nitrite: x   Leuk Esterase: x / RBC: x / WBC x   Sq Epi: x / Non Sq Epi: x / Bacteria: x      I&O's Summary    28 Feb 2024 07:01  -  29 Feb 2024 07:00  --------------------------------------------------------  IN: 0 mL / OUT: 1000 mL / NET: -1000 mL        RADIOLOGY & ADDITIONAL STUDIES:  Imaging Reviewed  (  x )   < from: CT Head No Cont (02.17.24 @ 12:31) >    ACC: [id number] EXAM:  CT BRAIN   ORDERED BY: GAGE ZHANG     PROCEDURE DATE:  02/17/2024          INTERPRETATION:  .    CLINICAL INFORMATION: Worsening dysphagia.    TECHNIQUE: Multiple axial CT images of the head were obtained without   contrast.Sagittal and coronal reconstructed images were acquired from   the source data.    COMPARISON: Prior CT study of the head from 9/14/2022.    FINDINGS: Evaluation is limited secondary to streak and beam hardening   artifact generated by the overlying tubes and catheters.    There is no acute intracranial hemorrhage, mass effect, shift of the   midline structures, herniation, extra-axial fluid collection, or   hydrocephalus.    There is diffuse cerebral volume loss with prominence of the sulci,   fissures, and cisternal spaces which is normal for the patient's age.   There is mild deep and periventricular white matter hypoattenuation   statistically compatible with microvascular changes given calcific   atherosclerotic disease of the intracranial arteries.    The paranasal sinuses and tympanomastoid cavities are clear. The   calvarium is intact. The imaged orbits are unremarkable.    IMPRESSION: Limited study secondary to streak and beam hardening artifact   generated by the overlying tubesand catheters.    No acute intracranial hemorrhage, mass effect, or shift of the midline   structures.    Similar-appearing mild chronic white matter microvascular type changes.    --- End of Report ---            JESICA MORALES MD; Attending Radiologist  This document has been electronically signed. Feb 17 2024 12:44PM    < end of copied text >            ADVANCE DIRECTIVE PREFERENCES    Full Code  DNR/I  and continuing medical treatments  DNR with Trial of Intubation and continuing medical treatments   DNR/I  and comfort measures CC:  Follow up GOC , Symptoms    OVERNIGHT EVENTS:  daughter contacted me and informs me brother confused as to what to do about father's situation  No reported issues per 1:1    Present Symptoms:   Dyspnea:  No    Nausea/Vomiting:  No   Anxiety:  No     Depression:  No    Fatigue:  Yes   Loss of appetite: Yes     Constipation: Not Reported      Pain: No            Location            Duration            Character            Severity            Factors            Effect    Pain AD Score:  [email or web address] (press ctrl + left click to view)    Review of Systems: Reviewed as above  All others negative      MEDICATIONS  (STANDING):  ARIPiprazole 5 milliGRAM(s) Oral daily  aspirin  chewable 81 milliGRAM(s) Oral daily  atorvastatin 80 milliGRAM(s) Oral at bedtime  busPIRone 10 milliGRAM(s) Oral <User Schedule>  ceFAZolin  Injectable. 1000 milliGRAM(s) IV Push once  chlorhexidine 2% Cloths 1 Application(s) Topical <User Schedule>  dextrose 50% Injectable 25 Gram(s) IV Push once  dextrose 50% Injectable 25 Gram(s) IV Push once  dextrose 50% Injectable 12.5 Gram(s) IV Push once  heparin   Injectable 5000 Unit(s) SubCutaneous every 8 hours  hydrALAZINE 10 milliGRAM(s) Oral three times a day  influenza  Vaccine (HIGH DOSE) 0.7 milliLiter(s) IntraMuscular once  insulin glargine Injectable (LANTUS) 15 Unit(s) SubCutaneous every morning  insulin lispro (ADMELOG) corrective regimen sliding scale   SubCutaneous every 6 hours  isosorbide   dinitrate Tablet (ISORDIL) 10 milliGRAM(s) Oral three times a day  metoprolol succinate ER 12.5 milliGRAM(s) Oral two times a day  pantoprazole  Injectable 40 milliGRAM(s) IV Push daily  QUEtiapine 50 milliGRAM(s) Oral at bedtime  sertraline 100 milliGRAM(s) Oral daily  triamcinolone 0.1% Cream 1 Application(s) Topical two times a day    MEDICATIONS  (PRN):  acetaminophen     Tablet .. 650 milliGRAM(s) Oral every 6 hours PRN Temp greater or equal to 38C (100.4F), Mild Pain (1 - 3), Moderate Pain (4 - 6)  clonazePAM  Tablet 1 milliGRAM(s) Oral daily PRN for anxiety  melatonin 3 milliGRAM(s) Oral at bedtime PRN Insomnia  sodium chloride 0.9% lock flush 10 milliLiter(s) IV Push every 1 hour PRN Pre/post blood products, medications, blood draw, and to maintain line patency      PHYSICAL EXAM:    Vital Signs Last 24 Hrs  T(C): 36.7 (29 Feb 2024 07:29), Max: 37.3 (28 Feb 2024 23:56)  T(F): 98 (29 Feb 2024 07:29), Max: 99.2 (28 Feb 2024 23:56)  HR: 86 (29 Feb 2024 07:29) (78 - 87)  BP: 111/62 (29 Feb 2024 07:29) (108/54 - 125/72)  BP(mean): --  RR: 17 (29 Feb 2024 07:29) (17 - 20)  SpO2: 92% (29 Feb 2024 07:29) (92% - 100%)    Parameters below as of 29 Feb 2024 07:29  Patient On (Oxygen Delivery Method): room air      Karnofsky: 40 %  General:  Elderly man awake alert NAD        HEENT:  NCAT  ( x )  NGT  Lungs: comfortable  non labored  CV: RR   GI:  soft NTND  MSK: weakness  Skin:  warm/dry  Neuro  awake alert NAD no focal deficits  Psych calm    LABS:                          9.2    11.96 )-----------( 374      ( 28 Feb 2024 06:35 )             29.7     02-28    142  |  102  |  71.7<H>  ----------------------------<  89  4.6   |  29.0  |  1.71<H>    Ca    9.3      28 Feb 2024 06:35    TPro  7.0  /  Alb  3.5  /  TBili  0.3<L>  /  DBili  x   /  AST  28  /  ALT  17  /  AlkPhos  122<H>  02-28    PT/INR - ( 28 Feb 2024 06:35 )   PT: 12.3 sec;   INR: 1.11 ratio           Urinalysis Basic - ( 28 Feb 2024 06:35 )    Color: x / Appearance: x / SG: x / pH: x  Gluc: 89 mg/dL / Ketone: x  / Bili: x / Urobili: x   Blood: x / Protein: x / Nitrite: x   Leuk Esterase: x / RBC: x / WBC x   Sq Epi: x / Non Sq Epi: x / Bacteria: x      I&O's Summary    28 Feb 2024 07:01  -  29 Feb 2024 07:00  --------------------------------------------------------  IN: 0 mL / OUT: 1000 mL / NET: -1000 mL        RADIOLOGY & ADDITIONAL STUDIES:  Imaging Reviewed  (  x )   < from: CT Head No Cont (02.17.24 @ 12:31) >    ACC: [id number] EXAM:  CT BRAIN   ORDERED BY: GAGE ZHANG     PROCEDURE DATE:  02/17/2024          INTERPRETATION:  .    CLINICAL INFORMATION: Worsening dysphagia.    TECHNIQUE: Multiple axial CT images of the head were obtained without   contrast.Sagittal and coronal reconstructed images were acquired from   the source data.    COMPARISON: Prior CT study of the head from 9/14/2022.    FINDINGS: Evaluation is limited secondary to streak and beam hardening   artifact generated by the overlying tubes and catheters.    There is no acute intracranial hemorrhage, mass effect, shift of the   midline structures, herniation, extra-axial fluid collection, or   hydrocephalus.    There is diffuse cerebral volume loss with prominence of the sulci,   fissures, and cisternal spaces which is normal for the patient's age.   There is mild deep and periventricular white matter hypoattenuation   statistically compatible with microvascular changes given calcific   atherosclerotic disease of the intracranial arteries.    The paranasal sinuses and tympanomastoid cavities are clear. The   calvarium is intact. The imaged orbits are unremarkable.    IMPRESSION: Limited study secondary to streak and beam hardening artifact   generated by the overlying tubesand catheters.    No acute intracranial hemorrhage, mass effect, or shift of the midline   structures.    Similar-appearing mild chronic white matter microvascular type changes.    --- End of Report ---            JESICA MORALES MD; Attending Radiologist  This document has been electronically signed. Feb 17 2024 12:44PM    < end of copied text >            ADVANCE DIRECTIVE PREFERENCES    DNR/I  and continuing medical treatments

## 2024-02-29 NOTE — PROGRESS NOTE ADULT - CONVERSATION DETAILS
Tried to discuss  GOC with patient. Discussed issue of dysphagia and pleasure feeds - though not engaging. He was in restraints, for which I informed him will speak to RN.    Requested by daughter Alisia to call her brother Yaima because he had questions about my discussion with her yesterday.   Had similar discussion with brother as I did with Alisia yesterday Reviewed risks/benefits of pleasure feeds. Informed them of risks of recurrent hospitalization from infection. Discussed [facility] vs hospice.     Informed son this is all dependent on what father would want.  He states he will speak with his father.  Informed him if [facility] is decided, can have hospice thereafter should they wish.
had multiple discussions with son regards palliative nature f care and he discussed with his mother and sister and later his sister called the nursing station and asked for PEG to be cancelled - did not ask for
have been having ongoing discussions with pt and pt's family. on discussion with pt's son today, answered all questions/concerns.   reports understanding of pt's stated wishes, including foregoing invasive interventions including PCI and PEG. says he would "want to go as I am," in regards to end of life. pt ultimately wants to go home. unclear how much additional assistance pt would need at home at this time.   Pt's son encouraging pt to go to [facility], hoping he can regain some strength before returning home and has additional questions regarding PCI, for which, he was determined to be high risk, according to cardio d/w pt's family. cardio called to f/u d/w pt and family
Daughter aware father failed his SLP eval - NPO  Daughter states he was recommended a PEG in 2022 but refused and continued to eat.  He would often cough when he ate and  has lost about 30 pounds from his baseline.   Discussed the risks/benefits of PEG.    Discussed  risks of infection as PEG does not totally prevent aspiration.     Discussed risk-benefit of Comfort/Pleasure feeds.   Risks of aspiration, infection, respiratory compromise.  Benefit of providing QOL by allowing one the pleasure of tasting and eating food.    Patient had refused a PEG in the past and doing so would go against his will.  Alisia agrees and states if he did have a PEG he would just eat anyway.     Alisia informs me cardio told mother how father is at high risk for PCI.  Father had also verbalized would not want it.     Discussed hospice services. She is open to hospice SW for information.   She will speak to her brother and mother as well. She states mother is agreement to focus on his comfort and not go through interventions. She had once stated if he dies drinking - let it be.
Spoke to patient - discussed issue of dysphagia, reason for PEG and explored his  thoughts it.  Patient responded  " I don't want it".  When asked if he can tell me what would happen, he responded   " I would lose weight".  When asked what would happen if you a lot of weight?  He responded '' I would get weak"  When asked what happens if someone becomes really weak because they don't get enough nutrition, Patient responded  "I've had this problem for 2 years and have managed".
discussed PEG again- he is not sure if he wants dad to get the PEG - will talk to his mother

## 2024-02-29 NOTE — CONSULT NOTE ADULT - PROVIDER SPECIALTY LIST ADULT
Intervent Cardiology
Cardiology
Heart Failure
Intervent Cardiology
CT Surgery
Nephrology
Neurology
Palliative Care
Gastroenterology

## 2024-02-29 NOTE — PROGRESS NOTE ADULT - SUBJECTIVE AND OBJECTIVE BOX
Hospitalist Progress Note    Chief Complaint:  Cardiogenic Shock    SUBJECTIVE / OVERNIGHT EVENTS:  No events overnight, patient seen at bedside, in NAD, unable to obtain ROS due to mental status.    MEDICATIONS  (STANDING):  ARIPiprazole 5 milliGRAM(s) Oral daily  aspirin  chewable 81 milliGRAM(s) Oral daily  atorvastatin 80 milliGRAM(s) Oral at bedtime  busPIRone 10 milliGRAM(s) Oral <User Schedule>  ceFAZolin  Injectable. 1000 milliGRAM(s) IV Push once  chlorhexidine 2% Cloths 1 Application(s) Topical <User Schedule>  dextrose 50% Injectable 25 Gram(s) IV Push once  dextrose 50% Injectable 25 Gram(s) IV Push once  dextrose 50% Injectable 12.5 Gram(s) IV Push once  heparin   Injectable 5000 Unit(s) SubCutaneous every 8 hours  hydrALAZINE 10 milliGRAM(s) Oral three times a day  influenza  Vaccine (HIGH DOSE) 0.7 milliLiter(s) IntraMuscular once  insulin glargine Injectable (LANTUS) 15 Unit(s) SubCutaneous every morning  insulin lispro (ADMELOG) corrective regimen sliding scale   SubCutaneous every 6 hours  isosorbide   dinitrate Tablet (ISORDIL) 10 milliGRAM(s) Oral three times a day  metoprolol succinate ER 12.5 milliGRAM(s) Oral two times a day  pantoprazole  Injectable 40 milliGRAM(s) IV Push daily  QUEtiapine 50 milliGRAM(s) Oral at bedtime  sertraline 100 milliGRAM(s) Oral daily  triamcinolone 0.1% Cream 1 Application(s) Topical two times a day    MEDICATIONS  (PRN):  acetaminophen     Tablet .. 650 milliGRAM(s) Oral every 6 hours PRN Temp greater or equal to 38C (100.4F), Mild Pain (1 - 3), Moderate Pain (4 - 6)  clonazePAM  Tablet 1 milliGRAM(s) Oral daily PRN for anxiety  melatonin 3 milliGRAM(s) Oral at bedtime PRN Insomnia  sodium chloride 0.9% lock flush 10 milliLiter(s) IV Push every 1 hour PRN Pre/post blood products, medications, blood draw, and to maintain line patency        I&O's Summary    28 Feb 2024 07:01  -  29 Feb 2024 07:00  --------------------------------------------------------  IN: 0 mL / OUT: 1000 mL / NET: -1000 mL        PHYSICAL EXAM:  Vital Signs Last 24 Hrs  T(C): 36.7 (29 Feb 2024 07:29), Max: 37.3 (28 Feb 2024 23:56)  T(F): 98 (29 Feb 2024 07:29), Max: 99.2 (28 Feb 2024 23:56)  HR: 86 (29 Feb 2024 07:29) (78 - 87)  BP: 111/62 (29 Feb 2024 07:29) (108/54 - 125/72)  BP(mean): --  RR: 17 (29 Feb 2024 07:29) (17 - 20)  SpO2: 92% (29 Feb 2024 07:29) (92% - 100%)    Parameters below as of 29 Feb 2024 07:29  Patient On (Oxygen Delivery Method): room air      GENERAL: NAD, frail elderly cachectic   HEAD:  Atraumatic, Normocephalic  EYES:  conjunctiva and sclera clear  ENMT:  Moist mucous membranes,    NERVOUS SYSTEM:  Alert to voice, not oriented,  Moves upper and lower extremities; CNS-II-XII  CHEST/LUNG: Clear to auscultation bilaterally; No rales, rhonchi, wheezing,   HEART: Regular rate and rhythm;   ABDOMEN: Soft, Nontender, Nondistended; Bowel sounds present  EXTREMITIES:  Peripheral Pulses,   psychiatry- cant assess Insight and judgement intact     LABS:                        9.2    11.96 )-----------( 374      ( 28 Feb 2024 06:35 )             29.7     02-28    142  |  102  |  71.7<H>  ----------------------------<  89  4.6   |  29.0  |  1.71<H>    Ca    9.3      28 Feb 2024 06:35    TPro  7.0  /  Alb  3.5  /  TBili  0.3<L>  /  DBili  x   /  AST  28  /  ALT  17  /  AlkPhos  122<H>  02-28    PT/INR - ( 28 Feb 2024 06:35 )   PT: 12.3 sec;   INR: 1.11 ratio               Urinalysis Basic - ( 28 Feb 2024 06:35 )    Color: x / Appearance: x / SG: x / pH: x  Gluc: 89 mg/dL / Ketone: x  / Bili: x / Urobili: x   Blood: x / Protein: x / Nitrite: x   Leuk Esterase: x / RBC: x / WBC x   Sq Epi: x / Non Sq Epi: x / Bacteria: x        CAPILLARY BLOOD GLUCOSE      POCT Blood Glucose.: 183 mg/dL (29 Feb 2024 08:06)  POCT Blood Glucose.: 149 mg/dL (29 Feb 2024 05:50)  POCT Blood Glucose.: 146 mg/dL (29 Feb 2024 00:10)  POCT Blood Glucose.: 75 mg/dL (28 Feb 2024 17:02)  POCT Blood Glucose.: 84 mg/dL (28 Feb 2024 12:21)        RADIOLOGY & ADDITIONAL TESTS:  Results Reviewed: Y  Imaging Personally Reviewed: MARY  Electrocardiogram Personally Reviewed: MARY

## 2024-02-29 NOTE — CONSULT NOTE ADULT - ASSESSMENT
The patient is a 78y Male who is followed by neurology because of delirium    Delirium  Likely metabolic and hospital confinement  treat underlying conditions  try to place by window and encourage appropriate sleep-wake cycle  Unable to assess for dementia in hospital setting    I would be happy to re-evaluate as an outpatient following discharge when he is in his home enviroment and his delirium has resolved/improved    Thank you for allowing me to participate in the care of your patient    Caleb Vázquez MD, PhD   [id number]

## 2024-02-29 NOTE — CONSULT NOTE ADULT - SUBJECTIVE AND OBJECTIVE BOX
[facility]                                     Neurology at [place]                                 [facility]                                  [address]                                        [place], [place], [address]                                             [phone]    CC: delirium  HPI:   The patient is a 78y Male who presented with chest pain on 2/12/24.  He was found to be cardiogenic shock with EF under 20%, was in MICU and transferred to floor.  He was evaluated for CABG and was not a surgical candidate due to the thiought that he was not safe to have surgery due to underlying medical condition.  He has elevated BUN/Cr.  He has long standing problem swallowing and per chart does not want PEG.  He has been confused during admission.  He denies confusion at home, but not clear how reliable he is.  Neurology was called on 2/28/24 to ron.    PAST MEDICAL & SURGICAL HISTORY:  Diabetes      Hyperlipidemia      Anxiety with depression      Insomnia      No significant past surgical history          MEDICATIONS  (STANDING):  ARIPiprazole 5 milliGRAM(s) Oral daily  aspirin  chewable 81 milliGRAM(s) Oral daily  atorvastatin 80 milliGRAM(s) Oral at bedtime  busPIRone 10 milliGRAM(s) Oral <User Schedule>  ceFAZolin  Injectable. 1000 milliGRAM(s) IV Push once  chlorhexidine 2% Cloths 1 Application(s) Topical <User Schedule>  dextrose 50% Injectable 12.5 Gram(s) IV Push once  dextrose 50% Injectable 25 Gram(s) IV Push once  dextrose 50% Injectable 25 Gram(s) IV Push once  heparin   Injectable 5000 Unit(s) SubCutaneous every 8 hours  hydrALAZINE 10 milliGRAM(s) Oral three times a day  influenza  Vaccine (HIGH DOSE) 0.7 milliLiter(s) IntraMuscular once  insulin glargine Injectable (LANTUS) 15 Unit(s) SubCutaneous every morning  insulin lispro (ADMELOG) corrective regimen sliding scale   SubCutaneous every 6 hours  isosorbide   dinitrate Tablet (ISORDIL) 10 milliGRAM(s) Oral three times a day  metoprolol succinate ER 12.5 milliGRAM(s) Oral two times a day  pantoprazole  Injectable 40 milliGRAM(s) IV Push daily  QUEtiapine 50 milliGRAM(s) Oral at bedtime  sertraline 100 milliGRAM(s) Oral daily  triamcinolone 0.1% Cream 1 Application(s) Topical two times a day    MEDICATIONS  (PRN):  acetaminophen     Tablet .. 650 milliGRAM(s) Oral every 6 hours PRN Temp greater or equal to 38C (100.4F), Mild Pain (1 - 3), Moderate Pain (4 - 6)  clonazePAM  Tablet 1 milliGRAM(s) Oral daily PRN for anxiety  melatonin 3 milliGRAM(s) Oral at bedtime PRN Insomnia  sodium chloride 0.9% lock flush 10 milliLiter(s) IV Push every 1 hour PRN Pre/post blood products, medications, blood draw, and to maintain line patency      Allergies    No Known Allergies    Intolerances    SOCIAL HISTORY:  no tob,   no alcohol   no drugs    FAMILY HISTORY:  No pertinent family history in first degree relatives      ROS: 14 point ROS negative other than what is present in HPI or below    Vital Signs Last 24 Hrs  T(C): 36.7 (29 Feb 2024 07:29), Max: 37.3 (28 Feb 2024 23:56)  T(F): 98 (29 Feb 2024 07:29), Max: 99.2 (28 Feb 2024 23:56)  HR: 86 (29 Feb 2024 07:29) (78 - 87)  BP: 111/62 (29 Feb 2024 07:29) (108/54 - 125/72)  BP(mean): --  RR: 17 (29 Feb 2024 07:29) (17 - 20)  SpO2: 92% (29 Feb 2024 07:29) (92% - 100%)    Parameters below as of 29 Feb 2024 07:29  Patient On (Oxygen Delivery Method): room air      General: NAD, cachetic    Detailed Neurologic Exam:    Mental status: The patient is awake and alert and has normal attention span.  The patient is oriented to self, hospital February 2022 The patient is oriented to current events. The patient is able to name objects, follow commands, repeat sentences. He has mildly impaired memory.    Cranial nerves: Pupils equal and react symmetrically to light. There is no visual field deficit to confrontation. Extraocular motion is full with no nystagmus. There is no ptosis. Facial sensation is intact. Facial musculature is symmetric.     Motor: There is normal bulk and tone.  There is no tremor.  diffuse, but non focal weakness    Sensation: Intact to light touch and pin in 4 extremities    Reflexes: 1+ throughout and plantar responses are flexor.    Cerebellar: There is no dysmetria on finger to nose testing.    Gait : deferred    LABS:                         9.2    11.96 )-----------( 374      ( 28 Feb 2024 06:35 )             29.7       02-28    142  |  102  |  71.7<H>  ----------------------------<  89  4.6   |  29.0  |  1.71<H>    Ca    9.3      28 Feb 2024 06:35    TPro  7.0  /  Alb  3.5  /  TBili  0.3<L>  /  DBili  x   /  AST  28  /  ALT  17  /  AlkPhos  122<H>  02-28      PT/INR - ( 28 Feb 2024 06:35 )   PT: 12.3 sec;   INR: 1.11 ratio        RADIOLOGY & ADDITIONAL STUDIES (independently reviewed unless otherwise noted):  CT Head No Cont (02.17.24 @ 12:31)   IMPRESSION: Limited study secondary to streak and beam hardening artifact   generated by the overlying tubesand catheters.  No acute intracranial hemorrhage, mass effect, or shift of the midline   structures. Similar-appearing mild chronic white matter microvascular type changes.

## 2024-02-29 NOTE — CONSULT NOTE ADULT - REASON FOR ADMISSION
Cardiogenic Shock

## 2024-02-29 NOTE — PROGRESS NOTE ADULT - ASSESSMENT
78 y/oM PMH of HTN, HLD, CKD, plaque psoriasis, DM-2 who came to the ED (02/12/24) complaining of chest pain radiating to left arm associated with multiple episodes of diarrhea.  On ED arrival, afebrile, hypotensive to SBP 80-90s despite 1L IVF requiring Levophed. EKG with nonspecific ST changes, troponin 2344->2477. Labs otherwise notable for K 5.6, BUN/Cr 43.5/2.31 (baseline Cr ~1 in 2022), BNP 96008, lactate 6.2. CXR with pulmonary edema. Placed on BiPAP ,given Lasix 40mg. TTE with EF<20%, multiple segmental abnormalities, mod MR, mod TR, mod pericardial effusion without tamponade; started on Heparin drip for NSTEMI, cardiology consulted. Admitted to MICU for further management of cardiogenic shock. On 02/14/24: patient became obtunded, febrile, intubated for airway protection. Interventional cardiology also on board; S/P LHC: Severe multi-vessel CAD including LM, CABG recommended. CT surgery deemed patient poor candidate for CABG. HF team also following; s/p RHC: shows low filling pressures with normal cardiac output. Nephrology following for WES. Patient extubated (02/18/24); NG tube in place.     patient DNR/DNI.   SLP 2/20/24- NPO w/ GOC discussion regarding PEG vs pleasure feeds.    #NSTEMI  -S/p LHC: severe multiple-vessel CAD, not a candidate for CABG   -c/w Asprin 81mg , Atorvastatin 80mg   -Cardiology following, no plan for cabg at this time , likely for elective cabg / ongoign discussions with family     #Acute Systolic CHF  -s/p Bumex and Lasix drip   - c/w hydralazine , imdur   -HF following   -Echo done   -diuretics on hold for now     #Cardiogenic shock   -s/p Levophed and Dobutamine   -Monitor BP    #Acute metabolic encephalopathy - hx of dysphagia- s/p extubation 2/18  -Aspiration precautions  -SLP rec NPO--cont TF for now via NGT  with free water- PEG, now decision for no PEG and comfort feeds but later in the day family again wanted to discuss steps incase he aspirates on comfort feeds   -GI informed of family decision to cancel PEG- (daughter directly called Marco A on the floor and asked for cancel PEG  -Plavix  held 5 days before proceeding with procedure- can probably be restarted but family has been goign back and forth with the PEG decision  -cardio consult noted     #Leukocytosis  likely reactive, no signs of infection.   -Monitor WBC, temp.  - hold off abxs for now   - monitor     #WES on CKD:  -Nephrology following   -Monitor renal function     #DM-2  -Lantus 15 units AM   -Insulin sliding scale     #Depression:    -Buspirone 10mg   -Aripiprazole 5mg   -Seroquel 50mg HS   -Sertraline 100mg     #DVT prophylaxis: Heparin sc    #Palliative following   - family requesting  consult for capacity to refuse PEG tube. Complex patient w/ involved family. f/u palliative care for discussion w/ family on GOC. Hospice candidate, poor prognosis.  #code: dnr/ dni / NIV  78 y/oM PMH of HTN, HLD, CKD, plaque psoriasis, DM-2 who came to the ED (02/12/24) complaining of chest pain radiating to left arm associated with multiple episodes of diarrhea.  On ED arrival, afebrile, hypotensive to SBP 80-90s despite 1L IVF requiring Levophed. EKG with nonspecific ST changes, troponin 2344->2477. Labs otherwise notable for K 5.6, BUN/Cr 43.5/2.31 (baseline Cr ~1 in 2022), BNP 27866, lactate 6.2. CXR with pulmonary edema. Placed on BiPAP ,given Lasix 40mg. TTE with EF<20%, multiple segmental abnormalities, mod MR, mod TR, mod pericardial effusion without tamponade; started on Heparin drip for NSTEMI, cardiology consulted. Admitted to MICU for further management of cardiogenic shock. On 02/14/24: patient became obtunded, febrile, intubated for airway protection. Interventional cardiology also on board; S/P LHC: Severe multi-vessel CAD including LM, CABG recommended. CT surgery deemed patient poor candidate for CABG. HF team also following; s/p RHC: shows low filling pressures with normal cardiac output. Nephrology following for WES. Patient extubated (02/18/24); NG tube in place.     patient DNR/DNI.   SLP 2/20/24- NPO w/ GOC discussion regarding PEG vs pleasure feeds.    #NSTEMI  -S/p LHC: severe multiple-vessel CAD, not a candidate for CABG   -c/w Asprin 81mg , Atorvastatin 80mg   -Cardiology following, no plan for cabg at this time , likely for elective cabg / ongoing discussions with family     #Acute Systolic CHF  -s/p Bumex and Lasix drip   - c/w hydralazine , imdur   -HF following   -Echo done   -diuretics on hold for now     #Cardiogenic shock   -s/p Levophed and Dobutamine   -Monitor BP    #Acute metabolic encephalopathy - hx of dysphagia- s/p extubation 2/18  -Aspiration precautions  -SLP rec NPO--cont TF for now via NGT  with free water- PEG, now decision for no PEG and comfort feeds but later in the day family again wanted to discuss steps incase he aspirates on comfort feeds   -GI informed of family decision to cancel PEG- (daughter directly called Marco A on the floor and asked for cancel PEG  -Plavix  held 5 days before proceeding with procedure- can probably be restarted but family has been going back and forth with the PEG decision  -cardio consult noted     #Leukocytosis  likely reactive, no signs of infection.   -Monitor WBC, temp.  - hold off abxs for now   - monitor     #WES on CKD:  -Nephrology following   -Monitor renal function     #DM-2  -Lantus 15 units AM   -Insulin sliding scale     #Depression:    -Buspirone 10mg   -Aripiprazole 5mg   -Seroquel 50mg HS   -Sertraline 100mg     #DVT prophylaxis: Heparin sc    #Palliative following   - Ethics consulted. Complex patient w/ involved family. f/u palliative care for discussion w/ family on GOC. Hospice candidate, poor prognosis.  #code: dnr/ dni / NIV

## 2024-02-29 NOTE — CONSULT NOTE ADULT - CONSULT REQUESTED BY NAME
Ad Rogers MD
Diabetes
Dr Rosas
Dr. Monte
ICU
MICU - [facility] Topper
MICU team/ General Cardiology Team
Dr. Denis
Persits to Janice

## 2024-02-29 NOTE — CONSULT NOTE ADULT - CONSULT REASON
Multivessel CAD
WES
Goals of Care
Cardiac Catheterization
Chest Pain
Acute Heart Failure
NSTEMI/ Multivessel CAD
Peg eval
delirium

## 2024-02-29 NOTE — PROGRESS NOTE ADULT - ASSESSMENT
78yr man  PMHx HTN, HLD, T2DM, CKD, plaque psoriasis, anxiety, depression, history of dysphagia admitted with cardiogenic shock 2/2 NSTEMI with Respiratory failure.    Cardiogenic Shock/NSTEMI  Per Cardio and CTS not  a candidate for CABG   Informed Interventional cardiology  requested to reevaluate  Daughter informs me told by cardio if father gets PEG and gets stronger then may be considered    Dysphagia  Patient with hx of dysphagia -reported offered a PEG last year but declined  Aspiration precautions  SLP eval rec NPO  Discussed risks of pleasure feeds with daughter and son  Patient  agreeing to PEG    Respiratory Failure  extubated  on room air  cont monitor O2 sats    WES/CKD  avoid nephrotoxic agents    Encounter for Palliative Care  Spoke with patient who verbalizes he does NOT want PEG.  ( see above)  Spoke with daughter who tells me brother is conflicted.  Cardio informed him if father gets the PEG and can get stronger, then PCI may be considered  Although father did consent to PEG earlier this week, she feels father my have felt pressure to do so.   Informed daughter, father has been generally consistent he does not want PEG.  This was his wish 2 years ago when it was first recommended, and he constinues to voice the same  Will Consult Ethics           78yr man  PMHx HTN, HLD, T2DM, CKD, plaque psoriasis, anxiety, depression, history of dysphagia admitted with cardiogenic shock 2/2 NSTEMI with Respiratory failure.    Cardiogenic Shock/NSTEMI  Per Cardio and CTS not  a candidate for CABG   Informed Interventional cardiology  requested to reevaluate  Daughter informs me told by cardio if father gets PEG and gets stronger then may be considered    Dysphagia  Patient with hx of dysphagia -reported offered a PEG last year but declined  Aspiration precautions  SLP eval rec NPO  Discussed risks of pleasure feeds with daughter and son  Patient  agreeing to PEG    Respiratory Failure  extubated  on room air  cont monitor O2 sats    WES/CKD  avoid nephrotoxic agents    Encounter for Palliative Care  Spoke with patient who verbalizes he does NOT want PEG.  ( see above). Patient seems to understand the situation, though may not fully appreciate the risks.   Spoke with daughter who tells me brother is conflicted.  Cardio informed him if father gets the PEG and can get stronger, then PCI may be considered  Although father did consent to PEG earlier this week, she feels father my have felt pressure to do so.   Informed daughter, father has been generally consistent he does not want PEG.  This was his wish 2 years ago when it was first recommended, and he continues to voice the same  Will Consult Ethics

## 2024-02-29 NOTE — CONSULT NOTE ADULT - CONSULT REQUESTED DATE/TIME
16-Feb-2024 11:50
14-Feb-2024 11:06
14-Feb-2024 16:06
14-Feb-2024 09:21
14-Feb-2024 16:48
23-Feb-2024 10:41
29-Feb-2024 09:27
12-Feb-2024 20:50
14-Feb-2024 16:53

## 2024-02-29 NOTE — CHART NOTE - NSCHARTNOTEFT_GEN_A_CORE
Ethics consult initiated.    Case discussed in detail with SHAHIDA Zarco MA (Ethics Fellow), SHAHIDA Chiu MD (Ethics Attending), JUDITH Fallon DO (Palliative Care Attending) and JOSE Toney LCSW (Palliative Care SW).     Ethics to meet with the patient and his family, as well as assist with facilitating a family meeting.    Consult in progress.    Mignon Zarco MA  Ethics Fellow  [phone].

## 2024-03-01 ENCOUNTER — TRANSCRIPTION ENCOUNTER (OUTPATIENT)
Age: 79
End: 2024-03-01

## 2024-03-01 LAB
ANION GAP SERPL CALC-SCNC: 14 MMOL/L — SIGNIFICANT CHANGE UP (ref 5–17)
BUN SERPL-MCNC: 74.2 MG/DL — HIGH (ref 8–20)
CALCIUM SERPL-MCNC: 9.1 MG/DL — SIGNIFICANT CHANGE UP (ref 8.4–10.5)
CHLORIDE SERPL-SCNC: 102 MMOL/L — SIGNIFICANT CHANGE UP (ref 96–108)
CO2 SERPL-SCNC: 27 MMOL/L — SIGNIFICANT CHANGE UP (ref 22–29)
CREAT SERPL-MCNC: 1.85 MG/DL — HIGH (ref 0.5–1.3)
EGFR: 37 ML/MIN/1.73M2 — LOW
GLUCOSE BLDC GLUCOMTR-MCNC: 114 MG/DL — HIGH (ref 70–99)
GLUCOSE BLDC GLUCOMTR-MCNC: 152 MG/DL — HIGH (ref 70–99)
GLUCOSE BLDC GLUCOMTR-MCNC: 156 MG/DL — HIGH (ref 70–99)
GLUCOSE BLDC GLUCOMTR-MCNC: 166 MG/DL — HIGH (ref 70–99)
GLUCOSE BLDC GLUCOMTR-MCNC: 80 MG/DL — SIGNIFICANT CHANGE UP (ref 70–99)
GLUCOSE BLDC GLUCOMTR-MCNC: 88 MG/DL — SIGNIFICANT CHANGE UP (ref 70–99)
GLUCOSE BLDC GLUCOMTR-MCNC: 91 MG/DL — SIGNIFICANT CHANGE UP (ref 70–99)
GLUCOSE SERPL-MCNC: 91 MG/DL — SIGNIFICANT CHANGE UP (ref 70–99)
HCT VFR BLD CALC: 28.6 % — LOW (ref 39–50)
HGB BLD-MCNC: 8.8 G/DL — LOW (ref 13–17)
MCHC RBC-ENTMCNC: 26.3 PG — LOW (ref 27–34)
MCHC RBC-ENTMCNC: 30.8 GM/DL — LOW (ref 32–36)
MCV RBC AUTO: 85.4 FL — SIGNIFICANT CHANGE UP (ref 80–100)
PLATELET # BLD AUTO: 342 K/UL — SIGNIFICANT CHANGE UP (ref 150–400)
POTASSIUM SERPL-MCNC: 4.7 MMOL/L — SIGNIFICANT CHANGE UP (ref 3.5–5.3)
POTASSIUM SERPL-SCNC: 4.7 MMOL/L — SIGNIFICANT CHANGE UP (ref 3.5–5.3)
RBC # BLD: 3.35 M/UL — LOW (ref 4.2–5.8)
RBC # FLD: 15.3 % — HIGH (ref 10.3–14.5)
SODIUM SERPL-SCNC: 143 MMOL/L — SIGNIFICANT CHANGE UP (ref 135–145)
WBC # BLD: 10 K/UL — SIGNIFICANT CHANGE UP (ref 3.8–10.5)
WBC # FLD AUTO: 10 K/UL — SIGNIFICANT CHANGE UP (ref 3.8–10.5)

## 2024-03-01 PROCEDURE — 99233 SBSQ HOSP IP/OBS HIGH 50: CPT

## 2024-03-01 PROCEDURE — 99232 SBSQ HOSP IP/OBS MODERATE 35: CPT

## 2024-03-01 PROCEDURE — 74230 X-RAY XM SWLNG FUNCJ C+: CPT | Mod: 26

## 2024-03-01 RX ORDER — SIMVASTATIN 20 MG/1
1 TABLET, FILM COATED ORAL
Refills: 0 | DISCHARGE

## 2024-03-01 RX ORDER — QUETIAPINE FUMARATE 200 MG/1
1 TABLET, FILM COATED ORAL
Qty: 0 | Refills: 0 | DISCHARGE

## 2024-03-01 RX ORDER — LISINOPRIL 2.5 MG/1
1 TABLET ORAL
Refills: 0 | DISCHARGE

## 2024-03-01 RX ORDER — METFORMIN HYDROCHLORIDE 850 MG/1
1 TABLET ORAL
Qty: 0 | Refills: 0 | DISCHARGE

## 2024-03-01 RX ORDER — ARIPIPRAZOLE 15 MG/1
1 TABLET ORAL
Refills: 0 | DISCHARGE

## 2024-03-01 RX ORDER — HALOPERIDOL DECANOATE 100 MG/ML
1 INJECTION INTRAMUSCULAR
Qty: 12 | Refills: 0
Start: 2024-03-01 | End: 2024-03-03

## 2024-03-01 RX ORDER — MORPHINE SULFATE 50 MG/1
0.25 CAPSULE, EXTENDED RELEASE ORAL
Qty: 7 | Refills: 0
Start: 2024-03-01 | End: 2024-03-07

## 2024-03-01 RX ORDER — SERTRALINE 25 MG/1
1 TABLET, FILM COATED ORAL
Refills: 0 | DISCHARGE

## 2024-03-01 RX ORDER — ACETAMINOPHEN 500 MG
1 TABLET ORAL
Qty: 12 | Refills: 0
Start: 2024-03-01 | End: 2024-03-03

## 2024-03-01 RX ORDER — CLONAZEPAM 1 MG
1 TABLET ORAL
Refills: 0 | DISCHARGE

## 2024-03-01 RX ORDER — HYOSCYAMINE SULFATE 0.13 MG
1 TABLET ORAL
Qty: 28 | Refills: 0
Start: 2024-03-01 | End: 2024-03-07

## 2024-03-01 RX ADMIN — Medication 81 MILLIGRAM(S): at 11:55

## 2024-03-01 RX ADMIN — HEPARIN SODIUM 5000 UNIT(S): 5000 INJECTION INTRAVENOUS; SUBCUTANEOUS at 05:41

## 2024-03-01 RX ADMIN — ARIPIPRAZOLE 5 MILLIGRAM(S): 15 TABLET ORAL at 11:54

## 2024-03-01 RX ADMIN — INSULIN GLARGINE 15 UNIT(S): 100 INJECTION, SOLUTION SUBCUTANEOUS at 08:33

## 2024-03-01 RX ADMIN — Medication 1 APPLICATION(S): at 17:54

## 2024-03-01 RX ADMIN — Medication 12.5 MILLIGRAM(S): at 05:41

## 2024-03-01 RX ADMIN — ATORVASTATIN CALCIUM 80 MILLIGRAM(S): 80 TABLET, FILM COATED ORAL at 23:10

## 2024-03-01 RX ADMIN — Medication 650 MILLIGRAM(S): at 06:25

## 2024-03-01 RX ADMIN — HEPARIN SODIUM 5000 UNIT(S): 5000 INJECTION INTRAVENOUS; SUBCUTANEOUS at 14:53

## 2024-03-01 RX ADMIN — PANTOPRAZOLE SODIUM 40 MILLIGRAM(S): 20 TABLET, DELAYED RELEASE ORAL at 11:55

## 2024-03-01 RX ADMIN — Medication 10 MILLIGRAM(S): at 14:53

## 2024-03-01 RX ADMIN — HEPARIN SODIUM 5000 UNIT(S): 5000 INJECTION INTRAVENOUS; SUBCUTANEOUS at 23:10

## 2024-03-01 RX ADMIN — ISOSORBIDE DINITRATE 10 MILLIGRAM(S): 5 TABLET ORAL at 05:41

## 2024-03-01 RX ADMIN — ISOSORBIDE DINITRATE 10 MILLIGRAM(S): 5 TABLET ORAL at 11:55

## 2024-03-01 RX ADMIN — CHLORHEXIDINE GLUCONATE 1 APPLICATION(S): 213 SOLUTION TOPICAL at 05:42

## 2024-03-01 RX ADMIN — Medication 12.5 MILLIGRAM(S): at 17:59

## 2024-03-01 RX ADMIN — Medication 10 MILLIGRAM(S): at 23:09

## 2024-03-01 RX ADMIN — SERTRALINE 100 MILLIGRAM(S): 25 TABLET, FILM COATED ORAL at 11:56

## 2024-03-01 RX ADMIN — Medication 650 MILLIGRAM(S): at 05:40

## 2024-03-01 RX ADMIN — Medication 10 MILLIGRAM(S): at 05:41

## 2024-03-01 RX ADMIN — Medication 10 MILLIGRAM(S): at 10:01

## 2024-03-01 RX ADMIN — Medication 1 APPLICATION(S): at 05:42

## 2024-03-01 RX ADMIN — QUETIAPINE FUMARATE 50 MILLIGRAM(S): 200 TABLET, FILM COATED ORAL at 23:09

## 2024-03-01 RX ADMIN — ISOSORBIDE DINITRATE 10 MILLIGRAM(S): 5 TABLET ORAL at 17:55

## 2024-03-01 NOTE — PROGRESS NOTE ADULT - SUBJECTIVE AND OBJECTIVE BOX
OVERNIGHT EVENTS:  Failed modified barium swallow  Family decided on hospice  Patient and family decided to decline PEG tube  NG tube removed    Present Symptoms:     Dyspnea: no  Nausea/Vomiting: No  Anxiety:  No  Depression: No  Fatigue: Yes   Loss of appetite: Yes   Constipation: not reported    Pain: no            Character-            Duration-            Effect-            Factors-            Frequency-            Location-            Severity-    Pain AD Score:  [email or web address] (press ctrl + left click to view)    Review of Systems:   All others negative    MEDICATIONS  (STANDING):  ARIPiprazole 5 milliGRAM(s) Oral daily  aspirin  chewable 81 milliGRAM(s) Oral daily  atorvastatin 80 milliGRAM(s) Oral at bedtime  busPIRone 10 milliGRAM(s) Oral <User Schedule>  ceFAZolin  Injectable. 1000 milliGRAM(s) IV Push once  chlorhexidine 2% Cloths 1 Application(s) Topical <User Schedule>  dextrose 50% Injectable 25 Gram(s) IV Push once  dextrose 50% Injectable 25 Gram(s) IV Push once  dextrose 50% Injectable 12.5 Gram(s) IV Push once  heparin   Injectable 5000 Unit(s) SubCutaneous every 8 hours  hydrALAZINE 10 milliGRAM(s) Oral three times a day  influenza  Vaccine (HIGH DOSE) 0.7 milliLiter(s) IntraMuscular once  insulin glargine Injectable (LANTUS) 15 Unit(s) SubCutaneous every morning  insulin lispro (ADMELOG) corrective regimen sliding scale   SubCutaneous every 6 hours  isosorbide   dinitrate Tablet (ISORDIL) 10 milliGRAM(s) Oral three times a day  metoprolol succinate ER 12.5 milliGRAM(s) Oral two times a day  pantoprazole  Injectable 40 milliGRAM(s) IV Push daily  QUEtiapine 50 milliGRAM(s) Oral at bedtime  sertraline 100 milliGRAM(s) Oral daily  triamcinolone 0.1% Cream 1 Application(s) Topical two times a day    MEDICATIONS  (PRN):  acetaminophen     Tablet .. 650 milliGRAM(s) Oral every 6 hours PRN Temp greater or equal to 38C (100.4F), Mild Pain (1 - 3), Moderate Pain (4 - 6)  melatonin 3 milliGRAM(s) Oral at bedtime PRN Insomnia  sodium chloride 0.9% lock flush 10 milliLiter(s) IV Push every 1 hour PRN Pre/post blood products, medications, blood draw, and to maintain line patency      PHYSICAL EXAM:    Vital Signs Last 24 Hrs  T(C): 36.4 (01 Mar 2024 08:26), Max: 37.1 (29 Feb 2024 19:48)  T(F): 97.5 (01 Mar 2024 08:26), Max: 98.7 (29 Feb 2024 19:48)  HR: 76 (01 Mar 2024 08:26) (76 - 85)  BP: 124/68 (01 Mar 2024 08:26) (111/56 - 129/71)  BP(mean): --  RR: 18 (01 Mar 2024 08:26) (18 - 18)  SpO2: 98% (01 Mar 2024 08:26) (95% - 100%)    Parameters below as of 01 Mar 2024 08:26  Patient On (Oxygen Delivery Method): room air     Karnofsky: 40 %    General: alert NAD, awake alert, answering questions    HEENT: normal, NCAT    Lungs: comfortable non-labored breathing,  excessive secretions    CV: normal rate    GI: normal, soft, nontender    MSK: weakness     Neuro: awake and alert, answering quesitons    LABS:                          8.8    10.00 )-----------( 342      ( 01 Mar 2024 06:30 )             28.6     03-01    143  |  102  |  74.2<H>  ----------------------------<  91  4.7   |  27.0  |  1.85<H>    Ca    9.1      01 Mar 2024 06:30        Urinalysis Basic - ( 01 Mar 2024 06:30 )    Color: x / Appearance: x / SG: x / pH: x  Gluc: 91 mg/dL / Ketone: x  / Bili: x / Urobili: x   Blood: x / Protein: x / Nitrite: x   Leuk Esterase: x / RBC: x / WBC x   Sq Epi: x / Non Sq Epi: x / Bacteria: x      I&O's Summary    29 Feb 2024 07:01  -  01 Mar 2024 07:00  --------------------------------------------------------  IN: 1550 mL / OUT: 400 mL / NET: 1150 mL        RADIOLOGY & ADDITIONAL STUDIES:    RADIOLOGY & ADDITIONAL STUDIES:  Imaging Reviewed  (  x )   < from: CT Head No Cont (02.17.24 @ 12:31) >    ACC: [id number] EXAM:  CT BRAIN   ORDERED BY: GAGE ZHANG     PROCEDURE DATE:  02/17/2024          INTERPRETATION:  .    CLINICAL INFORMATION: Worsening dysphagia.    TECHNIQUE: Multiple axial CT images of the head were obtained without   contrast.Sagittal and coronal reconstructed images were acquired from   the source data.    COMPARISON: Prior CT study of the head from 9/14/2022.    FINDINGS: Evaluation is limited secondary to streak and beam hardening   artifact generated by the overlying tubes and catheters.    There is no acute intracranial hemorrhage, mass effect, shift of the   midline structures, herniation, extra-axial fluid collection, or   hydrocephalus.    There is diffuse cerebral volume loss with prominence of the sulci,   fissures, and cisternal spaces which is normal for the patient's age.   There is mild deep and periventricular white matter hypoattenuation   statistically compatible with microvascular changes given calcific   atherosclerotic disease of the intracranial arteries.    The paranasal sinuses and tympanomastoid cavities are clear. The   calvarium is intact. The imaged orbits are unremarkable.    IMPRESSION: Limited study secondary to streak and beam hardening artifact   generated by the overlying tubesand catheters.    No acute intracranial hemorrhage, mass effect, or shift of the midline   structures.    Similar-appearing mild chronic white matter microvascular type changes.    --- End of Report ---            JESICA MORALES MD; Attending Radiologist  This document has been electronically signed. Feb 17 2024 12:44PM    < end of copied text >      ADVANCE DIRECTIVES/TREATMENT PREFERENCES:  DNR/ DNI  OVERNIGHT EVENTS:  Failed modified barium swallow  Family decided on home hospice  Patient and family decided to decline PEG tube, elected for comfort feeding  NG tube removed    Present Symptoms:     Dyspnea: no  Nausea/Vomiting: No  Anxiety:  No  Depression: No  Fatigue: Yes   Loss of appetite: Yes   Constipation: not reported    Pain: no            Character-            Duration-            Effect-            Factors-            Frequency-            Location-            Severity-    Pain AD Score:  [email or web address] (press ctrl + left click to view)    Review of Systems:   All others negative    MEDICATIONS  (STANDING):  ARIPiprazole 5 milliGRAM(s) Oral daily  aspirin  chewable 81 milliGRAM(s) Oral daily  atorvastatin 80 milliGRAM(s) Oral at bedtime  busPIRone 10 milliGRAM(s) Oral <User Schedule>  ceFAZolin  Injectable. 1000 milliGRAM(s) IV Push once  chlorhexidine 2% Cloths 1 Application(s) Topical <User Schedule>  dextrose 50% Injectable 25 Gram(s) IV Push once  dextrose 50% Injectable 25 Gram(s) IV Push once  dextrose 50% Injectable 12.5 Gram(s) IV Push once  heparin   Injectable 5000 Unit(s) SubCutaneous every 8 hours  hydrALAZINE 10 milliGRAM(s) Oral three times a day  influenza  Vaccine (HIGH DOSE) 0.7 milliLiter(s) IntraMuscular once  insulin glargine Injectable (LANTUS) 15 Unit(s) SubCutaneous every morning  insulin lispro (ADMELOG) corrective regimen sliding scale   SubCutaneous every 6 hours  isosorbide   dinitrate Tablet (ISORDIL) 10 milliGRAM(s) Oral three times a day  metoprolol succinate ER 12.5 milliGRAM(s) Oral two times a day  pantoprazole  Injectable 40 milliGRAM(s) IV Push daily  QUEtiapine 50 milliGRAM(s) Oral at bedtime  sertraline 100 milliGRAM(s) Oral daily  triamcinolone 0.1% Cream 1 Application(s) Topical two times a day    MEDICATIONS  (PRN):  acetaminophen     Tablet .. 650 milliGRAM(s) Oral every 6 hours PRN Temp greater or equal to 38C (100.4F), Mild Pain (1 - 3), Moderate Pain (4 - 6)  melatonin 3 milliGRAM(s) Oral at bedtime PRN Insomnia  sodium chloride 0.9% lock flush 10 milliLiter(s) IV Push every 1 hour PRN Pre/post blood products, medications, blood draw, and to maintain line patency      PHYSICAL EXAM:    Vital Signs Last 24 Hrs  T(C): 36.4 (01 Mar 2024 08:26), Max: 37.1 (29 Feb 2024 19:48)  T(F): 97.5 (01 Mar 2024 08:26), Max: 98.7 (29 Feb 2024 19:48)  HR: 76 (01 Mar 2024 08:26) (76 - 85)  BP: 124/68 (01 Mar 2024 08:26) (111/56 - 129/71)  BP(mean): --  RR: 18 (01 Mar 2024 08:26) (18 - 18)  SpO2: 98% (01 Mar 2024 08:26) (95% - 100%)    Parameters below as of 01 Mar 2024 08:26  Patient On (Oxygen Delivery Method): room air     Karnofsky: 40 %    General: alert NAD, awake alert, answering questions    HEENT: normal, NCAT    Lungs: comfortable non-labored breathing,  excessive secretions    CV: normal rate    GI: normal, soft, nontender    MSK: weakness     Neuro: awake and alert, answering quesitons    LABS:                          8.8    10.00 )-----------( 342      ( 01 Mar 2024 06:30 )             28.6     03-01    143  |  102  |  74.2<H>  ----------------------------<  91  4.7   |  27.0  |  1.85<H>    Ca    9.1      01 Mar 2024 06:30        Urinalysis Basic - ( 01 Mar 2024 06:30 )    Color: x / Appearance: x / SG: x / pH: x  Gluc: 91 mg/dL / Ketone: x  / Bili: x / Urobili: x   Blood: x / Protein: x / Nitrite: x   Leuk Esterase: x / RBC: x / WBC x   Sq Epi: x / Non Sq Epi: x / Bacteria: x      I&O's Summary    29 Feb 2024 07:01  -  01 Mar 2024 07:00  --------------------------------------------------------  IN: 1550 mL / OUT: 400 mL / NET: 1150 mL        RADIOLOGY & ADDITIONAL STUDIES:    RADIOLOGY & ADDITIONAL STUDIES:  Imaging Reviewed  (  x )   < from: CT Head No Cont (02.17.24 @ 12:31) >    ACC: [id number] EXAM:  CT BRAIN   ORDERED BY: GAGE ZHANG     PROCEDURE DATE:  02/17/2024          INTERPRETATION:  .    CLINICAL INFORMATION: Worsening dysphagia.    TECHNIQUE: Multiple axial CT images of the head were obtained without   contrast.Sagittal and coronal reconstructed images were acquired from   the source data.    COMPARISON: Prior CT study of the head from 9/14/2022.    FINDINGS: Evaluation is limited secondary to streak and beam hardening   artifact generated by the overlying tubes and catheters.    There is no acute intracranial hemorrhage, mass effect, shift of the   midline structures, herniation, extra-axial fluid collection, or   hydrocephalus.    There is diffuse cerebral volume loss with prominence of the sulci,   fissures, and cisternal spaces which is normal for the patient's age.   There is mild deep and periventricular white matter hypoattenuation   statistically compatible with microvascular changes given calcific   atherosclerotic disease of the intracranial arteries.    The paranasal sinuses and tympanomastoid cavities are clear. The   calvarium is intact. The imaged orbits are unremarkable.    IMPRESSION: Limited study secondary to streak and beam hardening artifact   generated by the overlying tubesand catheters.    No acute intracranial hemorrhage, mass effect, or shift of the midline   structures.    Similar-appearing mild chronic white matter microvascular type changes.    --- End of Report ---            JESICA MORALES MD; Attending Radiologist  This document has been electronically signed. Feb 17 2024 12:44PM    < end of copied text >      ADVANCE DIRECTIVES/TREATMENT PREFERENCES:  DNR/ DNI  OVERNIGHT EVENTS:  Informed by Dr. Rivera  he spoke to son plan is MBS. If fails, then will do hospice    Present Symptoms:     Dyspnea: no  Nausea/Vomiting: No  Anxiety:  No  Depression: No  Fatigue: Yes   Loss of appetite: Yes   Constipation: not reported    Pain: no            Character-            Duration-            Effect-            Factors-            Frequency-            Location-            Severity-    Pain AD Score:  [email or web address] (press ctrl + left click to view)    Review of Systems:   All others negative    MEDICATIONS  (STANDING):  ARIPiprazole 5 milliGRAM(s) Oral daily  aspirin  chewable 81 milliGRAM(s) Oral daily  atorvastatin 80 milliGRAM(s) Oral at bedtime  busPIRone 10 milliGRAM(s) Oral <User Schedule>  ceFAZolin  Injectable. 1000 milliGRAM(s) IV Push once  chlorhexidine 2% Cloths 1 Application(s) Topical <User Schedule>  dextrose 50% Injectable 25 Gram(s) IV Push once  dextrose 50% Injectable 25 Gram(s) IV Push once  dextrose 50% Injectable 12.5 Gram(s) IV Push once  heparin   Injectable 5000 Unit(s) SubCutaneous every 8 hours  hydrALAZINE 10 milliGRAM(s) Oral three times a day  influenza  Vaccine (HIGH DOSE) 0.7 milliLiter(s) IntraMuscular once  insulin glargine Injectable (LANTUS) 15 Unit(s) SubCutaneous every morning  insulin lispro (ADMELOG) corrective regimen sliding scale   SubCutaneous every 6 hours  isosorbide   dinitrate Tablet (ISORDIL) 10 milliGRAM(s) Oral three times a day  metoprolol succinate ER 12.5 milliGRAM(s) Oral two times a day  pantoprazole  Injectable 40 milliGRAM(s) IV Push daily  QUEtiapine 50 milliGRAM(s) Oral at bedtime  sertraline 100 milliGRAM(s) Oral daily  triamcinolone 0.1% Cream 1 Application(s) Topical two times a day    MEDICATIONS  (PRN):  acetaminophen     Tablet .. 650 milliGRAM(s) Oral every 6 hours PRN Temp greater or equal to 38C (100.4F), Mild Pain (1 - 3), Moderate Pain (4 - 6)  melatonin 3 milliGRAM(s) Oral at bedtime PRN Insomnia  sodium chloride 0.9% lock flush 10 milliLiter(s) IV Push every 1 hour PRN Pre/post blood products, medications, blood draw, and to maintain line patency      PHYSICAL EXAM:    Vital Signs Last 24 Hrs  T(C): 36.4 (01 Mar 2024 08:26), Max: 37.1 (29 Feb 2024 19:48)  T(F): 97.5 (01 Mar 2024 08:26), Max: 98.7 (29 Feb 2024 19:48)  HR: 76 (01 Mar 2024 08:26) (76 - 85)  BP: 124/68 (01 Mar 2024 08:26) (111/56 - 129/71)  BP(mean): --  RR: 18 (01 Mar 2024 08:26) (18 - 18)  SpO2: 98% (01 Mar 2024 08:26) (95% - 100%)    Parameters below as of 01 Mar 2024 08:26  Patient On (Oxygen Delivery Method): room air     Karnofsky: 40 %    General: alert NAD, awake alert, answering questions    HEENT: normal, NCAT    Lungs: comfortable non-labored breathing,  excessive secretions    CV: normal rate    GI: normal, soft, nontender    MSK: weakness     Neuro: awake and alert, answering questions    LABS:                          8.8    10.00 )-----------( 342      ( 01 Mar 2024 06:30 )             28.6     03-01    143  |  102  |  74.2<H>  ----------------------------<  91  4.7   |  27.0  |  1.85<H>    Ca    9.1      01 Mar 2024 06:30        Urinalysis Basic - ( 01 Mar 2024 06:30 )    Color: x / Appearance: x / SG: x / pH: x  Gluc: 91 mg/dL / Ketone: x  / Bili: x / Urobili: x   Blood: x / Protein: x / Nitrite: x   Leuk Esterase: x / RBC: x / WBC x   Sq Epi: x / Non Sq Epi: x / Bacteria: x      I&O's Summary    29 Feb 2024 07:01  -  01 Mar 2024 07:00  --------------------------------------------------------  IN: 1550 mL / OUT: 400 mL / NET: 1150 mL        RADIOLOGY & ADDITIONAL STUDIES:    RADIOLOGY & ADDITIONAL STUDIES:  Imaging Reviewed  (  x )   < from: CT Head No Cont (02.17.24 @ 12:31) >    ACC: [id number] EXAM:  CT BRAIN   ORDERED BY: GAGE ZHANG     PROCEDURE DATE:  02/17/2024          INTERPRETATION:  .    CLINICAL INFORMATION: Worsening dysphagia.    TECHNIQUE: Multiple axial CT images of the head were obtained without   contrast.Sagittal and coronal reconstructed images were acquired from   the source data.    COMPARISON: Prior CT study of the head from 9/14/2022.    FINDINGS: Evaluation is limited secondary to streak and beam hardening   artifact generated by the overlying tubes and catheters.    There is no acute intracranial hemorrhage, mass effect, shift of the   midline structures, herniation, extra-axial fluid collection, or   hydrocephalus.    There is diffuse cerebral volume loss with prominence of the sulci,   fissures, and cisternal spaces which is normal for the patient's age.   There is mild deep and periventricular white matter hypoattenuation   statistically compatible with microvascular changes given calcific   atherosclerotic disease of the intracranial arteries.    The paranasal sinuses and tympanomastoid cavities are clear. The   calvarium is intact. The imaged orbits are unremarkable.    IMPRESSION: Limited study secondary to streak and beam hardening artifact   generated by the overlying tubesand catheters.    No acute intracranial hemorrhage, mass effect, or shift of the midline   structures.    Similar-appearing mild chronic white matter microvascular type changes.    --- End of Report ---            JESICA MORALES MD; Attending Radiologist  This document has been electronically signed. Feb 17 2024 12:44PM    < end of copied text >      ADVANCE DIRECTIVES/TREATMENT PREFERENCES:  DNR/ DNI

## 2024-03-01 NOTE — DISCHARGE NOTE PROVIDER - ATTENDING DISCHARGE PHYSICAL EXAMINATION:
GENERAL: NAD, frail elderly cachectic   HEAD:  Atraumatic, Normocephalic  EYES:  conjunctiva and sclera clear  ENMT:  Moist mucous membranes,    NERVOUS SYSTEM:  Alert & Oriented X2-3,  Moves upper and lower extremities; CNS-II-XII  CHEST/LUNG: Clear to auscultation bilaterally; No rales, rhonchi, wheezing,   HEART: Regular rate and rhythm;   ABDOMEN: Soft, Nontender, Nondistended; Bowel sounds present  EXTREMITIES:  Peripheral Pulses,   psychiatry- cant assess Insight and judgement intact

## 2024-03-01 NOTE — SWALLOW VFSS/MBS ASSESSMENT ADULT - COMMENTS
As per MD note, "78 y/oM PMH of HTN, HLD, CKD, plaque psoriasis, DM-2 who came to the ED (02/12/24) complaining of chest pain radiating to left arm associated with multiple episodes of diarrhea.  On ED arrival, afebrile, hypotensive to SBP 80-90s despite 1L IVF requiring Levophed. EKG with nonspecific ST changes, troponin 2344->2477. Labs otherwise notable for K 5.6, BUN/Cr 43.5/2.31 (baseline Cr ~1 in 2022), BNP 47684, lactate 6.2. CXR with pulmonary edema. Placed on BiPAP ,given Lasix 40mg. TTE with EF<20%, multiple segmental abnormalities, mod MR, mod TR, mod pericardial effusion without tamponade; started on Heparin drip for NSTEMI, cardiology consulted. Admitted to MICU for further management of cardiogenic shock. On 02/14/24: patient became obtunded, febrile, intubated for airway protection. Interventional cardiology also on board; S/P LHC: Severe multi-vessel CAD including LM, CABG recommended. CT surgery deemed patient poor candidate for CABG. HF team also following; s/p RHC: shows low filling pressures with normal cardiac output. Nephrology following for WES. Patient extubated (02/18/24); NG tube in place. Acute metabolic encephalopathy - hx of dysphagia- s/p extubation 2/18  -Aspiration precautions  -SLP rec NPO--cont TF for now via NGT  with free water- PEG, now decision for no PEG and comfort feeds but later in the day family again wanted to discuss steps incase he aspirates on comfort feeds   -GI informed of family decision to cancel PEG- (daughter directly called Marco A on the floor and asked for cancel PEG  -Plavix  held 5 days before proceeding with procedure- can probably be restarted but family has been going back and forth with the PEG decision  -cardio consult noted   -Discussed w/ son on 2/29/2024, agreeable to one final SLP eval via [facility] if [place] fails, agreeable to pleasure feeds".

## 2024-03-01 NOTE — PROGRESS NOTE ADULT - NSPROGADDITIONALINFOA_GEN_ALL_CORE
Time spent with review of chart documents, labs, imaging. Direct patient assessment,  formulation of care plan. Discussion with  Interdisciplinary  team    JASMEET Hogue NP
I have personally seen and examined the patient.  I fully participated in the care of this patient.  I have made amendments to the documentation where necessary, and agree with the history, physical exam, and plan as documented by the Resident.   Per discussions with Dr. Rivera, patient failed MBS, family has agreed to pleasue feeds and hospice. Hospice consulted.

## 2024-03-01 NOTE — DISCHARGE NOTE PROVIDER - HOSPITAL COURSE
78 y/oM PMH of HTN, HLD, CKD, plaque psoriasis, DM-2 who came to the ED (02/12/24) complaining of chest pain radiating to left arm associated with multiple episodes of diarrhea.  On ED arrival, afebrile, hypotensive to SBP 80-90s despite 1L IVF requiring Levophed. EKG with nonspecific ST changes, troponin 2344->2477. Labs otherwise notable for K 5.6, BUN/Cr 43.5/2.31 (baseline Cr ~1 in 2022), BNP 54828, lactate 6.2. CXR with pulmonary edema. Placed on BiPAP ,given Lasix 40mg. TTE with EF<20%, multiple segmental abnormalities, mod MR, mod TR, mod pericardial effusion without tamponade; started on Heparin drip for NSTEMI, cardiology consulted. Admitted to MICU for further management of cardiogenic shock. On 02/14/24: patient became obtunded, febrile, intubated for airway protection. Interventional cardiology also on board; S/P LHC: Severe multi-vessel CAD including LM, CABG recommended. CT surgery deemed patient poor candidate for CABG. HF team also following; s/p RHC: shows low filling pressures with normal cardiac output. Nephrology following for WES. Patient extubated (02/18/24); NG tube in place. Failed multiple swallow evals, patient expressed desire for no PEG tube. After failure of last MBS, decision made by family to remove NG tube and consult hospice. Patient discharging for home hospice.

## 2024-03-01 NOTE — PROGRESS NOTE ADULT - ASSESSMENT
78 y/oM PMH of HTN, HLD, CKD, plaque psoriasis, DM-2 who came to the ED (02/12/24) complaining of chest pain radiating to left arm associated with multiple episodes of diarrhea.  On ED arrival, afebrile, hypotensive to SBP 80-90s despite 1L IVF requiring Levophed. EKG with nonspecific ST changes, troponin 2344->2477. Labs otherwise notable for K 5.6, BUN/Cr 43.5/2.31 (baseline Cr ~1 in 2022), BNP 35384, lactate 6.2. CXR with pulmonary edema. Placed on BiPAP ,given Lasix 40mg. TTE with EF<20%, multiple segmental abnormalities, mod MR, mod TR, mod pericardial effusion without tamponade; started on Heparin drip for NSTEMI, cardiology consulted. Admitted to MICU for further management of cardiogenic shock. On 02/14/24: patient became obtunded, febrile, intubated for airway protection. Interventional cardiology also on board; S/P LHC: Severe multi-vessel CAD including LM, CABG recommended. CT surgery deemed patient poor candidate for CABG. HF team also following; s/p RHC: shows low filling pressures with normal cardiac output. Nephrology following for WES. Patient extubated (02/18/24); NG tube in place.     patient DNR/DNI.   SLP 2/20/24- NPO w/ GOC discussion regarding PEG vs pleasure feeds.    #NSTEMI  -S/p LHC: severe multiple-vessel CAD, not a candidate for CABG   -c/w Asprin 81mg , Atorvastatin 80mg   -Cardiology following, no plan for cabg at this time , likely for elective cabg / ongoing discussions with family     #Acute Systolic CHF  -s/p Bumex and Lasix drip   - c/w hydralazine , imdur   -HF following   -Echo done   -diuretics on hold for now     #Cardiogenic shock   -s/p Levophed and Dobutamine   -Monitor BP    #Acute metabolic encephalopathy - hx of dysphagia- s/p extubation 2/18  -Aspiration precautions  -SLP rec NPO--cont TF for now via NGT  with free water- PEG, now decision for no PEG and comfort feeds but later in the day family again wanted to discuss steps incase he aspirates on comfort feeds   -GI informed of family decision to cancel PEG- (daughter directly called Marco A on the floor and asked for cancel PEG  -Plavix  held 5 days before proceeding with procedure- can probably be restarted but family has been going back and forth with the PEG decision  -cardio consult noted   -Discussed w/ son on 2/29/2024, agreeable to one final SLP eval via MBS, if patient fails, agreeable to pleasure feeds.    #Leukocytosis  likely reactive, no signs of infection.   -Monitor WBC, temp.  - hold off abxs for now   - monitor     #WES on CKD:  -Nephrology following   -Monitor renal function     #DM-2  -Lantus 15 units AM   -Insulin sliding scale     #Depression:    -Buspirone 10mg   -Aripiprazole 5mg   -Seroquel 50mg HS   -Sertraline 100mg     #DVT prophylaxis: Heparin sc    #Palliative following   - Final SLP today as per discussion w/ son on 2/29/2024, if fails, family understands and will make comfort feeds and pull ngt after  #code: dnr/ dni / NIV

## 2024-03-01 NOTE — DISCHARGE NOTE PROVIDER - NSDCCPCAREPLAN_GEN_ALL_CORE_FT
PRINCIPAL DISCHARGE DIAGNOSIS  Diagnosis: Acute CHF  Assessment and Plan of Treatment: Hospice care only      SECONDARY DISCHARGE DIAGNOSES  Diagnosis: Dysphagia  Assessment and Plan of Treatment: Hospice care, pleasure feeds only

## 2024-03-01 NOTE — SWALLOW VFSS/MBS ASSESSMENT ADULT - SLP PERTINENT HISTORY OF CURRENT PROBLEM
Pt known to this service from past inpatient & outpatient visits. MBSV x 2 completed in 09/2022, w/rx made for NPO due to severe pharyngeal dysphagia of unclear etiology. Extensive w/u from GI, ENT, neuro & imaging completed has been unrevealing thus far. Pt & family with difficulty for decision re: pleasure feeds vs PEG, MD requesting repeat MBS to eval swallow objectively.

## 2024-03-01 NOTE — PROGRESS NOTE ADULT - ASSESSMENT
78yr man  PMHx HTN, HLD, T2DM, CKD, plaque psoriasis, anxiety, depression, history of dysphagia admitted with cardiogenic shock 2/2 NSTEMI with Respiratory failure.    # Cardiogenic Shock/NSTEMI  Per Cardio and CTS not  a candidate for CABG   Not stable for PCI unless general health improves    #Dysphagia  Patient with hx of dysphagia -reported offered a PEG last year but declined  SLP eval rec NPO, failed repeat MBS  Family and patient elected for pleasure feeds, they are aware and are willing to take the high chance and risk of aspiration  Patient and family declined PEG    Respiratory Failure  extubated  on room air    WES/CKD  avoid nephrotoxic agents    Encounter for Palliative Care  Family decided on hospice  Patient and family decided to decline PEG tube- pt elected for comfort feeds  NG tube removed  PCI would be considered in pt's status improves, but decline of general health anticipated without PEG and severe dysphagia  Likely discharge for home hospice  Patient is hospice appropriate given severe dysphagia, CAD (unsuitable for CABG and PCI at this time), and cardiogenic shock 2/2 HF 2/2 NSTEMI     78yr man  PMHx HTN, HLD, T2DM, CKD, plaque psoriasis, anxiety, depression, history of dysphagia admitted with cardiogenic shock 2/2 NSTEMI with Respiratory failure.    # Cardiogenic Shock/NSTEMI  Per Cardio and CTS not  a candidate for CABG   Not stable for PCI unless general health improves    #Dysphagia  Patient with hx of dysphagia -reported offered a PEG last year but declined  SLP eval rec NPO, failed repeat MBS  Family and patient elected for pleasure feeds, they are aware and are willing to take the high chance and risk of aspiration  Patient and family declined PEG    # Respiratory Failure  extubated  on room air    # WES/CKD  avoid nephrotoxic agents    # Encounter for Palliative Care  Family and patient decided on home hospice  Patient and family decided to decline PEG tube- pt elected for comfort feeds  NG tube removed  PCI would be considered in pt's status improves, but decline of general health anticipated without PEG and severe dysphagia  Likely discharge for home hospice  Patient is hospice appropriate given severe dysphagia, CAD (unsuitable for CABG and PCI at this time), and cardiogenic shock 2/2 HF 2/2 NSTEMI     78yr man  PMHx HTN, HLD, T2DM, CKD, plaque psoriasis, anxiety, depression, history of dysphagia admitted with cardiogenic shock 2/2 NSTEMI with Respiratory failure.    # Cardiogenic Shock/NSTEMI  Per Cardio and CTS not  a candidate for CABG   Per cards, Not currently stable for PCI unless given general health     #Dysphagia  Patient with hx of dysphagia -reported offered a PEG last year but declined  SLP eval rec NPO, failed repeat MBS  Family and patient elected for pleasure feeds, they are aware and are willing to take the high chance and risk of aspiration  Patient and family declined PEG today    # Respiratory Failure  extubated  on room air    # WES/CKD  avoid nephrotoxic agents    # Encounter for Palliative Care  Family and patient decided on home hospice, home hospice referral sent  Patient and family decided to decline PEG tube- pt elected for comfort feeds  NG tube removed, pt currently on comfort feeds  Likely discharge for home hospice  Patient is hospice appropriate given severe dysphagia, CAD (unsuitable for CABG and PCI at this time), and cardiogenic shock 2/2 HF 2/2 NSTEMI     78yr man  PMHx HTN, HLD, T2DM, CKD, plaque psoriasis, anxiety, depression, history of dysphagia admitted with cardiogenic shock 2/2 NSTEMI with Respiratory failure.    # Cardiogenic Shock/NSTEMI  Per Cardio and CTS not  a candidate for CABG and not currently stable for PCI.      #Dysphagia  Patient with hx of dysphagia -reported offered a PEG last year but declined  SLP eval rec NPO, failed repeat MBS  Informed by Dr. Rivera Family and patient declined PEG todayand   elected for pleasure/comfort  feeds.  They understand if he aspirates then they won't escalate care  If patient to be placed on comfort feeds- recommend modified diet of puree       # WES/CKD  avoid nephrotoxic agents    # Debility   Assist with care    # Encounter for Palliative Care  Informed by Dr. Rivera  - Family and patient decided NO PEG and agreed to  home hospice after failing MBS  NG tube removed, pt currently on comfort feeds  Hospice referral sent - Spoke to hospice CRISTO Jimenez  Family are in line with patient's wishes. Ethics consult d/c

## 2024-03-01 NOTE — SWALLOW VFSS/MBS ASSESSMENT ADULT - SLP GENERAL OBSERVATIONS
Recd awake/upright in stretcher in radiology, A&A Ox2, reduced cognition, 0/10 pain pre/post, tolerating RA NAD, NGT in place, wet/hypophonic phonation noted at baseline, NGT in place

## 2024-03-01 NOTE — DISCHARGE NOTE PROVIDER - NSDCMRMEDTOKEN_GEN_ALL_CORE_FT
ARIPiprazole 5 mg oral tablet: 1 tab(s) orally once a day  busPIRone 10 mg oral tablet: 1 tab(s) orally once a day  KlonoPIN 1 mg oral tablet: 1 tab(s) orally once a day as needed for  anxiety  lisinopril 40 mg oral tablet: 1 tab(s) orally once a day  metFORMIN 500 mg oral tablet: 1 tab(s) orally 2 times a day  QUEtiapine 50 mg oral tablet: 1 tab(s) orally once a day (at bedtime)  sertraline 100 mg oral tablet: 1 tab(s) orally once a day  simvastatin 20 mg oral tablet: 1 tab(s) orally once a day   acetaminophen 650 mg oral tablet, extended release: 1 tab(s) orally every 6 hours as needed for  fever and/or mild pain.  ARIPiprazole 5 mg oral tablet: 1 tab(s) orally once a day  busPIRone 10 mg oral tablet: 1 tab(s) orally once a day  haloperidol 1 mg oral tablet: 1 tab(s) orally every 6 hours as needed for  restlessness/agitation MDD: 4  hydrALAZINE 10 mg oral tablet: 1 tab(s) orally 3 times a day  hyoscyamine 0.125 mg oral tablet: 1 tab(s) orally every 6 hours 1 tab(s) orally by mouth or under the tongue every 6 hours, As Needed for terminal/excessive secretions. MDD: 4  isosorbide dinitrate 10 mg oral tablet: 1 tab(s) orally 3 times a day  LORazepam 2 mg/mL oral concentrate: 0.5 milliliter(s) orally every 6 hours Take 0.5 milliliter(s) orally every 6 hours, As Needed -for anxiety MDD: 2 mL  metoprolol succinate 25 mg oral tablet, extended release: 0.5 tab(s) orally 2 times a day  morphine 20 mg/mL oral concentrate: 0.25 milliliter(s) orally every 6 hours as needed for  pain Place 0.25 milliliter(s) under tongue every 6 hours, As Needed for pain or shortness of breath MDD: 1 mL  QUEtiapine 50 mg oral tablet: 1 tab(s) orally once a day (at bedtime)  sertraline 100 mg oral tablet: 1 tab(s) orally once a day

## 2024-03-01 NOTE — PROGRESS NOTE ADULT - SUBJECTIVE AND OBJECTIVE BOX
Hospitalist Progress Note    Chief Complaint:  Cardiogenic Shock    SUBJECTIVE / OVERNIGHT EVENTS:  No events overnight, patient seen at bedside, in NAD, AAOx2 now, no complaints today.  Patient denies chest pain, SOB, abd pain, N/V, fever, chills, dysuria or any other complaints. All remainder ROS negative.     MEDICATIONS  (STANDING):  ARIPiprazole 5 milliGRAM(s) Oral daily  aspirin  chewable 81 milliGRAM(s) Oral daily  atorvastatin 80 milliGRAM(s) Oral at bedtime  busPIRone 10 milliGRAM(s) Oral <User Schedule>  ceFAZolin  Injectable. 1000 milliGRAM(s) IV Push once  chlorhexidine 2% Cloths 1 Application(s) Topical <User Schedule>  dextrose 50% Injectable 25 Gram(s) IV Push once  dextrose 50% Injectable 12.5 Gram(s) IV Push once  dextrose 50% Injectable 25 Gram(s) IV Push once  heparin   Injectable 5000 Unit(s) SubCutaneous every 8 hours  hydrALAZINE 10 milliGRAM(s) Oral three times a day  influenza  Vaccine (HIGH DOSE) 0.7 milliLiter(s) IntraMuscular once  insulin glargine Injectable (LANTUS) 15 Unit(s) SubCutaneous every morning  insulin lispro (ADMELOG) corrective regimen sliding scale   SubCutaneous every 6 hours  isosorbide   dinitrate Tablet (ISORDIL) 10 milliGRAM(s) Oral three times a day  metoprolol succinate ER 12.5 milliGRAM(s) Oral two times a day  pantoprazole  Injectable 40 milliGRAM(s) IV Push daily  QUEtiapine 50 milliGRAM(s) Oral at bedtime  sertraline 100 milliGRAM(s) Oral daily  triamcinolone 0.1% Cream 1 Application(s) Topical two times a day    MEDICATIONS  (PRN):  acetaminophen     Tablet .. 650 milliGRAM(s) Oral every 6 hours PRN Temp greater or equal to 38C (100.4F), Mild Pain (1 - 3), Moderate Pain (4 - 6)  melatonin 3 milliGRAM(s) Oral at bedtime PRN Insomnia  sodium chloride 0.9% lock flush 10 milliLiter(s) IV Push every 1 hour PRN Pre/post blood products, medications, blood draw, and to maintain line patency        I&O's Summary    29 Feb 2024 07:01  -  01 Mar 2024 07:00  --------------------------------------------------------  IN: 1550 mL / OUT: 400 mL / NET: 1150 mL        PHYSICAL EXAM:  Vital Signs Last 24 Hrs  T(C): 36.4 (01 Mar 2024 08:26), Max: 37.1 (29 Feb 2024 19:48)  T(F): 97.5 (01 Mar 2024 08:26), Max: 98.7 (29 Feb 2024 19:48)  HR: 76 (01 Mar 2024 08:26) (76 - 85)  BP: 124/68 (01 Mar 2024 08:26) (111/56 - 129/71)  BP(mean): --  RR: 18 (01 Mar 2024 08:26) (18 - 18)  SpO2: 98% (01 Mar 2024 08:26) (95% - 100%)    Parameters below as of 01 Mar 2024 08:26  Patient On (Oxygen Delivery Method): room air          GENERAL: NAD, frail elderly cachectic   HEAD:  Atraumatic, Normocephalic  EYES:  conjunctiva and sclera clear  ENMT:  Moist mucous membranes,    NERVOUS SYSTEM:  Alert & Oriented X2-3,  Moves upper and lower extremities; CNS-II-XII  CHEST/LUNG: Clear to auscultation bilaterally; No rales, rhonchi, wheezing,   HEART: Regular rate and rhythm;   ABDOMEN: Soft, Nontender, Nondistended; Bowel sounds present  EXTREMITIES:  Peripheral Pulses,   psychiatry- cant assess Insight and judgement intact     LABS:                        8.8    10.00 )-----------( 342      ( 01 Mar 2024 06:30 )             28.6     03-01    143  |  102  |  74.2<H>  ----------------------------<  91  4.7   |  27.0  |  1.85<H>    Ca    9.1      01 Mar 2024 06:30            Urinalysis Basic - ( 01 Mar 2024 06:30 )    Color: x / Appearance: x / SG: x / pH: x  Gluc: 91 mg/dL / Ketone: x  / Bili: x / Urobili: x   Blood: x / Protein: x / Nitrite: x   Leuk Esterase: x / RBC: x / WBC x   Sq Epi: x / Non Sq Epi: x / Bacteria: x        CAPILLARY BLOOD GLUCOSE      POCT Blood Glucose.: 156 mg/dL (01 Mar 2024 08:28)  POCT Blood Glucose.: 91 mg/dL (01 Mar 2024 06:11)  POCT Blood Glucose.: 152 mg/dL (01 Mar 2024 00:10)  POCT Blood Glucose.: 178 mg/dL (29 Feb 2024 18:04)  POCT Blood Glucose.: 204 mg/dL (29 Feb 2024 12:22)        RADIOLOGY & ADDITIONAL TESTS:  Results Reviewed: Y  Imaging Personally Reviewed: N  Electrocardiogram Personally Reviewed: MARY

## 2024-03-01 NOTE — SWALLOW VFSS/MBS ASSESSMENT ADULT - RECOMMENDED CONSISTENCY
NPO w/consideration for non oral alternative means of nutrition/hydration  Eval with GI for enteral nutrition only following final GOC conversations with palliative care   Consider further medical work-up for etiology of dysphagia if in alignment with GOC

## 2024-03-01 NOTE — SWALLOW VFSS/MBS ASSESSMENT ADULT - ROSENBEK'S PENETRATION ASPIRATION SCALE
observed during & following the swallow head neutral & all postures; 5-penetration contacting the vocal cords without clearance during & following the swallow head neutral & all postures/(8) contrast passes glottis, visible subglottic residue remains, absent patient response (aspiration)

## 2024-03-01 NOTE — CHART NOTE - NSCHARTNOTEFT_GEN_A_CORE
Consult no longer necessary because the family has decided to move forward with hospice at this time, as the patient did not pass a modified barium swallow and he does not want a PEG.     Mignon Zarco MA  Ethics Fellow  [phone]

## 2024-03-01 NOTE — SWALLOW VFSS/MBS ASSESSMENT ADULT - PHARYNGEAL PHASE COMMENTS
unable to clear stasis despite subsequent swallows, liquid wash or compensatory postures. Fair clearance only w/expectoration/regurgitation to oral cavity

## 2024-03-01 NOTE — SWALLOW VFSS/MBS ASSESSMENT ADULT - DIAGNOSTIC IMPRESSIONS
Overall swallow profile unchanged from previous MBSV x 2 completed in 9/2022. Pt presents w/severe pharyngeal dysphagia for consistencies presented puree, thin & moderately thick liquids all via 1/4 tsp. Oral stage of swallow generally functional, intact PO acceptance w/functional bolus formation/cohesion/propulsion, no anterior loss or oral stasis appreciated.    Pharyngeal phase of swallow marked by poor epiglottic deflection, suspect reduced integrity of cricopharyngeus muscle- thus impeding UES opening, w/reduced upper/lower airway protection. Abnormal movement/flow of PO through pharyngeal space, w/only SLIGHT progression of PO beyond UES. Substantial stasis remaining in pharynx despite multiple swallows, liquid wash or compensatory postures via chin tuck, R or L head turn. Pt with clearance of stasis reducing to min-mod only following independent & cued expectoration/regurgitation of trials via oral cavity. Penetration contacting the vocal cords w/ultimate silent aspiration during & following the swallow on severe stasis, without clearance despite cued cough. Compensatory postures are ineffective at facilitating airway protection, continued airway entry of material visualized across all trials. Additional presence of osteophytic degeneration noted at level C2, C3, C4, however do not suspect to impact bolus progression as per radiologist.   Esophageal phase of swallow notable for mild retention/retrograde progression of all trials, below the UES, in upper cervical esophagus (observed w/small amount of PO that was able to pass beyond pharynx). Retrograde movement not re-occupying pharyngeal space.

## 2024-03-02 LAB
GLUCOSE BLDC GLUCOMTR-MCNC: 107 MG/DL — HIGH (ref 70–99)
GLUCOSE BLDC GLUCOMTR-MCNC: 112 MG/DL — HIGH (ref 70–99)
GLUCOSE BLDC GLUCOMTR-MCNC: 113 MG/DL — HIGH (ref 70–99)
GLUCOSE BLDC GLUCOMTR-MCNC: 137 MG/DL — HIGH (ref 70–99)
GLUCOSE BLDC GLUCOMTR-MCNC: 88 MG/DL — SIGNIFICANT CHANGE UP (ref 70–99)

## 2024-03-02 PROCEDURE — 99232 SBSQ HOSP IP/OBS MODERATE 35: CPT

## 2024-03-02 RX ORDER — HYDRALAZINE HCL 50 MG
1 TABLET ORAL
Qty: 90 | Refills: 0
Start: 2024-03-02 | End: 2024-03-31

## 2024-03-02 RX ORDER — ARIPIPRAZOLE 15 MG/1
1 TABLET ORAL
Qty: 0 | Refills: 0 | DISCHARGE
Start: 2024-03-02

## 2024-03-02 RX ORDER — SERTRALINE 25 MG/1
1 TABLET, FILM COATED ORAL
Qty: 0 | Refills: 0 | DISCHARGE
Start: 2024-03-02

## 2024-03-02 RX ORDER — HYDRALAZINE HCL 50 MG
1 TABLET ORAL
Qty: 0 | Refills: 0 | DISCHARGE
Start: 2024-03-02

## 2024-03-02 RX ORDER — QUETIAPINE FUMARATE 200 MG/1
1 TABLET, FILM COATED ORAL
Qty: 0 | Refills: 0 | DISCHARGE
Start: 2024-03-02

## 2024-03-02 RX ORDER — ISOSORBIDE DINITRATE 5 MG/1
1 TABLET ORAL
Qty: 90 | Refills: 0
Start: 2024-03-02 | End: 2024-03-31

## 2024-03-02 RX ORDER — ISOSORBIDE DINITRATE 5 MG/1
1 TABLET ORAL
Qty: 0 | Refills: 0 | DISCHARGE
Start: 2024-03-02

## 2024-03-02 RX ORDER — METOPROLOL TARTRATE 50 MG
0.5 TABLET ORAL
Qty: 30 | Refills: 0
Start: 2024-03-02 | End: 2024-03-31

## 2024-03-02 RX ADMIN — ISOSORBIDE DINITRATE 10 MILLIGRAM(S): 5 TABLET ORAL at 05:36

## 2024-03-02 RX ADMIN — Medication 12.5 MILLIGRAM(S): at 05:36

## 2024-03-02 RX ADMIN — ISOSORBIDE DINITRATE 10 MILLIGRAM(S): 5 TABLET ORAL at 17:25

## 2024-03-02 RX ADMIN — Medication 10 MILLIGRAM(S): at 13:19

## 2024-03-02 RX ADMIN — CHLORHEXIDINE GLUCONATE 1 APPLICATION(S): 213 SOLUTION TOPICAL at 05:37

## 2024-03-02 RX ADMIN — INSULIN GLARGINE 15 UNIT(S): 100 INJECTION, SOLUTION SUBCUTANEOUS at 08:51

## 2024-03-02 RX ADMIN — Medication 12.5 MILLIGRAM(S): at 17:26

## 2024-03-02 RX ADMIN — HEPARIN SODIUM 5000 UNIT(S): 5000 INJECTION INTRAVENOUS; SUBCUTANEOUS at 21:51

## 2024-03-02 RX ADMIN — Medication 1 APPLICATION(S): at 05:36

## 2024-03-02 RX ADMIN — Medication 1 APPLICATION(S): at 17:25

## 2024-03-02 RX ADMIN — HEPARIN SODIUM 5000 UNIT(S): 5000 INJECTION INTRAVENOUS; SUBCUTANEOUS at 05:35

## 2024-03-02 RX ADMIN — SERTRALINE 100 MILLIGRAM(S): 25 TABLET, FILM COATED ORAL at 13:19

## 2024-03-02 RX ADMIN — HEPARIN SODIUM 5000 UNIT(S): 5000 INJECTION INTRAVENOUS; SUBCUTANEOUS at 13:20

## 2024-03-02 RX ADMIN — Medication 10 MILLIGRAM(S): at 05:36

## 2024-03-02 RX ADMIN — Medication 81 MILLIGRAM(S): at 13:19

## 2024-03-02 RX ADMIN — PANTOPRAZOLE SODIUM 40 MILLIGRAM(S): 20 TABLET, DELAYED RELEASE ORAL at 13:20

## 2024-03-02 RX ADMIN — ISOSORBIDE DINITRATE 10 MILLIGRAM(S): 5 TABLET ORAL at 13:19

## 2024-03-02 RX ADMIN — ARIPIPRAZOLE 5 MILLIGRAM(S): 15 TABLET ORAL at 13:19

## 2024-03-02 NOTE — PROGRESS NOTE ADULT - ASSESSMENT
78 y/oM PMH of HTN, HLD, CKD, plaque psoriasis, DM-2 who came to the ED (02/12/24) complaining of chest pain radiating to left arm associated with multiple episodes of diarrhea.  On ED arrival, afebrile, hypotensive to SBP 80-90s despite 1L IVF requiring Levophed. EKG with nonspecific ST changes, troponin 2344->2477. Labs otherwise notable for K 5.6, BUN/Cr 43.5/2.31 (baseline Cr ~1 in 2022), BNP 96459, lactate 6.2. CXR with pulmonary edema. Placed on BiPAP ,given Lasix 40mg. TTE with EF<20%, multiple segmental abnormalities, mod MR, mod TR, mod pericardial effusion without tamponade; started on Heparin drip for NSTEMI, cardiology consulted. Admitted to MICU for further management of cardiogenic shock. On 02/14/24: patient became obtunded, febrile, intubated for airway protection. Interventional cardiology also on board; S/P LHC: Severe multi-vessel CAD including LM, CABG recommended. CT surgery deemed patient poor candidate for CABG. HF team also following; s/p RHC: shows low filling pressures with normal cardiac output. Nephrology following for WES. Patient extubated (02/18/24); NG tube in place.   patient DNR/DNI.   SLP 2/20/24- NPO w/ GOC discussion regarding PEG vs pleasure feeds.    #NSTEMI  -S/p LHC: severe multiple-vessel CAD, not a candidate for CABG   -c/w Asprin 81mg , Atorvastatin 80mg   -Cardiology following, no plan for cabg at this time  -pt now hospice    #Acute Systolic CHF  -s/p Bumex and Lasix drip   -c/w hydralazine , imdur   -HF following   -Echo done   -diuretics on hold for now     #Cardiogenic shock   -s/p Levophed and Dobutamine   -Monitor BP    #Acute metabolic encephalopathy - hx of dysphagia- s/p extubation 2/18  -Aspiration precautions  -SLP rec NPO--cont TF for now via NGT  with free water- PEG, now decision for no PEG and comfort feeds but later in the day family again wanted to discuss steps incase he aspirates on comfort feeds   -GI informed of family decision to cancel PEG- (daughter directly called Marco A on the floor and asked for cancel PEG  -cardio consult noted   -Discussed w/ son on 2/29/2024, agreeable for pleasure feeding  -home hospice referral made    #Leukocytosis  likely reactive, no signs of infection.   -Monitor WBC, temp.  -hold off abxs for now     #WES on CKD:  -Nephrology following   -Monitor renal function     #DM-2  -Lantus 15 units AM   -Insulin sliding scale   -A1C 7    #Depression:    -Buspirone 10mg   -Aripiprazole 5mg   -Seroquel 50mg HS   -Sertraline 100mg     #DVT prophylaxis: Heparin sc    #Palliative following   - Final SLP as per discussion w/ son on 2/29/2024, failed, family understands and agreeable for comfort feeds and home hospice  #code: dnr/ dni / NIV

## 2024-03-02 NOTE — PROGRESS NOTE ADULT - SUBJECTIVE AND OBJECTIVE BOX
Nicolasa Mckenzie M.D.    Patient is a 78y old  Male who presents with a chief complaint of Cardiogenic Shock (01 Mar 2024 13:22)      SUBJECTIVE / OVERNIGHT EVENTS: no concerns. NGT removed, now on pleasure feeding.     Patient denies chest pain, SOB, abd pain, N/V, fever, chills, dysuria or any other complaints. All remainder ROS negative.     MEDICATIONS  (STANDING):  ARIPiprazole 5 milliGRAM(s) Oral daily  aspirin  chewable 81 milliGRAM(s) Oral daily  atorvastatin 80 milliGRAM(s) Oral at bedtime  busPIRone 10 milliGRAM(s) Oral <User Schedule>  ceFAZolin  Injectable. 1000 milliGRAM(s) IV Push once  chlorhexidine 2% Cloths 1 Application(s) Topical <User Schedule>  dextrose 50% Injectable 25 Gram(s) IV Push once  dextrose 50% Injectable 12.5 Gram(s) IV Push once  dextrose 50% Injectable 25 Gram(s) IV Push once  heparin   Injectable 5000 Unit(s) SubCutaneous every 8 hours  hydrALAZINE 10 milliGRAM(s) Oral three times a day  influenza  Vaccine (HIGH DOSE) 0.7 milliLiter(s) IntraMuscular once  insulin glargine Injectable (LANTUS) 15 Unit(s) SubCutaneous every morning  insulin lispro (ADMELOG) corrective regimen sliding scale   SubCutaneous every 6 hours  isosorbide   dinitrate Tablet (ISORDIL) 10 milliGRAM(s) Oral three times a day  metoprolol succinate ER 12.5 milliGRAM(s) Oral two times a day  pantoprazole  Injectable 40 milliGRAM(s) IV Push daily  QUEtiapine 50 milliGRAM(s) Oral at bedtime  sertraline 100 milliGRAM(s) Oral daily  triamcinolone 0.1% Cream 1 Application(s) Topical two times a day    MEDICATIONS  (PRN):  acetaminophen     Tablet .. 650 milliGRAM(s) Oral every 6 hours PRN Temp greater or equal to 38C (100.4F), Mild Pain (1 - 3), Moderate Pain (4 - 6)  melatonin 3 milliGRAM(s) Oral at bedtime PRN Insomnia  sodium chloride 0.9% lock flush 10 milliLiter(s) IV Push every 1 hour PRN Pre/post blood products, medications, blood draw, and to maintain line patency      I&O's Summary    01 Mar 2024 07:01  -  02 Mar 2024 07:00  --------------------------------------------------------  IN: 0 mL / OUT: 700 mL / NET: -700 mL        PHYSICAL EXAM:  Vital Signs Last 24 Hrs  T(C): 36.8 (02 Mar 2024 08:00), Max: 37.1 (01 Mar 2024 22:53)  T(F): 98.2 (02 Mar 2024 08:00), Max: 98.8 (02 Mar 2024 05:00)  HR: 78 (02 Mar 2024 08:00) (74 - 80)  BP: 114/62 (02 Mar 2024 08:00) (112/66 - 134/68)  BP(mean): --  RR: 17 (02 Mar 2024 08:00) (17 - 18)  SpO2: 97% (02 Mar 2024 08:00) (94% - 99%)    Parameters below as of 02 Mar 2024 08:00  Patient On (Oxygen Delivery Method): room air      GENERAL: NAD, frail elderly cachectic   HEAD:  Atraumatic, Normocephalic  EYES:  conjunctiva and sclera clear  ENMT:  Moist mucous membranes,    NERVOUS SYSTEM:  Alert & Oriented X2-3,  Moves upper and lower extremities; CNS-II-XII  CHEST/LUNG: Clear to auscultation bilaterally; No rales, rhonchi, wheezing,   HEART: Regular rate and rhythm;   ABDOMEN: Soft, Nontender, Nondistended; Bowel sounds present  EXTREMITIES:  Peripheral Pulses,   psychiatry- cant assess Insight and judgement intact    LABS:                        8.8    10.00 )-----------( 342      ( 01 Mar 2024 06:30 )             28.6     03-01    143  |  102  |  74.2<H>  ----------------------------<  91  4.7   |  27.0  |  1.85<H>    Ca    9.1      01 Mar 2024 06:30            Urinalysis Basic - ( 01 Mar 2024 06:30 )    Color: x / Appearance: x / SG: x / pH: x  Gluc: 91 mg/dL / Ketone: x  / Bili: x / Urobili: x   Blood: x / Protein: x / Nitrite: x   Leuk Esterase: x / RBC: x / WBC x   Sq Epi: x / Non Sq Epi: x / Bacteria: x        CAPILLARY BLOOD GLUCOSE      POCT Blood Glucose.: 113 mg/dL (02 Mar 2024 08:36)  POCT Blood Glucose.: 88 mg/dL (02 Mar 2024 06:07)  POCT Blood Glucose.: 88 mg/dL (01 Mar 2024 23:37)  POCT Blood Glucose.: 80 mg/dL (01 Mar 2024 17:48)  POCT Blood Glucose.: 114 mg/dL (01 Mar 2024 14:49)  POCT Blood Glucose.: 166 mg/dL (01 Mar 2024 12:11)      RADIOLOGY & ADDITIONAL TESTS:  Results Reviewed:   Imaging Personally Reviewed:  Electrocardiogram Personally Reviewed:

## 2024-03-03 LAB
GLUCOSE BLDC GLUCOMTR-MCNC: 71 MG/DL — SIGNIFICANT CHANGE UP (ref 70–99)
GLUCOSE BLDC GLUCOMTR-MCNC: 85 MG/DL — SIGNIFICANT CHANGE UP (ref 70–99)
GLUCOSE BLDC GLUCOMTR-MCNC: 92 MG/DL — SIGNIFICANT CHANGE UP (ref 70–99)
GLUCOSE BLDC GLUCOMTR-MCNC: 96 MG/DL — SIGNIFICANT CHANGE UP (ref 70–99)
GLUCOSE BLDC GLUCOMTR-MCNC: 97 MG/DL — SIGNIFICANT CHANGE UP (ref 70–99)

## 2024-03-03 PROCEDURE — 99232 SBSQ HOSP IP/OBS MODERATE 35: CPT

## 2024-03-03 RX ORDER — INSULIN GLARGINE 100 [IU]/ML
12 INJECTION, SOLUTION SUBCUTANEOUS EVERY MORNING
Refills: 0 | Status: DISCONTINUED | OUTPATIENT
Start: 2024-03-04 | End: 2024-03-04

## 2024-03-03 RX ADMIN — Medication 10 MILLIGRAM(S): at 21:41

## 2024-03-03 RX ADMIN — PANTOPRAZOLE SODIUM 40 MILLIGRAM(S): 20 TABLET, DELAYED RELEASE ORAL at 08:49

## 2024-03-03 RX ADMIN — ISOSORBIDE DINITRATE 10 MILLIGRAM(S): 5 TABLET ORAL at 18:37

## 2024-03-03 RX ADMIN — Medication 1 APPLICATION(S): at 06:35

## 2024-03-03 RX ADMIN — HEPARIN SODIUM 5000 UNIT(S): 5000 INJECTION INTRAVENOUS; SUBCUTANEOUS at 12:32

## 2024-03-03 RX ADMIN — QUETIAPINE FUMARATE 50 MILLIGRAM(S): 200 TABLET, FILM COATED ORAL at 21:41

## 2024-03-03 RX ADMIN — ARIPIPRAZOLE 5 MILLIGRAM(S): 15 TABLET ORAL at 08:41

## 2024-03-03 RX ADMIN — Medication 1 APPLICATION(S): at 17:59

## 2024-03-03 RX ADMIN — ATORVASTATIN CALCIUM 80 MILLIGRAM(S): 80 TABLET, FILM COATED ORAL at 21:41

## 2024-03-03 RX ADMIN — Medication 10 MILLIGRAM(S): at 12:32

## 2024-03-03 RX ADMIN — HEPARIN SODIUM 5000 UNIT(S): 5000 INJECTION INTRAVENOUS; SUBCUTANEOUS at 21:42

## 2024-03-03 RX ADMIN — Medication 81 MILLIGRAM(S): at 08:40

## 2024-03-03 RX ADMIN — Medication 12.5 MILLIGRAM(S): at 17:58

## 2024-03-03 RX ADMIN — HEPARIN SODIUM 5000 UNIT(S): 5000 INJECTION INTRAVENOUS; SUBCUTANEOUS at 06:34

## 2024-03-03 RX ADMIN — SERTRALINE 100 MILLIGRAM(S): 25 TABLET, FILM COATED ORAL at 08:41

## 2024-03-03 RX ADMIN — Medication 10 MILLIGRAM(S): at 08:40

## 2024-03-03 RX ADMIN — INSULIN GLARGINE 15 UNIT(S): 100 INJECTION, SOLUTION SUBCUTANEOUS at 08:41

## 2024-03-03 RX ADMIN — CHLORHEXIDINE GLUCONATE 1 APPLICATION(S): 213 SOLUTION TOPICAL at 10:19

## 2024-03-03 RX ADMIN — ISOSORBIDE DINITRATE 10 MILLIGRAM(S): 5 TABLET ORAL at 08:41

## 2024-03-03 NOTE — PROGRESS NOTE ADULT - ASSESSMENT
78 y/oM PMH of HTN, HLD, CKD, plaque psoriasis, DM-2 who came to the ED (02/12/24) complaining of chest pain radiating to left arm associated with multiple episodes of diarrhea.  On ED arrival, afebrile, hypotensive to SBP 80-90s despite 1L IVF requiring Levophed. EKG with nonspecific ST changes, troponin 2344->2477. Labs otherwise notable for K 5.6, BUN/Cr 43.5/2.31 (baseline Cr ~1 in 2022), BNP 66539, lactate 6.2. CXR with pulmonary edema. Placed on BiPAP ,given Lasix 40mg. TTE with EF<20%, multiple segmental abnormalities, mod MR, mod TR, mod pericardial effusion without tamponade; started on Heparin drip for NSTEMI, cardiology consulted. Admitted to MICU for further management of cardiogenic shock. On 02/14/24: patient became obtunded, febrile, intubated for airway protection. Interventional cardiology also on board; S/P LHC: Severe multi-vessel CAD including LM, CABG recommended. CT surgery deemed patient poor candidate for CABG. HF team also following; s/p RHC: shows low filling pressures with normal cardiac output. Nephrology following for WES. Patient extubated (02/18/24); NG tube in place.   patient DNR/DNI.   SLP 2/20/24- NPO w/ GOC discussion regarding PEG vs pleasure feeds.    #NSTEMI  -S/p LHC: severe multiple-vessel CAD, not a candidate for CABG   -c/w Asprin 81mg , Atorvastatin 80mg   -Cardiology following, no plan for cabg at this time  -pt now hospice pending    #Acute Systolic CHF  -s/p Bumex and Lasix drip   -c/w hydralazine , imdur   -HF following   -Echo done   -diuretics on hold for now     #Cardiogenic shock   -s/p Levophed and Dobutamine   -Monitor BP    #Acute metabolic encephalopathy - hx of dysphagia- s/p extubation 2/18  -Aspiration precautions  -SLP rec NPO--cont TF for now via NGT  with free water- PEG, now decision for no PEG and comfort feeds but later in the day family again wanted to discuss steps incase he aspirates on comfort feeds   -GI informed of family decision to cancel PEG- (daughter directly called Marco A on the floor and asked for cancel PEG  -cardio consult noted   -Discussed w/ son on 2/29/2024, agreeable for pleasure feeding  -home hospice referral made    #Leukocytosis  likely reactive, no signs of infection.   -Monitor WBC, temp.  -hold off abxs for now     #WES on CKD:  -Nephrology following   -Monitor renal function     #DM-2  -Lantus 15 units AM   -Insulin sliding scale   -A1C 7    #Depression:    -Buspirone 10mg   -Aripiprazole 5mg   -Seroquel 50mg HS   -Sertraline 100mg     #DVT prophylaxis: Heparin sc    #Palliative following   - Final SLP as per discussion w/ son on 2/29/2024, failed, family understands and agreeable for comfort feeds and home hospice  #code: dnr/ dni / NIV  Dispo: home hospice pending    RN, patient and wife & daughter updated of the plan of care 3/3

## 2024-03-03 NOTE — PROGRESS NOTE ADULT - SUBJECTIVE AND OBJECTIVE BOX
Nicolasa Mckenzie M.D.    Patient is a 78y old  Male who presents with a chief complaint of Cardiogenic Shock (02 Mar 2024 11:44)      SUBJECTIVE / OVERNIGHT EVENTS: No concerns.     Patient denies chest pain, SOB, abd pain, N/V, fever, chills, dysuria or any other complaints. All remainder ROS negative.     MEDICATIONS  (STANDING):  ARIPiprazole 5 milliGRAM(s) Oral daily  aspirin  chewable 81 milliGRAM(s) Oral daily  atorvastatin 80 milliGRAM(s) Oral at bedtime  busPIRone 10 milliGRAM(s) Oral <User Schedule>  ceFAZolin  Injectable. 1000 milliGRAM(s) IV Push once  chlorhexidine 2% Cloths 1 Application(s) Topical <User Schedule>  dextrose 50% Injectable 25 Gram(s) IV Push once  dextrose 50% Injectable 12.5 Gram(s) IV Push once  dextrose 50% Injectable 25 Gram(s) IV Push once  heparin   Injectable 5000 Unit(s) SubCutaneous every 8 hours  hydrALAZINE 10 milliGRAM(s) Oral three times a day  influenza  Vaccine (HIGH DOSE) 0.7 milliLiter(s) IntraMuscular once  insulin glargine Injectable (LANTUS) 15 Unit(s) SubCutaneous every morning  insulin lispro (ADMELOG) corrective regimen sliding scale   SubCutaneous every 6 hours  isosorbide   dinitrate Tablet (ISORDIL) 10 milliGRAM(s) Oral three times a day  metoprolol succinate ER 12.5 milliGRAM(s) Oral two times a day  pantoprazole  Injectable 40 milliGRAM(s) IV Push daily  QUEtiapine 50 milliGRAM(s) Oral at bedtime  sertraline 100 milliGRAM(s) Oral daily  triamcinolone 0.1% Cream 1 Application(s) Topical two times a day    MEDICATIONS  (PRN):  acetaminophen     Tablet .. 650 milliGRAM(s) Oral every 6 hours PRN Temp greater or equal to 38C (100.4F), Mild Pain (1 - 3), Moderate Pain (4 - 6)  melatonin 3 milliGRAM(s) Oral at bedtime PRN Insomnia  sodium chloride 0.9% lock flush 10 milliLiter(s) IV Push every 1 hour PRN Pre/post blood products, medications, blood draw, and to maintain line patency      I&O's Summary    02 Mar 2024 07:01  -  03 Mar 2024 07:00  --------------------------------------------------------  IN: 0 mL / OUT: 800 mL / NET: -800 mL        PHYSICAL EXAM:  Vital Signs Last 24 Hrs  T(C): 36.9 (03 Mar 2024 08:44), Max: 37.5 (02 Mar 2024 21:08)  T(F): 98.5 (03 Mar 2024 08:44), Max: 99.5 (02 Mar 2024 21:08)  HR: 89 (03 Mar 2024 08:44) (74 - 89)  BP: 99/64 (03 Mar 2024 08:44) (99/64 - 123/63)  BP(mean): 86 (02 Mar 2024 13:31) (86 - 86)  RR: 16 (03 Mar 2024 08:44) (16 - 18)  SpO2: 96% (03 Mar 2024 08:44) (95% - 99%)    Parameters below as of 03 Mar 2024 08:44  Patient On (Oxygen Delivery Method): room air      GENERAL: NAD, frail elderly cachectic   HEAD:  Atraumatic, Normocephalic  EYES:  conjunctiva and sclera clear  ENMT:  Moist mucous membranes,    NERVOUS SYSTEM:  Alert & Oriented X2-3,  Moves upper and lower extremities; CNS-II-XII  CHEST/LUNG: Clear to auscultation bilaterally; No rales, rhonchi, wheezing,   HEART: Regular rate and rhythm;   ABDOMEN: Soft, Nontender, Nondistended; Bowel sounds present  EXTREMITIES:  Peripheral Pulses,   psychiatry- cant assess Insight and judgement intact    LABS:                    CAPILLARY BLOOD GLUCOSE      POCT Blood Glucose.: 71 mg/dL (03 Mar 2024 08:08)  POCT Blood Glucose.: 85 mg/dL (03 Mar 2024 06:31)  POCT Blood Glucose.: 97 mg/dL (03 Mar 2024 00:57)  POCT Blood Glucose.: 107 mg/dL (02 Mar 2024 21:19)  POCT Blood Glucose.: 137 mg/dL (02 Mar 2024 17:29)  POCT Blood Glucose.: 112 mg/dL (02 Mar 2024 12:57)      RADIOLOGY & ADDITIONAL TESTS:  Results Reviewed:   Imaging Personally Reviewed:  Electrocardiogram Personally Reviewed:

## 2024-03-04 LAB
GLUCOSE BLDC GLUCOMTR-MCNC: 116 MG/DL — HIGH (ref 70–99)
GLUCOSE BLDC GLUCOMTR-MCNC: 122 MG/DL — HIGH (ref 70–99)
GLUCOSE BLDC GLUCOMTR-MCNC: 123 MG/DL — HIGH (ref 70–99)
GLUCOSE BLDC GLUCOMTR-MCNC: 149 MG/DL — HIGH (ref 70–99)
GLUCOSE BLDC GLUCOMTR-MCNC: 219 MG/DL — HIGH (ref 70–99)
GLUCOSE BLDC GLUCOMTR-MCNC: 55 MG/DL — LOW (ref 70–99)
GLUCOSE BLDC GLUCOMTR-MCNC: 92 MG/DL — SIGNIFICANT CHANGE UP (ref 70–99)

## 2024-03-04 PROCEDURE — 99232 SBSQ HOSP IP/OBS MODERATE 35: CPT

## 2024-03-04 RX ORDER — DEXTROSE 50 % IN WATER 50 %
12.5 SYRINGE (ML) INTRAVENOUS ONCE
Refills: 0 | Status: DISCONTINUED | OUTPATIENT
Start: 2024-03-04 | End: 2024-03-06

## 2024-03-04 RX ORDER — DEXTROSE 50 % IN WATER 50 %
25 SYRINGE (ML) INTRAVENOUS ONCE
Refills: 0 | Status: COMPLETED | OUTPATIENT
Start: 2024-03-04 | End: 2024-03-04

## 2024-03-04 RX ORDER — INSULIN GLARGINE 100 [IU]/ML
5 INJECTION, SOLUTION SUBCUTANEOUS EVERY MORNING
Refills: 0 | Status: DISCONTINUED | OUTPATIENT
Start: 2024-03-04 | End: 2024-03-06

## 2024-03-04 RX ORDER — DEXTROSE 50 % IN WATER 50 %
12.5 SYRINGE (ML) INTRAVENOUS ONCE
Refills: 0 | Status: COMPLETED | OUTPATIENT
Start: 2024-03-04 | End: 2024-03-04

## 2024-03-04 RX ADMIN — Medication 10 MILLIGRAM(S): at 08:40

## 2024-03-04 RX ADMIN — Medication 10 MILLIGRAM(S): at 06:04

## 2024-03-04 RX ADMIN — CHLORHEXIDINE GLUCONATE 1 APPLICATION(S): 213 SOLUTION TOPICAL at 06:06

## 2024-03-04 RX ADMIN — ARIPIPRAZOLE 5 MILLIGRAM(S): 15 TABLET ORAL at 08:41

## 2024-03-04 RX ADMIN — Medication 12.5 MILLIGRAM(S): at 06:04

## 2024-03-04 RX ADMIN — Medication 12.5 MILLIGRAM(S): at 17:24

## 2024-03-04 RX ADMIN — HEPARIN SODIUM 5000 UNIT(S): 5000 INJECTION INTRAVENOUS; SUBCUTANEOUS at 06:05

## 2024-03-04 RX ADMIN — Medication 25 GRAM(S): at 07:31

## 2024-03-04 RX ADMIN — Medication 12.5 GRAM(S): at 01:46

## 2024-03-04 RX ADMIN — Medication 10 MILLIGRAM(S): at 22:37

## 2024-03-04 RX ADMIN — Medication 81 MILLIGRAM(S): at 08:40

## 2024-03-04 RX ADMIN — INSULIN GLARGINE 5 UNIT(S): 100 INJECTION, SOLUTION SUBCUTANEOUS at 09:03

## 2024-03-04 RX ADMIN — ISOSORBIDE DINITRATE 10 MILLIGRAM(S): 5 TABLET ORAL at 17:24

## 2024-03-04 RX ADMIN — Medication 1 APPLICATION(S): at 18:27

## 2024-03-04 RX ADMIN — SERTRALINE 100 MILLIGRAM(S): 25 TABLET, FILM COATED ORAL at 08:40

## 2024-03-04 RX ADMIN — HEPARIN SODIUM 5000 UNIT(S): 5000 INJECTION INTRAVENOUS; SUBCUTANEOUS at 22:37

## 2024-03-04 RX ADMIN — ISOSORBIDE DINITRATE 10 MILLIGRAM(S): 5 TABLET ORAL at 06:04

## 2024-03-04 RX ADMIN — HEPARIN SODIUM 5000 UNIT(S): 5000 INJECTION INTRAVENOUS; SUBCUTANEOUS at 13:51

## 2024-03-04 RX ADMIN — PANTOPRAZOLE SODIUM 40 MILLIGRAM(S): 20 TABLET, DELAYED RELEASE ORAL at 08:41

## 2024-03-04 RX ADMIN — Medication 1 APPLICATION(S): at 06:06

## 2024-03-04 RX ADMIN — Medication 4: at 08:41

## 2024-03-04 RX ADMIN — QUETIAPINE FUMARATE 50 MILLIGRAM(S): 200 TABLET, FILM COATED ORAL at 22:37

## 2024-03-04 RX ADMIN — ATORVASTATIN CALCIUM 80 MILLIGRAM(S): 80 TABLET, FILM COATED ORAL at 22:36

## 2024-03-04 NOTE — PROGRESS NOTE ADULT - ASSESSMENT
78yr man  PMHx HTN, HLD, T2DM, CKD, plaque psoriasis, anxiety, depression, history of dysphagia admitted with cardiogenic shock 2/2 NSTEMI with Respiratory failure.    # Cardiogenic Shock/NSTEMI  Per Cardio and CTS not  a candidate for CABG and not currently stable for PCI.      #Dysphagia  Patient with hx of dysphagia -reported offered a PEG last year but declined  SLP eval rec NPO, failed repeat MBS  Patient on  pleasure/comfort  feeds- puree. They understand if he aspirates then they won't escalate care  Recommend modified diet     # WES/CKD  avoid nephrotoxic agents    # Debility   Assist with care    # Encounter for Palliative Care  Family agreed to home hospice   Patient on pleasure feeds  Patient comfortable without symptoms  Palliative Care to sign off.

## 2024-03-04 NOTE — PROGRESS NOTE ADULT - ASSESSMENT
78 y/oM PMH of HTN, HLD, CKD, plaque psoriasis, DM-2 who came to the ED (02/12/24) complaining of chest pain radiating to left arm associated with multiple episodes of diarrhea.  On ED arrival, afebrile, hypotensive to SBP 80-90s despite 1L IVF requiring Levophed. EKG with nonspecific ST changes, troponin 2344->2477. Labs otherwise notable for K 5.6, BUN/Cr 43.5/2.31 (baseline Cr ~1 in 2022), BNP 90264, lactate 6.2. CXR with pulmonary edema. Placed on BiPAP ,given Lasix 40mg. TTE with EF<20%, multiple segmental abnormalities, mod MR, mod TR, mod pericardial effusion without tamponade; started on Heparin drip for NSTEMI, cardiology consulted. Admitted to MICU for further management of cardiogenic shock. On 02/14/24: patient became obtunded, febrile, intubated for airway protection. Interventional cardiology also on board; S/P LHC: Severe multi-vessel CAD including LM, CABG recommended. CT surgery deemed patient poor candidate for CABG. HF team also following; s/p RHC: shows low filling pressures with normal cardiac output. Nephrology following for WES. Patient extubated (02/18/24); NG tube in place.   patient DNR/DNI.   SLP 2/20/24- NPO w/ GOC discussion regarding PEG vs pleasure feeds.    #NSTEMI  -S/p LHC: severe multiple-vessel CAD, not a candidate for CABG   -c/w Asprin 81mg , Atorvastatin 80mg   -Cardiology following, no plan for cabg at this time  -pt now hospice pending    #Acute Systolic CHF  -s/p Bumex and Lasix drip   -c/w hydralazine , imdur   -HF following   -Echo done   -diuretics on hold for now     #Cardiogenic shock   -s/p Levophed and Dobutamine   -Monitor BP    #Acute metabolic encephalopathy - hx of dysphagia- s/p extubation 2/18  -Aspiration precautions  -SLP rec NPO--cont TF for now via NGT  with free water- PEG, now decision for no PEG and comfort feeds but later in the day family again wanted to discuss steps incase he aspirates on comfort feeds   -GI informed of family decision to cancel PEG- (daughter directly called Marco A on the floor and asked for cancel PEG  -cardio consult noted   -Discussed w/ son on 2/29/2024, agreeable for pleasure feeding  -home hospice referral made    #Leukocytosis  likely reactive, no signs of infection.   -Monitor WBC, temp.  -hold off abxs for now     #WES on CKD:  -Nephrology following   -Monitor renal function     #DM-2  -Lantus 15 units AM   -Insulin sliding scale   -A1C 7    #Depression:    -Buspirone 10mg   -Aripiprazole 5mg   -Seroquel 50mg HS   -Sertraline 100mg     #DVT prophylaxis: Heparin sc    #Palliative following   - Final SLP as per discussion w/ son on 2/29/2024, failed, family understands and agreeable for comfort feeds and home hospice  #code: dnr/ dni / NIV  Dispo: home hospice pending    RN, patient & daughter updated of the plan of care 3/4

## 2024-03-04 NOTE — CHART NOTE - NSCHARTNOTESELECT_GEN_ALL_CORE
CT surgery/Event Note
ETHICS/Event Note
ETHICS/Event Note
Event Note
Event Note
Gastroenterology/Event Note
Nutrition Services
Event Note
Nutrition Services

## 2024-03-04 NOTE — PROGRESS NOTE ADULT - TIME BILLING
Time spent with review of chart documents, labs, imaging. Direct patient assessment,  formulation of care plan. Discussion with  Interdisciplinary  team  Sulema STERN,  Dr. Lara,   Ethics  Dr. Chiu,
Time spent with review of chart documents, labs, imaging. Direct patient assessment,  formulation of care plan. Discussion with  Interdisciplinary  team  Dr. Cloud  Including ACP  16   minutes This time is exclusive of the encounter
Time spent with review of chart documents, labs, imaging. Direct patient assessment,  formulation of care plan. Discussion with  Interdisciplinary  team  SW
Time spent with review of chart documents, labs, imaging. Direct patient assessment,  formulation of care plan. Discussion with  Interdisciplinary  team  Dr. Cloud
Time spent with review of chart documents, labs, imaging. Direct patient assessment,  formulation of care plan. Discussion with  Interdisciplinary  team  Dr. Martinez
Time spent with review of chart documents, labs, imaging. Direct patient assessment,  formulation of care plan. Discussion with  Interdisciplinary  team  Dr. Rogers RN , CNA
Time spent with review of chart documents, labs, imaging. Direct patient assessment,  formulation of care plan. Discussion with  Interdisciplinary  team  MICU resident
Time spent with review of chart documents, labs, imaging. Direct patient assessment,  formulation of care plan. Discussion with  Interdisciplinary  team  Dr. Landis
Time spent with review of chart documents, labs, imaging. Direct patient assessment,  formulation of care plan. Discussion with  Interdisciplinary  team  Dr. Rivera
- Generation of cardiovascular treatment plan.  - Coordination of care with primary team.
Time spent with review of chart documents, labs, imaging. Direct patient assessment,  formulation of care plan. Discussion with  Interdisciplinary  team  Dr. Cloud  Including ACP 16    minutes with            completion.  This time is exclusive of the encounter
- Generation of cardiovascular treatment plan.  - Coordination of care with primary team.

## 2024-03-04 NOTE — PROGRESS NOTE ADULT - SUBJECTIVE AND OBJECTIVE BOX
Nicolasa Mckenzie M.D.    Patient is a 78y old  Male who presents with a chief complaint of Cardiogenic Shock (03 Mar 2024 10:51)      SUBJECTIVE / OVERNIGHT EVENTS: no event overnight.     Patient denies chest pain, SOB, abd pain, N/V, fever, chills, dysuria or any other complaints. All remainder ROS negative.     MEDICATIONS  (STANDING):  ARIPiprazole 5 milliGRAM(s) Oral daily  aspirin  chewable 81 milliGRAM(s) Oral daily  atorvastatin 80 milliGRAM(s) Oral at bedtime  busPIRone 10 milliGRAM(s) Oral <User Schedule>  ceFAZolin  Injectable. 1000 milliGRAM(s) IV Push once  chlorhexidine 2% Cloths 1 Application(s) Topical <User Schedule>  dextrose 50% Injectable 25 Gram(s) IV Push once  dextrose 50% Injectable 12.5 Gram(s) IV Push once  dextrose 50% Injectable 25 Gram(s) IV Push once  dextrose 50% Injectable 12.5 Gram(s) IV Push once  dextrose 50% Injectable 12.5 Gram(s) IV Push once  dextrose 50% Injectable 12.5 Gram(s) IV Push once  dextrose 50% Injectable 12.5 Gram(s) IV Push once  heparin   Injectable 5000 Unit(s) SubCutaneous every 8 hours  hydrALAZINE 10 milliGRAM(s) Oral three times a day  influenza  Vaccine (HIGH DOSE) 0.7 milliLiter(s) IntraMuscular once  insulin glargine Injectable (LANTUS) 5 Unit(s) SubCutaneous every morning  insulin lispro (ADMELOG) corrective regimen sliding scale   SubCutaneous every 6 hours  isosorbide   dinitrate Tablet (ISORDIL) 10 milliGRAM(s) Oral three times a day  metoprolol succinate ER 12.5 milliGRAM(s) Oral two times a day  pantoprazole  Injectable 40 milliGRAM(s) IV Push daily  QUEtiapine 50 milliGRAM(s) Oral at bedtime  sertraline 100 milliGRAM(s) Oral daily  triamcinolone 0.1% Cream 1 Application(s) Topical two times a day    MEDICATIONS  (PRN):  acetaminophen     Tablet .. 650 milliGRAM(s) Oral every 6 hours PRN Temp greater or equal to 38C (100.4F), Mild Pain (1 - 3), Moderate Pain (4 - 6)  melatonin 3 milliGRAM(s) Oral at bedtime PRN Insomnia  sodium chloride 0.9% lock flush 10 milliLiter(s) IV Push every 1 hour PRN Pre/post blood products, medications, blood draw, and to maintain line patency      I&O's Summary    03 Mar 2024 07:01  -  04 Mar 2024 07:00  --------------------------------------------------------  IN: 0 mL / OUT: 300 mL / NET: -300 mL        PHYSICAL EXAM:  Vital Signs Last 24 Hrs  T(C): 36.8 (04 Mar 2024 09:07), Max: 37 (03 Mar 2024 12:26)  T(F): 98.3 (04 Mar 2024 09:07), Max: 98.6 (03 Mar 2024 12:26)  HR: 73 (04 Mar 2024 09:07) (67 - 80)  BP: 143/61 (04 Mar 2024 09:07) (115/65 - 143/61)  BP(mean): 82 (03 Mar 2024 16:13) (82 - 85)  RR: 17 (04 Mar 2024 09:07) (16 - 18)  SpO2: 97% (04 Mar 2024 09:07) (96% - 98%)    Parameters below as of 04 Mar 2024 09:07  Patient On (Oxygen Delivery Method): room air    GENERAL: NAD, frail elderly cachectic   NERVOUS SYSTEM:  Alert & Oriented X2-3,  Moves upper and lower extremities; CNS-II-XII  CHEST/LUNG: Some secretions noted, No rales, rhonchi, wheezing  HEART: Regular rate and rhythm;   ABDOMEN: Soft, Nontender, Nondistended; Bowel sounds present  EXTREMITIES:  Peripheral Pulses,       LABS:    CAPILLARY BLOOD GLUCOSE    POCT Blood Glucose.: 219 mg/dL (04 Mar 2024 08:13)  POCT Blood Glucose.: 92 mg/dL (04 Mar 2024 06:09)  POCT Blood Glucose.: 122 mg/dL (04 Mar 2024 02:00)  POCT Blood Glucose.: 55 mg/dL (04 Mar 2024 01:22)  POCT Blood Glucose.: 92 mg/dL (03 Mar 2024 17:17)  POCT Blood Glucose.: 96 mg/dL (03 Mar 2024 12:03)      RADIOLOGY & ADDITIONAL TESTS:  Results Reviewed:   Imaging Personally Reviewed:  Electrocardiogram Personally Reviewed:

## 2024-03-04 NOTE — CHART NOTE - NSCHARTNOTEFT_GEN_A_CORE
Source: Patient, staff, chart     Current Diet: Diet, Pureed (03-01-24 @ 12:01)    PO intake: 50-75%     Source for PO intake: Patient, staff, chart       Current Weight:   3/2 140 lbs  2/17 140 lbs  2/16 143 lbs  2/13 145 lbs    5 lb weight loss since admission (~3%)    Pertinent Medications: MEDICATIONS  (STANDING):  ARIPiprazole 5 milliGRAM(s) Oral daily  busPIRone 10 milliGRAM(s) Oral <User Schedule>  ceFAZolin  Injectable. 1000 milliGRAM(s) IV Push once  chlorhexidine 2% Cloths 1 Application(s) Topical <User Schedule>  dextrose 50% Injectable 25 Gram(s) IV Push once  hydrALAZINE 10 milliGRAM(s) Oral three times a day  insulin glargine Injectable (LANTUS) 5 Unit(s) SubCutaneous every morning  insulin lispro (ADMELOG) corrective regimen sliding scale   SubCutaneous every 6 hours  isosorbide   dinitrate Tablet (ISORDIL) 10 milliGRAM(s) Oral three times a day  metoprolol succinate ER 12.5 milliGRAM(s) Oral two times a day  pantoprazole  Injectable 40 milliGRAM(s) IV Push daily    Pertinent Labs:  N/A    Skin: R groin cath site     Nutrition focused physical exam conducted previously with signs of malnutrition present    Subcutaneous fat loss: [x ] Orbital fat pads region, [x ]Buccal fat region, [ ]Triceps region,  [ x]Ribs region    Muscle wasting: [ x]Temples region, [x ]Clavicle region, [x ]Shoulder region, [ ]Scapula region, [ ]Interosseous region,  [ ]thigh region, [ ]Calf region    Hospital Course: 78 y/oM PMH of HTN, HLD, CKD, plaque psoriasis, DM-2 who came to the ED (02/12/24) complaining of chest pain radiating to left arm associated with multiple episodes of diarrhea.  On ED arrival, afebrile, hypotensive to SBP 80-90s despite 1L IVF requiring Levophed. EKG with nonspecific ST changes, troponin 2344->2477. Labs otherwise notable for K 5.6, BUN/Cr 43.5/2.31 (baseline Cr ~1 in 2022), BNP 90210, lactate 6.2. CXR with pulmonary edema. Placed on BiPAP ,given Lasix 40mg. TTE with EF<20%, multiple segmental abnormalities, mod MR, mod TR, mod pericardial effusion without tamponade; started on Heparin drip for NSTEMI, cardiology consulted. Admitted to MICU for further management of cardiogenic shock. On 02/14/24: patient became obtunded, febrile, intubated for airway protection. Interventional cardiology also on board; S/P LHC: Severe multi-vessel CAD including LM, CABG recommended. CT surgery deemed patient poor candidate for CABG. HF team also following; s/p RHC: shows low filling pressures with normal cardiac output. Nephrology following for WES. Patient extubated (02/18/24); NG tube in place.   patient DNR/DNI. SLP 2/20/24- NPO w/ GOC discussion regarding PEG vs pleasure feeds.    Estimated Needs:   [x ] no change since previous assessment  [ ] recalculated:     Current Nutrition Diagnosis: Pt remains at high nutrition risk secondary to severe (chronic) protein calorie malnutrition related to inability to meet sufficient protein energy needs in setting of history of dysphagia, now with Cardiogenic shock, Acute systolic heart failure, NSTEMI, Multivessel CAD, Acute hypoxemic respiratory failure, Acute pulmonary edema, Aspiration pneumonitis/pneumonia WES on CKD as evidenced by physical signs of severe muscle/fat loss, meeting < 75% estimated energy needs > 1 month.    Per SLP evaluation 3/1 - SLP recommended NPO with consideration for non oral alternate means of nutrition. RD aware palliative following for GOC. Continue with pt and family wishes for GOC. Pt remains on pureed diet for pleasure feeds. Possible plan for home hospice when discharged noted. Pt reports that his appetite was good this AM and he ate about 50-75% of breakfast this AM. No current c/o N/V/C/D at this time. Pt with no further nutrition-related questions or concerns at this time. Will continue to monitor and follow up as needed. RD remains available.     Recommendations:   1) Continue pureed diet for pleasure feeds per family's wishes  2) Encourage po intake, monitor diet tolerance, and provide assistance at meals as needed.   3) Rx: MVI daily.   4) Obtain daily weights to monitor trends.     Monitoring and Evaluation:   [x ] PO intake [ x] Tolerance to diet prescription [X] Weights  [X] Follow up per protocol [X] Labs: chem 8, mg, phos, H/H

## 2024-03-04 NOTE — PROGRESS NOTE ADULT - SUBJECTIVE AND OBJECTIVE BOX
CC:  Follow up GOC , Symptoms    OVERNIGHT EVENTS:  reported family agreeing to hospice    Present Symptoms:   Dyspnea:  No    Nausea/Vomiting:  No   Anxiety:  No      Depression:  No    Fatigue:  Yes     Loss of appetite:   Yes    Constipation: No    Pain: No             Location            Duration            Character            Severity            Factors            Effect    Pain AD Score:  [email or web address] (press ctrl + left click to view)    Review of Systems: Reviewed as above  All others negative  Further ROS unable to  obtain due to poor mentation historian      MEDICATIONS  (STANDING):  ARIPiprazole 5 milliGRAM(s) Oral daily  aspirin  chewable 81 milliGRAM(s) Oral daily  atorvastatin 80 milliGRAM(s) Oral at bedtime  busPIRone 10 milliGRAM(s) Oral <User Schedule>  ceFAZolin  Injectable. 1000 milliGRAM(s) IV Push once  chlorhexidine 2% Cloths 1 Application(s) Topical <User Schedule>  dextrose 50% Injectable 25 Gram(s) IV Push once  dextrose 50% Injectable 25 Gram(s) IV Push once  dextrose 50% Injectable 12.5 Gram(s) IV Push once  dextrose 50% Injectable 12.5 Gram(s) IV Push once  dextrose 50% Injectable 12.5 Gram(s) IV Push once  dextrose 50% Injectable 12.5 Gram(s) IV Push once  dextrose 50% Injectable 12.5 Gram(s) IV Push once  heparin   Injectable 5000 Unit(s) SubCutaneous every 8 hours  hydrALAZINE 10 milliGRAM(s) Oral three times a day  influenza  Vaccine (HIGH DOSE) 0.7 milliLiter(s) IntraMuscular once  insulin glargine Injectable (LANTUS) 5 Unit(s) SubCutaneous every morning  insulin lispro (ADMELOG) corrective regimen sliding scale   SubCutaneous every 6 hours  isosorbide   dinitrate Tablet (ISORDIL) 10 milliGRAM(s) Oral three times a day  metoprolol succinate ER 12.5 milliGRAM(s) Oral two times a day  pantoprazole  Injectable 40 milliGRAM(s) IV Push daily  QUEtiapine 50 milliGRAM(s) Oral at bedtime  sertraline 100 milliGRAM(s) Oral daily  triamcinolone 0.1% Cream 1 Application(s) Topical two times a day    MEDICATIONS  (PRN):  acetaminophen     Tablet .. 650 milliGRAM(s) Oral every 6 hours PRN Temp greater or equal to 38C (100.4F), Mild Pain (1 - 3), Moderate Pain (4 - 6)  melatonin 3 milliGRAM(s) Oral at bedtime PRN Insomnia  sodium chloride 0.9% lock flush 10 milliLiter(s) IV Push every 1 hour PRN Pre/post blood products, medications, blood draw, and to maintain line patency      PHYSICAL EXAM:    Vital Signs Last 24 Hrs  T(C): 36.8 (04 Mar 2024 09:07), Max: 37 (03 Mar 2024 12:26)  T(F): 98.3 (04 Mar 2024 09:07), Max: 98.6 (03 Mar 2024 12:26)  HR: 73 (04 Mar 2024 09:07) (67 - 80)  BP: 143/61 (04 Mar 2024 09:07) (115/65 - 143/61)  BP(mean): 82 (03 Mar 2024 16:13) (82 - 85)  RR: 17 (04 Mar 2024 09:07) (16 - 18)  SpO2: 97% (04 Mar 2024 09:07) (96% - 98%)    Parameters below as of 04 Mar 2024 09:07  Patient On (Oxygen Delivery Method): room air    Karnofsky:  30%  General:  M awake alert NAD       HEENT:  NCAT     Lungs: comfortable  non labored  CV:  RR  GI: soft NTND  MSK: weakness  Skin:  warm/dry  Neuro  no focal deficits  Psych  calm    LABS:        I&O's Summary    03 Mar 2024 07:01  -  04 Mar 2024 07:00  --------------------------------------------------------  IN: 0 mL / OUT: 300 mL / NET: -300 mL      ADVANCE DIRECTIVE PREFERENCES    DNR/I

## 2024-03-05 ENCOUNTER — TRANSCRIPTION ENCOUNTER (OUTPATIENT)
Age: 79
End: 2024-03-05

## 2024-03-05 LAB
GLUCOSE BLDC GLUCOMTR-MCNC: 101 MG/DL — HIGH (ref 70–99)
GLUCOSE BLDC GLUCOMTR-MCNC: 110 MG/DL — HIGH (ref 70–99)
GLUCOSE BLDC GLUCOMTR-MCNC: 134 MG/DL — HIGH (ref 70–99)
GLUCOSE BLDC GLUCOMTR-MCNC: 153 MG/DL — HIGH (ref 70–99)

## 2024-03-05 PROCEDURE — 99232 SBSQ HOSP IP/OBS MODERATE 35: CPT

## 2024-03-05 RX ADMIN — Medication 2: at 18:20

## 2024-03-05 RX ADMIN — ARIPIPRAZOLE 5 MILLIGRAM(S): 15 TABLET ORAL at 08:28

## 2024-03-05 RX ADMIN — PANTOPRAZOLE SODIUM 40 MILLIGRAM(S): 20 TABLET, DELAYED RELEASE ORAL at 08:28

## 2024-03-05 RX ADMIN — ISOSORBIDE DINITRATE 10 MILLIGRAM(S): 5 TABLET ORAL at 13:02

## 2024-03-05 RX ADMIN — ISOSORBIDE DINITRATE 10 MILLIGRAM(S): 5 TABLET ORAL at 17:13

## 2024-03-05 RX ADMIN — SERTRALINE 100 MILLIGRAM(S): 25 TABLET, FILM COATED ORAL at 08:28

## 2024-03-05 RX ADMIN — HEPARIN SODIUM 5000 UNIT(S): 5000 INJECTION INTRAVENOUS; SUBCUTANEOUS at 05:08

## 2024-03-05 RX ADMIN — INSULIN GLARGINE 5 UNIT(S): 100 INJECTION, SOLUTION SUBCUTANEOUS at 08:27

## 2024-03-05 RX ADMIN — ATORVASTATIN CALCIUM 80 MILLIGRAM(S): 80 TABLET, FILM COATED ORAL at 22:10

## 2024-03-05 RX ADMIN — Medication 10 MILLIGRAM(S): at 05:08

## 2024-03-05 RX ADMIN — ISOSORBIDE DINITRATE 10 MILLIGRAM(S): 5 TABLET ORAL at 05:08

## 2024-03-05 RX ADMIN — Medication 10 MILLIGRAM(S): at 08:28

## 2024-03-05 RX ADMIN — Medication 1 APPLICATION(S): at 05:09

## 2024-03-05 RX ADMIN — Medication 12.5 MILLIGRAM(S): at 17:11

## 2024-03-05 RX ADMIN — Medication 81 MILLIGRAM(S): at 08:27

## 2024-03-05 RX ADMIN — HEPARIN SODIUM 5000 UNIT(S): 5000 INJECTION INTRAVENOUS; SUBCUTANEOUS at 22:10

## 2024-03-05 RX ADMIN — Medication 1 APPLICATION(S): at 17:12

## 2024-03-05 RX ADMIN — CHLORHEXIDINE GLUCONATE 1 APPLICATION(S): 213 SOLUTION TOPICAL at 05:09

## 2024-03-05 RX ADMIN — Medication 12.5 MILLIGRAM(S): at 05:08

## 2024-03-05 RX ADMIN — Medication 10 MILLIGRAM(S): at 13:04

## 2024-03-05 RX ADMIN — Medication 10 MILLIGRAM(S): at 22:11

## 2024-03-05 RX ADMIN — HEPARIN SODIUM 5000 UNIT(S): 5000 INJECTION INTRAVENOUS; SUBCUTANEOUS at 13:05

## 2024-03-05 RX ADMIN — QUETIAPINE FUMARATE 50 MILLIGRAM(S): 200 TABLET, FILM COATED ORAL at 22:10

## 2024-03-05 NOTE — DISCHARGE NOTE NURSING/CASE MANAGEMENT/SOCIAL WORK - PATIENT PORTAL LINK FT
You can access the FollowMyHealth Patient Portal offered by [facility] by registering at the following website: [email or web address]. By joining [facility]’s FollowMyHealth portal, you will also be able to view your health information using other applications (apps) compatible with our system.

## 2024-03-05 NOTE — PROGRESS NOTE ADULT - SUBJECTIVE AND OBJECTIVE BOX
Nicolasa Mckenzie M.D.    Patient is a 78y old  Male who presents with a chief complaint of Cardiogenic Shock (04 Mar 2024 12:10)      SUBJECTIVE / OVERNIGHT EVENTS: no concerns.     Patient denies chest pain, SOB, abd pain, N/V, fever, chills, dysuria or any other complaints. All remainder ROS negative.     MEDICATIONS  (STANDING):  ARIPiprazole 5 milliGRAM(s) Oral daily  aspirin  chewable 81 milliGRAM(s) Oral daily  atorvastatin 80 milliGRAM(s) Oral at bedtime  busPIRone 10 milliGRAM(s) Oral <User Schedule>  ceFAZolin  Injectable. 1000 milliGRAM(s) IV Push once  chlorhexidine 2% Cloths 1 Application(s) Topical <User Schedule>  dextrose 50% Injectable 25 Gram(s) IV Push once  dextrose 50% Injectable 12.5 Gram(s) IV Push once  dextrose 50% Injectable 25 Gram(s) IV Push once  dextrose 50% Injectable 12.5 Gram(s) IV Push once  dextrose 50% Injectable 12.5 Gram(s) IV Push once  dextrose 50% Injectable 12.5 Gram(s) IV Push once  dextrose 50% Injectable 12.5 Gram(s) IV Push once  heparin   Injectable 5000 Unit(s) SubCutaneous every 8 hours  hydrALAZINE 10 milliGRAM(s) Oral three times a day  influenza  Vaccine (HIGH DOSE) 0.7 milliLiter(s) IntraMuscular once  insulin glargine Injectable (LANTUS) 5 Unit(s) SubCutaneous every morning  insulin lispro (ADMELOG) corrective regimen sliding scale   SubCutaneous every 6 hours  isosorbide   dinitrate Tablet (ISORDIL) 10 milliGRAM(s) Oral three times a day  metoprolol succinate ER 12.5 milliGRAM(s) Oral two times a day  pantoprazole  Injectable 40 milliGRAM(s) IV Push daily  QUEtiapine 50 milliGRAM(s) Oral at bedtime  sertraline 100 milliGRAM(s) Oral daily  triamcinolone 0.1% Cream 1 Application(s) Topical two times a day    MEDICATIONS  (PRN):  acetaminophen     Tablet .. 650 milliGRAM(s) Oral every 6 hours PRN Temp greater or equal to 38C (100.4F), Mild Pain (1 - 3), Moderate Pain (4 - 6)  melatonin 3 milliGRAM(s) Oral at bedtime PRN Insomnia  sodium chloride 0.9% lock flush 10 milliLiter(s) IV Push every 1 hour PRN Pre/post blood products, medications, blood draw, and to maintain line patency      I&O's Summary    04 Mar 2024 07:01  -  05 Mar 2024 07:00  --------------------------------------------------------  IN: 480 mL / OUT: 400 mL / NET: 80 mL        PHYSICAL EXAM:  Vital Signs Last 24 Hrs  T(C): 37.2 (05 Mar 2024 07:55), Max: 37.2 (05 Mar 2024 07:55)  T(F): 98.9 (05 Mar 2024 07:55), Max: 98.9 (05 Mar 2024 07:55)  HR: 81 (05 Mar 2024 12:56) (70 - 81)  BP: 120/69 (05 Mar 2024 12:56) (102/56 - 126/64)  BP(mean): 86 (05 Mar 2024 12:56) (86 - 86)  RR: 17 (05 Mar 2024 07:55) (17 - 18)  SpO2: 97% (05 Mar 2024 07:55) (95% - 98%)    Parameters below as of 05 Mar 2024 07:55  Patient On (Oxygen Delivery Method): room air        GENERAL: NAD, frail elderly cachectic   NERVOUS SYSTEM:  Alert & Oriented X2-3,  Moves upper and lower extremities; CNS-II-XII  CHEST/LUNG: Some secretions noted, No rales, rhonchi, wheezing  HEART: Regular rate and rhythm;   ABDOMEN: Soft, Nontender, Nondistended; Bowel sounds present    LABS:                    CAPILLARY BLOOD GLUCOSE      POCT Blood Glucose.: 134 mg/dL (05 Mar 2024 11:48)  POCT Blood Glucose.: 110 mg/dL (05 Mar 2024 07:56)  POCT Blood Glucose.: 101 mg/dL (05 Mar 2024 04:35)  POCT Blood Glucose.: 149 mg/dL (04 Mar 2024 23:30)  POCT Blood Glucose.: 123 mg/dL (04 Mar 2024 17:04)      RADIOLOGY & ADDITIONAL TESTS:  Results Reviewed:   Imaging Personally Reviewed:  Electrocardiogram Personally Reviewed:

## 2024-03-05 NOTE — PROGRESS NOTE ADULT - ASSESSMENT
78 y/oM PMH of HTN, HLD, CKD, plaque psoriasis, DM-2 who came to the ED (02/12/24) complaining of chest pain radiating to left arm associated with multiple episodes of diarrhea.  On ED arrival, afebrile, hypotensive to SBP 80-90s despite 1L IVF requiring Levophed. EKG with nonspecific ST changes, troponin 2344->2477. Labs otherwise notable for K 5.6, BUN/Cr 43.5/2.31 (baseline Cr ~1 in 2022), BNP 54108, lactate 6.2. CXR with pulmonary edema. Placed on BiPAP ,given Lasix 40mg. TTE with EF<20%, multiple segmental abnormalities, mod MR, mod TR, mod pericardial effusion without tamponade; started on Heparin drip for NSTEMI, cardiology consulted. Admitted to MICU for further management of cardiogenic shock. On 02/14/24: patient became obtunded, febrile, intubated for airway protection. Interventional cardiology also on board; S/P LHC: Severe multi-vessel CAD including LM, CABG recommended. CT surgery deemed patient poor candidate for CABG. HF team also following; s/p RHC: shows low filling pressures with normal cardiac output. Nephrology following for WES. Patient extubated (02/18/24); NG tube in place.   patient DNR/DNI.   SLP 2/20/24- NPO w/ GOC discussion regarding PEG vs pleasure feeds.    #NSTEMI  -S/p LHC: severe multiple-vessel CAD, not a candidate for CABG   -c/w Asprin 81mg , Atorvastatin 80mg   -Cardiology following, no plan for cabg at this time  -pt now hospice pending    #Acute Systolic CHF  -s/p Bumex and Lasix drip   -c/w hydralazine , imdur   -HF following   -Echo done   -diuretics on hold for now     #Cardiogenic shock   -s/p Levophed and Dobutamine   -Monitor BP    #Acute metabolic encephalopathy - hx of dysphagia- s/p extubation 2/18  -Aspiration precautions  -SLP rec NPO--cont TF for now via NGT  with free water- PEG, now decision for no PEG and comfort feeds but later in the day family again wanted to discuss steps incase he aspirates on comfort feeds   -GI informed of family decision to cancel PEG- (daughter directly called Marco A on the floor and asked for cancel PEG  -cardio consult noted   -Discussed w/ son on 2/29/2024, agreeable for pleasure feeding  -home hospice referral made    #Leukocytosis  likely reactive, no signs of infection.   -Monitor WBC, temp.  -hold off abxs for now     #WES on CKD:  -Nephrology following   -Monitor renal function     #DM-2  -Lantus 15 units AM   -Insulin sliding scale   -A1C 7    #Depression:    -Buspirone 10mg   -Aripiprazole 5mg   -Seroquel 50mg HS   -Sertraline 100mg     #DVT prophylaxis: Heparin sc    #Palliative following   - Final SLP as per discussion w/ son on 2/29/2024, failed, family understands and agreeable for comfort feeds and home hospice  #code: dnr/ dni / NIV  Dispo: home hospice pending

## 2024-03-05 NOTE — DISCHARGE NOTE NURSING/CASE MANAGEMENT/SOCIAL WORK - NSDCPEFALRISK_GEN_ALL_CORE
For information on Fall & Injury Prevention, visit: [email or web address] OR  [email or web address] OR  https://www.cdc.gov/steadi/patient.html

## 2024-03-05 NOTE — PROGRESS NOTE ADULT - REASON FOR ADMISSION
Cardiogenic Shock

## 2024-03-05 NOTE — PROGRESS NOTE ADULT - NUTRITIONAL ASSESSMENT
This patient has been assessed with a concern for Malnutrition and has been determined to have a diagnosis/diagnoses of Severe protein-calorie malnutrition.    This patient is being managed with:   Diet NPO with Tube Feed-  Tube Feeding Modality: Nasogastric  Glucerna 1.5 Thomas (GLUCERNA1.5)  Total Volume for 24 Hours (mL): 1200  Continuous  Starting Tube Feed Rate {mL per Hour}: 10  Increase Tube Feed Rate by (mL): 10     Every 6 hours  Until Goal Tube Feed Rate (mL per Hour): 50  Tube Feed Duration (in Hours): 24  Tube Feed Start Time: 22:00  Entered: Feb 22 2024  5:32PM  
This patient has been assessed with a concern for Malnutrition and has been determined to have a diagnosis/diagnoses of Severe protein-calorie malnutrition.    This patient is being managed with:   Diet NPO after Midnight-     NPO Start Date: 27-Feb-2024   NPO Start Time: 23:59  Entered: Feb 27 2024  1:53PM    Diet NPO after Midnight-     NPO Start Date: 27-Feb-2024   NPO Start Time: 23:59  Except Medications  Entered: Feb 27 2024  1:23PM    Diet NPO with Tube Feed-  Tube Feeding Modality: Nasogastric  Glucerna 1.5 Thomas (GLUCERNA1.5)  Total Volume for 24 Hours (mL): 1200  Continuous  Starting Tube Feed Rate {mL per Hour}: 10  Increase Tube Feed Rate by (mL): 10     Every 6 hours  Until Goal Tube Feed Rate (mL per Hour): 50  Tube Feed Duration (in Hours): 24  Tube Feed Start Time: 22:00  Entered: Feb 22 2024  5:32PM  
This patient has been assessed with a concern for Malnutrition and has been determined to have a diagnosis/diagnoses of Severe protein-calorie malnutrition.    This patient is being managed with:   Diet NPO with Tube Feed-  Tube Feeding Modality: Nasogastric  Glucerna 1.5 Thomas (GLUCERNA1.5)  Total Volume for 24 Hours (mL): 1200  Continuous  Starting Tube Feed Rate {mL per Hour}: 10  Increase Tube Feed Rate by (mL): 10     Every 6 hours  Until Goal Tube Feed Rate (mL per Hour): 50  Tube Feed Duration (in Hours): 24  Tube Feed Start Time: 22:00  Entered: Feb 15 2024  9:30PM  
This patient has been assessed with a concern for Malnutrition and has been determined to have a diagnosis/diagnoses of Severe protein-calorie malnutrition.    This patient is being managed with:   Diet NPO with Tube Feed-  Tube Feeding Modality: Nasogastric  Glucerna 1.5 Thomas (GLUCERNA1.5)  Total Volume for 24 Hours (mL): 1200  Continuous  Starting Tube Feed Rate {mL per Hour}: 10  Increase Tube Feed Rate by (mL): 10     Every 6 hours  Until Goal Tube Feed Rate (mL per Hour): 50  Tube Feed Duration (in Hours): 24  Tube Feed Start Time: 22:00  Entered: Feb 15 2024  9:30PM  
This patient has been assessed with a concern for Malnutrition and has been determined to have a diagnosis/diagnoses of Severe protein-calorie malnutrition.    This patient is being managed with:   Diet NPO with Tube Feed-  Tube Feeding Modality: Nasogastric  Glucerna 1.5 Thomas (GLUCERNA1.5)  Total Volume for 24 Hours (mL): 1200  Continuous  Starting Tube Feed Rate {mL per Hour}: 10  Increase Tube Feed Rate by (mL): 10     Every 6 hours  Until Goal Tube Feed Rate (mL per Hour): 50  Tube Feed Duration (in Hours): 24  Tube Feed Start Time: 22:00  Entered: Feb 22 2024  5:32PM  
This patient has been assessed with a concern for Malnutrition and has been determined to have a diagnosis/diagnoses of Severe protein-calorie malnutrition.    This patient is being managed with:   Diet NPO with Tube Feed-  Tube Feeding Modality: Nasogastric  Glucerna 1.5 Thomas (GLUCERNA1.5)  Total Volume for 24 Hours (mL): 1200  Continuous  Starting Tube Feed Rate {mL per Hour}: 10  Increase Tube Feed Rate by (mL): 10     Every 6 hours  Until Goal Tube Feed Rate (mL per Hour): 50  Tube Feed Duration (in Hours): 24  Tube Feed Start Time: 22:00  Entered: Feb 15 2024  9:30PM  
This patient has been assessed with a concern for Malnutrition and has been determined to have a diagnosis/diagnoses of Severe protein-calorie malnutrition.    This patient is being managed with:   Diet NPO with Tube Feed-  Tube Feeding Modality: Nasogastric  Glucerna 1.5 Thomas (GLUCERNA1.5)  Total Volume for 24 Hours (mL): 1200  Continuous  Starting Tube Feed Rate {mL per Hour}: 10  Increase Tube Feed Rate by (mL): 10     Every 6 hours  Until Goal Tube Feed Rate (mL per Hour): 50  Tube Feed Duration (in Hours): 24  Tube Feed Start Time: 22:00  Entered: Feb 22 2024  5:32PM  
This patient has been assessed with a concern for Malnutrition and has been determined to have a diagnosis/diagnoses of Severe protein-calorie malnutrition.    This patient is being managed with:   Diet NPO with Tube Feed-  Tube Feeding Modality: Nasogastric  Glucerna 1.5 Thomas (GLUCERNA1.5)  Total Volume for 24 Hours (mL): 1200  Continuous  Starting Tube Feed Rate {mL per Hour}: 10  Increase Tube Feed Rate by (mL): 10     Every 6 hours  Until Goal Tube Feed Rate (mL per Hour): 50  Tube Feed Duration (in Hours): 24  Tube Feed Start Time: 22:00  Entered: Feb 22 2024  5:32PM  
This patient has been assessed with a concern for Malnutrition and has been determined to have a diagnosis/diagnoses of Severe protein-calorie malnutrition.    This patient is being managed with:   Diet NPO with Tube Feed-  Tube Feeding Modality: Nasogastric  Glucerna 1.5 Thomas (GLUCERNA1.5)  Total Volume for 24 Hours (mL): 1200  Continuous  Starting Tube Feed Rate {mL per Hour}: 10  Increase Tube Feed Rate by (mL): 10     Every 6 hours  Until Goal Tube Feed Rate (mL per Hour): 50  Tube Feed Duration (in Hours): 24  Tube Feed Start Time: 22:00  Entered: Feb 15 2024  9:30PM  
This patient has been assessed with a concern for Malnutrition and has been determined to have a diagnosis/diagnoses of Severe protein-calorie malnutrition.    This patient is being managed with:   Diet Pureed-  Entered: Mar  1 2024 12:00PM  
This patient has been assessed with a concern for Malnutrition and has been determined to have a diagnosis/diagnoses of Severe protein-calorie malnutrition.    This patient is being managed with:   Diet Pureed-  Entered: Mar  1 2024 12:00PM  
This patient has been assessed with a concern for Malnutrition and has been determined to have a diagnosis/diagnoses of Severe protein-calorie malnutrition.    This patient is being managed with:   Diet NPO with Tube Feed-  Tube Feeding Modality: Nasogastric  Glucerna 1.5 Thomas (GLUCERNA1.5)  Total Volume for 24 Hours (mL): 1200  Continuous  Starting Tube Feed Rate {mL per Hour}: 10  Increase Tube Feed Rate by (mL): 10     Every 6 hours  Until Goal Tube Feed Rate (mL per Hour): 50  Tube Feed Duration (in Hours): 24  Tube Feed Start Time: 22:00  Entered: Feb 22 2024  5:32PM  
This patient has been assessed with a concern for Malnutrition and has been determined to have a diagnosis/diagnoses of Severe protein-calorie malnutrition.    This patient is being managed with:   Diet Pureed-  Entered: Mar  1 2024 12:00PM  
This patient has been assessed with a concern for Malnutrition and has been determined to have a diagnosis/diagnoses of Severe protein-calorie malnutrition.    This patient is being managed with:   Diet Pureed-  Entered: Mar  1 2024 12:00PM  
This patient has been assessed with a concern for Malnutrition and has been determined to have a diagnosis/diagnoses of Severe protein-calorie malnutrition.    This patient is being managed with:   Diet NPO with Tube Feed-  Tube Feeding Modality: Nasogastric  Glucerna 1.5 Thomas (GLUCERNA1.5)  Total Volume for 24 Hours (mL): 1200  Continuous  Starting Tube Feed Rate {mL per Hour}: 10  Increase Tube Feed Rate by (mL): 10     Every 6 hours  Until Goal Tube Feed Rate (mL per Hour): 50  Tube Feed Duration (in Hours): 24  Tube Feed Start Time: 22:00  Entered: Feb 15 2024  9:30PM  
This patient has been assessed with a concern for Malnutrition and has been determined to have a diagnosis/diagnoses of Severe protein-calorie malnutrition.    This patient is being managed with:   Diet NPO with Tube Feed-  Tube Feeding Modality: Nasogastric  Glucerna 1.5 Thomas (GLUCERNA1.5)  Total Volume for 24 Hours (mL): 1200  Continuous  Starting Tube Feed Rate {mL per Hour}: 10  Increase Tube Feed Rate by (mL): 10     Every 6 hours  Until Goal Tube Feed Rate (mL per Hour): 50  Tube Feed Duration (in Hours): 24  Tube Feed Start Time: 22:00  Entered: Feb 22 2024  5:32PM  
This patient has been assessed with a concern for Malnutrition and has been determined to have a diagnosis/diagnoses of Severe protein-calorie malnutrition.    This patient is being managed with:   Diet NPO with Tube Feed-  Tube Feeding Modality: Nasogastric  Glucerna 1.5 Thomas (GLUCERNA1.5)  Total Volume for 24 Hours (mL): 1200  Continuous  Starting Tube Feed Rate {mL per Hour}: 10  Increase Tube Feed Rate by (mL): 10     Every 6 hours  Until Goal Tube Feed Rate (mL per Hour): 50  Tube Feed Duration (in Hours): 24  Tube Feed Start Time: 22:00  Entered: Feb 22 2024  5:32PM

## 2024-03-05 NOTE — PROGRESS NOTE ADULT - PROVIDER SPECIALTY LIST ADULT
Cardiology
Cardiology
Critical Care
Hospitalist
Intervent Cardiology
Nephrology
Palliative Care
Palliative Care
Critical Care
Hospitalist
Internal Medicine
Internal Medicine
Palliative Care
Cardiology
Hospitalist
Nephrology
Palliative Care
Palliative Care
CT Surgery
Critical Care
Gastroenterology
Heart Failure
Hospitalist
MICU
Nephrology
Palliative Care
Cardiology
Heart Failure
Hospitalist
Cardiology
Heart Failure
Cardiology
Palliative Care
Heart Failure
Cardiology
Cardiology
Hospitalist

## 2024-03-06 VITALS
OXYGEN SATURATION: 97 % | DIASTOLIC BLOOD PRESSURE: 67 MMHG | TEMPERATURE: 98 F | SYSTOLIC BLOOD PRESSURE: 117 MMHG | RESPIRATION RATE: 17 BRPM | HEART RATE: 73 BPM

## 2024-03-06 LAB
GLUCOSE BLDC GLUCOMTR-MCNC: 129 MG/DL — HIGH (ref 70–99)
GLUCOSE BLDC GLUCOMTR-MCNC: 135 MG/DL — HIGH (ref 70–99)
GLUCOSE BLDC GLUCOMTR-MCNC: 146 MG/DL — HIGH (ref 70–99)
GLUCOSE BLDC GLUCOMTR-MCNC: 151 MG/DL — HIGH (ref 70–99)

## 2024-03-06 PROCEDURE — 83880 ASSAY OF NATRIURETIC PEPTIDE: CPT

## 2024-03-06 PROCEDURE — 82803 BLOOD GASES ANY COMBINATION: CPT

## 2024-03-06 PROCEDURE — 80061 LIPID PANEL: CPT

## 2024-03-06 PROCEDURE — 86901 BLOOD TYPING SEROLOGIC RH(D): CPT

## 2024-03-06 PROCEDURE — 83036 HEMOGLOBIN GLYCOSYLATED A1C: CPT

## 2024-03-06 PROCEDURE — 80048 BASIC METABOLIC PNL TOTAL CA: CPT

## 2024-03-06 PROCEDURE — 86850 RBC ANTIBODY SCREEN: CPT

## 2024-03-06 PROCEDURE — 93321 DOPPLER ECHO F-UP/LMTD STD: CPT

## 2024-03-06 PROCEDURE — 96375 TX/PRO/DX INJ NEW DRUG ADDON: CPT

## 2024-03-06 PROCEDURE — 82330 ASSAY OF CALCIUM: CPT

## 2024-03-06 PROCEDURE — 84100 ASSAY OF PHOSPHORUS: CPT

## 2024-03-06 PROCEDURE — 84295 ASSAY OF SERUM SODIUM: CPT

## 2024-03-06 PROCEDURE — 84145 PROCALCITONIN (PCT): CPT

## 2024-03-06 PROCEDURE — 87040 BLOOD CULTURE FOR BACTERIA: CPT

## 2024-03-06 PROCEDURE — 82435 ASSAY OF BLOOD CHLORIDE: CPT

## 2024-03-06 PROCEDURE — 94760 N-INVAS EAR/PLS OXIMETRY 1: CPT

## 2024-03-06 PROCEDURE — 99285 EMERGENCY DEPT VISIT HI MDM: CPT

## 2024-03-06 PROCEDURE — 84443 ASSAY THYROID STIM HORMONE: CPT

## 2024-03-06 PROCEDURE — 80053 COMPREHEN METABOLIC PANEL: CPT

## 2024-03-06 PROCEDURE — 81001 URINALYSIS AUTO W/SCOPE: CPT

## 2024-03-06 PROCEDURE — 71250 CT THORAX DX C-: CPT

## 2024-03-06 PROCEDURE — 85610 PROTHROMBIN TIME: CPT

## 2024-03-06 PROCEDURE — 82962 GLUCOSE BLOOD TEST: CPT

## 2024-03-06 PROCEDURE — 94003 VENT MGMT INPAT SUBQ DAY: CPT

## 2024-03-06 PROCEDURE — 83690 ASSAY OF LIPASE: CPT

## 2024-03-06 PROCEDURE — 85014 HEMATOCRIT: CPT

## 2024-03-06 PROCEDURE — 0225U NFCT DS DNA&RNA 21 SARSCOV2: CPT

## 2024-03-06 PROCEDURE — 97530 THERAPEUTIC ACTIVITIES: CPT

## 2024-03-06 PROCEDURE — 83605 ASSAY OF LACTIC ACID: CPT

## 2024-03-06 PROCEDURE — 83735 ASSAY OF MAGNESIUM: CPT

## 2024-03-06 PROCEDURE — C1889: CPT

## 2024-03-06 PROCEDURE — 84484 ASSAY OF TROPONIN QUANT: CPT

## 2024-03-06 PROCEDURE — 99239 HOSP IP/OBS DSCHRG MGMT >30: CPT

## 2024-03-06 PROCEDURE — 87070 CULTURE OTHR SPECIMN AEROBIC: CPT

## 2024-03-06 PROCEDURE — 87641 MR-STAPH DNA AMP PROBE: CPT

## 2024-03-06 PROCEDURE — 87449 NOS EACH ORGANISM AG IA: CPT

## 2024-03-06 PROCEDURE — 87086 URINE CULTURE/COLONY COUNT: CPT

## 2024-03-06 PROCEDURE — 82947 ASSAY GLUCOSE BLOOD QUANT: CPT

## 2024-03-06 PROCEDURE — 96374 THER/PROPH/DIAG INJ IV PUSH: CPT

## 2024-03-06 PROCEDURE — 36415 COLL VENOUS BLD VENIPUNCTURE: CPT

## 2024-03-06 PROCEDURE — 85018 HEMOGLOBIN: CPT

## 2024-03-06 PROCEDURE — 94002 VENT MGMT INPAT INIT DAY: CPT

## 2024-03-06 PROCEDURE — 93456 R HRT CORONARY ARTERY ANGIO: CPT

## 2024-03-06 PROCEDURE — 86900 BLOOD TYPING SEROLOGIC ABO: CPT

## 2024-03-06 PROCEDURE — 85027 COMPLETE CBC AUTOMATED: CPT

## 2024-03-06 PROCEDURE — C1760: CPT

## 2024-03-06 PROCEDURE — 85730 THROMBOPLASTIN TIME PARTIAL: CPT

## 2024-03-06 PROCEDURE — C1894: CPT

## 2024-03-06 PROCEDURE — C8929: CPT

## 2024-03-06 PROCEDURE — 93880 EXTRACRANIAL BILAT STUDY: CPT

## 2024-03-06 PROCEDURE — 93005 ELECTROCARDIOGRAM TRACING: CPT

## 2024-03-06 PROCEDURE — C1887: CPT

## 2024-03-06 PROCEDURE — 87640 STAPH A DNA AMP PROBE: CPT

## 2024-03-06 PROCEDURE — 84132 ASSAY OF SERUM POTASSIUM: CPT

## 2024-03-06 PROCEDURE — 92610 EVALUATE SWALLOWING FUNCTION: CPT

## 2024-03-06 PROCEDURE — 97116 GAIT TRAINING THERAPY: CPT

## 2024-03-06 PROCEDURE — 94660 CPAP INITIATION&MGMT: CPT

## 2024-03-06 PROCEDURE — 93308 TTE F-UP OR LMTD: CPT

## 2024-03-06 PROCEDURE — 74230 X-RAY XM SWLNG FUNCJ C+: CPT

## 2024-03-06 PROCEDURE — 85025 COMPLETE CBC W/AUTO DIFF WBC: CPT

## 2024-03-06 PROCEDURE — 70450 CT HEAD/BRAIN W/O DYE: CPT

## 2024-03-06 PROCEDURE — 71045 X-RAY EXAM CHEST 1 VIEW: CPT

## 2024-03-06 PROCEDURE — C1769: CPT

## 2024-03-06 RX ADMIN — Medication 2: at 12:22

## 2024-03-06 RX ADMIN — SERTRALINE 100 MILLIGRAM(S): 25 TABLET, FILM COATED ORAL at 12:22

## 2024-03-06 RX ADMIN — Medication 10 MILLIGRAM(S): at 12:22

## 2024-03-06 RX ADMIN — ISOSORBIDE DINITRATE 10 MILLIGRAM(S): 5 TABLET ORAL at 07:11

## 2024-03-06 RX ADMIN — Medication 81 MILLIGRAM(S): at 12:41

## 2024-03-06 RX ADMIN — Medication 12.5 MILLIGRAM(S): at 07:07

## 2024-03-06 RX ADMIN — Medication 10 MILLIGRAM(S): at 07:09

## 2024-03-06 RX ADMIN — CHLORHEXIDINE GLUCONATE 1 APPLICATION(S): 213 SOLUTION TOPICAL at 07:10

## 2024-03-06 RX ADMIN — HEPARIN SODIUM 5000 UNIT(S): 5000 INJECTION INTRAVENOUS; SUBCUTANEOUS at 07:07

## 2024-03-06 RX ADMIN — INSULIN GLARGINE 5 UNIT(S): 100 INJECTION, SOLUTION SUBCUTANEOUS at 08:26

## 2024-03-06 RX ADMIN — PANTOPRAZOLE SODIUM 40 MILLIGRAM(S): 20 TABLET, DELAYED RELEASE ORAL at 12:22

## 2024-03-06 RX ADMIN — ARIPIPRAZOLE 5 MILLIGRAM(S): 15 TABLET ORAL at 12:22

## 2024-03-06 RX ADMIN — Medication 1 APPLICATION(S): at 07:09

## 2024-05-10 NOTE — ED PROVIDER NOTE - NS ED MD DISPO DISCHARGE CCDA
Follow-up with primary care provider.  Call Monday morning to schedule a lab test to recheck your sodium level.  Take the Keflex 3 times a day to treat cellulitis.  Start taking this before bed tonight.  Return to emergency department if redness is worsening after 48 hours on the antibiotic.  Return if you develop confusion, nausea, worsening weakness.  
Patient/Caregiver provided printed discharge information.

## 2024-06-04 NOTE — DISCHARGE NOTE PROVIDER - CARE PROVIDER_API CALL
Patient discussed on morning rounds with Dr. Rader    Operation / Date: Bronchoscopy, LVATS, RA, Lingular segmentectomy, MLND, 5/29/24    SUBJECTIVE ASSESSMENT:  73y Female with same complaints of L chest wall discomfort where chest tube is located, controlled with tylenol. Otherwise denies dizziness, sob, abd pain, n/v/d, LE swelling. Walking well in hallways.     Vital Signs Last 24 Hrs  T(C): 37.3 (04 Jun 2024 17:45), Max: 37.3 (04 Jun 2024 17:45)  T(F): 99.1 (04 Jun 2024 17:45), Max: 99.1 (04 Jun 2024 17:45)  HR: 68 (04 Jun 2024 14:01) (66 - 94)  BP: 127/60 (04 Jun 2024 14:01) (124/63 - 154/78)  BP(mean): 86 (04 Jun 2024 14:01) (84 - 107)  RR: 18 (04 Jun 2024 14:01) (18 - 18)  SpO2: 94% (04 Jun 2024 14:01) (94% - 98%)    Parameters below as of 04 Jun 2024 14:01  Patient On (Oxygen Delivery Method): room air    I&O's Detail    03 Jun 2024 07:01  -  04 Jun 2024 07:00  --------------------------------------------------------  IN:    Oral Fluid: 236 mL  Total IN: 236 mL    OUT:    Chest Tube (mL): 150 mL  Total OUT: 150 mL    Total NET: 86 mL      04 Jun 2024 07:01  -  04 Jun 2024 18:15  --------------------------------------------------------  IN:    Oral Fluid: 300 mL  Total IN: 300 mL    OUT:    Chest Tube (mL): 0 mL    Voided (mL): 1 mL  Total OUT: 1 mL    Total NET: 299 mL      CHEST TUBE:  Yes AIR LEAKS: No. Suction  LOREN DRAIN:  No.  EPICARDIAL WIRES: No.  TIE DOWNS: Yes.  WEAVER: No.    PHYSICAL EXAM:  Appearance: No acute distress.  Neurologic: AAOx3, no AMS or focal deficits.  Responds appropriately to verbal and physical stimuli; exhibits purposeful movement in all extremities.  Cardiovascular: RRR, S1 S2. No m/r/g.  Respiratory: No acute respiratory distress. L lung w/ crackles, R lung CTA.   Gastrointestinal:  Soft, non-tender, non-distended, + BS.	  Skin: No rashes. No ecchymoses. No cyanosis.  Extremities: Exhibits normal range of motion, no clubbing, cyanosis or edema.  Vascular: Peripheral pulses palpable 2+ bilaterally.  Incisions: LVATS incisions well approximated. L CT in place with dry dressing.     LABS:      COUMADIN: No. REASON: Not indicated     MEDICATIONS  (STANDING):  acetaminophen     Tablet .. 975 milliGRAM(s) Oral every 6 hours  atorvastatin 40 milliGRAM(s) Oral at bedtime  enoxaparin Injectable 40 milliGRAM(s) SubCutaneous every 24 hours  fluticasone furoate/umeclidinium/vilanterol 100-62.5-25 MICROgram(s) Inhaler 1 Puff(s) Inhalation daily  lactated ringers. 1000 milliLiter(s) (50 mL/Hr) IV Continuous <Continuous>  lidocaine   4% Patch 1 Patch Transdermal daily  pantoprazole    Tablet 40 milliGRAM(s) Oral before breakfast  polyethylene glycol 3350 17 Gram(s) Oral daily  Rhopressa 0.02% eye drops 1 Drop(s) 1 Drop(s) Both EYES at bedtime  senna 2 Tablet(s) Oral at bedtime  Talfuprost 0.0015% Eye Drops 1 Drop(s) 1 Drop(s) Both EYES at bedtime  valsartan 320 milliGRAM(s) Oral daily    MEDICATIONS  (PRN):  oxyCODONE    IR 5 milliGRAM(s) Oral every 6 hours PRN Moderate Pain (4 - 6)  oxyCODONE    IR 10 milliGRAM(s) Oral every 6 hours PRN Severe Pain (7 - 10)        RADIOLOGY & ADDITIONAL TESTS:  < from: Xray Chest 1 View- PORTABLE-Routine (Xray Chest 1 View- PORTABLE-Routine in AM.) (06.04.24 @ 05:51) >  IMPRESSION: Small left apex pneumothorax similar to partially improved   compared to prior exam 6/3/2024. Left chest wall subcutaneous emphysema   minimally improved.    < end of copied text >     Primary medical doctor,   Phone: (   )    -  Fax: (   )    -  Follow Up Time:     Primary psychiatrist,   Phone: (   )    -  Fax: (   )    -  Follow Up Time:

## 2024-12-04 NOTE — PHYSICAL THERAPY INITIAL EVALUATION ADULT - TRANSFER SAFETY CONCERNS NOTED: SIT/STAND, REHAB EVAL
Introduction Text (Please End With A Colon): The following procedure was deferred:
Detail Level: Zone
Size Of Lesion In Cm (Optional): 0
Other Procedure: ln2 at time of laser
decreased step length